# Patient Record
Sex: FEMALE | Race: WHITE | Employment: OTHER | URBAN - METROPOLITAN AREA
[De-identification: names, ages, dates, MRNs, and addresses within clinical notes are randomized per-mention and may not be internally consistent; named-entity substitution may affect disease eponyms.]

---

## 2018-01-23 ENCOUNTER — OFFICE VISIT (OUTPATIENT)
Dept: ORTHOPEDIC SURGERY | Age: 68
End: 2018-01-23
Payer: MEDICARE

## 2018-01-23 VITALS — HEIGHT: 67 IN | WEIGHT: 193 LBS | BODY MASS INDEX: 30.29 KG/M2

## 2018-01-23 DIAGNOSIS — M96.1 POSTLAMINECTOMY SYNDROME, LUMBAR REGION: ICD-10-CM

## 2018-01-23 DIAGNOSIS — M48.061 SPINAL STENOSIS, LUMBAR REGION, WITHOUT NEUROGENIC CLAUDICATION: ICD-10-CM

## 2018-01-23 DIAGNOSIS — G89.29 CHRONIC MIDLINE LOW BACK PAIN WITHOUT SCIATICA: Primary | ICD-10-CM

## 2018-01-23 DIAGNOSIS — M54.50 CHRONIC MIDLINE LOW BACK PAIN WITHOUT SCIATICA: Primary | ICD-10-CM

## 2018-01-23 DIAGNOSIS — M43.10 ACQUIRED SPONDYLOLISTHESIS: ICD-10-CM

## 2018-01-23 PROCEDURE — G8598 ASA/ANTIPLAT THER USED: HCPCS | Performed by: ORTHOPAEDIC SURGERY

## 2018-01-23 PROCEDURE — 4040F PNEUMOC VAC/ADMIN/RCVD: CPT | Performed by: ORTHOPAEDIC SURGERY

## 2018-01-23 PROCEDURE — 1090F PRES/ABSN URINE INCON ASSESS: CPT | Performed by: ORTHOPAEDIC SURGERY

## 2018-01-23 PROCEDURE — G8419 CALC BMI OUT NRM PARAM NOF/U: HCPCS | Performed by: ORTHOPAEDIC SURGERY

## 2018-01-23 PROCEDURE — G8427 DOCREV CUR MEDS BY ELIG CLIN: HCPCS | Performed by: ORTHOPAEDIC SURGERY

## 2018-01-23 PROCEDURE — 3014F SCREEN MAMMO DOC REV: CPT | Performed by: ORTHOPAEDIC SURGERY

## 2018-01-23 PROCEDURE — 1036F TOBACCO NON-USER: CPT | Performed by: ORTHOPAEDIC SURGERY

## 2018-01-23 PROCEDURE — 99203 OFFICE O/P NEW LOW 30 MIN: CPT | Performed by: ORTHOPAEDIC SURGERY

## 2018-01-23 PROCEDURE — G8484 FLU IMMUNIZE NO ADMIN: HCPCS | Performed by: ORTHOPAEDIC SURGERY

## 2018-01-23 PROCEDURE — G8400 PT W/DXA NO RESULTS DOC: HCPCS | Performed by: ORTHOPAEDIC SURGERY

## 2018-01-23 PROCEDURE — 3017F COLORECTAL CA SCREEN DOC REV: CPT | Performed by: ORTHOPAEDIC SURGERY

## 2018-01-23 PROCEDURE — 1123F ACP DISCUSS/DSCN MKR DOCD: CPT | Performed by: ORTHOPAEDIC SURGERY

## 2018-01-23 ASSESSMENT — ENCOUNTER SYMPTOMS: BACK PAIN: 1

## 2018-01-23 NOTE — PROGRESS NOTES
Subjective:      Patient ID: Caden Menchaca is a 75 y.o. female. Back Pain   This is a chronic problem. The current episode started more than 1 year ago. The problem occurs constantly. The problem is unchanged. The pain is present in the gluteal and lumbar spine. The quality of the pain is described as aching. The pain radiates to the right thigh. The pain is severe. The pain is the same all the time. The symptoms are aggravated by standing, position and bending. Stiffness is present all day. Risk factors include obesity. Patient presents with chronic low back pain with neurogenic claudication radiation into the right greater than left buttock and intermittently down the leg. Patient with positive claudication symptoms. Patient with history of L3 to 5 PLIF in 2012. Patient's symptoms have been fairly constant since that time. Patient has been trialed with a spinal cord stimulator with 85-90% relief of pain. Review of Systems   Musculoskeletal: Positive for back pain. All other systems reviewed and are negative. Objective:   Physical Exam   Constitutional: She is oriented to person, place, and time. She appears well-developed and well-nourished. HENT:   Head: Normocephalic and atraumatic. Eyes: Conjunctivae and EOM are normal.   Neck: Normal range of motion. Pulmonary/Chest: Effort normal. No respiratory distress. Neurological: She is alert and oriented to person, place, and time. She has normal strength. No sensory deficit. Normal gait   Skin: Skin is warm and dry. Psychiatric: Her behavior is normal. Thought content normal.   Nursing note and vitals reviewed.     Diagnostic x-rays AP lateral with lateral flexion and extension obtained reviewed by myself in clinic today - hips normal.    Very satisfactory radiographic appearance of 4 to the sacrum fusion mild retrolisthesis L1 to correction L2-3 with significant degenerative disc disease at L3 4    MRI lumbar spine is reviewed patient with mild to moderate lumbar spinal stenosis L2-3 moderate to moderately severe lumbar spinal stenosis L3 4 history of L3 to 5 PLIF  Assessment:      Encounter Diagnoses   Name Primary?  Chronic midline low back pain without sciatica Yes    Postlaminectomy syndrome, lumbar region     Spinal stenosis, lumbar region, without neurogenic claudication     Acquired spondylolisthesis      Chronic low back pain and neurogenic claudication symptoms    Postlaminectomy syndrome.     Symptoms have been remarkably stable since index surgery in 2012th    85-90% relief reported following spinal cord stimulator trial      Plan:      MRI thoracic spine to ensure room for laminotomy lead    Follow-up    Follow-up will likely schedule for spinal cord stimulator

## 2018-02-12 ENCOUNTER — HOSPITAL ENCOUNTER (OUTPATIENT)
Dept: MRI IMAGING | Age: 68
Discharge: HOME | End: 2018-02-14
Payer: MEDICARE

## 2018-02-12 DIAGNOSIS — M48.061 SPINAL STENOSIS, LUMBAR REGION, WITHOUT NEUROGENIC CLAUDICATION: ICD-10-CM

## 2018-02-12 DIAGNOSIS — M96.1 POSTLAMINECTOMY SYNDROME, LUMBAR REGION: ICD-10-CM

## 2018-02-12 DIAGNOSIS — M43.10 ACQUIRED SPONDYLOLISTHESIS: ICD-10-CM

## 2018-02-12 DIAGNOSIS — G89.29 CHRONIC MIDLINE LOW BACK PAIN WITHOUT SCIATICA: ICD-10-CM

## 2018-02-12 DIAGNOSIS — M54.50 CHRONIC MIDLINE LOW BACK PAIN WITHOUT SCIATICA: ICD-10-CM

## 2018-02-12 PROCEDURE — 72146 MRI CHEST SPINE W/O DYE: CPT

## 2018-07-03 ENCOUNTER — OFFICE VISIT (OUTPATIENT)
Dept: ORTHOPEDIC SURGERY | Age: 68
End: 2018-07-03
Payer: MEDICARE

## 2018-07-03 DIAGNOSIS — M43.10 ACQUIRED SPONDYLOLISTHESIS: Primary | ICD-10-CM

## 2018-07-03 DIAGNOSIS — G89.29 CHRONIC MIDLINE LOW BACK PAIN WITHOUT SCIATICA: ICD-10-CM

## 2018-07-03 DIAGNOSIS — M48.061 SPINAL STENOSIS, LUMBAR REGION, WITHOUT NEUROGENIC CLAUDICATION: ICD-10-CM

## 2018-07-03 DIAGNOSIS — M96.1 POSTLAMINECTOMY SYNDROME, LUMBAR REGION: ICD-10-CM

## 2018-07-03 DIAGNOSIS — M54.50 CHRONIC MIDLINE LOW BACK PAIN WITHOUT SCIATICA: ICD-10-CM

## 2018-07-03 PROCEDURE — 1123F ACP DISCUSS/DSCN MKR DOCD: CPT | Performed by: ORTHOPAEDIC SURGERY

## 2018-07-03 PROCEDURE — 4004F PT TOBACCO SCREEN RCVD TLK: CPT | Performed by: ORTHOPAEDIC SURGERY

## 2018-07-03 PROCEDURE — G8400 PT W/DXA NO RESULTS DOC: HCPCS | Performed by: ORTHOPAEDIC SURGERY

## 2018-07-03 PROCEDURE — 3017F COLORECTAL CA SCREEN DOC REV: CPT | Performed by: ORTHOPAEDIC SURGERY

## 2018-07-03 PROCEDURE — G8598 ASA/ANTIPLAT THER USED: HCPCS | Performed by: ORTHOPAEDIC SURGERY

## 2018-07-03 PROCEDURE — 1090F PRES/ABSN URINE INCON ASSESS: CPT | Performed by: ORTHOPAEDIC SURGERY

## 2018-07-03 PROCEDURE — G8419 CALC BMI OUT NRM PARAM NOF/U: HCPCS | Performed by: ORTHOPAEDIC SURGERY

## 2018-07-03 PROCEDURE — 99213 OFFICE O/P EST LOW 20 MIN: CPT | Performed by: ORTHOPAEDIC SURGERY

## 2018-07-03 PROCEDURE — G8427 DOCREV CUR MEDS BY ELIG CLIN: HCPCS | Performed by: ORTHOPAEDIC SURGERY

## 2018-07-03 PROCEDURE — 4040F PNEUMOC VAC/ADMIN/RCVD: CPT | Performed by: ORTHOPAEDIC SURGERY

## 2018-07-13 ENCOUNTER — TELEPHONE (OUTPATIENT)
Dept: ORTHOPEDIC SURGERY | Age: 68
End: 2018-07-13

## 2018-07-13 NOTE — TELEPHONE ENCOUNTER
Patient calling in for you to send over surgery information to her cardiologist for clearance for her 8/8 spinal stimulator surgery with  [facility]. Her PAT is not until 7/25/18, but the cardiologist will not make her an appointment for clearance until we send all in the information with the request to them. I didn't see anything in the computer, wasn't sure if you already did or not.     Cardiologist is Dr. Marcelle Slater in [facility], fax

## 2018-07-25 ENCOUNTER — HOSPITAL ENCOUNTER (OUTPATIENT)
Dept: PREADMISSION TESTING | Age: 68
Discharge: HOME | End: 2018-07-29
Payer: MEDICARE

## 2018-07-25 VITALS
HEIGHT: 67 IN | SYSTOLIC BLOOD PRESSURE: 126 MMHG | WEIGHT: 198 LBS | OXYGEN SATURATION: 96 % | HEART RATE: 62 BPM | BODY MASS INDEX: 31.08 KG/M2 | TEMPERATURE: 97.4 F | RESPIRATION RATE: 16 BRPM | DIASTOLIC BLOOD PRESSURE: 63 MMHG

## 2018-07-25 LAB
ABSOLUTE EOS #: 0.1 K/UL (ref 0–0.4)
ABSOLUTE IMMATURE GRANULOCYTE: ABNORMAL K/UL (ref 0–0.3)
ABSOLUTE LYMPH #: 1.2 K/UL (ref 1–4.8)
ABSOLUTE MONO #: 0.4 K/UL (ref 0.1–1.3)
ANION GAP SERPL CALCULATED.3IONS-SCNC: 12 MMOL/L (ref 9–17)
BASOPHILS # BLD: 1 % (ref 0–2)
BASOPHILS ABSOLUTE: 0 K/UL (ref 0–0.2)
BUN BLDV-MCNC: 26 MG/DL (ref 8–23)
BUN/CREAT BLD: ABNORMAL (ref 9–20)
CALCIUM SERPL-MCNC: 10 MG/DL (ref 8.6–10.4)
CHLORIDE BLD-SCNC: 105 MMOL/L (ref 98–107)
CO2: 26 MMOL/L (ref 20–31)
CREAT SERPL-MCNC: 1.07 MG/DL (ref 0.5–0.9)
DIFFERENTIAL TYPE: ABNORMAL
EKG ATRIAL RATE: 62 BPM
EKG P AXIS: 32 DEGREES
EKG P-R INTERVAL: 158 MS
EKG Q-T INTERVAL: 434 MS
EKG QRS DURATION: 84 MS
EKG QTC CALCULATION (BAZETT): 440 MS
EKG R AXIS: 1 DEGREES
EKG T AXIS: 54 DEGREES
EKG VENTRICULAR RATE: 62 BPM
EOSINOPHILS RELATIVE PERCENT: 4 % (ref 0–4)
GFR AFRICAN AMERICAN: >60 ML/MIN
GFR NON-AFRICAN AMERICAN: 51 ML/MIN
GFR SERPL CREATININE-BSD FRML MDRD: ABNORMAL ML/MIN/{1.73_M2}
GFR SERPL CREATININE-BSD FRML MDRD: ABNORMAL ML/MIN/{1.73_M2}
GLUCOSE BLD-MCNC: 99 MG/DL (ref 70–99)
HCT VFR BLD CALC: 39.4 % (ref 36–46)
HEMOGLOBIN: 13.6 G/DL (ref 12–16)
IMMATURE GRANULOCYTES: ABNORMAL %
LYMPHOCYTES # BLD: 31 % (ref 24–44)
MCH RBC QN AUTO: 32.3 PG (ref 26–34)
MCHC RBC AUTO-ENTMCNC: 34.5 G/DL (ref 31–37)
MCV RBC AUTO: 93.6 FL (ref 80–100)
MONOCYTES # BLD: 9 % (ref 1–7)
NRBC AUTOMATED: ABNORMAL PER 100 WBC
PDW BLD-RTO: 13.8 % (ref 11.5–14.9)
PLATELET # BLD: 193 K/UL (ref 150–450)
PLATELET ESTIMATE: ABNORMAL
PMV BLD AUTO: 8 FL (ref 6–12)
POTASSIUM SERPL-SCNC: 4 MMOL/L (ref 3.7–5.3)
RBC # BLD: 4.2 M/UL (ref 4–5.2)
RBC # BLD: ABNORMAL 10*6/UL
SEG NEUTROPHILS: 55 % (ref 36–66)
SEGMENTED NEUTROPHILS ABSOLUTE COUNT: 2.2 K/UL (ref 1.3–9.1)
SODIUM BLD-SCNC: 143 MMOL/L (ref 135–144)
WBC # BLD: 3.9 K/UL (ref 3.5–11)
WBC # BLD: ABNORMAL 10*3/UL

## 2018-07-25 PROCEDURE — 93005 ELECTROCARDIOGRAM TRACING: CPT

## 2018-07-25 PROCEDURE — 85025 COMPLETE CBC W/AUTO DIFF WBC: CPT

## 2018-07-25 PROCEDURE — 36415 COLL VENOUS BLD VENIPUNCTURE: CPT

## 2018-07-25 PROCEDURE — 80048 BASIC METABOLIC PNL TOTAL CA: CPT

## 2018-07-25 RX ORDER — ASPIRIN 81 MG/1
81 TABLET ORAL DAILY
Status: ON HOLD | COMMUNITY
End: 2021-08-18 | Stop reason: HOSPADM

## 2018-07-25 RX ORDER — MULTIVIT-MIN/IRON/FOLIC ACID/K 18-600-40
2000 CAPSULE ORAL DAILY
COMMUNITY
End: 2019-07-16 | Stop reason: ALTCHOICE

## 2018-07-25 ASSESSMENT — PAIN DESCRIPTION - LOCATION: LOCATION: BACK

## 2018-07-25 ASSESSMENT — PAIN DESCRIPTION - FREQUENCY: FREQUENCY: CONTINUOUS

## 2018-07-25 ASSESSMENT — PAIN DESCRIPTION - ORIENTATION: ORIENTATION: LOWER

## 2018-07-25 ASSESSMENT — PAIN SCALES - GENERAL: PAINLEVEL_OUTOF10: 8

## 2018-07-25 ASSESSMENT — PAIN DESCRIPTION - ONSET: ONSET: ON-GOING

## 2018-07-25 ASSESSMENT — PAIN DESCRIPTION - DIRECTION: RADIATING_TOWARDS: RIGHT LEG

## 2018-07-25 ASSESSMENT — PAIN DESCRIPTION - PAIN TYPE: TYPE: CHRONIC PAIN

## 2018-07-25 NOTE — H&P
 KIDNEY STONE SURGERY      x 3    LITHOTRIPSY      x 3    PARATHYROIDECTOMY      VARICOSE VEIN SURGERY       FAMILY HISTORY       Family History   Problem Relation Age of Onset    Heart Disease Father      SOCIAL HISTORY       Social History     Social History    Marital status:      Spouse name: N/A    Number of children: N/A    Years of education: N/A     Social History Main Topics    Smoking status: Former Smoker     Packs/day: 1.00     Years: 44.00     Types: Cigarettes     Quit date: 5/25/2010    Smokeless tobacco: Never Used    Alcohol use No    Drug use: No    Sexual activity: Not Asked     Other Topics Concern    None     Social History Narrative    None     REVIEW OF SYSTEMS      Allergies   Allergen Reactions    Toradol [Ketorolac Tromethamine] Swelling     Swelling at site of injection     Current Outpatient Prescriptions on File Prior to Encounter   Medication Sig Dispense Refill    carvedilol (COREG) 12.5 MG tablet Take 1 tablet by mouth 2 times daily (with meals) (Patient taking differently: Take 25 mg by mouth 2 times daily (with meals) ) 60 tablet 3    pantoprazole (PROTONIX) 40 MG tablet Take 1 tablet by mouth daily 30 tablet 0    DULoxetine (CYMBALTA) 60 MG capsule Take 60 mg by mouth daily      gabapentin (NEURONTIN) 300 MG capsule Take 300 mg by mouth 3 times daily. Ely Hoke pravastatin (PRAVACHOL) 80 MG tablet Take 80 mg by mouth nightly      rOPINIRole (REQUIP) 1 MG tablet Take 1 mg by mouth nightly      tiZANidine (ZANAFLEX) 4 MG tablet Take 4 mg by mouth 2 times daily       No current facility-administered medications on file prior to encounter. General health:  Feel well today. No fever or chills. Skin:  No itching, redness or rash. HEENT:  No headache, rhinorrhea, congestion or sore throat. Neck:  No pain, stiffness or masses.                  Cardiovascular/Respiratory system:  No chest pain, palpitations, cough or shortness of breath. Gastrointestinal tract: No abdominal pain, nausea, vomiting, diarrhea or constipation. Genitourinary:  No dysuria, hesitancy, urgency, frequency or discoloration of urine. Locomotor:  See HPI. Neuropsychiatric:  No referable complaints. GENERAL PHYSICAL EXAM:     Vitals: /63   Pulse 62   Temp 97.4 °F (36.3 °C) (Oral)   Resp 16   Ht 5' 7\" (1.702 m)   Wt 198 lb (89.8 kg)   SpO2 96%   BMI 31.01 kg/m²  Body mass index is 31.01 kg/m². GENERAL APPEARANCE: Gabriella Brand is a 75 y.o.  female, mildly obese, nourished, conscious, alert. Does not appear to be in any distress or pain at this time. SKIN:  Normal temperature, turgor and texture. No cyanosis or jaundice. HEAD:  Normocephalic, atraumatic. EYES:  Pupils equal, reactive to light and accomodation. Conjunctiva clear. THROAT:  Mucous membranes moist. No tonsillar erythema or exudates. NECK:  No stiffness, trachea central.  No palpable masses. CHEST:  Symmetrical and equal on expansion. HEART:  Normotensive. Regular rate, rhythm. No murmur. LUNGS:  Equal on expansion. Clear to auscultation with no adventitious sounds. ABDOMEN:  Obese. Soft on palpation. No localized tenderness, guarding or rigidity. No palpable organomegaly. LYMPHATICS:  No palpable cervical lymphadenopathy. LOCOMOTOR, BACK AND SPINE:  Mild tenderness to palpation of lumbar spine. No obvious deformities. No flank tenderness. EXTREMITIES:  Symmetrical with no pretibial/pedal edema. No discoloration or ulcerations. No warmth, tenderness, erythema noted in lower legs bilaterally. Strength 5/5 in lower extremities bilaterally, no sensory deficit.      NEUROLOGIC:  The patient is conscious,

## 2018-07-26 ENCOUNTER — ANESTHESIA EVENT (OUTPATIENT)
Dept: OPERATING ROOM | Age: 68
End: 2018-07-26
Payer: MEDICARE

## 2018-08-08 ENCOUNTER — APPOINTMENT (OUTPATIENT)
Dept: GENERAL RADIOLOGY | Age: 68
End: 2018-08-08
Attending: ORTHOPAEDIC SURGERY
Payer: MEDICARE

## 2018-08-08 ENCOUNTER — ANESTHESIA (OUTPATIENT)
Dept: OPERATING ROOM | Age: 68
End: 2018-08-08
Payer: MEDICARE

## 2018-08-08 ENCOUNTER — HOSPITAL ENCOUNTER (OUTPATIENT)
Age: 68
Setting detail: OUTPATIENT SURGERY
Discharge: HOME | End: 2018-08-08
Attending: ORTHOPAEDIC SURGERY | Admitting: ORTHOPAEDIC SURGERY
Payer: MEDICARE

## 2018-08-08 VITALS
DIASTOLIC BLOOD PRESSURE: 78 MMHG | HEART RATE: 65 BPM | OXYGEN SATURATION: 93 % | TEMPERATURE: 97.8 F | SYSTOLIC BLOOD PRESSURE: 161 MMHG | BODY MASS INDEX: 31.08 KG/M2 | HEIGHT: 67 IN | RESPIRATION RATE: 16 BRPM | WEIGHT: 198 LBS

## 2018-08-08 VITALS
OXYGEN SATURATION: 97 % | RESPIRATION RATE: 10 BRPM | SYSTOLIC BLOOD PRESSURE: 107 MMHG | DIASTOLIC BLOOD PRESSURE: 63 MMHG

## 2018-08-08 DIAGNOSIS — M96.1 POSTLAMINECTOMY SYNDROME, LUMBAR REGION: Primary | ICD-10-CM

## 2018-08-08 PROCEDURE — 2500000003 HC RX 250 WO HCPCS: Performed by: ORTHOPAEDIC SURGERY

## 2018-08-08 PROCEDURE — 2580000003 HC RX 258: Performed by: ORTHOPAEDIC SURGERY

## 2018-08-08 PROCEDURE — 3600000002 HC SURGERY LEVEL 2 BASE: Performed by: ORTHOPAEDIC SURGERY

## 2018-08-08 PROCEDURE — 7100000000 HC PACU RECOVERY - FIRST 15 MIN: Performed by: ORTHOPAEDIC SURGERY

## 2018-08-08 PROCEDURE — 2500000003 HC RX 250 WO HCPCS: Performed by: NURSE ANESTHETIST, CERTIFIED REGISTERED

## 2018-08-08 PROCEDURE — 3600000012 HC SURGERY LEVEL 2 ADDTL 15MIN: Performed by: ORTHOPAEDIC SURGERY

## 2018-08-08 PROCEDURE — 3700000000 HC ANESTHESIA ATTENDED CARE: Performed by: ORTHOPAEDIC SURGERY

## 2018-08-08 PROCEDURE — 63655 IMPLANT NEUROELECTRODES: CPT | Performed by: ORTHOPAEDIC SURGERY

## 2018-08-08 PROCEDURE — 6370000000 HC RX 637 (ALT 250 FOR IP): Performed by: ANESTHESIOLOGY

## 2018-08-08 PROCEDURE — 6360000002 HC RX W HCPCS: Performed by: ORTHOPAEDIC SURGERY

## 2018-08-08 PROCEDURE — 63685 INS/RPLC SPI NPG/RCVR POCKET: CPT | Performed by: ORTHOPAEDIC SURGERY

## 2018-08-08 PROCEDURE — C1787 PATIENT PROGR, NEUROSTIM: HCPCS | Performed by: ORTHOPAEDIC SURGERY

## 2018-08-08 PROCEDURE — 6360000002 HC RX W HCPCS: Performed by: NURSE ANESTHETIST, CERTIFIED REGISTERED

## 2018-08-08 PROCEDURE — 7100000001 HC PACU RECOVERY - ADDTL 15 MIN: Performed by: ORTHOPAEDIC SURGERY

## 2018-08-08 PROCEDURE — 7100000011 HC PHASE II RECOVERY - ADDTL 15 MIN: Performed by: ORTHOPAEDIC SURGERY

## 2018-08-08 PROCEDURE — 7100000031 HC ASPR PHASE II RECOVERY - ADDTL 15 MIN: Performed by: ORTHOPAEDIC SURGERY

## 2018-08-08 PROCEDURE — 3209999900 FLUORO FOR SURGICAL PROCEDURES

## 2018-08-08 PROCEDURE — 7100000030 HC ASPR PHASE II RECOVERY - FIRST 15 MIN: Performed by: ORTHOPAEDIC SURGERY

## 2018-08-08 PROCEDURE — 2720000010 HC SURG SUPPLY STERILE: Performed by: ORTHOPAEDIC SURGERY

## 2018-08-08 PROCEDURE — 3700000001 HC ADD 15 MINUTES (ANESTHESIA): Performed by: ORTHOPAEDIC SURGERY

## 2018-08-08 PROCEDURE — C1822 GEN, NEURO, HF, RECHG BAT: HCPCS | Performed by: ORTHOPAEDIC SURGERY

## 2018-08-08 PROCEDURE — 72070 X-RAY EXAM THORAC SPINE 2VWS: CPT

## 2018-08-08 PROCEDURE — C1778 LEAD, NEUROSTIMULATOR: HCPCS | Performed by: ORTHOPAEDIC SURGERY

## 2018-08-08 PROCEDURE — 2709999900 HC NON-CHARGEABLE SUPPLY: Performed by: ORTHOPAEDIC SURGERY

## 2018-08-08 PROCEDURE — 6360000002 HC RX W HCPCS: Performed by: ANESTHESIOLOGY

## 2018-08-08 PROCEDURE — 7100000010 HC PHASE II RECOVERY - FIRST 15 MIN: Performed by: ORTHOPAEDIC SURGERY

## 2018-08-08 DEVICE — SURGICAL LEAD KIT, 50CM
Type: IMPLANTABLE DEVICE | Site: BACK | Status: FUNCTIONAL
Brand: NEVRO®

## 2018-08-08 DEVICE — SENZA®  IPG KIT
Type: IMPLANTABLE DEVICE | Site: BACK | Status: FUNCTIONAL
Brand: SENZA®

## 2018-08-08 RX ORDER — NEOSTIGMINE METHYLSULFATE 1 MG/ML
INJECTION, SOLUTION INTRAVENOUS PRN
Status: DISCONTINUED | OUTPATIENT
Start: 2018-08-08 | End: 2018-08-08 | Stop reason: SDUPTHER

## 2018-08-08 RX ORDER — FENTANYL CITRATE 50 UG/ML
25 INJECTION, SOLUTION INTRAMUSCULAR; INTRAVENOUS EVERY 5 MIN PRN
Status: DISCONTINUED | OUTPATIENT
Start: 2018-08-08 | End: 2018-08-08 | Stop reason: HOSPADM

## 2018-08-08 RX ORDER — MORPHINE SULFATE 10 MG/ML
INJECTION, SOLUTION INTRAMUSCULAR; INTRAVENOUS PRN
Status: DISCONTINUED | OUTPATIENT
Start: 2018-08-08 | End: 2018-08-08 | Stop reason: SDUPTHER

## 2018-08-08 RX ORDER — MORPHINE SULFATE 2 MG/ML
1 INJECTION, SOLUTION INTRAMUSCULAR; INTRAVENOUS EVERY 5 MIN PRN
Status: DISCONTINUED | OUTPATIENT
Start: 2018-08-08 | End: 2018-08-08 | Stop reason: HOSPADM

## 2018-08-08 RX ORDER — OXYCODONE HYDROCHLORIDE AND ACETAMINOPHEN 5; 325 MG/1; MG/1
2 TABLET ORAL PRN
Status: COMPLETED | OUTPATIENT
Start: 2018-08-08 | End: 2018-08-08

## 2018-08-08 RX ORDER — OXYCODONE HYDROCHLORIDE AND ACETAMINOPHEN 5; 325 MG/1; MG/1
1 TABLET ORAL PRN
Status: COMPLETED | OUTPATIENT
Start: 2018-08-08 | End: 2018-08-08

## 2018-08-08 RX ORDER — CEFAZOLIN SODIUM 1 G/3ML
INJECTION, POWDER, FOR SOLUTION INTRAMUSCULAR; INTRAVENOUS
Status: DISCONTINUED
Start: 2018-08-08 | End: 2018-08-08 | Stop reason: HOSPADM

## 2018-08-08 RX ORDER — BUPIVACAINE HYDROCHLORIDE AND EPINEPHRINE 5; 5 MG/ML; UG/ML
INJECTION, SOLUTION EPIDURAL; INTRACAUDAL; PERINEURAL PRN
Status: DISCONTINUED | OUTPATIENT
Start: 2018-08-08 | End: 2018-08-08 | Stop reason: HOSPADM

## 2018-08-08 RX ORDER — MIDAZOLAM HYDROCHLORIDE 1 MG/ML
INJECTION INTRAMUSCULAR; INTRAVENOUS PRN
Status: DISCONTINUED | OUTPATIENT
Start: 2018-08-08 | End: 2018-08-08 | Stop reason: SDUPTHER

## 2018-08-08 RX ORDER — DIPHENHYDRAMINE HYDROCHLORIDE 50 MG/ML
12.5 INJECTION INTRAMUSCULAR; INTRAVENOUS
Status: DISCONTINUED | OUTPATIENT
Start: 2018-08-08 | End: 2018-08-08 | Stop reason: HOSPADM

## 2018-08-08 RX ORDER — MEPERIDINE HYDROCHLORIDE 50 MG/ML
12.5 INJECTION INTRAMUSCULAR; INTRAVENOUS; SUBCUTANEOUS EVERY 5 MIN PRN
Status: DISCONTINUED | OUTPATIENT
Start: 2018-08-08 | End: 2018-08-08 | Stop reason: HOSPADM

## 2018-08-08 RX ORDER — PROPOFOL 10 MG/ML
INJECTION, EMULSION INTRAVENOUS PRN
Status: DISCONTINUED | OUTPATIENT
Start: 2018-08-08 | End: 2018-08-08 | Stop reason: SDUPTHER

## 2018-08-08 RX ORDER — TRANEXAMIC ACID 100 MG/ML
INJECTION, SOLUTION INTRAVENOUS PRN
Status: DISCONTINUED | OUTPATIENT
Start: 2018-08-08 | End: 2018-08-08 | Stop reason: SDUPTHER

## 2018-08-08 RX ORDER — DEXAMETHASONE SODIUM PHOSPHATE 4 MG/ML
INJECTION, SOLUTION INTRA-ARTICULAR; INTRALESIONAL; INTRAMUSCULAR; INTRAVENOUS; SOFT TISSUE PRN
Status: DISCONTINUED | OUTPATIENT
Start: 2018-08-08 | End: 2018-08-08 | Stop reason: SDUPTHER

## 2018-08-08 RX ORDER — FENTANYL CITRATE 50 UG/ML
INJECTION, SOLUTION INTRAMUSCULAR; INTRAVENOUS PRN
Status: DISCONTINUED | OUTPATIENT
Start: 2018-08-08 | End: 2018-08-08 | Stop reason: SDUPTHER

## 2018-08-08 RX ORDER — GLYCOPYRROLATE 1 MG/5 ML
SYRINGE (ML) INTRAVENOUS PRN
Status: DISCONTINUED | OUTPATIENT
Start: 2018-08-08 | End: 2018-08-08 | Stop reason: SDUPTHER

## 2018-08-08 RX ORDER — OXYCODONE HYDROCHLORIDE AND ACETAMINOPHEN 5; 325 MG/1; MG/1
1-2 TABLET ORAL EVERY 4 HOURS PRN
Qty: 40 TABLET | Refills: 0 | Status: SHIPPED | OUTPATIENT
Start: 2018-08-08 | End: 2019-08-08

## 2018-08-08 RX ORDER — SODIUM CHLORIDE, SODIUM LACTATE, POTASSIUM CHLORIDE, CALCIUM CHLORIDE 600; 310; 30; 20 MG/100ML; MG/100ML; MG/100ML; MG/100ML
INJECTION, SOLUTION INTRAVENOUS CONTINUOUS
Status: DISCONTINUED | OUTPATIENT
Start: 2018-08-08 | End: 2018-08-08 | Stop reason: HOSPADM

## 2018-08-08 RX ORDER — PROMETHAZINE HYDROCHLORIDE 25 MG/ML
6.25 INJECTION, SOLUTION INTRAMUSCULAR; INTRAVENOUS
Status: COMPLETED | OUTPATIENT
Start: 2018-08-08 | End: 2018-08-08

## 2018-08-08 RX ORDER — ROCURONIUM BROMIDE 10 MG/ML
INJECTION, SOLUTION INTRAVENOUS PRN
Status: DISCONTINUED | OUTPATIENT
Start: 2018-08-08 | End: 2018-08-08 | Stop reason: SDUPTHER

## 2018-08-08 RX ADMIN — Medication 0.5 MG: at 11:40

## 2018-08-08 RX ADMIN — NEOSTIGMINE METHYLSULFATE 3 MG: 1 INJECTION, SOLUTION INTRAVENOUS at 11:40

## 2018-08-08 RX ADMIN — OXYCODONE HYDROCHLORIDE AND ACETAMINOPHEN 2 TABLET: 5; 325 TABLET ORAL at 13:37

## 2018-08-08 RX ADMIN — HYDROMORPHONE HYDROCHLORIDE 0.5 MG: 1 INJECTION, SOLUTION INTRAMUSCULAR; INTRAVENOUS; SUBCUTANEOUS at 12:00

## 2018-08-08 RX ADMIN — SODIUM CHLORIDE, POTASSIUM CHLORIDE, SODIUM LACTATE AND CALCIUM CHLORIDE: 600; 310; 30; 20 INJECTION, SOLUTION INTRAVENOUS at 10:09

## 2018-08-08 RX ADMIN — FENTANYL CITRATE 100 MCG: 50 INJECTION, SOLUTION INTRAMUSCULAR; INTRAVENOUS at 10:12

## 2018-08-08 RX ADMIN — Medication 2 G: at 10:09

## 2018-08-08 RX ADMIN — PROPOFOL 200 MG: 10 INJECTION, EMULSION INTRAVENOUS at 10:12

## 2018-08-08 RX ADMIN — SODIUM CHLORIDE, POTASSIUM CHLORIDE, SODIUM LACTATE AND CALCIUM CHLORIDE: 600; 310; 30; 20 INJECTION, SOLUTION INTRAVENOUS at 07:39

## 2018-08-08 RX ADMIN — ROCURONIUM BROMIDE 40 MG: 10 INJECTION INTRAVENOUS at 10:12

## 2018-08-08 RX ADMIN — PHENYLEPHRINE HYDROCHLORIDE 100 MCG: 10 INJECTION INTRAVENOUS at 10:57

## 2018-08-08 RX ADMIN — PROMETHAZINE HYDROCHLORIDE 6.25 MG: 25 INJECTION INTRAMUSCULAR; INTRAVENOUS at 12:15

## 2018-08-08 RX ADMIN — MORPHINE SULFATE 8 MG: 10 INJECTION INTRAVENOUS at 11:19

## 2018-08-08 RX ADMIN — TRANEXAMIC ACID 1000 MG: 100 INJECTION, SOLUTION INTRAVENOUS at 10:23

## 2018-08-08 RX ADMIN — MIDAZOLAM 2 MG: 1 INJECTION INTRAMUSCULAR; INTRAVENOUS at 10:09

## 2018-08-08 RX ADMIN — PHENYLEPHRINE HYDROCHLORIDE 100 MCG: 10 INJECTION INTRAVENOUS at 10:47

## 2018-08-08 RX ADMIN — DEXAMETHASONE SODIUM PHOSPHATE 8 MG: 4 INJECTION, SOLUTION INTRAMUSCULAR; INTRAVENOUS at 10:23

## 2018-08-08 RX ADMIN — Medication 0.3 MG: at 10:22

## 2018-08-08 ASSESSMENT — PULMONARY FUNCTION TESTS
PIF_VALUE: 16
PIF_VALUE: 17
PIF_VALUE: 16
PIF_VALUE: 3
PIF_VALUE: 16
PIF_VALUE: 16
PIF_VALUE: 5
PIF_VALUE: 10
PIF_VALUE: 15
PIF_VALUE: 14
PIF_VALUE: 16
PIF_VALUE: 3
PIF_VALUE: 16
PIF_VALUE: 16
PIF_VALUE: 17
PIF_VALUE: 10
PIF_VALUE: 16
PIF_VALUE: 16
PIF_VALUE: 17
PIF_VALUE: 17
PIF_VALUE: 10
PIF_VALUE: 16
PIF_VALUE: 1
PIF_VALUE: 16
PIF_VALUE: 17
PIF_VALUE: 15
PIF_VALUE: 16
PIF_VALUE: 10
PIF_VALUE: 15
PIF_VALUE: 5
PIF_VALUE: 17
PIF_VALUE: 16
PIF_VALUE: 17
PIF_VALUE: 17
PIF_VALUE: 15
PIF_VALUE: 17
PIF_VALUE: 16
PIF_VALUE: 17
PIF_VALUE: 10
PIF_VALUE: 4
PIF_VALUE: 17
PIF_VALUE: 23
PIF_VALUE: 5
PIF_VALUE: 16
PIF_VALUE: 14
PIF_VALUE: 16
PIF_VALUE: 17
PIF_VALUE: 13
PIF_VALUE: 20
PIF_VALUE: 17
PIF_VALUE: 15
PIF_VALUE: 17
PIF_VALUE: 16
PIF_VALUE: 3
PIF_VALUE: 10
PIF_VALUE: 15
PIF_VALUE: 17
PIF_VALUE: 10
PIF_VALUE: 17
PIF_VALUE: 2
PIF_VALUE: 14
PIF_VALUE: 17
PIF_VALUE: 17
PIF_VALUE: 0
PIF_VALUE: 17
PIF_VALUE: 14
PIF_VALUE: 17
PIF_VALUE: 16
PIF_VALUE: 17
PIF_VALUE: 2
PIF_VALUE: 16
PIF_VALUE: 16
PIF_VALUE: 3
PIF_VALUE: 16
PIF_VALUE: 17
PIF_VALUE: 16
PIF_VALUE: 17
PIF_VALUE: 17
PIF_VALUE: 4

## 2018-08-08 ASSESSMENT — PAIN SCALES - GENERAL
PAINLEVEL_OUTOF10: 10
PAINLEVEL_OUTOF10: 7
PAINLEVEL_OUTOF10: 10
PAINLEVEL_OUTOF10: 7
PAINLEVEL_OUTOF10: 5

## 2018-08-08 ASSESSMENT — PAIN DESCRIPTION - DESCRIPTORS
DESCRIPTORS: DISCOMFORT
DESCRIPTORS: DISCOMFORT

## 2018-08-08 ASSESSMENT — PAIN DESCRIPTION - LOCATION
LOCATION: BACK
LOCATION: BACK

## 2018-08-08 ASSESSMENT — PAIN DESCRIPTION - FREQUENCY: FREQUENCY: CONTINUOUS

## 2018-08-08 ASSESSMENT — PAIN DESCRIPTION - PAIN TYPE: TYPE: SURGICAL PAIN;CHRONIC PAIN

## 2018-08-08 ASSESSMENT — PAIN - FUNCTIONAL ASSESSMENT: PAIN_FUNCTIONAL_ASSESSMENT: 0-10

## 2018-08-08 ASSESSMENT — PAIN DESCRIPTION - ORIENTATION: ORIENTATION: MID

## 2018-08-08 NOTE — BRIEF OP NOTE
Brief Postoperative Note    Amol Lester  YOB: 1950  [id number]    Pre-operative Diagnosis: post laminectomy syndrome    Post-operative Diagnosis: Same    Procedure: spinal cord stimulator; thoracic laminotomy; fluro    Anesthesia: General    Surgeons/Assistants: Jesus    Estimated Blood Loss: less than 50     Complications: None    Specimens: Was Not Obtained    Findings: Nevro    Electronically signed by Brittani Alfonso MD on 8/8/2018 at 11:41 AM

## 2018-08-08 NOTE — ANESTHESIA PRE PROCEDURE
Department of Anesthesiology  Preprocedure Note       Name:  Erik Baez   Age:  75 y.o.  :  1950                                          MRN:  [id number]         Date:  2018      Surgeon: Elidia Villavicencio):  Stephany Muñiz MD    Procedure: Procedure(s):  SPINAL CORD STIMULATOR IMPLANT    Medications prior to admission:   Prior to Admission medications    Medication Sig Start Date End Date Taking? Authorizing Provider   carvedilol (COREG) 12.5 MG tablet Take 1 tablet by mouth 2 times daily (with meals)  Patient taking differently: Take 25 mg by mouth 2 times daily (with meals)  16  Yes Daisy Eduardo MD   pantoprazole (PROTONIX) 40 MG tablet Take 1 tablet by mouth daily 16  Yes Daisy Eduardo MD   DULoxetine (CYMBALTA) 60 MG capsule Take 60 mg by mouth daily   Yes Historical Provider, MD   gabapentin (NEURONTIN) 300 MG capsule Take 300 mg by mouth 3 times daily. .   Yes Historical Provider, MD   pravastatin (PRAVACHOL) 80 MG tablet Take 80 mg by mouth nightly   Yes Historical Provider, MD   rOPINIRole (REQUIP) 1 MG tablet Take 1 mg by mouth nightly   Yes Historical Provider, MD   tiZANidine (ZANAFLEX) 4 MG tablet Take 4 mg by mouth 2 times daily   Yes Historical Provider, MD   aspirin 81 MG EC tablet Take 81 mg by mouth daily    Historical Provider, MD   Calcium Carbonate-Vitamin D (OSCAL 500/200 D-3 PO) Take 1 tablet by mouth daily    Historical Provider, MD   Cholecalciferol (VITAMIN D) 2000 units CAPS capsule Take 2,000 Units by mouth daily    Historical Provider, MD       Current medications:    Current Facility-Administered Medications   Medication Dose Route Frequency Provider Last Rate Last Dose    ceFAZolin (ANCEF) 2 g in dextrose 5 % 50 mL IVPB  2 g Intravenous Once Stephany Muñiz MD        lactated ringers infusion   Intravenous Continuous Stephany Muñiz  mL/hr at 18 7155         Allergies:     Allergies   Allergen Reactions    Toradol [Ketorolac Tromethamine] Swelling     Swelling at site of injection       Problem List:    Patient Active Problem List   Diagnosis Code    Acute cystitis without hematuria N30.00    CAD (coronary artery disease) I25.10    Hypertension I10    Lactic acid acidosis E87.2    JAZZMINE (acute kidney injury) ([facility] [address] N17.9    Septic shock ([facility] A41.9, R65.21    DIC (disseminated intravascular coagulation) ([facility] D65    Coagulopathy ([facility] [address] D68.9    Thrombocytopenia ([facility] [address] D69.6    Acute cystitis with hematuria N30.01    Pneumonia due to organism J18.9    Acquired spondylolisthesis M43.10    Spinal stenosis, lumbar region, without neurogenic claudication M48.061    Postlaminectomy syndrome, lumbar region M96.1    Chronic midline low back pain without sciatica M54.5, G89.29       Past Medical History:        Diagnosis Date    Arthritis     Blockage of coronary artery of heart ([facility] [facility] 2010    x 2 stents    CAD (coronary artery disease)     Fibromyalgia     History of blood transfusion     History of kidney stones     last one \"about 10 years ago\"    Hyperlipidemia     Hypertension     Pancreatitis 2016    Sleep apnea     uses Bipap occasionally as needed    Wears glasses     for reading       Past Surgical History:        Procedure Laterality Date    BACK SURGERY  2012    L4,L5,S1 hardware placed    CATARACT REMOVAL Bilateral 2016    COLONOSCOPY      normal    CORONARY ANGIOPLASTY  2010    x 2 stents    HERNIA REPAIR Right     inguinal    HYSTERECTOMY      KIDNEY STONE SURGERY      x 3    LITHOTRIPSY      x 3    PARATHYROIDECTOMY      VARICOSE VEIN SURGERY         Social History:    Social History   Substance Use Topics    Smoking status: Former Smoker     Packs/day: 1.00     Years: 44.00     Types: Cigarettes     Quit date: 5/25/2010    Smokeless tobacco: Never Used    Alcohol use No                                Counseling given: Not Answered      Vital Signs (Current):   Vitals:    08/08/18 0726   BP: 126/81   Pulse: 69 Resp: 16   Temp: 97.3 °F (36.3 °C)   SpO2: 92%   Weight: 198 lb (89.8 kg)   Height: 5' 7\" (1.702 m)                                              BP Readings from Last 3 Encounters:   08/08/18 126/81   07/25/18 126/63   05/16/16 111/61       NPO Status: Time of last liquid consumption: 2130                        Time of last solid consumption: 1930                        Date of last liquid consumption: 08/07/18                        Date of last solid food consumption: 08/07/18    BMI:   Wt Readings from Last 3 Encounters:   08/08/18 198 lb (89.8 kg)   07/25/18 198 lb (89.8 kg)   01/23/18 193 lb (87.5 kg)     Body mass index is 31.01 kg/m². CBC:   Lab Results   Component Value Date    WBC 3.9 07/25/2018    RBC 4.20 07/25/2018    HGB 13.6 07/25/2018    HCT 39.4 07/25/2018    MCV 93.6 07/25/2018    RDW 13.8 07/25/2018     07/25/2018       CMP:   Lab Results   Component Value Date     07/25/2018    K 4.0 07/25/2018     07/25/2018    CO2 26 07/25/2018    BUN 26 07/25/2018    CREATININE 1.07 07/25/2018    GFRAA >60 07/25/2018    LABGLOM 51 07/25/2018    GLUCOSE 99 07/25/2018    PROT 4.9 05/08/2016    CALCIUM 10.0 07/25/2018    BILITOT 0.19 05/08/2016    ALKPHOS 48 05/08/2016    AST 35 05/08/2016    ALT 27 05/08/2016       POC Tests: No results for input(s): POCGLU, POCNA, POCK, POCCL, POCBUN, POCHEMO, POCHCT in the last 72 hours.     Coags:   Lab Results   Component Value Date    PROTIME 11.3 05/10/2016    INR 1.0 05/10/2016    APTT 31.8 05/08/2016       HCG (If Applicable): No results found for: PREGTESTUR, PREGSERUM, HCG, HCGQUANT     ABGs: No results found for: PHART, PO2ART, MTC1RPE, KAC5DQY, BEART, H8NWEKEH     Type & Screen (If Applicable):  No results found for: LABABO, [address]    Anesthesia Evaluation  Patient summary reviewed and Nursing notes reviewed no history of anesthetic complications:   Airway: Mallampati: II  TM distance: >3 FB   Neck ROM: full  Mouth opening: > = 3 FB Dental: normal exam         Pulmonary:normal exam  breath sounds clear to auscultation  (+) sleep apnea:      (-) pneumonia                           Cardiovascular:    (+) hypertension: no interval change, CAD:, hyperlipidemia      ECG reviewed  Rhythm: regular  Rate: normal                    Neuro/Psych:   Negative Neuro/Psych ROS              GI/Hepatic/Renal: Neg GI/Hepatic/Renal ROS            Endo/Other:    (+) : arthritis: OA and no interval change. , . Abdominal:           Vascular: negative vascular ROS. Anesthesia Plan      general     ASA 3       Induction: intravenous. MIPS: Postoperative opioids intended and Prophylactic antiemetics administered. Anesthetic plan and risks discussed with patient. Plan discussed with CRNA.                   Sarah Beth Domingo MD   8/8/2018

## 2018-08-08 NOTE — PROGRESS NOTES
Pt has high bp. Pt reports patient has high BP in the past and higher than this reading of 161/78 especially when she has pain. Pt reports she has bp monitor at home. Writer instructs patient to check bp at home, take medications and report continued high bp to bp med prescriber. Pt verbalizes understanding.

## 2018-08-08 NOTE — H&P
Marital status:        Spouse name: N/A    Number of children: N/A    Years of education: N/A            Social History Main Topics    Smoking status: Former Smoker       Packs/day: 1.00       Years: 44.00       Types: Cigarettes       Quit date: 5/25/2010    Smokeless tobacco: Never Used    Alcohol use No    Drug use: No    Sexual activity: Not Asked           Other Topics Concern    None          Social History Narrative    None         REVIEW OF SYSTEMS             Allergies   Allergen Reactions    Toradol [Ketorolac Tromethamine] Swelling       Swelling at site of injection             Current Outpatient Prescriptions on File Prior to Encounter   Medication Sig Dispense Refill    carvedilol (COREG) 12.5 MG tablet Take 1 tablet by mouth 2 times daily (with meals) (Patient taking differently: Take 25 mg by mouth 2 times daily (with meals) ) 60 tablet 3    pantoprazole (PROTONIX) 40 MG tablet Take 1 tablet by mouth daily 30 tablet 0    DULoxetine (CYMBALTA) 60 MG capsule Take 60 mg by mouth daily        gabapentin (NEURONTIN) 300 MG capsule Take 300 mg by mouth 3 times daily. .        pravastatin (PRAVACHOL) 80 MG tablet Take 80 mg by mouth nightly        rOPINIRole (REQUIP) 1 MG tablet Take 1 mg by mouth nightly        tiZANidine (ZANAFLEX) 4 MG tablet Take 4 mg by mouth 2 times daily          No current facility-administered medications on file prior to encounter.       General health:  Feel well today. No fever or chills. Skin:  No itching, redness or rash. HEENT:  No headache, rhinorrhea, congestion or sore throat. Neck:  No pain, stiffness or masses. Cardiovascular/Respiratory system:  No chest pain, palpitations, cough or shortness of breath. Gastrointestinal tract: No abdominal pain, nausea, vomiting, diarrhea or constipation.               Genitourinary:  No dysuria, hesitancy, urgency, frequency or    Post Laminectomy Syndrome  Spinal Cord Stimulator Implant          Patient Active Problem List     Diagnosis Date Noted    Acquired spondylolisthesis 01/23/2018    Spinal stenosis, lumbar region, without neurogenic claudication 01/23/2018    Postlaminectomy syndrome, lumbar region 01/23/2018    Chronic midline low back pain without sciatica 01/23/2018    DIC (disseminated intravascular coagulation) ([address] 05/08/2016    Coagulopathy ([address] 05/08/2016    Thrombocytopenia ([address] 05/08/2016    Acute cystitis with hematuria      Pneumonia due to organism      Acute cystitis without hematuria 05/07/2016    CAD (coronary artery disease) 05/07/2016    Hypertension 05/07/2016    Lactic acid acidosis 05/07/2016    JAZZMINE (acute kidney injury) ([address] 05/07/2016    Septic shock ([address] 05/07/2016            Estela Muñoz PA-C on 7/25/2018 at 12:07 PM         Cosigned by: Melissa Parsons MD at 7/26/2018  7:50 AM

## 2018-08-09 NOTE — OP NOTE
freed up these adhesions. The paddle lead then slipped easily  into the canal.  Followup x-rays revealed that it was mostly centered over  T10. The paddle lead was advanced until it was just about dead center over  T9-10, may be electrode to caudal.  Nevertheless very acceptable, this was  verified AP, lateral.    Suture anchors were placed. Pocket for the IPG was created as well as pockets for the stress relieving  loop for the wires. The deep fascia of the thoracic spine was  reapproximated with 2-0 Vicryl suture after placing roughly 200 mg of  vancomycin in the depths of the wound. Wires were then tunneled to the pocket which was made just above the belt  line on the left hand side. IPG was connected and verified for impedance. Roughly 500 mg of vancomycin placed in the pocket for the IPG. IPG was  replaced with a wire loop behind. Deep fascia was reapproximated with a  couple of interrupted 2-0 Vicryl sutures. At the thoracic level, couple  2-0 deep sutures were utilized to try and hold the fat overlying the wire  loop and in correct position. All wounds were then reapproximated with 2-0  Vicryl suture and skin staples. Just prior to skin staples, final AP, lateral fluoroscopic images were  obtained verifying again that the paddle leads remained just slightly below  the T9-10 interspace midportion by one electrode, still remained midline,  everything looked very satisfactory. After closing the skin with staples, Aquacel dressings were applied. The  patient was awakened from anesthesia and taken to recovery room in stable  condition. ESTIMATED BLOOD LOSS:  Less than 10 mL. FLUID REPLACEMENT:  Less than 2 liters. COMPLICATIONS ARISING DURING OPERATION:  None noted.         KAYLEIGH Falk    D: 08/08/2018 11:59:17       T: 08/08/2018 22:09:31     [email or web address]  Job#: [id number]     Doc#: [id number]    CC:

## 2018-08-21 ENCOUNTER — OFFICE VISIT (OUTPATIENT)
Dept: ORTHOPEDIC SURGERY | Age: 68
End: 2018-08-21

## 2018-08-21 DIAGNOSIS — M48.061 SPINAL STENOSIS, LUMBAR REGION, WITHOUT NEUROGENIC CLAUDICATION: ICD-10-CM

## 2018-08-21 DIAGNOSIS — G89.29 CHRONIC MIDLINE LOW BACK PAIN WITHOUT SCIATICA: ICD-10-CM

## 2018-08-21 DIAGNOSIS — M96.1 POSTLAMINECTOMY SYNDROME, LUMBAR REGION: ICD-10-CM

## 2018-08-21 DIAGNOSIS — M54.50 CHRONIC MIDLINE LOW BACK PAIN WITHOUT SCIATICA: ICD-10-CM

## 2018-08-21 DIAGNOSIS — M43.10 ACQUIRED SPONDYLOLISTHESIS: Primary | ICD-10-CM

## 2018-08-21 PROCEDURE — 99024 POSTOP FOLLOW-UP VISIT: CPT | Performed by: ORTHOPAEDIC SURGERY

## 2018-09-11 ENCOUNTER — OFFICE VISIT (OUTPATIENT)
Dept: ORTHOPEDIC SURGERY | Age: 68
End: 2018-09-11

## 2018-09-11 DIAGNOSIS — G89.29 CHRONIC MIDLINE LOW BACK PAIN WITHOUT SCIATICA: ICD-10-CM

## 2018-09-11 DIAGNOSIS — M54.50 CHRONIC MIDLINE LOW BACK PAIN WITHOUT SCIATICA: ICD-10-CM

## 2018-09-11 DIAGNOSIS — M96.1 POSTLAMINECTOMY SYNDROME, LUMBAR REGION: ICD-10-CM

## 2018-09-11 DIAGNOSIS — M48.061 SPINAL STENOSIS, LUMBAR REGION, WITHOUT NEUROGENIC CLAUDICATION: ICD-10-CM

## 2018-09-11 DIAGNOSIS — M43.10 ACQUIRED SPONDYLOLISTHESIS: Primary | ICD-10-CM

## 2018-09-11 PROCEDURE — 99024 POSTOP FOLLOW-UP VISIT: CPT | Performed by: ORTHOPAEDIC SURGERY

## 2018-10-02 ENCOUNTER — HOSPITAL ENCOUNTER (EMERGENCY)
Age: 68
Discharge: HOME | End: 2018-10-02
Payer: MEDICARE

## 2018-10-02 ENCOUNTER — APPOINTMENT (OUTPATIENT)
Dept: GENERAL RADIOLOGY | Age: 68
End: 2018-10-02
Payer: MEDICARE

## 2018-10-02 VITALS
OXYGEN SATURATION: 99 % | HEART RATE: 72 BPM | SYSTOLIC BLOOD PRESSURE: 117 MMHG | RESPIRATION RATE: 14 BRPM | TEMPERATURE: 97.9 F | DIASTOLIC BLOOD PRESSURE: 76 MMHG

## 2018-10-02 DIAGNOSIS — S61.011A LACERATION OF RIGHT THUMB WITHOUT FOREIGN BODY WITHOUT DAMAGE TO NAIL, INITIAL ENCOUNTER: Primary | ICD-10-CM

## 2018-10-02 PROCEDURE — 73140 X-RAY EXAM OF FINGER(S): CPT

## 2018-10-02 PROCEDURE — 99282 EMERGENCY DEPT VISIT SF MDM: CPT

## 2018-10-02 RX ORDER — CEPHALEXIN 500 MG/1
1000 CAPSULE ORAL 2 TIMES DAILY
Qty: 28 CAPSULE | Refills: 0 | Status: SHIPPED | OUTPATIENT
Start: 2018-10-02 | End: 2018-10-09

## 2018-10-02 ASSESSMENT — PAIN DESCRIPTION - LOCATION: LOCATION: FINGER (COMMENT WHICH ONE)

## 2018-10-02 ASSESSMENT — PAIN DESCRIPTION - DESCRIPTORS: DESCRIPTORS: DISCOMFORT

## 2018-10-02 ASSESSMENT — PAIN DESCRIPTION - PAIN TYPE: TYPE: ACUTE PAIN

## 2018-10-02 ASSESSMENT — PAIN DESCRIPTION - ORIENTATION: ORIENTATION: RIGHT

## 2018-10-02 ASSESSMENT — PAIN DESCRIPTION - FREQUENCY: FREQUENCY: CONTINUOUS

## 2018-10-03 ASSESSMENT — ENCOUNTER SYMPTOMS
WHEEZING: 0
SORE THROAT: 0
BLOOD IN STOOL: 0
VOMITING: 0
EYE DISCHARGE: 0
CONSTIPATION: 0
SHORTNESS OF BREATH: 0
BACK PAIN: 0
EYE REDNESS: 0
COUGH: 0
DIARRHEA: 0
ABDOMINAL PAIN: 0
NAUSEA: 0
RHINORRHEA: 0
CHEST TIGHTNESS: 0

## 2018-10-04 NOTE — ED PROVIDER NOTES
arthralgias. Negative for back pain and myalgias. Skin: Negative for pallor and rash. Allergic/Immunologic: Negative for food allergies and immunocompromised state. Neurological: Negative for dizziness, syncope, weakness and light-headedness. Hematological: Negative for adenopathy. Does not bruise/bleed easily. Psychiatric/Behavioral: Negative for behavioral problems and suicidal ideas. The patient is not nervous/anxious. Except as noted above the remainder of the review of systems was reviewed and negative. PAST MEDICAL HISTORY     Past Medical History:   Diagnosis Date    Arthritis     Blockage of coronary artery of heart ([address] 2010    x 2 stents    CAD (coronary artery disease)     Fibromyalgia     History of blood transfusion     History of kidney stones     last one \"about 10 years ago\"    Hyperlipidemia     Hypertension     Pancreatitis 2016    Sleep apnea     uses Bipap occasionally as needed    Wears glasses     for reading         SURGICAL HISTORY       Past Surgical History:   Procedure Laterality Date    BACK SURGERY  2012    L4,L5,S1 hardware placed    CATARACT REMOVAL Bilateral 2016    COLONOSCOPY      normal    CORONARY ANGIOPLASTY  2010    x 2 stents    HERNIA REPAIR Right     inguinal    HYSTERECTOMY      KIDNEY STONE SURGERY      x 3    LITHOTRIPSY      x 3    PARATHYROIDECTOMY      OK PERCUT IMPLNT NEUROELECT,EPIDURAL N/A 8/8/2018    SPINAL CORD STIMULATOR IMPLANT performed by Nirav Forrest MD at [facility]       Discharge Medication List as of 10/2/2018  5:18 PM      CONTINUE these medications which have NOT CHANGED    Details   oxyCODONE-acetaminophen (PERCOCET) 5-325 MG per tablet Take 1-2 tablets by mouth every 4 hours as needed for Pain. ., Disp-40 tablet, R-0Print      aspirin 81 MG EC tablet Take 81 mg by mouth dailyHistorical Med      Calcium Carbonate-Vitamin D (OSCAL 500/200 D-3 PO) Take 1

## 2018-10-30 ENCOUNTER — HOSPITAL ENCOUNTER (OUTPATIENT)
Age: 68
Discharge: HOME | End: 2018-10-30
Payer: MEDICARE

## 2018-10-30 LAB
CREAT SERPL-MCNC: 0.94 MG/DL (ref 0.5–0.9)
EKG ATRIAL RATE: 73 BPM
EKG P AXIS: 29 DEGREES
EKG P-R INTERVAL: 154 MS
EKG Q-T INTERVAL: 410 MS
EKG QRS DURATION: 96 MS
EKG QTC CALCULATION (BAZETT): 451 MS
EKG R AXIS: 8 DEGREES
EKG T AXIS: 62 DEGREES
EKG VENTRICULAR RATE: 73 BPM
GFR AFRICAN AMERICAN: >60 ML/MIN
GFR NON-AFRICAN AMERICAN: 59 ML/MIN
GFR SERPL CREATININE-BSD FRML MDRD: ABNORMAL ML/MIN/{1.73_M2}
GFR SERPL CREATININE-BSD FRML MDRD: ABNORMAL ML/MIN/{1.73_M2}
POTASSIUM SERPL-SCNC: 4.7 MMOL/L (ref 3.7–5.3)

## 2018-10-30 PROCEDURE — 84132 ASSAY OF SERUM POTASSIUM: CPT

## 2018-10-30 PROCEDURE — 82565 ASSAY OF CREATININE: CPT

## 2018-10-30 PROCEDURE — 93005 ELECTROCARDIOGRAM TRACING: CPT

## 2018-10-30 PROCEDURE — 36415 COLL VENOUS BLD VENIPUNCTURE: CPT

## 2018-12-18 ENCOUNTER — OFFICE VISIT (OUTPATIENT)
Dept: ORTHOPEDIC SURGERY | Age: 68
End: 2018-12-18
Payer: MEDICARE

## 2018-12-18 DIAGNOSIS — M48.061 SPINAL STENOSIS, LUMBAR REGION, WITHOUT NEUROGENIC CLAUDICATION: ICD-10-CM

## 2018-12-18 DIAGNOSIS — G89.29 CHRONIC MIDLINE LOW BACK PAIN WITHOUT SCIATICA: ICD-10-CM

## 2018-12-18 DIAGNOSIS — M54.50 CHRONIC MIDLINE LOW BACK PAIN WITHOUT SCIATICA: ICD-10-CM

## 2018-12-18 DIAGNOSIS — M96.1 POSTLAMINECTOMY SYNDROME, LUMBAR REGION: Primary | ICD-10-CM

## 2018-12-18 PROCEDURE — 3017F COLORECTAL CA SCREEN DOC REV: CPT | Performed by: ORTHOPAEDIC SURGERY

## 2018-12-18 PROCEDURE — G8400 PT W/DXA NO RESULTS DOC: HCPCS | Performed by: ORTHOPAEDIC SURGERY

## 2018-12-18 PROCEDURE — G8484 FLU IMMUNIZE NO ADMIN: HCPCS | Performed by: ORTHOPAEDIC SURGERY

## 2018-12-18 PROCEDURE — 1123F ACP DISCUSS/DSCN MKR DOCD: CPT | Performed by: ORTHOPAEDIC SURGERY

## 2018-12-18 PROCEDURE — 1036F TOBACCO NON-USER: CPT | Performed by: ORTHOPAEDIC SURGERY

## 2018-12-18 PROCEDURE — 1101F PT FALLS ASSESS-DOCD LE1/YR: CPT | Performed by: ORTHOPAEDIC SURGERY

## 2018-12-18 PROCEDURE — 4040F PNEUMOC VAC/ADMIN/RCVD: CPT | Performed by: ORTHOPAEDIC SURGERY

## 2018-12-18 PROCEDURE — G8417 CALC BMI ABV UP PARAM F/U: HCPCS | Performed by: ORTHOPAEDIC SURGERY

## 2018-12-18 PROCEDURE — G8598 ASA/ANTIPLAT THER USED: HCPCS | Performed by: ORTHOPAEDIC SURGERY

## 2018-12-18 PROCEDURE — 99213 OFFICE O/P EST LOW 20 MIN: CPT | Performed by: ORTHOPAEDIC SURGERY

## 2018-12-18 PROCEDURE — 1090F PRES/ABSN URINE INCON ASSESS: CPT | Performed by: ORTHOPAEDIC SURGERY

## 2018-12-18 PROCEDURE — G8427 DOCREV CUR MEDS BY ELIG CLIN: HCPCS | Performed by: ORTHOPAEDIC SURGERY

## 2018-12-22 NOTE — PROGRESS NOTES
Subjective:      Patient ID: Allison Brennan is a 70 y.o. female. HPI  Refer to all previous clinic notes    Patient's post status post spinal cord stimulator placement    Patient continues to have better than expected results  Review of Systems    Objective:   Physical Exam   Constitutional: She is oriented to person, place, and time. She appears well-developed and well-nourished. HENT:   Head: Normocephalic and atraumatic. Eyes: Conjunctivae and EOM are normal.   Neck: Normal range of motion. Pulmonary/Chest: Effort normal. No respiratory distress. Neurological: She is alert and oriented to person, place, and time. She has normal strength. No sensory deficit. Normal gait   Skin: Skin is warm and dry. Psychiatric: Her behavior is normal. Thought content normal.   Nursing note and vitals reviewed. Assessment:      Encounter Diagnoses   Name Primary?     Postlaminectomy syndrome, lumbar region Yes    Spinal stenosis, lumbar region, without neurogenic claudication     Chronic midline low back pain without sciatica      Status post spinal cord stimulator placement with excellent outcome better than expected      Plan:      Follow-up August on anniversary        Seng Grace MD

## 2019-07-16 ENCOUNTER — HOSPITAL ENCOUNTER (EMERGENCY)
Age: 69
Discharge: HOME | End: 2019-07-16
Payer: MEDICARE

## 2019-07-16 ENCOUNTER — APPOINTMENT (OUTPATIENT)
Dept: GENERAL RADIOLOGY | Age: 69
End: 2019-07-16
Payer: MEDICARE

## 2019-07-16 VITALS
OXYGEN SATURATION: 95 % | SYSTOLIC BLOOD PRESSURE: 133 MMHG | HEART RATE: 77 BPM | TEMPERATURE: 98.6 F | DIASTOLIC BLOOD PRESSURE: 75 MMHG | RESPIRATION RATE: 18 BRPM

## 2019-07-16 DIAGNOSIS — S20.211A CONTUSION OF RIB ON RIGHT SIDE, INITIAL ENCOUNTER: Primary | ICD-10-CM

## 2019-07-16 PROCEDURE — 99283 EMERGENCY DEPT VISIT LOW MDM: CPT

## 2019-07-16 PROCEDURE — 71101 X-RAY EXAM UNILAT RIBS/CHEST: CPT

## 2019-07-16 PROCEDURE — 6370000000 HC RX 637 (ALT 250 FOR IP): Performed by: PHYSICIAN ASSISTANT

## 2019-07-16 RX ORDER — HYDROCODONE BITARTRATE AND ACETAMINOPHEN 5; 325 MG/1; MG/1
1 TABLET ORAL ONCE
Status: COMPLETED | OUTPATIENT
Start: 2019-07-16 | End: 2019-07-16

## 2019-07-16 RX ADMIN — HYDROCODONE BITARTRATE AND ACETAMINOPHEN 1 TABLET: 5; 325 TABLET ORAL at 16:26

## 2019-07-16 ASSESSMENT — ENCOUNTER SYMPTOMS
RHINORRHEA: 0
DIARRHEA: 0
COUGH: 0
EYE DISCHARGE: 0
SORE THROAT: 0
CHEST TIGHTNESS: 0
VOMITING: 0
BLOOD IN STOOL: 0
EYE REDNESS: 0
NAUSEA: 0
WHEEZING: 0
CONSTIPATION: 0
ABDOMINAL PAIN: 0
BACK PAIN: 0
SHORTNESS OF BREATH: 0

## 2019-07-16 ASSESSMENT — PAIN DESCRIPTION - LOCATION: LOCATION: RIB CAGE

## 2019-07-16 ASSESSMENT — PAIN SCALES - GENERAL: PAINLEVEL_OUTOF10: 10

## 2019-07-16 ASSESSMENT — PAIN DESCRIPTION - ORIENTATION: ORIENTATION: RIGHT

## 2019-07-16 ASSESSMENT — PAIN DESCRIPTION - PAIN TYPE: TYPE: ACUTE PAIN

## 2019-08-08 ENCOUNTER — OFFICE VISIT (OUTPATIENT)
Dept: ORTHOPEDIC SURGERY | Age: 69
End: 2019-08-08
Payer: MEDICARE

## 2019-08-08 DIAGNOSIS — M43.10 ACQUIRED SPONDYLOLISTHESIS: ICD-10-CM

## 2019-08-08 DIAGNOSIS — M54.50 CHRONIC MIDLINE LOW BACK PAIN WITHOUT SCIATICA: ICD-10-CM

## 2019-08-08 DIAGNOSIS — M96.1 POSTLAMINECTOMY SYNDROME, LUMBAR REGION: Primary | ICD-10-CM

## 2019-08-08 DIAGNOSIS — M48.061 SPINAL STENOSIS, LUMBAR REGION, WITHOUT NEUROGENIC CLAUDICATION: ICD-10-CM

## 2019-08-08 DIAGNOSIS — G89.29 CHRONIC MIDLINE LOW BACK PAIN WITHOUT SCIATICA: ICD-10-CM

## 2019-08-08 PROCEDURE — 1123F ACP DISCUSS/DSCN MKR DOCD: CPT | Performed by: ORTHOPAEDIC SURGERY

## 2019-08-08 PROCEDURE — 4040F PNEUMOC VAC/ADMIN/RCVD: CPT | Performed by: ORTHOPAEDIC SURGERY

## 2019-08-08 PROCEDURE — G8427 DOCREV CUR MEDS BY ELIG CLIN: HCPCS | Performed by: ORTHOPAEDIC SURGERY

## 2019-08-08 PROCEDURE — G8598 ASA/ANTIPLAT THER USED: HCPCS | Performed by: ORTHOPAEDIC SURGERY

## 2019-08-08 PROCEDURE — 99213 OFFICE O/P EST LOW 20 MIN: CPT | Performed by: ORTHOPAEDIC SURGERY

## 2019-08-08 PROCEDURE — 3017F COLORECTAL CA SCREEN DOC REV: CPT | Performed by: ORTHOPAEDIC SURGERY

## 2019-08-08 PROCEDURE — 1090F PRES/ABSN URINE INCON ASSESS: CPT | Performed by: ORTHOPAEDIC SURGERY

## 2019-08-08 PROCEDURE — G8421 BMI NOT CALCULATED: HCPCS | Performed by: ORTHOPAEDIC SURGERY

## 2019-08-08 PROCEDURE — G8400 PT W/DXA NO RESULTS DOC: HCPCS | Performed by: ORTHOPAEDIC SURGERY

## 2019-08-08 PROCEDURE — 1036F TOBACCO NON-USER: CPT | Performed by: ORTHOPAEDIC SURGERY

## 2019-08-08 ASSESSMENT — ENCOUNTER SYMPTOMS: BACK PAIN: 1

## 2019-08-08 NOTE — PROGRESS NOTES
Patient ID: Raymundo Lobo is a 70 y.o. female. Chief Complaint   Patient presents with    Follow-up     scs        Back Pain   This is a chronic problem. The current episode started more than 1 year ago. The problem occurs constantly. The problem is unchanged. The pain is present in the lumbar spine. The quality of the pain is described as aching. The pain does not radiate. The pain is moderate. The pain is worse during the day. The symptoms are aggravated by position. Stiffness is present all day. Risk factors include obesity. She has tried nothing for the symptoms. The treatment provided no relief.       1 year post spinal cord stimulator placement    Spinal cord stimulator is doing well with one exception    Patient is having pain at the IPG site generator is tending to flip horizontal as the bottom portion of the IPG is right at the beltline      Past Medical History:   Diagnosis Date    Arthritis     Blockage of coronary artery of heart ([address] 2010    x 2 stents    CAD (coronary artery disease)     Fibromyalgia     History of blood transfusion     History of kidney stones     last one \"about 10 years ago\"    Hyperlipidemia     Hypertension     Pancreatitis 2016    Sleep apnea     uses Bipap occasionally as needed    Wears glasses     for reading     Past Surgical History:   Procedure Laterality Date    BACK SURGERY  2012    L4,L5,S1 hardware placed    CATARACT REMOVAL Bilateral 2016    COLONOSCOPY      normal    CORONARY ANGIOPLASTY  2010    x 2 stents    HERNIA REPAIR Right     inguinal    HYSTERECTOMY      KIDNEY STONE SURGERY      x 3    LITHOTRIPSY      x 3    PARATHYROIDECTOMY      DE PERCUT IMPLNT NEUROELECT,EPIDURAL N/A 8/8/2018    SPINAL CORD STIMULATOR IMPLANT performed by Andrey Pollock MD at [address]        Family History   Problem Relation Age of Onset    Heart Disease Father        Review of Systems   Musculoskeletal: Positive for back pain.   All other systems reviewed and are negative. Physical Exam   Constitutional: She is oriented to person, place, and time. She appears well-developed and well-nourished. HENT:   Head: Normocephalic and atraumatic. Eyes: Conjunctivae and EOM are normal.   Neck: Normal range of motion. Pulmonary/Chest: Effort normal. No respiratory distress. Neurological: She is alert and oriented to person, place, and time. She has normal strength. No sensory deficit. Normal gait   Skin: Skin is warm and dry. Psychiatric: Her behavior is normal. Thought content normal.   Nursing note and vitals reviewed. Patient with unstable IPG tends to rotate horizontal in the pocket to the bottom of the IPG is right at the waistline of her pants  Assessment:     Painful implantable pain pulse generator    1. Postlaminectomy syndrome, lumbar region    2. Spinal stenosis, lumbar region, without neurogenic claudication    3. Chronic midline low back pain without sciatica    4. Acquired spondylolisthesis      Doing very well with spinal cord stimulator with the exception as noted above  Plan:     Digital patient for revision IPG pocket likely shift vertical more over ribs as pt well padded    No orders of the defined types were placed in this encounter. Amy Shah MD    Please note that this chart was generated using voicerecognition Dragon dictation software. Although every effort was made to ensurethe accuracy of this automated transcription, some errors in transcription may haveoccurred.

## 2019-08-21 ENCOUNTER — HOSPITAL ENCOUNTER (OUTPATIENT)
Dept: PREADMISSION TESTING | Age: 69
Discharge: HOME | End: 2019-08-25
Payer: MEDICARE

## 2019-08-21 VITALS
RESPIRATION RATE: 16 BRPM | DIASTOLIC BLOOD PRESSURE: 74 MMHG | SYSTOLIC BLOOD PRESSURE: 141 MMHG | HEART RATE: 64 BPM | TEMPERATURE: 97.9 F | BODY MASS INDEX: 32.02 KG/M2 | HEIGHT: 67 IN | WEIGHT: 204 LBS | OXYGEN SATURATION: 95 %

## 2019-08-21 LAB
ABSOLUTE EOS #: 0.1 K/UL (ref 0–0.4)
ABSOLUTE IMMATURE GRANULOCYTE: ABNORMAL K/UL (ref 0–0.3)
ABSOLUTE LYMPH #: 1.1 K/UL (ref 1–4.8)
ABSOLUTE MONO #: 0.5 K/UL (ref 0.1–1.3)
ANION GAP SERPL CALCULATED.3IONS-SCNC: 15 MMOL/L (ref 9–17)
BASOPHILS # BLD: 1 % (ref 0–2)
BASOPHILS ABSOLUTE: 0 K/UL (ref 0–0.2)
BUN BLDV-MCNC: 24 MG/DL (ref 8–23)
BUN/CREAT BLD: ABNORMAL (ref 9–20)
CALCIUM SERPL-MCNC: 10.9 MG/DL (ref 8.6–10.4)
CHLORIDE BLD-SCNC: 101 MMOL/L (ref 98–107)
CO2: 25 MMOL/L (ref 20–31)
CREAT SERPL-MCNC: 1.04 MG/DL (ref 0.5–0.9)
DIFFERENTIAL TYPE: ABNORMAL
EOSINOPHILS RELATIVE PERCENT: 3 % (ref 0–4)
GFR AFRICAN AMERICAN: >60 ML/MIN
GFR NON-AFRICAN AMERICAN: 52 ML/MIN
GFR SERPL CREATININE-BSD FRML MDRD: ABNORMAL ML/MIN/{1.73_M2}
GFR SERPL CREATININE-BSD FRML MDRD: ABNORMAL ML/MIN/{1.73_M2}
GLUCOSE BLD-MCNC: 111 MG/DL (ref 70–99)
HCT VFR BLD CALC: 45.5 % (ref 36–46)
HEMOGLOBIN: 15.5 G/DL (ref 12–16)
IMMATURE GRANULOCYTES: ABNORMAL %
LYMPHOCYTES # BLD: 24 % (ref 24–44)
MCH RBC QN AUTO: 31.4 PG (ref 26–34)
MCHC RBC AUTO-ENTMCNC: 34 G/DL (ref 31–37)
MCV RBC AUTO: 92.1 FL (ref 80–100)
MONOCYTES # BLD: 10 % (ref 1–7)
NRBC AUTOMATED: ABNORMAL PER 100 WBC
PDW BLD-RTO: 14.4 % (ref 11.5–14.9)
PLATELET # BLD: 210 K/UL (ref 150–450)
PLATELET ESTIMATE: ABNORMAL
PMV BLD AUTO: 7.9 FL (ref 6–12)
POTASSIUM SERPL-SCNC: 4.5 MMOL/L (ref 3.7–5.3)
RBC # BLD: 4.94 M/UL (ref 4–5.2)
RBC # BLD: ABNORMAL 10*6/UL
SEG NEUTROPHILS: 62 % (ref 36–66)
SEGMENTED NEUTROPHILS ABSOLUTE COUNT: 3 K/UL (ref 1.3–9.1)
SODIUM BLD-SCNC: 141 MMOL/L (ref 135–144)
WBC # BLD: 4.7 K/UL (ref 3.5–11)
WBC # BLD: ABNORMAL 10*3/UL

## 2019-08-21 PROCEDURE — 80048 BASIC METABOLIC PNL TOTAL CA: CPT

## 2019-08-21 PROCEDURE — 93005 ELECTROCARDIOGRAM TRACING: CPT | Performed by: ANESTHESIOLOGY

## 2019-08-21 PROCEDURE — 36415 COLL VENOUS BLD VENIPUNCTURE: CPT

## 2019-08-21 PROCEDURE — 85025 COMPLETE CBC W/AUTO DIFF WBC: CPT

## 2019-08-21 RX ORDER — HYDROCHLOROTHIAZIDE 25 MG/1
25 TABLET ORAL
COMMUNITY

## 2019-08-21 NOTE — H&P (VIEW-ONLY)
no marked hearing loss. NOSE:  No rhinorrhea, epistaxis or septal deformity. THROAT:  Not congested. No ulceration bleeding or discharge. NECK:  No stiffness, trachea central.                  CHEST:  Symmetrical and equal on expansion. HEART:  RRR S1 > S2. No audible murmurs or gallops. LUNGS:  Equal on expansion, normal breath sounds. ABDOMEN:  Obese. Soft on palpation. No localized tenderness. LYMPHATICS:  No palpable cervical lymphadenopathy. LOCOMOTOR, BACK AND SPINE:  No tenderness or deformities. EXTREMITIES:  Lumbar spine and paraspinals with tenderness. Stimulator palpable beneath skin surface, mobile, tender, left Symmetrical, no pedal edema. No calf tenderness. No discoloration or ulcerations. NEUROLOGIC:  The patient is conscious, alert, oriented, No apparent focal sensory or motor deficits.                                                                                    PROVISIONAL DIAGNOSES / SURGERY:      Painful spinal cord stimulator   Nerve Stimulator Revision/Removal     BEAU Viera - CNP on 8/21/2019 at 11:48 AM

## 2019-08-21 NOTE — H&P
HISTORY and [facility] [address] [address]       NAME:  Manual Duverney  MRN: [id number]   YOB: 1950   Date: 8/21/2019   Age: 78 y.o. Gender: female       COMPLAINT AND PRESENT HISTORY:     Manual Duverney is 78 y.o.,  female, undergoing preadmission testing for Nerve Stimulator Revision/Removal. Pt states she had the stimulator placed approximately 1 year ago. She states it has been working, but she feels it \"flopping around. \" Reports it is tender to the touch in the area where the stimulator is located, just \"hurts. \" She has some mild lumbar pain intermittently. S/P back surgery 2012. Denies any numbness or tingling. No recent falls or trauma. No redness or rashes. Pt C/O of pain surrounding stimulator, ROM has been fair per patient. She has not been taking anything for pain. Pt denies any other symptoms.     PAST MEDICAL HISTORY     Past Medical History:   Diagnosis Date    Arthritis     Blockage of coronary artery of heart ([address] 2010    x 2 stents    CAD (coronary artery disease)     Fibromyalgia     History of blood transfusion     History of kidney stones     last one \"about 10 years ago\"    Hyperlipidemia     Hypertension     Pancreatitis 2016    Sleep apnea     uses Bipap occasionally as needed    Wears glasses     for reading     SURGICAL HISTORY       Past Surgical History:   Procedure Laterality Date    BACK SURGERY  2012    L4,L5,S1 hardware placed    CATARACT REMOVAL Bilateral 2016    COLONOSCOPY      normal    CORONARY ANGIOPLASTY  2010    x 2 stents    HERNIA REPAIR Right     inguinal    HYSTERECTOMY      JOINT REPLACEMENT      KIDNEY STONE SURGERY      x 3    LITHOTRIPSY      x 3    PARATHYROIDECTOMY      PARATHYROIDECTOMY      OR PERCUT IMPLNT Milena Mock N/A 8/8/2018    SPINAL CORD STIMULATOR IMPLANT performed by Lemuel Kaur MD at [address] [place] [address] Right 03/2019       FAMILY HISTORY       Family History Problem Relation Age of Onset    Heart Disease Father        SOCIAL HISTORY       Social History     Socioeconomic History    Marital status:       Spouse name: None    Number of children: None    Years of education: None    Highest education level: None   Occupational History    None   Social Needs    Financial resource strain: None    Food insecurity:     Worry: None     Inability: None    Transportation needs:     Medical: None     Non-medical: None   Tobacco Use    Smoking status: Former Smoker     Packs/day: 1.00     Years: 44.00     Pack years: 44.00     Types: Cigarettes     Last attempt to quit: 2010     Years since quittin.2    Smokeless tobacco: Never Used   Substance and Sexual Activity    Alcohol use: No    Drug use: No    Sexual activity: None   Lifestyle    Physical activity:     Days per week: None     Minutes per session: None    Stress: None   Relationships    Social connections:     Talks on phone: None     Gets together: None     Attends Spiritism service: None     Active member of club or organization: None     Attends meetings of clubs or organizations: None     Relationship status: None    Intimate partner violence:     Fear of current or ex partner: None     Emotionally abused: None     Physically abused: None     Forced sexual activity: None   Other Topics Concern    None   Social History Narrative    None           REVIEW OF SYSTEMS      Allergies   Allergen Reactions    Toradol [Ketorolac Tromethamine] Swelling     Swelling at site of injection       Current Outpatient Medications on File Prior to Encounter   Medication Sig Dispense Refill    hydrochlorothiazide (HYDRODIURIL) 25 MG tablet Take 25 mg by mouth daily      aspirin 81 MG EC tablet Take 81 mg by mouth daily      Calcium Carbonate-Vitamin D (OSCAL 500/200 D-3 PO) Take 1 tablet by mouth daily      carvedilol (COREG) 12.5 MG tablet Take 1 tablet by mouth 2 times daily (with meals) (Patient

## 2019-08-23 ENCOUNTER — ANESTHESIA EVENT (OUTPATIENT)
Dept: OPERATING ROOM | Age: 69
End: 2019-08-23
Payer: MEDICARE

## 2019-08-23 LAB
EKG ATRIAL RATE: 62 BPM
EKG P AXIS: 37 DEGREES
EKG P-R INTERVAL: 144 MS
EKG Q-T INTERVAL: 422 MS
EKG QRS DURATION: 80 MS
EKG QTC CALCULATION (BAZETT): 428 MS
EKG R AXIS: 2 DEGREES
EKG T AXIS: 60 DEGREES
EKG VENTRICULAR RATE: 62 BPM

## 2019-08-23 RX ORDER — SODIUM CHLORIDE 0.9 % (FLUSH) 0.9 %
10 SYRINGE (ML) INJECTION EVERY 12 HOURS SCHEDULED
Status: CANCELLED | OUTPATIENT
Start: 2019-08-23

## 2019-08-23 RX ORDER — LIDOCAINE HYDROCHLORIDE 10 MG/ML
1 INJECTION, SOLUTION EPIDURAL; INFILTRATION; INTRACAUDAL; PERINEURAL
Status: CANCELLED | OUTPATIENT
Start: 2019-08-23 | End: 2019-08-23

## 2019-08-23 RX ORDER — SODIUM CHLORIDE 0.9 % (FLUSH) 0.9 %
10 SYRINGE (ML) INJECTION PRN
Status: CANCELLED | OUTPATIENT
Start: 2019-08-23

## 2019-08-23 RX ORDER — SODIUM CHLORIDE 9 MG/ML
INJECTION, SOLUTION INTRAVENOUS CONTINUOUS
Status: CANCELLED | OUTPATIENT
Start: 2019-08-23

## 2019-09-04 ENCOUNTER — APPOINTMENT (OUTPATIENT)
Dept: GENERAL RADIOLOGY | Age: 69
End: 2019-09-04
Attending: ORTHOPAEDIC SURGERY
Payer: MEDICARE

## 2019-09-04 ENCOUNTER — ANESTHESIA (OUTPATIENT)
Dept: OPERATING ROOM | Age: 69
End: 2019-09-04
Payer: MEDICARE

## 2019-09-04 ENCOUNTER — HOSPITAL ENCOUNTER (OUTPATIENT)
Age: 69
Setting detail: OUTPATIENT SURGERY
Discharge: HOME | End: 2019-09-04
Attending: ORTHOPAEDIC SURGERY | Admitting: ORTHOPAEDIC SURGERY
Payer: MEDICARE

## 2019-09-04 VITALS
HEIGHT: 67 IN | SYSTOLIC BLOOD PRESSURE: 125 MMHG | OXYGEN SATURATION: 95 % | WEIGHT: 204 LBS | DIASTOLIC BLOOD PRESSURE: 68 MMHG | RESPIRATION RATE: 14 BRPM | TEMPERATURE: 97.5 F | HEART RATE: 68 BPM | BODY MASS INDEX: 32.02 KG/M2

## 2019-09-04 VITALS — SYSTOLIC BLOOD PRESSURE: 130 MMHG | TEMPERATURE: 95.9 F | OXYGEN SATURATION: 100 % | DIASTOLIC BLOOD PRESSURE: 75 MMHG

## 2019-09-04 DIAGNOSIS — M96.1 POSTLAMINECTOMY SYNDROME, LUMBAR REGION: Primary | ICD-10-CM

## 2019-09-04 PROCEDURE — 7100000000 HC PACU RECOVERY - FIRST 15 MIN: Performed by: ORTHOPAEDIC SURGERY

## 2019-09-04 PROCEDURE — 7100000030 HC ASPR PHASE II RECOVERY - FIRST 15 MIN: Performed by: ORTHOPAEDIC SURGERY

## 2019-09-04 PROCEDURE — 2580000003 HC RX 258: Performed by: ANESTHESIOLOGY

## 2019-09-04 PROCEDURE — 7100000001 HC PACU RECOVERY - ADDTL 15 MIN: Performed by: ORTHOPAEDIC SURGERY

## 2019-09-04 PROCEDURE — 2709999900 HC NON-CHARGEABLE SUPPLY: Performed by: ORTHOPAEDIC SURGERY

## 2019-09-04 PROCEDURE — 7100000031 HC ASPR PHASE II RECOVERY - ADDTL 15 MIN: Performed by: ORTHOPAEDIC SURGERY

## 2019-09-04 PROCEDURE — 3600000003 HC SURGERY LEVEL 3 BASE: Performed by: ORTHOPAEDIC SURGERY

## 2019-09-04 PROCEDURE — 3600000013 HC SURGERY LEVEL 3 ADDTL 15MIN: Performed by: ORTHOPAEDIC SURGERY

## 2019-09-04 PROCEDURE — 3209999900 FLUORO FOR SURGICAL PROCEDURES

## 2019-09-04 PROCEDURE — 2500000003 HC RX 250 WO HCPCS: Performed by: ORTHOPAEDIC SURGERY

## 2019-09-04 PROCEDURE — 63688 REV/RMV IMP SP NPG/R DTCH CN: CPT | Performed by: ORTHOPAEDIC SURGERY

## 2019-09-04 PROCEDURE — 6360000002 HC RX W HCPCS: Performed by: NURSE ANESTHETIST, CERTIFIED REGISTERED

## 2019-09-04 PROCEDURE — 6370000000 HC RX 637 (ALT 250 FOR IP): Performed by: ANESTHESIOLOGY

## 2019-09-04 PROCEDURE — 3700000001 HC ADD 15 MINUTES (ANESTHESIA): Performed by: ORTHOPAEDIC SURGERY

## 2019-09-04 PROCEDURE — 2500000003 HC RX 250 WO HCPCS: Performed by: NURSE ANESTHETIST, CERTIFIED REGISTERED

## 2019-09-04 PROCEDURE — 6360000002 HC RX W HCPCS: Performed by: ORTHOPAEDIC SURGERY

## 2019-09-04 PROCEDURE — 3700000000 HC ANESTHESIA ATTENDED CARE: Performed by: ORTHOPAEDIC SURGERY

## 2019-09-04 PROCEDURE — C1883 ADAPT/EXT, PACING/NEURO LEAD: HCPCS | Performed by: ORTHOPAEDIC SURGERY

## 2019-09-04 PROCEDURE — 2720000010 HC SURG SUPPLY STERILE: Performed by: ORTHOPAEDIC SURGERY

## 2019-09-04 RX ORDER — FENTANYL CITRATE 50 UG/ML
25 INJECTION, SOLUTION INTRAMUSCULAR; INTRAVENOUS EVERY 5 MIN PRN
Status: DISCONTINUED | OUTPATIENT
Start: 2019-09-04 | End: 2019-09-04 | Stop reason: HOSPADM

## 2019-09-04 RX ORDER — SODIUM CHLORIDE 0.9 % (FLUSH) 0.9 %
10 SYRINGE (ML) INJECTION PRN
Status: DISCONTINUED | OUTPATIENT
Start: 2019-09-04 | End: 2019-09-04 | Stop reason: HOSPADM

## 2019-09-04 RX ORDER — HYDRALAZINE HYDROCHLORIDE 20 MG/ML
5 INJECTION INTRAMUSCULAR; INTRAVENOUS EVERY 10 MIN PRN
Status: DISCONTINUED | OUTPATIENT
Start: 2019-09-04 | End: 2019-09-04 | Stop reason: HOSPADM

## 2019-09-04 RX ORDER — 0.9 % SODIUM CHLORIDE 0.9 %
500 INTRAVENOUS SOLUTION INTRAVENOUS
Status: DISCONTINUED | OUTPATIENT
Start: 2019-09-04 | End: 2019-09-04 | Stop reason: HOSPADM

## 2019-09-04 RX ORDER — LABETALOL 20 MG/4 ML (5 MG/ML) INTRAVENOUS SYRINGE
5 EVERY 10 MIN PRN
Status: DISCONTINUED | OUTPATIENT
Start: 2019-09-04 | End: 2019-09-04 | Stop reason: HOSPADM

## 2019-09-04 RX ORDER — MEPERIDINE HYDROCHLORIDE 25 MG/ML
12.5 INJECTION INTRAMUSCULAR; INTRAVENOUS; SUBCUTANEOUS EVERY 5 MIN PRN
Status: DISCONTINUED | OUTPATIENT
Start: 2019-09-04 | End: 2019-09-04 | Stop reason: HOSPADM

## 2019-09-04 RX ORDER — ROCURONIUM BROMIDE 10 MG/ML
INJECTION, SOLUTION INTRAVENOUS PRN
Status: DISCONTINUED | OUTPATIENT
Start: 2019-09-04 | End: 2019-09-04 | Stop reason: SDUPTHER

## 2019-09-04 RX ORDER — DEXAMETHASONE SODIUM PHOSPHATE 4 MG/ML
INJECTION, SOLUTION INTRA-ARTICULAR; INTRALESIONAL; INTRAMUSCULAR; INTRAVENOUS; SOFT TISSUE PRN
Status: DISCONTINUED | OUTPATIENT
Start: 2019-09-04 | End: 2019-09-04 | Stop reason: SDUPTHER

## 2019-09-04 RX ORDER — TRANEXAMIC ACID 100 MG/ML
INJECTION, SOLUTION INTRAVENOUS PRN
Status: DISCONTINUED | OUTPATIENT
Start: 2019-09-04 | End: 2019-09-04 | Stop reason: SDUPTHER

## 2019-09-04 RX ORDER — SODIUM CHLORIDE 9 MG/ML
INJECTION, SOLUTION INTRAVENOUS CONTINUOUS
Status: DISCONTINUED | OUTPATIENT
Start: 2019-09-04 | End: 2019-09-04 | Stop reason: HOSPADM

## 2019-09-04 RX ORDER — MIDAZOLAM HYDROCHLORIDE 1 MG/ML
INJECTION INTRAMUSCULAR; INTRAVENOUS PRN
Status: DISCONTINUED | OUTPATIENT
Start: 2019-09-04 | End: 2019-09-04 | Stop reason: SDUPTHER

## 2019-09-04 RX ORDER — FENTANYL CITRATE 50 UG/ML
INJECTION, SOLUTION INTRAMUSCULAR; INTRAVENOUS PRN
Status: DISCONTINUED | OUTPATIENT
Start: 2019-09-04 | End: 2019-09-04 | Stop reason: SDUPTHER

## 2019-09-04 RX ORDER — SODIUM CHLORIDE 0.9 % (FLUSH) 0.9 %
10 SYRINGE (ML) INJECTION EVERY 12 HOURS SCHEDULED
Status: DISCONTINUED | OUTPATIENT
Start: 2019-09-04 | End: 2019-09-04 | Stop reason: HOSPADM

## 2019-09-04 RX ORDER — LIDOCAINE HYDROCHLORIDE 20 MG/ML
INJECTION, SOLUTION EPIDURAL; INFILTRATION; INTRACAUDAL; PERINEURAL PRN
Status: DISCONTINUED | OUTPATIENT
Start: 2019-09-04 | End: 2019-09-04 | Stop reason: SDUPTHER

## 2019-09-04 RX ORDER — GLYCOPYRROLATE 1 MG/5 ML
SYRINGE (ML) INTRAVENOUS PRN
Status: DISCONTINUED | OUTPATIENT
Start: 2019-09-04 | End: 2019-09-04 | Stop reason: SDUPTHER

## 2019-09-04 RX ORDER — DIPHENHYDRAMINE HYDROCHLORIDE 50 MG/ML
12.5 INJECTION INTRAMUSCULAR; INTRAVENOUS
Status: DISCONTINUED | OUTPATIENT
Start: 2019-09-04 | End: 2019-09-04 | Stop reason: HOSPADM

## 2019-09-04 RX ORDER — LIDOCAINE HYDROCHLORIDE 10 MG/ML
1 INJECTION, SOLUTION EPIDURAL; INFILTRATION; INTRACAUDAL; PERINEURAL
Status: DISCONTINUED | OUTPATIENT
Start: 2019-09-04 | End: 2019-09-04 | Stop reason: HOSPADM

## 2019-09-04 RX ORDER — HYDROCODONE BITARTRATE AND ACETAMINOPHEN 5; 325 MG/1; MG/1
1 TABLET ORAL EVERY 4 HOURS PRN
Qty: 18 TABLET | Refills: 0 | Status: SHIPPED | OUTPATIENT
Start: 2019-09-04 | End: 2019-09-07

## 2019-09-04 RX ORDER — PROPOFOL 10 MG/ML
INJECTION, EMULSION INTRAVENOUS PRN
Status: DISCONTINUED | OUTPATIENT
Start: 2019-09-04 | End: 2019-09-04 | Stop reason: SDUPTHER

## 2019-09-04 RX ORDER — LIDOCAINE HYDROCHLORIDE AND EPINEPHRINE 10; 10 MG/ML; UG/ML
INJECTION, SOLUTION INFILTRATION; PERINEURAL PRN
Status: DISCONTINUED | OUTPATIENT
Start: 2019-09-04 | End: 2019-09-04 | Stop reason: ALTCHOICE

## 2019-09-04 RX ORDER — BUPIVACAINE HYDROCHLORIDE AND EPINEPHRINE 2.5; 5 MG/ML; UG/ML
INJECTION, SOLUTION EPIDURAL; INFILTRATION; INTRACAUDAL; PERINEURAL PRN
Status: DISCONTINUED | OUTPATIENT
Start: 2019-09-04 | End: 2019-09-04 | Stop reason: ALTCHOICE

## 2019-09-04 RX ORDER — ONDANSETRON 2 MG/ML
INJECTION INTRAMUSCULAR; INTRAVENOUS PRN
Status: DISCONTINUED | OUTPATIENT
Start: 2019-09-04 | End: 2019-09-04 | Stop reason: SDUPTHER

## 2019-09-04 RX ORDER — PROMETHAZINE HYDROCHLORIDE 25 MG/ML
6.25 INJECTION, SOLUTION INTRAMUSCULAR; INTRAVENOUS
Status: DISCONTINUED | OUTPATIENT
Start: 2019-09-04 | End: 2019-09-04

## 2019-09-04 RX ORDER — NEOSTIGMINE METHYLSULFATE 5 MG/5 ML
SYRINGE (ML) INTRAVENOUS PRN
Status: DISCONTINUED | OUTPATIENT
Start: 2019-09-04 | End: 2019-09-04 | Stop reason: SDUPTHER

## 2019-09-04 RX ORDER — OXYCODONE HYDROCHLORIDE AND ACETAMINOPHEN 5; 325 MG/1; MG/1
1 TABLET ORAL
Status: COMPLETED | OUTPATIENT
Start: 2019-09-04 | End: 2019-09-04

## 2019-09-04 RX ORDER — METOCLOPRAMIDE HYDROCHLORIDE 5 MG/ML
10 INJECTION INTRAMUSCULAR; INTRAVENOUS
Status: DISCONTINUED | OUTPATIENT
Start: 2019-09-04 | End: 2019-09-04 | Stop reason: HOSPADM

## 2019-09-04 RX ADMIN — FENTANYL CITRATE 50 MCG: 50 INJECTION, SOLUTION INTRAMUSCULAR; INTRAVENOUS at 10:36

## 2019-09-04 RX ADMIN — OXYCODONE HYDROCHLORIDE AND ACETAMINOPHEN 1 TABLET: 5; 325 TABLET ORAL at 12:24

## 2019-09-04 RX ADMIN — SODIUM CHLORIDE: 9 INJECTION, SOLUTION INTRAVENOUS at 08:09

## 2019-09-04 RX ADMIN — ROCURONIUM BROMIDE 40 MG: 10 INJECTION, SOLUTION INTRAVENOUS at 10:37

## 2019-09-04 RX ADMIN — Medication 3 MG: at 11:14

## 2019-09-04 RX ADMIN — ONDANSETRON 4 MG: 2 INJECTION INTRAMUSCULAR; INTRAVENOUS at 10:55

## 2019-09-04 RX ADMIN — MIDAZOLAM 2 MG: 1 INJECTION INTRAMUSCULAR; INTRAVENOUS at 10:29

## 2019-09-04 RX ADMIN — FENTANYL CITRATE 50 MCG: 50 INJECTION, SOLUTION INTRAMUSCULAR; INTRAVENOUS at 11:29

## 2019-09-04 RX ADMIN — PROPOFOL 150 MG: 10 INJECTION, EMULSION INTRAVENOUS at 10:36

## 2019-09-04 RX ADMIN — DEXAMETHASONE SODIUM PHOSPHATE 4 MG: 4 INJECTION, SOLUTION INTRA-ARTICULAR; INTRALESIONAL; INTRAMUSCULAR; INTRAVENOUS; SOFT TISSUE at 10:55

## 2019-09-04 RX ADMIN — Medication 2 G: at 10:29

## 2019-09-04 RX ADMIN — TRANEXAMIC ACID 1000 MG: 100 INJECTION, SOLUTION INTRAVENOUS at 10:43

## 2019-09-04 RX ADMIN — LIDOCAINE HYDROCHLORIDE 60 MG: 20 INJECTION, SOLUTION EPIDURAL; INFILTRATION; INTRACAUDAL; PERINEURAL at 10:36

## 2019-09-04 RX ADMIN — Medication 0.6 MG: at 11:14

## 2019-09-04 ASSESSMENT — PULMONARY FUNCTION TESTS
PIF_VALUE: 13
PIF_VALUE: 2
PIF_VALUE: 3
PIF_VALUE: 14
PIF_VALUE: 13
PIF_VALUE: 2
PIF_VALUE: 13
PIF_VALUE: 12
PIF_VALUE: 4
PIF_VALUE: 31
PIF_VALUE: 13
PIF_VALUE: 19
PIF_VALUE: 12
PIF_VALUE: 19
PIF_VALUE: 14
PIF_VALUE: 31
PIF_VALUE: 13
PIF_VALUE: 14
PIF_VALUE: 13
PIF_VALUE: 14
PIF_VALUE: 18
PIF_VALUE: 14
PIF_VALUE: 14
PIF_VALUE: 27
PIF_VALUE: 12
PIF_VALUE: 2
PIF_VALUE: 14
PIF_VALUE: 12
PIF_VALUE: 14
PIF_VALUE: 2
PIF_VALUE: 12
PIF_VALUE: 12
PIF_VALUE: 14
PIF_VALUE: 14
PIF_VALUE: 27
PIF_VALUE: 0
PIF_VALUE: 12
PIF_VALUE: 14
PIF_VALUE: 25
PIF_VALUE: 17
PIF_VALUE: 24
PIF_VALUE: 14
PIF_VALUE: 12
PIF_VALUE: 12

## 2019-09-04 ASSESSMENT — ENCOUNTER SYMPTOMS: SHORTNESS OF BREATH: 0

## 2019-09-04 ASSESSMENT — PAIN SCALES - GENERAL
PAINLEVEL_OUTOF10: 7
PAINLEVEL_OUTOF10: 0
PAINLEVEL_OUTOF10: 8

## 2019-09-04 ASSESSMENT — PAIN - FUNCTIONAL ASSESSMENT: PAIN_FUNCTIONAL_ASSESSMENT: 0-10

## 2019-09-04 NOTE — ANESTHESIA PRE PROCEDURE
Department of Anesthesiology  Preprocedure Note       Name:  Wellington Diaz   Age:  78 y.o.  :  1950                                          MRN:  [id number]         Date:  2019      Surgeon: Liam Jones):  Elana Perez MD    Procedure: NERVE STIMULATOR REVISION / REMOVAL (N/A Back)    Medications prior to admission:   Prior to Admission medications    Medication Sig Start Date End Date Taking? Authorizing Provider   hydrochlorothiazide (HYDRODIURIL) 25 MG tablet Take 25 mg by mouth daily   Yes Historical Provider, MD   carvedilol (COREG) 12.5 MG tablet Take 1 tablet by mouth 2 times daily (with meals)  Patient taking differently: Take 25 mg by mouth 2 times daily (with meals)  16  Yes Santi Sun MD   pantoprazole (PROTONIX) 40 MG tablet Take 1 tablet by mouth daily  Patient taking differently: Take 40 mg by mouth nightly  16  Yes Santi Sun MD   DULoxetine (CYMBALTA) 60 MG capsule Take 60 mg by mouth daily   Yes Historical Provider, MD   gabapentin (NEURONTIN) 600 MG tablet Take 600 mg by mouth 3 times daily.     Yes Historical Provider, MD   pravastatin (PRAVACHOL) 80 MG tablet Take 80 mg by mouth nightly   Yes Historical Provider, MD   rOPINIRole (REQUIP) 1 MG tablet Take 1 mg by mouth nightly   Yes Historical Provider, MD   tiZANidine (ZANAFLEX) 4 MG tablet Take 4 mg by mouth 2 times daily   Yes Historical Provider, MD   aspirin 81 MG EC tablet Take 81 mg by mouth daily    Historical Provider, MD   Calcium Carbonate-Vitamin D (OSCAL 500/200 D-3 PO) Take 1 tablet by mouth daily    Historical Provider, MD       Current medications:    Current Facility-Administered Medications   Medication Dose Route Frequency Provider Last Rate Last Dose    0.9 % sodium chloride infusion   Intravenous Continuous [facility] MD Roger 125 mL/hr at 19 0809      lidocaine PF 1 % injection 1 mL  1 mL Intradermal Once PRN [facility] MD Roger        sodium chloride flush 0.9 % injection 10 mL PERCUT IMPLNT NEUROELECT,EPIDURAL N/A 2018    SPINAL CORD STIMULATOR IMPLANT performed by Priya Hathaway MD at [facility] Right 2019       Social History:    Social History     Tobacco Use    Smoking status: Former Smoker     Packs/day: 1.00     Years: 44.00     Pack years: 44.00     Types: Cigarettes     Last attempt to quit: 2010     Years since quittin.2    Smokeless tobacco: Never Used   Substance Use Topics    Alcohol use: No                                Counseling given: Not Answered      Vital Signs (Current):   Vitals:    19 0755   BP: 131/71   Pulse: 69   Resp: 18   Temp: 97.7 °F (36.5 °C)   TempSrc: Oral   SpO2: 94%   Weight: 204 lb (92.5 kg)   Height: 5' 7\" (1.702 m)                                              BP Readings from Last 3 Encounters:   19 131/71   19 (!) 141/74   19 133/75       NPO Status: Time of last liquid consumption:                         Time of last solid consumption:                         Date of last liquid consumption: 19                        Date of last solid food consumption: 19    BMI:   Wt Readings from Last 3 Encounters:   19 204 lb (92.5 kg)   19 204 lb (92.5 kg)   18 198 lb (89.8 kg)     Body mass index is 31.95 kg/m².     CBC:   Lab Results   Component Value Date    WBC 4.7 2019    RBC 4.94 2019    HGB 15.5 2019    HCT 45.5 2019    MCV 92.1 2019    RDW 14.4 2019     2019       CMP:   Lab Results   Component Value Date     2019    K 4.5 2019     2019    CO2 25 2019    BUN 24 2019    CREATININE 1.04 2019    GFRAA >60 2019    LABGLOM 52 2019    GLUCOSE 111 2019    PROT 4.9 2016    CALCIUM 10.9 2019    BILITOT 0.19 2016    ALKPHOS 48 2016    AST 35 2016    ALT 27 2016       POC Tests: No results for input(s): POCGLU, [place], POCCL, POCBUN, POCHEMO, POCHCT in the last 72 hours. Coags:   Lab Results   Component Value Date    PROTIME 11.3 05/10/2016    INR 1.0 05/10/2016    APTT 31.8 05/08/2016       HCG (If Applicable): No results found for: PREGTESTUR, PREGSERUM, HCG, HCGQUANT     ABGs: No results found for: PHART, PO2ART, TBI1LFV, DAD1YDS, BEART, G0EVBIPW     Type & Screen (If Applicable):  No results found for: LABABO, [address]    Anesthesia Evaluation  Patient summary reviewed and Nursing notes reviewed no history of anesthetic complications:   Airway: Mallampati: II  TM distance: >3 FB   Neck ROM: full  Mouth opening: > = 3 FB Dental: normal exam         Pulmonary:normal exam  breath sounds clear to auscultation  (+) sleep apnea: on noncompliant,      (-) COPD, asthma and shortness of breath                           Cardiovascular:    (+) hypertension:, past MI: > 6 months and no interval change, CAD: obstructive and no interval change, CABG/stent (stents x 2 2010):, dysrhythmias (paroxysmal a fib): atrial fibrillation, hyperlipidemia    (-)  angina and  LOW    ECG reviewed  Rhythm: regular  Rate: normal  Echocardiogram reviewed         Beta Blocker:  Dose within 24 Hrs      ROS comment: TTE 2016 EF 25% due to septic shock from pancreatitis  TTE 2017 EF 65%     Neuro/Psych:      (-) TIA, CVA, headaches, psychiatric history and depression/anxiety             ROS comment: Fibromyalgia  Spinal stenosis  Postlaminectomy syndrome  Chronic lower back pain GI/Hepatic/Renal:   (+) renal disease: kidney stones,      (-) GERD and hepatitis       Endo/Other:    (+) : arthritis:., .    (-) diabetes mellitus, hypothyroidism, hyperthyroidism               Abdominal:           Vascular: negative vascular ROS. Anesthesia Plan      general     ASA 3       Induction: intravenous. MIPS: Postoperative opioids intended and Prophylactic antiemetics administered.   Anesthetic plan and risks discussed with patient. Plan discussed with CRNA.                   Dave Vizcaino MD   9/4/2019

## 2019-09-19 ENCOUNTER — OFFICE VISIT (OUTPATIENT)
Dept: ORTHOPEDIC SURGERY | Age: 69
End: 2019-09-19

## 2019-09-19 DIAGNOSIS — M54.50 CHRONIC MIDLINE LOW BACK PAIN WITHOUT SCIATICA: ICD-10-CM

## 2019-09-19 DIAGNOSIS — M43.10 ACQUIRED SPONDYLOLISTHESIS: ICD-10-CM

## 2019-09-19 DIAGNOSIS — G89.29 CHRONIC MIDLINE LOW BACK PAIN WITHOUT SCIATICA: ICD-10-CM

## 2019-09-19 DIAGNOSIS — M96.1 POSTLAMINECTOMY SYNDROME, LUMBAR REGION: Primary | ICD-10-CM

## 2019-09-19 DIAGNOSIS — M48.061 SPINAL STENOSIS, LUMBAR REGION, WITHOUT NEUROGENIC CLAUDICATION: ICD-10-CM

## 2019-09-19 PROCEDURE — 99024 POSTOP FOLLOW-UP VISIT: CPT | Performed by: ORTHOPAEDIC SURGERY

## 2019-09-19 NOTE — PROGRESS NOTES
Subjective:      Patient ID: Rubie Fabry is a 78 y.o. female. HPI  2 weeks post spinal cord stimulator revision  Review of Systems    Objective:   Physical Exam  Incisions healing well  Assessment:      Encounter Diagnoses   Name Primary?     Postlaminectomy syndrome, lumbar region Yes    Spinal stenosis, lumbar region, without neurogenic claudication     Chronic midline low back pain without sciatica     Acquired spondylolisthesis            Plan:      Follow-up 8 weeks        Iram Christina MD

## 2019-11-14 ENCOUNTER — OFFICE VISIT (OUTPATIENT)
Dept: ORTHOPEDIC SURGERY | Age: 69
End: 2019-11-14
Payer: MEDICARE

## 2019-11-14 VITALS — WEIGHT: 198 LBS | HEIGHT: 67 IN | BODY MASS INDEX: 31.08 KG/M2

## 2019-11-14 DIAGNOSIS — M54.50 CHRONIC MIDLINE LOW BACK PAIN WITHOUT SCIATICA: ICD-10-CM

## 2019-11-14 DIAGNOSIS — M96.1 POSTLAMINECTOMY SYNDROME, LUMBAR REGION: Primary | ICD-10-CM

## 2019-11-14 DIAGNOSIS — M43.10 ACQUIRED SPONDYLOLISTHESIS: ICD-10-CM

## 2019-11-14 DIAGNOSIS — M48.061 SPINAL STENOSIS, LUMBAR REGION, WITHOUT NEUROGENIC CLAUDICATION: ICD-10-CM

## 2019-11-14 DIAGNOSIS — G89.29 CHRONIC MIDLINE LOW BACK PAIN WITHOUT SCIATICA: ICD-10-CM

## 2019-11-14 PROCEDURE — 3017F COLORECTAL CA SCREEN DOC REV: CPT | Performed by: ORTHOPAEDIC SURGERY

## 2019-11-14 PROCEDURE — G8400 PT W/DXA NO RESULTS DOC: HCPCS | Performed by: ORTHOPAEDIC SURGERY

## 2019-11-14 PROCEDURE — 99213 OFFICE O/P EST LOW 20 MIN: CPT | Performed by: ORTHOPAEDIC SURGERY

## 2019-11-14 PROCEDURE — 4040F PNEUMOC VAC/ADMIN/RCVD: CPT | Performed by: ORTHOPAEDIC SURGERY

## 2019-11-14 PROCEDURE — G8417 CALC BMI ABV UP PARAM F/U: HCPCS | Performed by: ORTHOPAEDIC SURGERY

## 2019-11-14 PROCEDURE — G8427 DOCREV CUR MEDS BY ELIG CLIN: HCPCS | Performed by: ORTHOPAEDIC SURGERY

## 2019-11-14 PROCEDURE — 1123F ACP DISCUSS/DSCN MKR DOCD: CPT | Performed by: ORTHOPAEDIC SURGERY

## 2019-11-14 PROCEDURE — G8484 FLU IMMUNIZE NO ADMIN: HCPCS | Performed by: ORTHOPAEDIC SURGERY

## 2019-11-14 PROCEDURE — 1090F PRES/ABSN URINE INCON ASSESS: CPT | Performed by: ORTHOPAEDIC SURGERY

## 2019-11-14 PROCEDURE — G8598 ASA/ANTIPLAT THER USED: HCPCS | Performed by: ORTHOPAEDIC SURGERY

## 2019-11-14 PROCEDURE — 1036F TOBACCO NON-USER: CPT | Performed by: ORTHOPAEDIC SURGERY

## 2020-01-16 ENCOUNTER — OFFICE VISIT (OUTPATIENT)
Dept: ORTHOPEDIC SURGERY | Age: 70
End: 2020-01-16
Payer: MEDICARE

## 2020-01-16 PROCEDURE — 99213 OFFICE O/P EST LOW 20 MIN: CPT | Performed by: ORTHOPAEDIC SURGERY

## 2020-01-16 NOTE — PROGRESS NOTES
Patient ID: Ashley Stern is a 78 y.o. female    Chief Compliant:  Chief Complaint   Patient presents with    Follow-up     low back pain         History of Present Illness: This is a 78 y.o. female who presents to the clinic today for evaluation of low back pain. She is status post spinal cord stimulator revision. She had not had any programing yet at this point as she had been trying to contact her rep. She notes that she has not had any back pain as she has not been using her spinal cord stimulator due to charging issues. Review of Systems   Constitutional: Negative for fever, sweats, weight loss, recent injury, or recent illness  Neurological: Negative for  headaches, numbness, or focal weakness. Integumentary: Negative for abrasion, laceration, rash, ecchymosis. Musculoskeletal: Positive for Follow-up (low back pain )         Physical Exam:  Constitutional: Patient is oriented to person, place, and time. Patient appears well-developed and well nourished. HENT: Negative otherwise noted  Head: Normocephalic and Atraumatic  Nose: Normal  Eyes: Conjunctivae and EOM are normal  Neck: Normal range of motion Neck supple. Respiratory/Cardio: Effort normal. No respiratory distress. Musculoskeletal: Normal gait. Neurological: Patient is alert and oriented to person, place, and time. Normal strenght. No sensory deficit. Skin: Skin is warm and dry  Psychiatric: Behavior is normal. Thought content normal.  Nursing note and vitals reviewed.        Past History:    Current Outpatient Medications:     hydrochlorothiazide (HYDRODIURIL) 25 MG tablet, Take 25 mg by mouth daily, Disp: , Rfl:     aspirin 81 MG EC tablet, Take 81 mg by mouth daily, Disp: , Rfl:     Calcium Carbonate-Vitamin D (OSCAL 500/200 D-3 PO), Take 1 tablet by mouth daily, Disp: , Rfl:     carvedilol (COREG) 12.5 MG tablet, Take 1 tablet by mouth 2 times daily (with meals) (Patient taking differently: Take 25 mg by mouth 2 times Not on file    Intimate partner violence:     Fear of current or ex partner: Not on file     Emotionally abused: Not on file     Physically abused: Not on file     Forced sexual activity: Not on file   Other Topics Concern    Not on file   Social History Narrative    Not on file     Past Medical History:   Diagnosis Date    Arthritis     Blockage of coronary artery of heart ([address] 2010    x 2 stents    CAD (coronary artery disease)     Fibromyalgia     History of blood transfusion     History of kidney stones     last one \"about 10 years ago\"    Hyperlipidemia     Hypertension     Pancreatitis 2016    Sleep apnea     uses Bipap occasionally as needed    Wears glasses     for reading     Past Surgical History:   Procedure Laterality Date    BACK SURGERY  2012    L4,L5,S1 hardware placed    CATARACT REMOVAL Bilateral 2016    COLONOSCOPY      normal    CORONARY ANGIOPLASTY  2010    x 2 stents    HERNIA REPAIR Right     inguinal    HYSTERECTOMY      JOINT REPLACEMENT Right     knee    KIDNEY STONE SURGERY      x 3    LITHOTRIPSY      x 3    PARATHYROIDECTOMY      PARATHYROIDECTOMY      ID PERCUT IMPLNT NEUROELECT,EPIDURAL N/A 8/8/2018    SPINAL CORD STIMULATOR IMPLANT performed by Thressa Gowers, MD at [address] [place] [address] Right 03/2019     Family History   Problem Relation Age of Onset    Heart Disease Father           Labs and Imaging:     None taken today    Assessment and Plan:  1. Acquired spondylolisthesis        This is a 78 y.o. female who presents to the clinic today for spinal cord stimulator. She has not had any pain although her spinal cord stimulator has not been charged. We will be working on getting the Rep to help fix the stimulator, as there has been a miscommunication somewhere along the line.      Follow up 4 months    Scribe Attestation:  By signing my name below, Lexi Mendoza, attest that this documentation has been prepared under the direction and in the

## 2020-01-30 NOTE — PROGRESS NOTES
NPO after midnight  [employer] and drivers license  Wear comfortable clean clothing  Do not bring jewelry  Shower night before and morning of surgery with a liquid antibacterial soap  Bring  medications   Follow all instructions given by your physician   needed at discharge  Call [phone] for any questions

## 2020-02-05 ENCOUNTER — ANESTHESIA EVENT (OUTPATIENT)
Dept: OPERATING ROOM | Age: 70
End: 2020-02-05
Payer: MEDICARE

## 2020-02-06 ENCOUNTER — HOSPITAL ENCOUNTER (OUTPATIENT)
Age: 70
Setting detail: OUTPATIENT SURGERY
Discharge: HOME | End: 2020-02-06
Attending: ORTHOPAEDIC SURGERY | Admitting: ORTHOPAEDIC SURGERY
Payer: MEDICARE

## 2020-02-06 ENCOUNTER — ANESTHESIA (OUTPATIENT)
Dept: OPERATING ROOM | Age: 70
End: 2020-02-06
Payer: MEDICARE

## 2020-02-06 VITALS
SYSTOLIC BLOOD PRESSURE: 133 MMHG | DIASTOLIC BLOOD PRESSURE: 78 MMHG | RESPIRATION RATE: 3 BRPM | OXYGEN SATURATION: 91 %

## 2020-02-06 VITALS
TEMPERATURE: 97.8 F | OXYGEN SATURATION: 91 % | HEIGHT: 67 IN | BODY MASS INDEX: 30.86 KG/M2 | SYSTOLIC BLOOD PRESSURE: 100 MMHG | RESPIRATION RATE: 20 BRPM | DIASTOLIC BLOOD PRESSURE: 65 MMHG | WEIGHT: 196.6 LBS | HEART RATE: 82 BPM

## 2020-02-06 LAB — POTASSIUM SERPL-SCNC: 4 MEQ/L (ref 3.5–5.2)

## 2020-02-06 PROCEDURE — 3700000000 HC ANESTHESIA ATTENDED CARE: Performed by: ORTHOPAEDIC SURGERY

## 2020-02-06 PROCEDURE — 6360000002 HC RX W HCPCS: Performed by: NURSE ANESTHETIST, CERTIFIED REGISTERED

## 2020-02-06 PROCEDURE — 3600000004 HC SURGERY LEVEL 4 BASE: Performed by: ORTHOPAEDIC SURGERY

## 2020-02-06 PROCEDURE — 2709999900 HC NON-CHARGEABLE SUPPLY: Performed by: ORTHOPAEDIC SURGERY

## 2020-02-06 PROCEDURE — 7100000011 HC PHASE II RECOVERY - ADDTL 15 MIN: Performed by: ORTHOPAEDIC SURGERY

## 2020-02-06 PROCEDURE — 3700000001 HC ADD 15 MINUTES (ANESTHESIA): Performed by: ORTHOPAEDIC SURGERY

## 2020-02-06 PROCEDURE — 7100000000 HC PACU RECOVERY - FIRST 15 MIN: Performed by: ORTHOPAEDIC SURGERY

## 2020-02-06 PROCEDURE — 36415 COLL VENOUS BLD VENIPUNCTURE: CPT

## 2020-02-06 PROCEDURE — 2500000003 HC RX 250 WO HCPCS: Performed by: NURSE ANESTHETIST, CERTIFIED REGISTERED

## 2020-02-06 PROCEDURE — 7100000001 HC PACU RECOVERY - ADDTL 15 MIN: Performed by: ORTHOPAEDIC SURGERY

## 2020-02-06 PROCEDURE — 6360000002 HC RX W HCPCS: Performed by: ORTHOPAEDIC SURGERY

## 2020-02-06 PROCEDURE — 84132 ASSAY OF SERUM POTASSIUM: CPT

## 2020-02-06 PROCEDURE — 7100000010 HC PHASE II RECOVERY - FIRST 15 MIN: Performed by: ORTHOPAEDIC SURGERY

## 2020-02-06 PROCEDURE — 3600000014 HC SURGERY LEVEL 4 ADDTL 15MIN: Performed by: ORTHOPAEDIC SURGERY

## 2020-02-06 PROCEDURE — 6360000002 HC RX W HCPCS: Performed by: ANESTHESIOLOGY

## 2020-02-06 PROCEDURE — 2580000003 HC RX 258: Performed by: NURSE ANESTHETIST, CERTIFIED REGISTERED

## 2020-02-06 RX ORDER — ONDANSETRON 2 MG/ML
4 INJECTION INTRAMUSCULAR; INTRAVENOUS
Status: DISCONTINUED | OUTPATIENT
Start: 2020-02-06 | End: 2020-02-06 | Stop reason: HOSPADM

## 2020-02-06 RX ORDER — ROPIVACAINE HYDROCHLORIDE 5 MG/ML
INJECTION, SOLUTION EPIDURAL; INFILTRATION; PERINEURAL
Status: COMPLETED | OUTPATIENT
Start: 2020-02-06 | End: 2020-02-06

## 2020-02-06 RX ORDER — DEXAMETHASONE SODIUM PHOSPHATE 4 MG/ML
INJECTION, SOLUTION INTRA-ARTICULAR; INTRALESIONAL; INTRAMUSCULAR; INTRAVENOUS; SOFT TISSUE PRN
Status: DISCONTINUED | OUTPATIENT
Start: 2020-02-06 | End: 2020-02-06 | Stop reason: SDUPTHER

## 2020-02-06 RX ORDER — PROPOFOL 10 MG/ML
INJECTION, EMULSION INTRAVENOUS PRN
Status: DISCONTINUED | OUTPATIENT
Start: 2020-02-06 | End: 2020-02-06 | Stop reason: SDUPTHER

## 2020-02-06 RX ORDER — SODIUM CHLORIDE 9 MG/ML
INJECTION, SOLUTION INTRAVENOUS CONTINUOUS
Status: DISCONTINUED | OUTPATIENT
Start: 2020-02-06 | End: 2020-02-06 | Stop reason: HOSPADM

## 2020-02-06 RX ORDER — SODIUM CHLORIDE 9 MG/ML
INJECTION, SOLUTION INTRAVENOUS CONTINUOUS PRN
Status: DISCONTINUED | OUTPATIENT
Start: 2020-02-06 | End: 2020-02-06 | Stop reason: SDUPTHER

## 2020-02-06 RX ORDER — GLYCOPYRROLATE 1 MG/5 ML
SYRINGE (ML) INTRAVENOUS PRN
Status: DISCONTINUED | OUTPATIENT
Start: 2020-02-06 | End: 2020-02-06 | Stop reason: SDUPTHER

## 2020-02-06 RX ORDER — MEPERIDINE HYDROCHLORIDE 25 MG/ML
12.5 INJECTION INTRAMUSCULAR; INTRAVENOUS; SUBCUTANEOUS EVERY 5 MIN PRN
Status: DISCONTINUED | OUTPATIENT
Start: 2020-02-06 | End: 2020-02-06 | Stop reason: HOSPADM

## 2020-02-06 RX ORDER — FENTANYL CITRATE 50 UG/ML
INJECTION, SOLUTION INTRAMUSCULAR; INTRAVENOUS PRN
Status: DISCONTINUED | OUTPATIENT
Start: 2020-02-06 | End: 2020-02-06 | Stop reason: SDUPTHER

## 2020-02-06 RX ORDER — HYDRALAZINE HYDROCHLORIDE 20 MG/ML
5 INJECTION INTRAMUSCULAR; INTRAVENOUS EVERY 10 MIN PRN
Status: DISCONTINUED | OUTPATIENT
Start: 2020-02-06 | End: 2020-02-06 | Stop reason: HOSPADM

## 2020-02-06 RX ORDER — FENTANYL CITRATE 50 UG/ML
50 INJECTION, SOLUTION INTRAMUSCULAR; INTRAVENOUS EVERY 5 MIN PRN
Status: DISCONTINUED | OUTPATIENT
Start: 2020-02-06 | End: 2020-02-06 | Stop reason: HOSPADM

## 2020-02-06 RX ORDER — LIDOCAINE HCL/PF 100 MG/5ML
SYRINGE (ML) INJECTION PRN
Status: DISCONTINUED | OUTPATIENT
Start: 2020-02-06 | End: 2020-02-06 | Stop reason: SDUPTHER

## 2020-02-06 RX ORDER — ONDANSETRON 2 MG/ML
INJECTION INTRAMUSCULAR; INTRAVENOUS PRN
Status: DISCONTINUED | OUTPATIENT
Start: 2020-02-06 | End: 2020-02-06 | Stop reason: SDUPTHER

## 2020-02-06 RX ORDER — OXYCODONE HYDROCHLORIDE AND ACETAMINOPHEN 5; 325 MG/1; MG/1
1-2 TABLET ORAL EVERY 4 HOURS PRN
Qty: 40 TABLET | Refills: 0 | Status: SHIPPED | OUTPATIENT
Start: 2020-02-06 | End: 2020-02-11

## 2020-02-06 RX ORDER — EPHEDRINE SULFATE/0.9% NACL/PF 50 MG/5 ML
SYRINGE (ML) INTRAVENOUS PRN
Status: DISCONTINUED | OUTPATIENT
Start: 2020-02-06 | End: 2020-02-06 | Stop reason: SDUPTHER

## 2020-02-06 RX ADMIN — Medication 20 MG: at 12:20

## 2020-02-06 RX ADMIN — Medication 80 MG: at 11:53

## 2020-02-06 RX ADMIN — FENTANYL CITRATE 25 MCG: 50 INJECTION, SOLUTION INTRAMUSCULAR; INTRAVENOUS at 12:02

## 2020-02-06 RX ADMIN — SODIUM CHLORIDE: 9 INJECTION, SOLUTION INTRAVENOUS at 11:49

## 2020-02-06 RX ADMIN — PROPOFOL 150 MG: 10 INJECTION, EMULSION INTRAVENOUS at 11:53

## 2020-02-06 RX ADMIN — Medication 20 MG: at 12:18

## 2020-02-06 RX ADMIN — PHENYLEPHRINE HYDROCHLORIDE 200 MCG: 10 INJECTION INTRAVENOUS at 12:26

## 2020-02-06 RX ADMIN — CEFAZOLIN 2000 MG: 10 INJECTION, POWDER, FOR SOLUTION INTRAVENOUS at 11:58

## 2020-02-06 RX ADMIN — Medication 0.2 MG: at 12:23

## 2020-02-06 RX ADMIN — Medication 10 MG: at 12:16

## 2020-02-06 RX ADMIN — ONDANSETRON HYDROCHLORIDE 4 MG: 4 INJECTION, SOLUTION INTRAMUSCULAR; INTRAVENOUS at 13:03

## 2020-02-06 RX ADMIN — PHENYLEPHRINE HYDROCHLORIDE 100 MCG: 10 INJECTION INTRAVENOUS at 12:23

## 2020-02-06 RX ADMIN — ROPIVACAINE HYDROCHLORIDE 25 ML: 5 INJECTION, SOLUTION EPIDURAL; INFILTRATION; PERINEURAL at 11:46

## 2020-02-06 RX ADMIN — FENTANYL CITRATE 25 MCG: 50 INJECTION, SOLUTION INTRAMUSCULAR; INTRAVENOUS at 12:05

## 2020-02-06 RX ADMIN — DEXAMETHASONE SODIUM PHOSPHATE 10 MG: 4 INJECTION, SOLUTION INTRAMUSCULAR; INTRAVENOUS at 12:02

## 2020-02-06 RX ADMIN — FENTANYL CITRATE 50 MCG: 50 INJECTION, SOLUTION INTRAMUSCULAR; INTRAVENOUS at 11:53

## 2020-02-06 ASSESSMENT — PULMONARY FUNCTION TESTS
PIF_VALUE: 10
PIF_VALUE: 11
PIF_VALUE: 10
PIF_VALUE: 3
PIF_VALUE: 10
PIF_VALUE: 3
PIF_VALUE: 10
PIF_VALUE: 3
PIF_VALUE: 23
PIF_VALUE: 2
PIF_VALUE: 0
PIF_VALUE: 10
PIF_VALUE: 10
PIF_VALUE: 3
PIF_VALUE: 10
PIF_VALUE: 21
PIF_VALUE: 10
PIF_VALUE: 3
PIF_VALUE: 10
PIF_VALUE: 0
PIF_VALUE: 3
PIF_VALUE: 4
PIF_VALUE: 10
PIF_VALUE: 1
PIF_VALUE: 11
PIF_VALUE: 5
PIF_VALUE: 2
PIF_VALUE: 5
PIF_VALUE: 10
PIF_VALUE: 2
PIF_VALUE: 10
PIF_VALUE: 2
PIF_VALUE: 11
PIF_VALUE: 11
PIF_VALUE: 10
PIF_VALUE: 10
PIF_VALUE: 0
PIF_VALUE: 11
PIF_VALUE: 11
PIF_VALUE: 10
PIF_VALUE: 2
PIF_VALUE: 4
PIF_VALUE: 2
PIF_VALUE: 4
PIF_VALUE: 3
PIF_VALUE: 1
PIF_VALUE: 2
PIF_VALUE: 6
PIF_VALUE: 1
PIF_VALUE: 11
PIF_VALUE: 2
PIF_VALUE: 10
PIF_VALUE: 3
PIF_VALUE: 3
PIF_VALUE: 10
PIF_VALUE: 10
PIF_VALUE: 1
PIF_VALUE: 1
PIF_VALUE: 11
PIF_VALUE: 3
PIF_VALUE: 10
PIF_VALUE: 5
PIF_VALUE: 11
PIF_VALUE: 1
PIF_VALUE: 10
PIF_VALUE: 2
PIF_VALUE: 1
PIF_VALUE: 10
PIF_VALUE: 16

## 2020-02-06 ASSESSMENT — LIFESTYLE VARIABLES: SMOKING_STATUS: 1

## 2020-02-06 ASSESSMENT — PAIN DESCRIPTION - DESCRIPTORS: DESCRIPTORS: ACHING

## 2020-02-06 ASSESSMENT — PAIN - FUNCTIONAL ASSESSMENT: PAIN_FUNCTIONAL_ASSESSMENT: 0-10

## 2020-02-06 ASSESSMENT — PAIN SCALES - GENERAL: PAINLEVEL_OUTOF10: 0

## 2020-02-06 NOTE — PROGRESS NOTES
1318-Patient to PACU. Report received from [place] [id number] and [address] [place] [address]. Patient awake. Denies pain or discomfort. On 4L NC. IV infusing, no redness or swelling at site noted. VSS  1340-Patient resting quietly in bed. Denies pain or discomfort. VSS  1354-Patient meets DC criteria. Resting quietly in bed. Denies pain or discomfort. VSS. To SDS in stable condition. [facility].

## 2020-02-06 NOTE — PROGRESS NOTES
Pt returned to [facility] room 5. Vitals and assessment as charted. 0.9 infusing, 800 ml to count from PACU. Pt has popsicle and water. Family at the bedside. Pt and family verbalized understanding of discharge criteria and call light use. Call light in reach.

## 2020-02-06 NOTE — BRIEF OP NOTE
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

## 2020-02-06 NOTE — ANESTHESIA PROCEDURE NOTES
Peripheral Block    Patient location during procedure: pre-op  Start time: 2/6/2020 11:41 AM  End time: 2/6/2020 11:46 AM  Staffing  Anesthesiologist: Cedrick Dangelo DO  Resident/CRNA: BEAU Lopez CRNA  Performed: anesthesiologist   Preanesthetic Checklist  Completed: patient identified, site marked, surgical consent, pre-op evaluation, timeout performed, IV checked, risks and benefits discussed, monitors and equipment checked, anesthesia consent given, oxygen available and patient being monitored  Peripheral Block  Patient position: supine  Prep: ChloraPrep  Patient monitoring: continuous pulse ox, frequent blood pressure checks and IV access  Block type: Brachial plexus  Laterality: left  Injection technique: single-shot  Procedures: ultrasound guided  Local infiltration: ropivacaine  Infiltration strength: 0.5 %  Dose: 3 mL  Supraclavicular  Provider prep: mask and sterile gloves  Local infiltration: ropivacaine  Needle  Needle type: combined needle/nerve stimulator   Needle gauge: 21 G  Needle length: 5 cm  Needle localization: anatomical landmarks and ultrasound guidance  Needle insertion depth: 2 cm  Assessment  Injection assessment: negative aspiration for heme, no paresthesia on injection and local visualized surrounding nerve on ultrasound  Paresthesia pain: none  Slow fractionated injection: yes  Hemodynamics: stable  Reason for block: primary anesthetic and at surgeon's request

## 2020-02-06 NOTE — PROGRESS NOTES

## 2020-02-06 NOTE — H&P
H&P Update    Patient's History and Physical from January 15, 2020 was reviewed. Patient examined. There has been no change.     Latesha Dialol

## 2020-02-06 NOTE — ANESTHESIA PRE PROCEDURE
Department of Anesthesiology  Preprocedure Note       Name:  Madelyn Tian   Age:  78 y.o.  :  1950                                          MRN:  [id number]         Date:  2020      Surgeon: Trenton Vann):  Milton Coburn DO    Procedure: LEFT FIRST CARPOMETACARPAL ARTHROPLASTY (Left )    Medications prior to admission:   Prior to Admission medications    Medication Sig Start Date End Date Taking? Authorizing Provider   hydrochlorothiazide (HYDRODIURIL) 25 MG tablet Take 25 mg by mouth nightly    Yes Historical Provider, MD   carvedilol (COREG) 12.5 MG tablet Take 1 tablet by mouth 2 times daily (with meals)  Patient taking differently: Take 25 mg by mouth 2 times daily (with meals)  16  Yes Federico Caro MD   pantoprazole (PROTONIX) 40 MG tablet Take 1 tablet by mouth daily  Patient taking differently: Take 40 mg by mouth nightly  16  Yes Federico Caro MD   DULoxetine (CYMBALTA) 60 MG capsule Take 60 mg by mouth daily   Yes Historical Provider, MD   pravastatin (PRAVACHOL) 80 MG tablet Take 80 mg by mouth nightly   Yes Historical Provider, MD   rOPINIRole (REQUIP) 1 MG tablet Take 1 mg by mouth nightly   Yes Historical Provider, MD   tiZANidine (ZANAFLEX) 4 MG tablet Take 4 mg by mouth 2 times daily   Yes Historical Provider, MD   aspirin 81 MG EC tablet Take 81 mg by mouth daily    Historical Provider, MD   Calcium Carbonate-Vitamin D (OSCAL 500/200 D-3 PO) Take 1 tablet by mouth daily    Historical Provider, MD   gabapentin (NEURONTIN) 600 MG tablet Take 600 mg by mouth 3 times daily as needed. Historical Provider, MD       Current medications:    Current Facility-Administered Medications   Medication Dose Route Frequency Provider Last Rate Last Dose    0.9 % sodium chloride infusion   Intravenous Continuous Milton Coburn DO        ceFAZolin (ANCEF) 2 g in dextrose 5 % 50 mL IVPB  2 g Intravenous Once Milton Coburn DO           Allergies:     Allergies Allergen Reactions    Toradol [Ketorolac Tromethamine] Swelling     Swelling at site of injection       Problem List:    Patient Active Problem List   Diagnosis Code    Acute cystitis without hematuria N30.00    CAD (coronary artery disease) I25.10    Hypertension I10    Lactic acid acidosis E87.2    JAZZMINE (acute kidney injury) ([address] N17.9    Septic shock ([address] A41.9, R65.21    DIC (disseminated intravascular coagulation) ([address] D65    Coagulopathy ([address] D68.9    Thrombocytopenia ([address] D69.6    Acute cystitis with hematuria N30.01    Pneumonia due to organism J18.9    Acquired spondylolisthesis M43.10    Spinal stenosis, lumbar region, without neurogenic claudication M48.061    Postlaminectomy syndrome, lumbar region M96.1    Chronic midline low back pain without sciatica M54.5, G89.29       Past Medical History:        Diagnosis Date    Arthritis     Blockage of coronary artery of heart ([facility] 2010    x 2 stents    CAD (coronary artery disease)     Fibromyalgia     History of blood transfusion     History of kidney stones     last one \"about 10 years ago\"    Hyperlipidemia     Hypertension     Pancreatitis 2016    Sleep apnea     uses Bipap occasionally as needed    Thyroid disease     Wears glasses     for reading       Past Surgical History:        Procedure Laterality Date    BACK SURGERY  2012    L4,L5,S1 hardware placed    BACK SURGERY  2018    SPINAL CORD STIMULATOR    CATARACT REMOVAL Bilateral 2016    COLONOSCOPY      normal    CORONARY ANGIOPLASTY  2010    x 2 stents    HERNIA REPAIR Right     inguinal    HYSTERECTOMY      JOINT REPLACEMENT Right     knee    KIDNEY STONE SURGERY      x 3    LITHOTRIPSY      x 3    PARATHYROIDECTOMY      PARATHYROIDECTOMY      [place] PERCUT IMPLNT NEUROELECT,EPIDURAL N/A 8/8/2018    SPINAL CORD STIMULATOR IMPLANT performed by Kait Malin MD at [facility] Right 03/2019       Social History:    Social History

## 2020-02-07 NOTE — OP NOTE
[address] [id number]                                OPERATIVE REPORT    PATIENT NAME: Augustin Castro               :        1950  MED REC NO:   [id number]                           ROOM:  ACCOUNT NO:   [de-identified]                           ADMIT DATE: 2020  PROVIDER:     Clarita Mckeon D.O.    DATE OF PROCEDURE:  2020    PREOPERATIVE DIAGNOSIS:  Left thumb CMC arthritis. POSTOPERATIVE DIAGNOSIS:  Left thumb CMC arthritis. OPERATION PERFORMED:  Left thumb CMC arthroplasty with APL suspension. SURGEON:  Clarita Mckeon DO    ASSISTANT:  Jennifer Lutz. TYPE OF ANESTHESIA:  Regional and general.    BLOOD LOSS:  Minimal.    TOURNIQUET TIME:  48 minutes. INDICATION FOR OPERATION:  The patient is a 69-year-old female that  presented to my office with longstanding thumb pain. She was diagnosed  with thumb CMC arthritis. She failed conservative management including  injection and bracing. I recommended thumb CMC arthroplasty with  trapeziectomy and APL suspension. Risks, benefits, and alternatives  were reviewed. The risks including but not limited to infection;  bleeding; blood clots; damage to nerves, vessels, tendons; joint  stiffness; residual pain; need for further surgery; risks of anesthesia;  loss of limb; loss of life were reviewed. All questions were answered  and the patient elected to proceed. OPERATIVE SUMMARY:  The patient was met in the preoperative holding area  and the correct extremity was identified and marked. Informed consent  was obtained. The patient was escorted to the operative suite. She was  transferred to the operating table, left in the supine position. The  Anesthesia team had already administered a regional nerve block in the  preoperative holding area. General anesthesia was administered. A  well-padded tourniquet was placed on the upper arm.   The upper extremity  was prepped and draped in standard surgical fashion. A timeout was  taken and the correct patient, procedure, and operative site were agreed  upon by all who were present. The arm was exsanguinated with an Esmarch  bandage and the tourniquet was set at 250 mmHg. I began with a  longitudinal incision over the proximal third of the metacarpal shaft  extending to the radial styloid. Blunt dissection was carried down to  create full-thickness flaps in order to protect the branches of the  superficial radial nerve. Nerve branches were identified and protected  throughout the case. I then developed the interval between the APL and  the EPB tendon. I then identified the radial artery and used bipolar  cautery to dissect it from the joint capsule. Once the artery was  mobilized and adequate exposure, I created a full-thickness capsulotomy  and did a subperiosteal dissection off the base of the first metacarpal  with care taken not to release the APL insertion. I completely  dissected around the trapezium. Fluoroscopy was used during this  portion of the procedure just to ensure correct  localization of dissection. Once I felt I had the trapezium freed up  enough, I then removed it with a rongeur in piecemeal fashion and it  came out very easily. I removed a couple of cartilaginous loose bodies  from the recess between the first and second metacarpal bases. There  were no other visible or palpable pieces of bone remaining. The FCR  tendon was left intact. The first dorsal compartment was released on  its dorsal aspect gaining access to the APL tendons. An ulnar base slip  of the APL was then harvested. It was then directed through the capsule  beneath it, and then I used a right-angle clamp to lead the tendon  through the volar capsule and around the FCR tendon. A shallow groove  was created in the base of the metacarpal using a rongeur as I placed  the APL tendon.   The thumb and tendon were held with appropriate  traction, and the capsule was closed to the tendon in the area of the  base of the metacarpal using a 3-0 FiberWire suture. Multiple stitches  were placed. Fluoroscopy was used to confirm appropriate suspension. The remainder of the tendon was cut, and the capsule in the area of the  trapezial resection was closed in a pursestring fashion. The tourniquet  was let down at 48 minutes. There was no bleeding from the radial  artery. Other small bleeding vessels were cauterized with bipolar  cautery with care taken to ensure these branches of superficial radial  nerve were not aggravated. The skin was closed with 4-0 nylon suture in  an interrupted fashion. Sterile dressing followed by a thumb spica  splint was applied. The patient was awakened from anesthesia. She was  transferred back to PACU in stable condition. There were no  complications.         Dick Kearns D.O.    D: 02/07/2020 7:01:27       T: 02/07/2020 8:12:36     [email or web address]  Job#: [id number]     Doc#: [id number]    CC:

## 2020-05-21 ENCOUNTER — OFFICE VISIT (OUTPATIENT)
Dept: ORTHOPEDIC SURGERY | Age: 70
End: 2020-05-21
Payer: MEDICARE

## 2020-05-21 VITALS — WEIGHT: 201 LBS | HEIGHT: 67 IN | BODY MASS INDEX: 31.55 KG/M2

## 2020-05-21 PROCEDURE — 99213 OFFICE O/P EST LOW 20 MIN: CPT | Performed by: ORTHOPAEDIC SURGERY

## 2020-10-04 ENCOUNTER — HOSPITAL ENCOUNTER (EMERGENCY)
Age: 70
Discharge: HOME | End: 2020-10-04
Attending: FAMILY MEDICINE
Payer: MEDICARE

## 2020-10-04 ENCOUNTER — APPOINTMENT (OUTPATIENT)
Dept: CT IMAGING | Age: 70
End: 2020-10-04
Payer: MEDICARE

## 2020-10-04 VITALS
OXYGEN SATURATION: 97 % | BODY MASS INDEX: 30.85 KG/M2 | DIASTOLIC BLOOD PRESSURE: 90 MMHG | WEIGHT: 197 LBS | RESPIRATION RATE: 16 BRPM | SYSTOLIC BLOOD PRESSURE: 118 MMHG | HEART RATE: 67 BPM

## 2020-10-04 LAB
-: ABNORMAL
ABSOLUTE EOS #: 0.14 K/UL (ref 0–0.44)
ABSOLUTE IMMATURE GRANULOCYTE: <0.03 K/UL (ref 0–0.3)
ABSOLUTE LYMPH #: 1.78 K/UL (ref 1.1–3.7)
ABSOLUTE MONO #: 1.09 K/UL (ref 0.1–1.2)
ALBUMIN SERPL-MCNC: 4.2 G/DL (ref 3.5–5.2)
ALBUMIN/GLOBULIN RATIO: 1.8 (ref 1–2.5)
ALP BLD-CCNC: 79 U/L (ref 35–104)
ALT SERPL-CCNC: 13 U/L (ref 5–33)
AMORPHOUS: ABNORMAL
ANION GAP SERPL CALCULATED.3IONS-SCNC: 11 MMOL/L (ref 9–17)
AST SERPL-CCNC: 16 U/L
BACTERIA: ABNORMAL
BASOPHILS # BLD: 1 % (ref 0–2)
BASOPHILS ABSOLUTE: 0.04 K/UL (ref 0–0.2)
BILIRUB SERPL-MCNC: 0.4 MG/DL (ref 0.3–1.2)
BILIRUBIN URINE: NEGATIVE
BUN BLDV-MCNC: 21 MG/DL (ref 8–23)
BUN/CREAT BLD: 22 (ref 9–20)
CALCIUM SERPL-MCNC: 9.5 MG/DL (ref 8.6–10.4)
CASTS UA: ABNORMAL /LPF
CHLORIDE BLD-SCNC: 108 MMOL/L (ref 98–107)
CO2: 23 MMOL/L (ref 20–31)
COLOR: YELLOW
COMMENT UA: ABNORMAL
CREAT SERPL-MCNC: 0.97 MG/DL (ref 0.5–0.9)
CRYSTALS, UA: ABNORMAL /HPF
DIFFERENTIAL TYPE: ABNORMAL
EOSINOPHILS RELATIVE PERCENT: 2 % (ref 1–4)
EPITHELIAL CELLS UA: ABNORMAL /HPF (ref 0–25)
GFR AFRICAN AMERICAN: >60 ML/MIN
GFR NON-AFRICAN AMERICAN: 57 ML/MIN
GFR SERPL CREATININE-BSD FRML MDRD: ABNORMAL ML/MIN/{1.73_M2}
GFR SERPL CREATININE-BSD FRML MDRD: ABNORMAL ML/MIN/{1.73_M2}
GLUCOSE BLD-MCNC: 55 MG/DL (ref 70–99)
GLUCOSE URINE: NEGATIVE
HCT VFR BLD CALC: 39.9 % (ref 36.3–47.1)
HEMOGLOBIN: 13.2 G/DL (ref 11.9–15.1)
IMMATURE GRANULOCYTES: 0 %
KETONES, URINE: NEGATIVE
LEUKOCYTE ESTERASE, URINE: ABNORMAL
LYMPHOCYTES # BLD: 23 % (ref 24–43)
MCH RBC QN AUTO: 31.3 PG (ref 25.2–33.5)
MCHC RBC AUTO-ENTMCNC: 33.1 G/DL (ref 28.4–34.8)
MCV RBC AUTO: 94.5 FL (ref 82.6–102.9)
MONOCYTES # BLD: 14 % (ref 3–12)
MUCUS: ABNORMAL
NITRITE, URINE: POSITIVE
NRBC AUTOMATED: 0 PER 100 WBC
OTHER OBSERVATIONS UA: ABNORMAL
PDW BLD-RTO: 13.1 % (ref 11.8–14.4)
PH UA: 6 (ref 5–9)
PLATELET # BLD: 227 K/UL (ref 138–453)
PLATELET ESTIMATE: ABNORMAL
PMV BLD AUTO: 9.7 FL (ref 8.1–13.5)
POTASSIUM SERPL-SCNC: 3.7 MMOL/L (ref 3.7–5.3)
PROTEIN UA: NEGATIVE
RBC # BLD: 4.22 M/UL (ref 3.95–5.11)
RBC # BLD: ABNORMAL 10*6/UL
RBC UA: ABNORMAL /HPF (ref 0–2)
RENAL EPITHELIAL, UA: ABNORMAL /HPF
SEG NEUTROPHILS: 60 % (ref 36–65)
SEGMENTED NEUTROPHILS ABSOLUTE COUNT: 4.56 K/UL (ref 1.5–8.1)
SODIUM BLD-SCNC: 142 MMOL/L (ref 135–144)
SPECIFIC GRAVITY UA: 1.02 (ref 1.01–1.02)
TOTAL PROTEIN: 6.6 G/DL (ref 6.4–8.3)
TRICHOMONAS: ABNORMAL
TURBIDITY: ABNORMAL
URINE HGB: ABNORMAL
UROBILINOGEN, URINE: NORMAL
WBC # BLD: 7.6 K/UL (ref 3.5–11.3)
WBC # BLD: ABNORMAL 10*3/UL
WBC UA: ABNORMAL /HPF (ref 0–5)
YEAST: ABNORMAL

## 2020-10-04 PROCEDURE — 85025 COMPLETE CBC W/AUTO DIFF WBC: CPT

## 2020-10-04 PROCEDURE — 99284 EMERGENCY DEPT VISIT MOD MDM: CPT

## 2020-10-04 PROCEDURE — 87086 URINE CULTURE/COLONY COUNT: CPT

## 2020-10-04 PROCEDURE — 80053 COMPREHEN METABOLIC PANEL: CPT

## 2020-10-04 PROCEDURE — 87186 SC STD MICRODIL/AGAR DIL: CPT

## 2020-10-04 PROCEDURE — 96374 THER/PROPH/DIAG INJ IV PUSH: CPT

## 2020-10-04 PROCEDURE — 74176 CT ABD & PELVIS W/O CONTRAST: CPT

## 2020-10-04 PROCEDURE — 2580000003 HC RX 258: Performed by: FAMILY MEDICINE

## 2020-10-04 PROCEDURE — 81001 URINALYSIS AUTO W/SCOPE: CPT

## 2020-10-04 PROCEDURE — 87077 CULTURE AEROBIC IDENTIFY: CPT

## 2020-10-04 PROCEDURE — 6360000002 HC RX W HCPCS: Performed by: FAMILY MEDICINE

## 2020-10-04 RX ORDER — TAMSULOSIN HYDROCHLORIDE 0.4 MG/1
0.4 CAPSULE ORAL DAILY
Qty: 10 CAPSULE | Refills: 0 | Status: SHIPPED | OUTPATIENT
Start: 2020-10-04 | End: 2021-07-23

## 2020-10-04 RX ORDER — CEPHALEXIN 500 MG/1
500 CAPSULE ORAL 3 TIMES DAILY
Qty: 28 CAPSULE | Refills: 0 | Status: SHIPPED | OUTPATIENT
Start: 2020-10-04 | End: 2020-10-11

## 2020-10-04 RX ORDER — ONDANSETRON 4 MG/1
4 TABLET, FILM COATED ORAL 3 TIMES DAILY PRN
Qty: 15 TABLET | Refills: 0 | Status: SHIPPED | OUTPATIENT
Start: 2020-10-04 | End: 2021-07-23

## 2020-10-04 RX ADMIN — WATER 1 G: 1 INJECTION INTRAMUSCULAR; INTRAVENOUS; SUBCUTANEOUS at 16:38

## 2020-10-04 ASSESSMENT — PAIN DESCRIPTION - PAIN TYPE: TYPE: ACUTE PAIN

## 2020-10-04 ASSESSMENT — PAIN DESCRIPTION - ORIENTATION: ORIENTATION: RIGHT

## 2020-10-04 ASSESSMENT — PAIN DESCRIPTION - FREQUENCY: FREQUENCY: CONTINUOUS

## 2020-10-04 ASSESSMENT — PAIN DESCRIPTION - LOCATION: LOCATION: FLANK

## 2020-10-04 ASSESSMENT — PAIN SCALES - GENERAL
PAINLEVEL_OUTOF10: 5
PAINLEVEL_OUTOF10: 5

## 2020-10-04 ASSESSMENT — PAIN DESCRIPTION - DESCRIPTORS: DESCRIPTORS: DULL

## 2020-10-04 NOTE — ED PROVIDER NOTES
[facility] ED  EMERGENCY DEPARTMENT ENCOUNTER      Pt Name: Peter Hand  MRN: [id number]  [place] 1950  Date of evaluation: 10/4/2020  Provider: Devika Issa MD    [address] [place] [address]     Chief Complaint   Patient presents with    Flank Pain     right sided, onset yesterday         HISTORY OF PRESENT ILLNESS   (Location/Symptom, Timing/Onset, Context/Setting,Quality, Duration, Modifying Factors, Severity)  Note limiting factors. Peter Hand is a77 y.o. female who presents to the emergency department      Is a 69years old female presented to our ER complaining of right flank pain radiating to her lower abdomen. States that her pain started early this morning mostly in the flank/lower back radiating to her lower abdomen. Denies dysuria, frequency and urgency. States that she feels similar she did when she had a kidney stone in the past.  Rates her pain 5 out of 10 at this time. Is sharp throbbing pain without any alleviating or aggravating factors. Nursing Notes werereviewed. REVIEW OF SYSTEMS    (2-9 systems for level 4, 10 or more for level 5)     Review of Systems   Genitourinary: Positive for dysuria, frequency and pelvic pain. Musculoskeletal: Negative. Except as noted above the remainder of the review of systems was reviewed and negative.        PAST MEDICAL HISTORY     Past Medical History:   Diagnosis Date    Arthritis     Blockage of coronary artery of heart ([address] 2010    x 2 stents    CAD (coronary artery disease)     Fibromyalgia     History of blood transfusion     History of kidney stones     last one \"about 10 years ago\"    Hyperlipidemia     Hypertension     Pancreatitis 2016    Sleep apnea     uses Bipap occasionally as needed    Thyroid disease     Wears glasses     for reading         SURGICALHISTORY       Past Surgical History:   Procedure Laterality Date    ARTHROPLASTY Left 2/6/2020    LEFT FIRST CARPOMETACARPAL ARTHROPLASTY performed by Wendy Hairston DO at [facility]  2012    L4,L5,S1 hardware placed   [address]  2018    SPINAL CORD STIMULATOR    CATARACT REMOVAL Bilateral 2016    COLONOSCOPY      normal    CORONARY ANGIOPLASTY  2010    x 2 stents    HERNIA REPAIR Right     inguinal    HYSTERECTOMY      JOINT REPLACEMENT Right     knee    KIDNEY STONE SURGERY      x 3    LITHOTRIPSY      x 3    PARATHYROIDECTOMY      PARATHYROIDECTOMY      [place] PERCUT IMPLNT NEUROELECT,EPIDURAL N/A 8/8/2018    SPINAL CORD STIMULATOR IMPLANT performed by Mark Cervantes MD at [facility] Right 03/2019         CURRENT MEDICATIONS       Discharge Medication List as of 10/4/2020  4:37 PM      CONTINUE these medications which have NOT CHANGED    Details   hydrochlorothiazide (HYDRODIURIL) 25 MG tablet Take 25 mg by mouth nightly Historical Med      aspirin 81 MG EC tablet Take 81 mg by mouth dailyHistorical Med      Calcium Carbonate-Vitamin D (OSCAL 500/200 D-3 PO) Take 1 tablet by mouth dailyHistorical Med      carvedilol (COREG) 12.5 MG tablet Take 1 tablet by mouth 2 times daily (with meals), Disp-60 tablet, R-3      pantoprazole (PROTONIX) 40 MG tablet Take 1 tablet by mouth daily, Disp-30 tablet, R-0      DULoxetine (CYMBALTA) 60 MG capsule Take 60 mg by mouth daily      gabapentin (NEURONTIN) 600 MG tablet Take 600 mg by mouth 3 times daily as needed. Historical Med      pravastatin (PRAVACHOL) 80 MG tablet Take 80 mg by mouth nightly      rOPINIRole (REQUIP) 1 MG tablet Take 1 mg by mouth nightly                  Lisinopril and Toradol [ketorolac tromethamine]    FAMILY HISTORY       Family History   Problem Relation Age of Onset    Heart Disease Father           SOCIAL HISTORY       Social History     Socioeconomic History    Marital status:       Spouse name: None    Number of children: None    Years of education: None    Highest education level: None   Occupational History    None   Social Needs    Financial resource strain: None    Food insecurity     Worry: None     Inability: None    Transportation needs     Medical: None     Non-medical: None   Tobacco Use    Smoking status: Former Smoker     Packs/day: 1.00     Years: 44.00     Pack years: 44.00     Types: Cigarettes     Last attempt to quit: 5/25/2010     Years since quitting: 10.3    Smokeless tobacco: Never Used   Substance and Sexual Activity    Alcohol use: No    Drug use: No    Sexual activity: None   Lifestyle    Physical activity     Days per week: None     Minutes per session: None    Stress: None   Relationships    Social connections     Talks on phone: None     Gets together: None     Attends Restorationism service: None     Active member of club or organization: None     Attends meetings of clubs or organizations: None     Relationship status: None    Intimate partner violence     Fear of current or ex partner: None     Emotionally abused: None     Physically abused: None     Forced sexual activity: None   Other Topics Concern    None   Social History Narrative    None       SCREENINGS      [place] Coma Scale  Eye Opening: Spontaneous  Best Verbal Response: Oriented  Best Motor Response: Obeys commands  [place] Coma Scale Score: 15             PHYSICAL EXAM    (up to 7 for level 4, 8 or more for level 5)     ED Triage Vitals [10/04/20 1400]   BP Temp Temp src Pulse Resp SpO2 Height Weight   (!) 113/56 -- -- 79 16 98 % -- 197 lb (89.4 kg)       Physical Exam  Cardiovascular:      Rate and Rhythm: Normal rate and regular rhythm. Pulses: Normal pulses. Heart sounds: Normal heart sounds. Genitourinary:     Comments: Tenderness palpation of the right CVA. Tender palpation suprapubic area. No guarding  Musculoskeletal:         General: Tenderness and signs of injury present. No swelling or deformity. Right lower leg: No edema. Left lower leg: No edema. DIAGNOSTIC RESULTS     EKG:  All EKG's are interpreted by the Emergency Department Physician who either signs orCo-signs this chart in the absence of a cardiologist.        RADIOLOGY:   plain film images such as CT, Ultrasound and MRI are read by the radiologist. Plain radiographic images are visualized and preliminarily interpreted by the emergency physician with the below findings:        Interpretation per the Radiologist below, ifavailable at the time of this note:    CT ABDOMEN PELVIS WO CONTRAST Additional Contrast? None   Final Result   1. Mild right hydronephrosis and mild dilatation of the right ureter   diffusely without evidence for calculus. Findings could be due to recently   passed calculus or non radiopaque distal obstructing calculus. 2. Punctate right and 3 mm left renal calculus unchanged. 3. Multiple hepatic hypodensities representing cysts some of which have   increased in size from 2016.                ED BEDSIDE ULTRASOUND:   Performed by ED Physician - none    LABS:  Labs Reviewed   CULTURE, URINE - Abnormal; Notable for the following components:       Result Value    Culture KLEBSIELLA PNEUMONIAE >779107 CFU/ML (*)     Culture ESCHERICHIA COLI >[id number] CFU/ML (*)     All other components within normal limits   URINE RT REFLEX TO CULTURE - Abnormal; Notable for the following components:    Turbidity UA CLOUDY (*)     Urine Hgb 1+ (*)     Nitrite, Urine POSITIVE (*)     Leukocyte Esterase, Urine MODERATE (*)     All other components within normal limits   CBC WITH AUTO DIFFERENTIAL - Abnormal; Notable for the following components:    Lymphocytes 23 (*)     Monocytes 14 (*)     All other components within normal limits   COMPREHENSIVE METABOLIC PANEL - Abnormal; Notable for the following components:    Glucose 55 (*)     CREATININE 0.97 (*)     Bun/Cre Ratio 22 (*)     Chloride 108 (*)     GFR Non- 57 (*)     All other components within normal limits   MICROSCOPIC URINALYSIS - Abnormal; Notable for the following components:    Bacteria, UA 2+ (*)     All other components within normal limits       All other labs were within normal range ornot returned as of this dictation. EMERGENCY DEPARTMENT COURSE and DIFFERENTIAL DIAGNOSIS/MDM:   Vitals:    Vitals:    10/04/20 1400 10/04/20 1645 10/04/20 1648   BP: (!) 113/56 (!) 118/90    Pulse: 79 67    Resp: 16 18 16   SpO2: 98% 97%    Weight: 197 lb (89.4 kg)             MDM  Number of Diagnoses or Management Options  Complicated UTI (urinary tract infection):   Renal colic:   Diagnosis management comments: Patient presents with right flank pain turns out is a renal colic CAT scan revealing right hydronephrosis. This is likely due to either a passed stone or radio transparent stone. Also found to have pyuria, bacteriuria positive LE. We will arrange follow-up in the morning with Dr. Fabrizio Chen the urologist on call. She be discharged home with antibiotics along with some nausea medications. She states that she has pain medications at home she does not need any pain medicines from us. CRITICAL CARE TIME   Total CriticalCare time was  minutes, excluding separately reportable procedures. There was a high probability of clinically significant/life threatening deterioration in the patient's condition which required my urgent intervention. CONSULTS:  None    PROCEDURES:  Unlessotherwise noted below, none     Procedures    FINAL IMPRESSION      1. Renal colic    2.  Complicated UTI (urinary tract infection)          DISPOSITION/PLAN   DISPOSITION        PATIENT REFERRED TO:  Yasir Kang MD  [address] [place] [address]  [phone]    In 1 day        DISCHARGE MEDICATIONS:  Discharge Medication List as of 10/4/2020  4:37 PM      START taking these medications    Details   tamsulosin (FLOMAX) 0.4 MG capsule Take 1 capsule by mouth daily, Disp-10 capsule,R-0Print      ondansetron (ZOFRAN) 4 MG tablet Take 1 tablet by mouth 3 times daily as needed for Nausea or Vomiting, Disp-15 tablet,R-0Print      cephALEXin (KEFLEX) 500 MG capsule Take 1 capsule by mouth 3 times daily for 7 days, Disp-28 capsule,R-0Print                    (Please note that portions of this note were completed with a voice recognition program.  Efforts were made to edit the dictations but occasionally words are mis-transcribed.)      Janna Mitchell MD (electronically signed)  Attending Emergency Physician          Janna Mitchell MD  10/05/20 555 GT Valenzuela MD  10/08/20 [id number]

## 2020-10-05 ENCOUNTER — TELEPHONE (OUTPATIENT)
Dept: UROLOGY | Age: 70
End: 2020-10-05

## 2020-10-05 NOTE — TELEPHONE ENCOUNTER
Patient seen in ER with stone. Called patient to see about scheduling her for follow up. Did inform patient that office not contracted with her insurance but she could still be seen. Patient stated she is going to discuss with her PCP and call back if she decides to schedule here.

## 2020-10-05 NOTE — TELEPHONE ENCOUNTER
----- Message from Milton Gil MD sent at 10/5/2020  7:34 AM EDT -----  Schedule within the week  ----- Message -----  From: Naz Sierra MD  Sent: 10/4/2020   5:07 PM EDT  To: Milton Gil MD

## 2020-10-07 LAB
CULTURE: ABNORMAL
CULTURE: ABNORMAL
Lab: ABNORMAL
SPECIMEN DESCRIPTION: ABNORMAL

## 2020-10-14 ENCOUNTER — HOSPITAL ENCOUNTER (OUTPATIENT)
Age: 70
Discharge: HOME | End: 2020-10-14
Payer: MEDICARE

## 2020-10-14 LAB
EKG ATRIAL RATE: 75 BPM
EKG P AXIS: 29 DEGREES
EKG P-R INTERVAL: 146 MS
EKG Q-T INTERVAL: 406 MS
EKG QRS DURATION: 84 MS
EKG QTC CALCULATION (BAZETT): 453 MS
EKG R AXIS: 6 DEGREES
EKG T AXIS: 48 DEGREES
EKG VENTRICULAR RATE: 75 BPM

## 2020-10-14 PROCEDURE — 93010 ELECTROCARDIOGRAM REPORT: CPT | Performed by: INTERNAL MEDICINE

## 2020-10-14 PROCEDURE — 93005 ELECTROCARDIOGRAM TRACING: CPT | Performed by: PODIATRIST

## 2021-05-28 ENCOUNTER — HOSPITAL ENCOUNTER (OUTPATIENT)
Dept: MRI IMAGING | Age: 71
Discharge: HOME | End: 2021-05-30
Payer: MEDICARE

## 2021-05-28 DIAGNOSIS — M50.323 OTHER CERVICAL DISC DEGENERATION AT C6-C7 LEVEL: ICD-10-CM

## 2021-05-28 DIAGNOSIS — M54.12 RADICULOPATHY, CERVICAL: ICD-10-CM

## 2021-05-28 DIAGNOSIS — M48.02 STENOSIS OF CERVICAL SPINE REGION: ICD-10-CM

## 2021-05-28 PROCEDURE — 72141 MRI NECK SPINE W/O DYE: CPT

## 2021-07-16 NOTE — PROGRESS NOTES
PAT appointment reminder call  Arrival time and location given 07/23 at 12:00 in [facility]  Bring drivers license and insurance  Bring list of medications with dosage and frequency  If possible bring caregiver for appointment  Appointment may last 2 hours

## 2021-07-23 ENCOUNTER — HOSPITAL ENCOUNTER (OUTPATIENT)
Dept: GENERAL RADIOLOGY | Age: 71
Discharge: HOME | End: 2021-07-23
Payer: MEDICARE

## 2021-07-23 ENCOUNTER — HOSPITAL ENCOUNTER (OUTPATIENT)
Dept: PREADMISSION TESTING | Age: 71
Discharge: HOME | End: 2021-07-27
Payer: MEDICARE

## 2021-07-23 VITALS
WEIGHT: 201.94 LBS | HEART RATE: 72 BPM | TEMPERATURE: 98 F | RESPIRATION RATE: 20 BRPM | OXYGEN SATURATION: 96 % | DIASTOLIC BLOOD PRESSURE: 74 MMHG | BODY MASS INDEX: 32.45 KG/M2 | SYSTOLIC BLOOD PRESSURE: 136 MMHG | HEIGHT: 66 IN

## 2021-07-23 DIAGNOSIS — Z01.818 PRE-OP TESTING: ICD-10-CM

## 2021-07-23 LAB
ANION GAP SERPL CALCULATED.3IONS-SCNC: 14 MEQ/L (ref 8–16)
APTT: 33.5 SECONDS (ref 22–38)
BUN BLDV-MCNC: 24 MG/DL (ref 7–22)
CALCIUM SERPL-MCNC: 10.3 MG/DL (ref 8.5–10.5)
CHLORIDE BLD-SCNC: 103 MEQ/L (ref 98–111)
CO2: 25 MEQ/L (ref 23–33)
CREAT SERPL-MCNC: 1 MG/DL (ref 0.4–1.2)
ERYTHROCYTE [DISTWIDTH] IN BLOOD BY AUTOMATED COUNT: 13.1 % (ref 11.5–14.5)
ERYTHROCYTE [DISTWIDTH] IN BLOOD BY AUTOMATED COUNT: 45.5 FL (ref 35–45)
GFR SERPL CREATININE-BSD FRML MDRD: 55 ML/MIN/1.73M2
GLUCOSE BLD-MCNC: 133 MG/DL (ref 70–108)
HCT VFR BLD CALC: 44 % (ref 37–47)
HEMOGLOBIN: 14.9 GM/DL (ref 12–16)
INR BLD: 1.03 (ref 0.85–1.13)
MCH RBC QN AUTO: 32 PG (ref 26–33)
MCHC RBC AUTO-ENTMCNC: 33.9 GM/DL (ref 32.2–35.5)
MCV RBC AUTO: 94.4 FL (ref 81–99)
MRSA NASAL SCREEN RT-PCR: NEGATIVE
PLATELET # BLD: 181 THOU/MM3 (ref 130–400)
PMV BLD AUTO: 9.9 FL (ref 9.4–12.4)
POTASSIUM SERPL-SCNC: 3.5 MEQ/L (ref 3.5–5.2)
RBC # BLD: 4.66 MILL/MM3 (ref 4.2–5.4)
SODIUM BLD-SCNC: 142 MEQ/L (ref 135–145)
STAPH AUREUS SCREEN RT-PCR: POSITIVE
WBC # BLD: 3.9 THOU/MM3 (ref 4.8–10.8)

## 2021-07-23 PROCEDURE — 87641 MR-STAPH DNA AMP PROBE: CPT

## 2021-07-23 PROCEDURE — 85730 THROMBOPLASTIN TIME PARTIAL: CPT

## 2021-07-23 PROCEDURE — 80048 BASIC METABOLIC PNL TOTAL CA: CPT

## 2021-07-23 PROCEDURE — 36415 COLL VENOUS BLD VENIPUNCTURE: CPT

## 2021-07-23 PROCEDURE — 85027 COMPLETE CBC AUTOMATED: CPT

## 2021-07-23 PROCEDURE — 87640 STAPH A DNA AMP PROBE: CPT

## 2021-07-23 PROCEDURE — 85610 PROTHROMBIN TIME: CPT

## 2021-07-23 PROCEDURE — 71046 X-RAY EXAM CHEST 2 VIEWS: CPT

## 2021-07-23 RX ORDER — M-VIT,TX,IRON,MINS/CALC/FOLIC 27MG-0.4MG
1 TABLET ORAL DAILY
COMMUNITY

## 2021-07-23 NOTE — PROGRESS NOTES
Pain -pt denies any pain today. 0-10 pain scale reviewed and handout given.   Patient verbalizes understanding of PAT instructions for neck surgery no

## 2021-07-23 NOTE — PROGRESS NOTES
Cervical Surgery Pre-op Instructions   Nothing to eat or drink after midnight the night before surgery except sips of water to take medications   Bring photo ID and any health insurance cards   Wear comfortable clean loose fitting clothing   Do not wear any jewelry    Bring your medication in the original bottles   Bring your CPAP machine if you have one   Wear clean clothes to bed and place fresh clean sheets and pillowcases on your bed the night before surgery   Showering:  o Shower 2 days before, day before and morning of surgery using the StartClean® kit provided (follow the instructions provided with the kit), OR    Do not shave the surgical area! If needed, your nurse will use clippers to remove any excess hair at the surgical site when you come in for surgery. Do not use a razor on the site of your surgery for at least 2 days prior to surgery because shaving can leave tiny nicks in the skin which may allow germs to enter and cause infection. If you have any questions about your preoperative preparations, please call the [address] [place] [address] at [phone]. STARTCLEAN® KIT SHOWER INSTRUCITONS    _____8/15___ (date)   FIRST SHOWER: Two days before surgery: Take a shower and wash your entire body, including your hair and scalp in the following manner:   Wash your hair using normal shampoo. Make sure you rinse the shampoo from your hair and body. Wash your face with your regular soap or cleanser.  Use one of the sponges in the [facility] kit and apply 1/3 of the Chlorhexidine soap to the sponge, wash from your neck down. This is very important. Do not use the soap on your face or hair and avoid private areas.  Rinse your body thoroughly. This is very important.  Using a fresh, clean towel, dry your body.  Dress in freshly washed clothes.  Do not use lotions, powders, or creams after this shower.       _8/16_______ (date)   SECOND SHOWER: The day before see if you are a carrier of Staph. The results will be available about 2 hours after we obtain the nasal specimen. A positive test does not mean you have an infection; it just means you are a carrier of Staph. Your surgery most likely will not be canceled or delayed. If my test is positive, what happens? If your test is positive, a nurse will call you and tell you to get Mupirocin Ointment (Bactroban) at your pharmacy. The medicine may come in one large tube or may come in many small packets. When the medication is administered into your nose, it works by killing some of the germs that could cause you to get a surgical site infection.  If you get a big tube of Mupirocin, place enough medicine to cover the tip of a cotton swab (Q-tip). Place the cotton swab inside one nostril and rub the medicine onto the inside of the nose. Then repeat this same procedure in your other nostril.  If you get small individual tubes, put half the tube on a cotton swab and put the medicine in one nostril. Then the other half in the other nostril. After the medication has been inserted into each nostril, gently press your nose together and release for about a minute to get the medicine all over the inside of your nose. Do this once in the morning and once at night for 5 days prior to your scheduled surgery date. If I have Staph, will I be treated differently in the hospital?  If you have a certain type of Staph called MRSA (Methicillin-Resistant Staph aureus), you will be in a single room on Contact Precautions.  This means your doctors and nurses will wear gloves and gowns when taking care of you. We do this (along with good hand hygiene) to make sure we do not spread MRSA to any another patients in our care.         SURGERY PREPARATION CHECKLIST     NAME: ________________________________________     DATE OF SURGERY: ____________________________    Enter Dates, Check (?) circles to indicate task is completed. Date MUPIROCIN NASAL OINTMENT BODY CLEANSING     DAY 1 ______________8/12________   Morning    Evening          Day 2 ____________8/13__________   Morning     Evening        Day 3 _______________8/14_______   Morning  Evening        Day 4 ___________8/15___________   Morning    Evening        Day 5 ____________8/16__________   Morning  Evening        Day 6 _____________8/17_________  (Day of Surgery)               PLEASE COMPLETE and BRING THIS CHECKLIST WITH YOU TO [facility] [address] to give to your nurse on the day of surgery. You will be notified if you need to use Mupirocin Nasal Ointment.

## 2021-07-23 NOTE — PROGRESS NOTES
Preliminary Discharge Planning Questionnaire  Date of Surgery 8/17   Surgeon stephan      · Having the proper help and care after surgery is very important to your recovery. Who will be able to help you at home when you are discharged from the hospital? ([facility] - 24/7 for a minimum of 2 weeks)    daughter    Name(s)     · How many steps to enter your home? 1    · Bathroom on first floor? Yes    · Bedroom on the first floor? Yes    · Do you have an elevated toilet seat to use at home? N/A    · Do you have a walker to use at home? · Total Joints - with wheels N/A   · Spine - with wheels  No     Have you been doing home exercises? *You will go home with some outpatient physical therapy, where do you prefer to go? Unsure but some where near her home    *If needed, what home health agency would you like to use?   Unsure

## 2021-07-26 NOTE — PROGRESS NOTES
Mupirocin ointment 22 grams BID to both nostrils for 5 days. No refills    Patient called and informed of the mupirocin script. Patient educated on how and when to use the ointment.

## 2021-08-13 NOTE — PROGRESS NOTES
Spoke with Yg French in Medical records at [facility] she will fax the clearance today

## 2021-08-15 NOTE — H&P
History and Physical    Arturo Christianson (1950)  8/15/2021      CHIEF COMPLAINT:  Cervical pain    HISTORY OF PRESENT ILLNESS:    The patient is a 71-year-old female with complaints of constant cervical pain that radiates numbness and tingling to the left bicep and anterior forearm. Symptoms have been present on a chronic basis. Patient currently has a VAS score of 2 out of 10. Percentage wise, 90% of the pain is in the neck and 10% left arm. Activities aggravate the pain include standing and bending forward. Activities help relieve the pain include lying on her side. Modifying factors include physical therapy and anti-inflammatories. Patient reports these are provide no relief of her symptoms. Patient's had 2 prior spinal surgeries. Patient had ACDF done in 2000. Patient also had a lumbar posterior spinal fusion done in 2012. Patient is a non-smoker.       PCP: Aniyah Short MD      Past Medical History:        Diagnosis Date    Arthritis     Blockage of coronary artery of heart ([address] 2010    x 2 stents    CAD (coronary artery disease)     DDD (degenerative disc disease), cervical     cervical 3-6    Fibromyalgia     History of blood transfusion     History of kidney stones     last one \"about 10 years ago\"    Hyperlipidemia     Hypertension     Pancreatitis 2016    Sleep apnea     Thyroid disease     Wears glasses     for reading     Past Surgical History:        Procedure Laterality Date    ARTHROPLASTY Left 2/6/2020    LEFT FIRST CARPOMETACARPAL ARTHROPLASTY performed by Elaine Brice DO at [facility]  2012    L4,L5,S1 hardware placed    BACK SURGERY  2018    SPINAL CORD STIMULATOR    CATARACT REMOVAL Bilateral 2016    CERVICAL SPINE SURGERY      2000    COLONOSCOPY      normal    CORONARY ANGIOPLASTY  2010    x 2 stents    FINGER SURGERY      right thumb joint    HERNIA REPAIR Right     inguinal    HYSTERECTOMY      JOINT REPLACEMENT Right     knee    KIDNEY STONE SURGERY      x 3    LITHOTRIPSY      x 3    PARATHYROIDECTOMY      PARATHYROIDECTOMY      VT PERCUT IMPLNT NEUROELECT,EPIDURAL N/A 8/8/2018    SPINAL CORD STIMULATOR IMPLANT performed by Jason Rosario MD at [facility] Right 03/2019     Current Medications:   Prior to Admission medications    Medication Sig Start Date End Date Taking? Authorizing Provider   mupirocin (BACTROBAN) 2 % nasal ointment by Nasal route 2 times daily Take by Nasal route 2 times daily. 5 days prior to surgery    Historical Provider, MD   Multiple Vitamins-Minerals (THERAPEUTIC MULTIVITAMIN-MINERALS) tablet Take 1 tablet by mouth daily    Historical Provider, MD   hydrochlorothiazide (HYDRODIURIL) 25 MG tablet Take 25 mg by mouth nightly     Historical Provider, MD   aspirin 81 MG EC tablet Take 81 mg by mouth daily    Historical Provider, MD   Calcium Carbonate-Vitamin D (OSCAL 500/200 D-3 PO) Take 1 tablet by mouth daily    Historical Provider, MD   carvedilol (COREG) 12.5 MG tablet Take 1 tablet by mouth 2 times daily (with meals)  Patient taking differently: Take 25 mg by mouth 2 times daily (with meals)  5/16/16   Sandra Castellon MD   pantoprazole (PROTONIX) 40 MG tablet Take 1 tablet by mouth daily  Patient taking differently: Take 40 mg by mouth nightly  5/16/16   Sandra Castellon MD   DULoxetine (CYMBALTA) 60 MG capsule Take 60 mg by mouth daily    Historical Provider, MD   gabapentin (NEURONTIN) 600 MG tablet Take 300 mg by mouth 2 times daily. 1 tab in morning and 2 tabs at dinner    Historical Provider, MD   pravastatin (PRAVACHOL) 80 MG tablet Take 80 mg by mouth nightly    Historical Provider, MD   rOPINIRole (REQUIP) 1 MG tablet Take 1 mg by mouth 2 times daily 1 in morning and 2 at dinner    Historical Provider, MD    D@  Allergies:  Lisinopril and Toradol [ketorolac tromethamine]    Social History:   TOBACCO:   reports that she quit smoking about 11 years ago.  Her smoking use included cigarettes. She has a 44.00 pack-year smoking history. She has never used smokeless tobacco.  ETOH:   reports no history of alcohol use. DRUGS:   reports no history of drug use. Family History:       Problem Relation Age of Onset    Heart Disease Father        REVIEW OF SYSTEMS:    CONSTITUTIONAL:  negative for fevers, chills  RESPIRATORY:  negative for cough and shortness of breath  CARDIOVASCULAR:  negative for chest pain, palpitations  GASTROINTESTINAL:  negative for nausea, vomiting, incontinence  GENITOURINARY:  negative for frequency and urinary incontinence  MUSCULOSKELETAL:  (+) for neck pain, arm pain  NEUROLOGICAL:  (+) for numbness/tingling,negative for headaches  BEHAVIOR/PSYCH:  negative for depressed mood, increased anxiety    PHYSICAL EXAM:    VITAL SIGNS: Ht 5'5\", Wt 197 lbs, BMI 32.78.  CONSTITUTIONAL:  Awake, alert, cooperative, no apparent distress, and appears stated age  HEAD: Atraumatic, normocephalic  LUNGS:  No respiratory distress. CTA bilaterally. No wheezes, rales, rhonchi  CARDIOVASCULAR:  Regular rate and rhythm, no murmur  ABDOMEN:  Normal bowel sounds, soft, non-distended, non-tender  SPINE: Limited cervical ROM. Inspection of the spine shows a well-healed anterior cervical incision. TTP cervical spine. MUSCULOSKELETAL:  There is no redness, warmth, or swelling of the joints. Full range of motion noted. Motor strength is 5 out of 5 bilateral UE. NEUROLOGIC:  Awake, alert, oriented to name, place and time.  Sensory is intact bilateral UE     DATA:    CBC:   Lab Results   Component Value Date    WBC 3.9 07/23/2021    RBC 4.66 07/23/2021    HGB 14.9 07/23/2021    HCT 44.0 07/23/2021    MCV 94.4 07/23/2021    MCH 32.0 07/23/2021    MCHC 33.9 07/23/2021    RDW 13.1 10/04/2020     07/23/2021    MPV 9.9 07/23/2021     WBC:    Lab Results   Component Value Date    WBC 3.9 07/23/2021     Hemoglobin/Hematocrit:    Lab Results   Component Value Date    HGB 14.9 07/23/2021    HCT 44.0 07/23/2021     CMP:    Lab Results   Component Value Date     07/23/2021    K 3.5 07/23/2021     07/23/2021    CO2 25 07/23/2021    BUN 24 07/23/2021    CREATININE 1.0 07/23/2021    GFRAA >60 10/04/2020    LABGLOM 55 07/23/2021    GLUCOSE 133 07/23/2021    PROT 6.6 10/04/2020    LABALBU 4.2 10/04/2020    CALCIUM 10.3 07/23/2021    BILITOT 0.40 10/04/2020    ALKPHOS 79 10/04/2020    AST 16 10/04/2020    ALT 13 10/04/2020       Radiology:  MRI Cervical Spine dated 05/28/2021     Impression   Anterior fixation at C6-7 without evidence for complication.       Multilevel degenerative change with canal stenosis at C3-4 and C4-5.       Foraminal narrowing bilaterally as described above.          IMPRESSION/RECOMMENDATIONS:    Assessment:   1) Prior C6-7 ACDF  2) C3-6 degenerative disc disease with stenosis and radiculopathy    Plan:  1)  C3-6 anterior cervical discectomy and fusion with allograft and plate, possible hardware removal at C6-7    DEBBIE Narayan

## 2021-08-17 ENCOUNTER — APPOINTMENT (OUTPATIENT)
Dept: GENERAL RADIOLOGY | Age: 71
End: 2021-08-17
Attending: ORTHOPAEDIC SURGERY
Payer: MEDICARE

## 2021-08-17 ENCOUNTER — ANESTHESIA EVENT (OUTPATIENT)
Dept: OPERATING ROOM | Age: 71
End: 2021-08-17
Payer: MEDICARE

## 2021-08-17 ENCOUNTER — ANESTHESIA (OUTPATIENT)
Dept: OPERATING ROOM | Age: 71
End: 2021-08-17
Payer: MEDICARE

## 2021-08-17 ENCOUNTER — HOSPITAL ENCOUNTER (OUTPATIENT)
Age: 71
Discharge: HOME | End: 2021-08-18
Attending: ORTHOPAEDIC SURGERY | Admitting: ORTHOPAEDIC SURGERY
Payer: MEDICARE

## 2021-08-17 VITALS — TEMPERATURE: 96.1 F | DIASTOLIC BLOOD PRESSURE: 85 MMHG | OXYGEN SATURATION: 99 % | SYSTOLIC BLOOD PRESSURE: 137 MMHG

## 2021-08-17 DIAGNOSIS — M48.02 CERVICAL SPINAL STENOSIS: Primary | ICD-10-CM

## 2021-08-17 LAB
ABO: NORMAL
ANTIBODY SCREEN: NORMAL
RH FACTOR: NORMAL

## 2021-08-17 PROCEDURE — 72020 X-RAY EXAM OF SPINE 1 VIEW: CPT

## 2021-08-17 PROCEDURE — 2500000003 HC RX 250 WO HCPCS: Performed by: NURSE ANESTHETIST, CERTIFIED REGISTERED

## 2021-08-17 PROCEDURE — L0120 CERV FLEX N/ADJ FOAM PRE OTS: HCPCS | Performed by: ORTHOPAEDIC SURGERY

## 2021-08-17 PROCEDURE — 6360000002 HC RX W HCPCS

## 2021-08-17 PROCEDURE — C1713 ANCHOR/SCREW BN/BN,TIS/BN: HCPCS | Performed by: ORTHOPAEDIC SURGERY

## 2021-08-17 PROCEDURE — 86900 BLOOD TYPING SEROLOGIC ABO: CPT

## 2021-08-17 PROCEDURE — 2709999900 HC NON-CHARGEABLE SUPPLY: Performed by: ORTHOPAEDIC SURGERY

## 2021-08-17 PROCEDURE — 6360000002 HC RX W HCPCS: Performed by: NURSE ANESTHETIST, CERTIFIED REGISTERED

## 2021-08-17 PROCEDURE — 2500000003 HC RX 250 WO HCPCS: Performed by: PHYSICIAN ASSISTANT

## 2021-08-17 PROCEDURE — 86850 RBC ANTIBODY SCREEN: CPT

## 2021-08-17 PROCEDURE — 3600000004 HC SURGERY LEVEL 4 BASE: Performed by: ORTHOPAEDIC SURGERY

## 2021-08-17 PROCEDURE — 7100000011 HC PHASE II RECOVERY - ADDTL 15 MIN: Performed by: ORTHOPAEDIC SURGERY

## 2021-08-17 PROCEDURE — 36415 COLL VENOUS BLD VENIPUNCTURE: CPT

## 2021-08-17 PROCEDURE — 6360000002 HC RX W HCPCS: Performed by: ANESTHESIOLOGY

## 2021-08-17 PROCEDURE — 86901 BLOOD TYPING SEROLOGIC RH(D): CPT

## 2021-08-17 PROCEDURE — 3700000001 HC ADD 15 MINUTES (ANESTHESIA): Performed by: ORTHOPAEDIC SURGERY

## 2021-08-17 PROCEDURE — 7100000000 HC PACU RECOVERY - FIRST 15 MIN: Performed by: ORTHOPAEDIC SURGERY

## 2021-08-17 PROCEDURE — 7100000010 HC PHASE II RECOVERY - FIRST 15 MIN: Performed by: ORTHOPAEDIC SURGERY

## 2021-08-17 PROCEDURE — 7100000001 HC PACU RECOVERY - ADDTL 15 MIN: Performed by: ORTHOPAEDIC SURGERY

## 2021-08-17 PROCEDURE — 3700000000 HC ANESTHESIA ATTENDED CARE: Performed by: ORTHOPAEDIC SURGERY

## 2021-08-17 PROCEDURE — 2720000010 HC SURG SUPPLY STERILE: Performed by: ORTHOPAEDIC SURGERY

## 2021-08-17 PROCEDURE — 6360000002 HC RX W HCPCS: Performed by: PHYSICIAN ASSISTANT

## 2021-08-17 PROCEDURE — 6370000000 HC RX 637 (ALT 250 FOR IP): Performed by: PHYSICIAN ASSISTANT

## 2021-08-17 PROCEDURE — 3600000014 HC SURGERY LEVEL 4 ADDTL 15MIN: Performed by: ORTHOPAEDIC SURGERY

## 2021-08-17 PROCEDURE — 2580000003 HC RX 258: Performed by: PHYSICIAN ASSISTANT

## 2021-08-17 DEVICE — IMPLANTABLE DEVICE: Type: IMPLANTABLE DEVICE | Site: NECK | Status: FUNCTIONAL

## 2021-08-17 RX ORDER — PROPOFOL 10 MG/ML
INJECTION, EMULSION INTRAVENOUS CONTINUOUS PRN
Status: DISCONTINUED | OUTPATIENT
Start: 2021-08-17 | End: 2021-08-17 | Stop reason: SDUPTHER

## 2021-08-17 RX ORDER — ONDANSETRON 2 MG/ML
4 INJECTION INTRAMUSCULAR; INTRAVENOUS EVERY 6 HOURS PRN
Status: DISCONTINUED | OUTPATIENT
Start: 2021-08-17 | End: 2021-08-18 | Stop reason: HOSPADM

## 2021-08-17 RX ORDER — LABETALOL 20 MG/4 ML (5 MG/ML) INTRAVENOUS SYRINGE
5 4 TIMES DAILY PRN
Status: DISCONTINUED | OUTPATIENT
Start: 2021-08-17 | End: 2021-08-17 | Stop reason: HOSPADM

## 2021-08-17 RX ORDER — MEPERIDINE HYDROCHLORIDE 25 MG/ML
12.5 INJECTION INTRAMUSCULAR; INTRAVENOUS; SUBCUTANEOUS EVERY 5 MIN PRN
Status: DISCONTINUED | OUTPATIENT
Start: 2021-08-17 | End: 2021-08-17 | Stop reason: HOSPADM

## 2021-08-17 RX ORDER — MORPHINE SULFATE 4 MG/ML
4 INJECTION, SOLUTION INTRAMUSCULAR; INTRAVENOUS
Status: DISCONTINUED | OUTPATIENT
Start: 2021-08-17 | End: 2021-08-18 | Stop reason: HOSPADM

## 2021-08-17 RX ORDER — TRAMADOL HYDROCHLORIDE 50 MG/1
100 TABLET ORAL EVERY 6 HOURS PRN
Status: DISCONTINUED | OUTPATIENT
Start: 2021-08-17 | End: 2021-08-18 | Stop reason: HOSPADM

## 2021-08-17 RX ORDER — SODIUM CHLORIDE 0.9 % (FLUSH) 0.9 %
5-40 SYRINGE (ML) INJECTION EVERY 12 HOURS SCHEDULED
Status: DISCONTINUED | OUTPATIENT
Start: 2021-08-17 | End: 2021-08-17 | Stop reason: HOSPADM

## 2021-08-17 RX ORDER — POLYETHYLENE GLYCOL 3350 17 G/17G
17 POWDER, FOR SOLUTION ORAL DAILY
Status: DISCONTINUED | OUTPATIENT
Start: 2021-08-17 | End: 2021-08-18 | Stop reason: HOSPADM

## 2021-08-17 RX ORDER — MORPHINE SULFATE 2 MG/ML
2 INJECTION, SOLUTION INTRAMUSCULAR; INTRAVENOUS
Status: DISCONTINUED | OUTPATIENT
Start: 2021-08-17 | End: 2021-08-18 | Stop reason: HOSPADM

## 2021-08-17 RX ORDER — MIDAZOLAM HYDROCHLORIDE 1 MG/ML
INJECTION INTRAMUSCULAR; INTRAVENOUS PRN
Status: DISCONTINUED | OUTPATIENT
Start: 2021-08-17 | End: 2021-08-17 | Stop reason: SDUPTHER

## 2021-08-17 RX ORDER — TRAMADOL HYDROCHLORIDE 50 MG/1
50 TABLET ORAL EVERY 6 HOURS PRN
Status: DISCONTINUED | OUTPATIENT
Start: 2021-08-17 | End: 2021-08-18 | Stop reason: HOSPADM

## 2021-08-17 RX ORDER — PANTOPRAZOLE SODIUM 40 MG/1
40 TABLET, DELAYED RELEASE ORAL DAILY
Status: DISCONTINUED | OUTPATIENT
Start: 2021-08-17 | End: 2021-08-18 | Stop reason: HOSPADM

## 2021-08-17 RX ORDER — MORPHINE SULFATE 4 MG/ML
INJECTION, SOLUTION INTRAMUSCULAR; INTRAVENOUS
Status: COMPLETED
Start: 2021-08-17 | End: 2021-08-17

## 2021-08-17 RX ORDER — LIDOCAINE HCL/PF 100 MG/5ML
SYRINGE (ML) INJECTION PRN
Status: DISCONTINUED | OUTPATIENT
Start: 2021-08-17 | End: 2021-08-17 | Stop reason: SDUPTHER

## 2021-08-17 RX ORDER — ROPINIROLE 1 MG/1
1 TABLET, FILM COATED ORAL 2 TIMES DAILY
Status: DISCONTINUED | OUTPATIENT
Start: 2021-08-17 | End: 2021-08-18 | Stop reason: HOSPADM

## 2021-08-17 RX ORDER — SODIUM CHLORIDE 0.9 % (FLUSH) 0.9 %
5-40 SYRINGE (ML) INJECTION PRN
Status: DISCONTINUED | OUTPATIENT
Start: 2021-08-17 | End: 2021-08-18 | Stop reason: HOSPADM

## 2021-08-17 RX ORDER — ONDANSETRON 2 MG/ML
INJECTION INTRAMUSCULAR; INTRAVENOUS PRN
Status: DISCONTINUED | OUTPATIENT
Start: 2021-08-17 | End: 2021-08-17 | Stop reason: SDUPTHER

## 2021-08-17 RX ORDER — PROPOFOL 10 MG/ML
INJECTION, EMULSION INTRAVENOUS PRN
Status: DISCONTINUED | OUTPATIENT
Start: 2021-08-17 | End: 2021-08-17 | Stop reason: SDUPTHER

## 2021-08-17 RX ORDER — DULOXETIN HYDROCHLORIDE 60 MG/1
60 CAPSULE, DELAYED RELEASE ORAL DAILY
Status: DISCONTINUED | OUTPATIENT
Start: 2021-08-17 | End: 2021-08-18 | Stop reason: HOSPADM

## 2021-08-17 RX ORDER — DEXAMETHASONE SODIUM PHOSPHATE 10 MG/ML
INJECTION, EMULSION INTRAMUSCULAR; INTRAVENOUS PRN
Status: DISCONTINUED | OUTPATIENT
Start: 2021-08-17 | End: 2021-08-17 | Stop reason: SDUPTHER

## 2021-08-17 RX ORDER — MORPHINE SULFATE 2 MG/ML
2 INJECTION, SOLUTION INTRAMUSCULAR; INTRAVENOUS EVERY 5 MIN PRN
Status: DISCONTINUED | OUTPATIENT
Start: 2021-08-17 | End: 2021-08-17 | Stop reason: HOSPADM

## 2021-08-17 RX ORDER — SODIUM CHLORIDE 0.9 % (FLUSH) 0.9 %
5-40 SYRINGE (ML) INJECTION EVERY 12 HOURS SCHEDULED
Status: DISCONTINUED | OUTPATIENT
Start: 2021-08-17 | End: 2021-08-18 | Stop reason: HOSPADM

## 2021-08-17 RX ORDER — SODIUM CHLORIDE 9 MG/ML
25 INJECTION, SOLUTION INTRAVENOUS PRN
Status: DISCONTINUED | OUTPATIENT
Start: 2021-08-17 | End: 2021-08-18 | Stop reason: HOSPADM

## 2021-08-17 RX ORDER — FENTANYL CITRATE 50 UG/ML
INJECTION, SOLUTION INTRAMUSCULAR; INTRAVENOUS PRN
Status: DISCONTINUED | OUTPATIENT
Start: 2021-08-17 | End: 2021-08-17 | Stop reason: SDUPTHER

## 2021-08-17 RX ORDER — EPHEDRINE SULFATE 50 MG/ML
INJECTION INTRAVENOUS PRN
Status: DISCONTINUED | OUTPATIENT
Start: 2021-08-17 | End: 2021-08-17 | Stop reason: SDUPTHER

## 2021-08-17 RX ORDER — DEXTROSE, SODIUM CHLORIDE, AND POTASSIUM CHLORIDE 5; .45; .15 G/100ML; G/100ML; G/100ML
INJECTION INTRAVENOUS CONTINUOUS
Status: DISCONTINUED | OUTPATIENT
Start: 2021-08-17 | End: 2021-08-18 | Stop reason: HOSPADM

## 2021-08-17 RX ORDER — HYDRALAZINE HYDROCHLORIDE 20 MG/ML
5 INJECTION INTRAMUSCULAR; INTRAVENOUS EVERY 10 MIN PRN
Status: DISCONTINUED | OUTPATIENT
Start: 2021-08-17 | End: 2021-08-17 | Stop reason: HOSPADM

## 2021-08-17 RX ORDER — SODIUM CHLORIDE 9 MG/ML
INJECTION, SOLUTION INTRAVENOUS CONTINUOUS
Status: DISCONTINUED | OUTPATIENT
Start: 2021-08-17 | End: 2021-08-17

## 2021-08-17 RX ORDER — SENNA AND DOCUSATE SODIUM 50; 8.6 MG/1; MG/1
1 TABLET, FILM COATED ORAL 2 TIMES DAILY
Status: DISCONTINUED | OUTPATIENT
Start: 2021-08-17 | End: 2021-08-18 | Stop reason: HOSPADM

## 2021-08-17 RX ORDER — CYCLOBENZAPRINE HCL 10 MG
10 TABLET ORAL 3 TIMES DAILY PRN
Status: DISCONTINUED | OUTPATIENT
Start: 2021-08-17 | End: 2021-08-18 | Stop reason: HOSPADM

## 2021-08-17 RX ORDER — DIPHENHYDRAMINE HYDROCHLORIDE 50 MG/ML
12.5 INJECTION INTRAMUSCULAR; INTRAVENOUS
Status: DISCONTINUED | OUTPATIENT
Start: 2021-08-17 | End: 2021-08-17 | Stop reason: HOSPADM

## 2021-08-17 RX ORDER — PRAVASTATIN SODIUM 80 MG/1
80 TABLET ORAL NIGHTLY
Status: DISCONTINUED | OUTPATIENT
Start: 2021-08-17 | End: 2021-08-18 | Stop reason: HOSPADM

## 2021-08-17 RX ORDER — ROCURONIUM BROMIDE 10 MG/ML
INJECTION, SOLUTION INTRAVENOUS PRN
Status: DISCONTINUED | OUTPATIENT
Start: 2021-08-17 | End: 2021-08-17 | Stop reason: SDUPTHER

## 2021-08-17 RX ORDER — HYDROCHLOROTHIAZIDE 25 MG/1
25 TABLET ORAL NIGHTLY
Status: DISCONTINUED | OUTPATIENT
Start: 2021-08-17 | End: 2021-08-18 | Stop reason: HOSPADM

## 2021-08-17 RX ORDER — ONDANSETRON 4 MG/1
4 TABLET, ORALLY DISINTEGRATING ORAL EVERY 8 HOURS PRN
Status: DISCONTINUED | OUTPATIENT
Start: 2021-08-17 | End: 2021-08-18 | Stop reason: HOSPADM

## 2021-08-17 RX ORDER — SODIUM CHLORIDE 9 MG/ML
25 INJECTION, SOLUTION INTRAVENOUS PRN
Status: DISCONTINUED | OUTPATIENT
Start: 2021-08-17 | End: 2021-08-17 | Stop reason: HOSPADM

## 2021-08-17 RX ORDER — FENTANYL CITRATE 50 UG/ML
50 INJECTION, SOLUTION INTRAMUSCULAR; INTRAVENOUS EVERY 5 MIN PRN
Status: DISCONTINUED | OUTPATIENT
Start: 2021-08-17 | End: 2021-08-17 | Stop reason: HOSPADM

## 2021-08-17 RX ORDER — CARVEDILOL 25 MG/1
25 TABLET ORAL 2 TIMES DAILY WITH MEALS
Status: DISCONTINUED | OUTPATIENT
Start: 2021-08-17 | End: 2021-08-18 | Stop reason: HOSPADM

## 2021-08-17 RX ORDER — SODIUM CHLORIDE 0.9 % (FLUSH) 0.9 %
5-40 SYRINGE (ML) INJECTION PRN
Status: DISCONTINUED | OUTPATIENT
Start: 2021-08-17 | End: 2021-08-17 | Stop reason: HOSPADM

## 2021-08-17 RX ORDER — ONDANSETRON 2 MG/ML
4 INJECTION INTRAMUSCULAR; INTRAVENOUS
Status: DISCONTINUED | OUTPATIENT
Start: 2021-08-17 | End: 2021-08-17 | Stop reason: HOSPADM

## 2021-08-17 RX ADMIN — DOCUSATE SODIUM 50 MG AND SENNOSIDES 8.6 MG 1 TABLET: 8.6; 5 TABLET, FILM COATED ORAL at 20:57

## 2021-08-17 RX ADMIN — MORPHINE SULFATE 4 MG: 4 INJECTION, SOLUTION INTRAMUSCULAR; INTRAVENOUS at 18:39

## 2021-08-17 RX ADMIN — SODIUM CHLORIDE: 9 INJECTION, SOLUTION INTRAVENOUS at 08:02

## 2021-08-17 RX ADMIN — Medication 70 MG: at 08:27

## 2021-08-17 RX ADMIN — EPHEDRINE SULFATE 10 MG: 50 INJECTION, SOLUTION INTRAVENOUS at 08:50

## 2021-08-17 RX ADMIN — HYDROMORPHONE HYDROCHLORIDE 0.25 MG: 1 INJECTION, SOLUTION INTRAMUSCULAR; INTRAVENOUS; SUBCUTANEOUS at 10:44

## 2021-08-17 RX ADMIN — CEFAZOLIN 2000 MG: 10 INJECTION, POWDER, FOR SOLUTION INTRAVENOUS at 08:32

## 2021-08-17 RX ADMIN — ROPINIROLE 1 MG: 1 TABLET, FILM COATED ORAL at 20:58

## 2021-08-17 RX ADMIN — CYCLOBENZAPRINE 10 MG: 10 TABLET, FILM COATED ORAL at 20:57

## 2021-08-17 RX ADMIN — FENTANYL CITRATE 50 MCG: 50 INJECTION, SOLUTION INTRAMUSCULAR; INTRAVENOUS at 08:27

## 2021-08-17 RX ADMIN — HYDROMORPHONE HYDROCHLORIDE 0.25 MG: 1 INJECTION, SOLUTION INTRAMUSCULAR; INTRAVENOUS; SUBCUTANEOUS at 10:51

## 2021-08-17 RX ADMIN — SUGAMMADEX 200 MG: 100 INJECTION, SOLUTION INTRAVENOUS at 09:58

## 2021-08-17 RX ADMIN — EPHEDRINE SULFATE 10 MG: 50 INJECTION, SOLUTION INTRAVENOUS at 08:42

## 2021-08-17 RX ADMIN — BISACODYL 5 MG: 5 TABLET, COATED ORAL at 15:13

## 2021-08-17 RX ADMIN — PRAVASTATIN SODIUM 80 MG: 80 TABLET ORAL at 20:58

## 2021-08-17 RX ADMIN — PROPOFOL 120 MG: 10 INJECTION, EMULSION INTRAVENOUS at 08:27

## 2021-08-17 RX ADMIN — ONDANSETRON HYDROCHLORIDE 4 MG: 4 INJECTION, SOLUTION INTRAMUSCULAR; INTRAVENOUS at 08:33

## 2021-08-17 RX ADMIN — MORPHINE SULFATE 4 MG: 4 INJECTION, SOLUTION INTRAMUSCULAR; INTRAVENOUS at 12:14

## 2021-08-17 RX ADMIN — PANTOPRAZOLE SODIUM 40 MG: 40 TABLET, DELAYED RELEASE ORAL at 20:58

## 2021-08-17 RX ADMIN — HYDROMORPHONE HYDROCHLORIDE 0.25 MG: 1 INJECTION, SOLUTION INTRAMUSCULAR; INTRAVENOUS; SUBCUTANEOUS at 10:22

## 2021-08-17 RX ADMIN — TRAMADOL HYDROCHLORIDE 100 MG: 50 TABLET, FILM COATED ORAL at 14:47

## 2021-08-17 RX ADMIN — FENTANYL CITRATE 50 MCG: 50 INJECTION, SOLUTION INTRAMUSCULAR; INTRAVENOUS at 08:49

## 2021-08-17 RX ADMIN — EPHEDRINE SULFATE 20 MG: 50 INJECTION, SOLUTION INTRAVENOUS at 08:47

## 2021-08-17 RX ADMIN — POTASSIUM CHLORIDE, DEXTROSE MONOHYDRATE AND SODIUM CHLORIDE: 150; 5; 450 INJECTION, SOLUTION INTRAVENOUS at 15:08

## 2021-08-17 RX ADMIN — POLYETHYLENE GLYCOL 3350 17 G: 17 POWDER, FOR SOLUTION ORAL at 15:17

## 2021-08-17 RX ADMIN — DOCUSATE SODIUM 50 MG AND SENNOSIDES 8.6 MG 1 TABLET: 8.6; 5 TABLET, FILM COATED ORAL at 14:47

## 2021-08-17 RX ADMIN — HYDROMORPHONE HYDROCHLORIDE 0.25 MG: 1 INJECTION, SOLUTION INTRAMUSCULAR; INTRAVENOUS; SUBCUTANEOUS at 10:33

## 2021-08-17 RX ADMIN — FENTANYL CITRATE 50 MCG: 50 INJECTION, SOLUTION INTRAMUSCULAR; INTRAVENOUS at 09:38

## 2021-08-17 RX ADMIN — CARVEDILOL 25 MG: 25 TABLET, FILM COATED ORAL at 18:38

## 2021-08-17 RX ADMIN — CEFAZOLIN 2000 MG: 10 INJECTION, POWDER, FOR SOLUTION INTRAVENOUS at 23:37

## 2021-08-17 RX ADMIN — FENTANYL CITRATE 50 MCG: 50 INJECTION, SOLUTION INTRAMUSCULAR; INTRAVENOUS at 09:03

## 2021-08-17 RX ADMIN — EPHEDRINE SULFATE 10 MG: 50 INJECTION, SOLUTION INTRAVENOUS at 08:44

## 2021-08-17 RX ADMIN — MIDAZOLAM 1 MG: 1 INJECTION INTRAMUSCULAR; INTRAVENOUS at 08:25

## 2021-08-17 RX ADMIN — CEFAZOLIN 2000 MG: 10 INJECTION, POWDER, FOR SOLUTION INTRAVENOUS at 15:13

## 2021-08-17 RX ADMIN — ROCURONIUM BROMIDE 50 MG: 10 INJECTION INTRAVENOUS at 08:28

## 2021-08-17 RX ADMIN — TRAMADOL HYDROCHLORIDE 100 MG: 50 TABLET, FILM COATED ORAL at 20:57

## 2021-08-17 RX ADMIN — DEXAMETHASONE SODIUM PHOSPHATE 10 MG: 10 INJECTION, EMULSION INTRAMUSCULAR; INTRAVENOUS at 08:33

## 2021-08-17 RX ADMIN — CYCLOBENZAPRINE 10 MG: 10 TABLET, FILM COATED ORAL at 15:23

## 2021-08-17 RX ADMIN — PROPOFOL 100 MCG/KG/MIN: 10 INJECTION, EMULSION INTRAVENOUS at 08:33

## 2021-08-17 ASSESSMENT — PULMONARY FUNCTION TESTS
PIF_VALUE: 2
PIF_VALUE: 9
PIF_VALUE: 14
PIF_VALUE: 20
PIF_VALUE: 14
PIF_VALUE: 18
PIF_VALUE: 21
PIF_VALUE: 19
PIF_VALUE: 20
PIF_VALUE: 21
PIF_VALUE: 23
PIF_VALUE: 20
PIF_VALUE: 19
PIF_VALUE: 19
PIF_VALUE: 12
PIF_VALUE: 14
PIF_VALUE: 20
PIF_VALUE: 2
PIF_VALUE: 20
PIF_VALUE: 13
PIF_VALUE: 20
PIF_VALUE: 21
PIF_VALUE: 21
PIF_VALUE: 19
PIF_VALUE: 18
PIF_VALUE: 21
PIF_VALUE: 20
PIF_VALUE: 0
PIF_VALUE: 20
PIF_VALUE: 21
PIF_VALUE: 21
PIF_VALUE: 20
PIF_VALUE: 19
PIF_VALUE: 21
PIF_VALUE: 20
PIF_VALUE: 19
PIF_VALUE: 1
PIF_VALUE: 21
PIF_VALUE: 21
PIF_VALUE: 20
PIF_VALUE: 20
PIF_VALUE: 21
PIF_VALUE: 19
PIF_VALUE: 20
PIF_VALUE: 20
PIF_VALUE: 19
PIF_VALUE: 20
PIF_VALUE: 19
PIF_VALUE: 20
PIF_VALUE: 20
PIF_VALUE: 9
PIF_VALUE: 21
PIF_VALUE: 20
PIF_VALUE: 21
PIF_VALUE: 20
PIF_VALUE: 18
PIF_VALUE: 20
PIF_VALUE: 20
PIF_VALUE: 5
PIF_VALUE: 20
PIF_VALUE: 19
PIF_VALUE: 20
PIF_VALUE: 21
PIF_VALUE: 20
PIF_VALUE: 21
PIF_VALUE: 20
PIF_VALUE: 22
PIF_VALUE: 21
PIF_VALUE: 20
PIF_VALUE: 18
PIF_VALUE: 21
PIF_VALUE: 19
PIF_VALUE: 27
PIF_VALUE: 20
PIF_VALUE: 18
PIF_VALUE: 14
PIF_VALUE: 21
PIF_VALUE: 20
PIF_VALUE: 1
PIF_VALUE: 18
PIF_VALUE: 4
PIF_VALUE: 19
PIF_VALUE: 21
PIF_VALUE: 9
PIF_VALUE: 20
PIF_VALUE: 18
PIF_VALUE: 21
PIF_VALUE: 20
PIF_VALUE: 19
PIF_VALUE: 20
PIF_VALUE: 23

## 2021-08-17 ASSESSMENT — PAIN SCALES - GENERAL
PAINLEVEL_OUTOF10: 5
PAINLEVEL_OUTOF10: 6
PAINLEVEL_OUTOF10: 6
PAINLEVEL_OUTOF10: 3
PAINLEVEL_OUTOF10: 8
PAINLEVEL_OUTOF10: 7
PAINLEVEL_OUTOF10: 6
PAINLEVEL_OUTOF10: 6
PAINLEVEL_OUTOF10: 3
PAINLEVEL_OUTOF10: 6
PAINLEVEL_OUTOF10: 7
PAINLEVEL_OUTOF10: 7
PAINLEVEL_OUTOF10: 8

## 2021-08-17 ASSESSMENT — PAIN DESCRIPTION - ONSET: ONSET: ON-GOING

## 2021-08-17 ASSESSMENT — PAIN - FUNCTIONAL ASSESSMENT
PAIN_FUNCTIONAL_ASSESSMENT: PREVENTS OR INTERFERES SOME ACTIVE ACTIVITIES AND ADLS
PAIN_FUNCTIONAL_ASSESSMENT: 0-10

## 2021-08-17 ASSESSMENT — PAIN DESCRIPTION - DESCRIPTORS: DESCRIPTORS: ACHING

## 2021-08-17 ASSESSMENT — PAIN DESCRIPTION - PAIN TYPE
TYPE: SURGICAL PAIN
TYPE: SURGICAL PAIN

## 2021-08-17 ASSESSMENT — PAIN DESCRIPTION - FREQUENCY: FREQUENCY: CONTINUOUS

## 2021-08-17 ASSESSMENT — PAIN DESCRIPTION - PROGRESSION: CLINICAL_PROGRESSION: NOT CHANGED

## 2021-08-17 ASSESSMENT — PAIN DESCRIPTION - ORIENTATION: ORIENTATION: ANTERIOR

## 2021-08-17 ASSESSMENT — PAIN DESCRIPTION - LOCATION: LOCATION: NECK

## 2021-08-17 NOTE — ANESTHESIA POSTPROCEDURE EVALUATION
Department of Anesthesiology  Postprocedure Note    Patient: Hailey Nguyen  MRN: [id number]  YOB: 1950  Date of evaluation: 8/17/2021  Time:  12:16 PM     Procedure Summary     Date: 08/17/21 Room / Location: [address]    Anesthesia Start: 6818 Anesthesia Stop: 1014    Procedure: C3-6 ACDF, REMOVAL OF HARDWARE C6-7 (N/A Neck) Diagnosis: (CERVICAL SPINAL STENOSIS)    Surgeons: Toni Jameson MD Responsible Provider: Mannie Maldonado MD    Anesthesia Type: general ASA Status: 3          Anesthesia Type: general    Steh Phase I: Seth Score: 9    Seth Phase II: Seth Score: 9    Last vitals: Reviewed and per EMR flowsheets.        Anesthesia Post Evaluation    Complications: no  Cardiovascular status: hemodynamically stable  Respiratory status: acceptable

## 2021-08-17 NOTE — OP NOTE
Operative Note      PATIENT NAME: Benita Mcdaniel  YOB: 1950  MRN: [id number]                      ACCOUNT NO: [de-identified]                               ADMISSION DATE: 8/17/2021    PROVIDER:  Any Serrano. Julian Lomas M.D.     DATE OF PROCEDURE: 8/17/2021     PREOPERATIVE DIAGNOSES:  1. Prior C6-C7 anterior cervical discectomy and fusion  2. C3-C4, C4-C5 and C5-C6 cervical stenosis with radiculopathy. 3.  C3-C4, C4-C5 and C5-C6 degenerative disc disease. 4.  Obesity, BMI of 32.44     POSTOPERATIVE DIAGNOSES:  1. Prior C6-C7 anterior cervical discectomy and fusion  2. C3-C4, C4-C5 and C5-C6 cervical stenosis with radiculopathy. 3.  C3-C4, C4-C5 and C5-C6 degenerative disc disease. 4.  Obesity, BMI of 32.44     OPERATION PERFORMED:  1. C6-C7 removal of instrumentation, [place], Medtronic instrumentation  2. C6-C7 exploration of fusion  3. C3-C4, C4-C5 and C5-C6 anterior cervical diskectomy and fusion with decompression and stabilization of spinal cord and nerve roots. 4.  C3-C4, C4-C5 and C5-C6 Elemax allograft with demineralized bone matrix, 6-mm, 7-mm and 6-mm height respectively  5. C3 through C6 anterior cervical plate, Aspect with 14 mm fixed angle and variable screws, SurgAlign instrumentation    SURGEON:  Claire Alcazar M.D.     ASSISTANT:  Tai Euceda PA-C. Tai Euceda PA-C, assisted throughout the procedure with positioning, draping, retraction, wound closure, and dressing application     ANESTHESIA:  General.     INDICATIONS:  This is a 66 y.o. female with refractory neck and left arm pain from cervical stenosis primarily at C3 through C6. She is status post C6-7 anterior cervical discectomy and fusion done in 2000 elsewhere. Patient had tried and failed conservative therapy including medication management, physical therapy. Due to the persistence of symptoms and reduction in ADLs, patient elected surgical treatment.  Patient therefore understood the indications for the Excellent torque insertion was achieved. The locking mechanism was engaged. Final x-rays were taken demonstrating good position of the spine and all of the implants. Satisfied with this, we then achieved hemostasis. We then copiously irrigated the wound. We then inserted a MANSOOR drain through a separate stab incision. The wound was then closed in layers with a single running 2-0 Vicryl suture. A 4-0 Monocryl was used for the skin. The skin edges were sealed with Dermabond. Steri-strips were placed over the incision. A dry sterile dressing was applied. Cervical collar secured into place. The patient was then returned to the hospital bed, extubated, and taken to the recovery room in stable condition. Spinal cord monitoring remained stable throughout the operation. Specimens:   * No specimens in log *    Implants:  Implant Name Type Inv. Item Serial No.  Lot No. LRB No. Used Action   GRAFT 2CC DBM PTTY BIOREADY ALLGRFT IMPL BIOSET PSTE SYR OPN  GRAFT 2CC DBM PTTY BIOREADY ALLGRFT IMPL BIOSET PSTE SYR OPN  [employer] Expert [id number] N/A 1 Implanted   RTI SURGICAL ELEMAX CORTICAL SPACER LORDOTIC SMALL 6 MM   [id number] [employer] Expert [id number] N/A 1 Implanted   RTI SURGICAL ELEMAX CORTICAL SPACER LORDOTIC SMALL 7 MM   [id number] [employer] Expert [id number] N/A 1 Implanted   RTI SURGICAL ELEMAX CORTICAL SPACER LORDOTIC SMALL 6 MM   [id number] [employer] Expert [id number] N/A 1 Implanted   ASPECT 3 LEVEL 45 MM PLATE    [employer] Expert  N/A 1 Implanted   SDV ASPECT SCREW 4.0 X 14      N/A 2 Implanted   SDF ASPECT SCREW 4.0 X 14 MM      N/A 6 Implanted         Drains:   Closed/Suction Drain Left; Anterior;Superior Neck Bulb (Active)   Site Description Unable to view 08/17/21 1011   Dressing Status Clean;Dry; Intact 08/17/21 1011   Drainage Appearance Bloody 08/17/21 1011   Status To bulb suction 08/17/21 1803         COMPLICATIONS:  None. SPECIMENS:  None.      ESTIMATED BLOOD LOSS:  25 mL.     POSTOPERATIVE CARE:  The patient will be recovered in PACU and then a regular nursing floor. Once the drainage is low and pain is under control, patient will be discharged home per clinical indication. Patient will follow up in the office in six weeks. At that time, AP and lateral x-rays of the cervical spine will be obtained to assess  instrumentation and fusion. MODIFIER 22:  Due to the patient's BMI over 30, modifier 22 applied due to the patient's case taking 50% longer due to poor visualization and orientation.       Electronically signed by Severa Moan, MD on 8/17/2021 at 10:39 AM

## 2021-08-17 NOTE — H&P
[facility]  History and Physical Update    Pt Name: Yue Mcdonald  MRN: [id number]  YOB: 1950  Date of evaluation: 8/17/2021    I have examined the patient and reviewed the H&P/Consult and there are no changes to the patient or plans.       Renato Tay MD  Electronically signed 8/17/2021 at 10:39 AM

## 2021-08-17 NOTE — PROGRESS NOTES
1011-  Patient arrived to pacu via bed to bay 10. Spontaneous respirations even and unlabored. Placed on monitor--VSS. Report received from [facility] and Inga Vazquez CRNA.    2585-  Assessment completed-- see flowsheet. Patient is drowsy, but responds to name and follows commands. IV infusing 0.9 in left hand-- no complications. Patient states pain 6/10. Denies N/V-- will monitor. MANSOOR in place--see charting. See incision charting. SCDS and TEDs in place. Neck brace on. 1015-  Respirations even and unlabored. VSS.   1022-  0.25mg of Dilaudid given. See MAR.    1027-  Respirations even and unlabored. VSS. 1033-  Patient states pain remains 6/10.  0.25mg of Dilaudid given. See MAR. 1040-  Respirations even and unlabored. VSS. 1044-  0.25mg of Dilaudid given. See MAR.   1050-  Respirations even and unlabored. VSS. 1051-  Patient states pain 5/10.  0.25mg of Dilaudid given. See MAR.   1055-  Respirations even and unlabored. VSS. Patient states pain is \"better\" 4-5/10 and tolerable. 1100-  Patient sleeping. 1105-  Respirations even and unlabored. VSS. 1110-  Dressing remain clean and dry. 1115-  Patient continue to rest.  Pain remains tolerable. 1120-  Reassessment completed. Patient meets criteria to be moved [address] [place] [address]. Patient transferred to [facility] in stable condition on 4L NC. Will call report to [address] [place] [address]. 1130-  Report given to [facility]. All questions answered.

## 2021-08-17 NOTE — PROGRESS NOTES
ADMITTED TO [facility] AND ORIENTED TO UNIT. SCDS ON. FALL AND ALLERGY BANDS ON. PT VERBALIZED APPROVAL FOR FIRST NAME, LAST INITIAL AND PHYSICIAN NAME ON UNIT WHITEBOARD. Daughter, Angelika Juárez with the patient.

## 2021-08-17 NOTE — ANESTHESIA PRE PROCEDURE
Department of Anesthesiology  Preprocedure Note       Name:  Benita Mcdaniel   Age:  66 y.o.  :  1950                                          MRN:  [id number]         Date:  2021      Surgeon: Peter Shin):  Bry Alexander MD    Procedure: Procedure(s):  C3-6 ACDF    Medications prior to admission:   Prior to Admission medications    Medication Sig Start Date End Date Taking? Authorizing Provider   mupirocin (BACTROBAN) 2 % nasal ointment by Nasal route 2 times daily Take by Nasal route 2 times daily. 5 days prior to surgery   Yes Historical Provider, MD   Multiple Vitamins-Minerals (THERAPEUTIC MULTIVITAMIN-MINERALS) tablet Take 1 tablet by mouth daily   Yes Historical Provider, MD   hydrochlorothiazide (HYDRODIURIL) 25 MG tablet Take 25 mg by mouth nightly    Yes Historical Provider, MD   aspirin 81 MG EC tablet Take 81 mg by mouth daily   Yes Historical Provider, MD   Calcium Carbonate-Vitamin D (OSCAL 500/200 D-3 PO) Take 1 tablet by mouth daily   Yes Historical Provider, MD   carvedilol (COREG) 12.5 MG tablet Take 1 tablet by mouth 2 times daily (with meals)  Patient taking differently: Take 25 mg by mouth 2 times daily (with meals)  16  Yes Lior Adams MD   pantoprazole (PROTONIX) 40 MG tablet Take 1 tablet by mouth daily  Patient taking differently: Take 40 mg by mouth nightly  16  Yes Lior Adams MD   DULoxetine (CYMBALTA) 60 MG capsule Take 60 mg by mouth daily   Yes Historical Provider, MD   gabapentin (NEURONTIN) 600 MG tablet Take 300 mg by mouth 2 times daily.  1 tab in morning and 2 tabs at dinner   Yes Historical Provider, MD   pravastatin (PRAVACHOL) 80 MG tablet Take 80 mg by mouth nightly   Yes Historical Provider, MD   rOPINIRole (REQUIP) 1 MG tablet Take 1 mg by mouth 2 times daily 1 in morning and 2 at dinner   Yes Historical Provider, MD       Current medications:    Current Facility-Administered Medications   Medication Dose Route Frequency Provider Last Rate Last Admin    0.9 % sodium chloride infusion   Intravenous Continuous DEBBIE [place]        sodium chloride flush 0.9 % injection 5-40 mL  5-40 mL Intravenous 2 times per day DBEBIE Short        sodium chloride flush 0.9 % injection 5-40 mL  5-40 mL Intravenous PRN DEBBIE [place]        0.9 % sodium chloride infusion  25 mL Intravenous PRN DEBBIE Short        ceFAZolin (ANCEF) 2000 mg in dextrose 5 % 50 mL IVPB  2,000 mg Intravenous On Call to [address] DE[address]BI[address]           Allergies:     Allergies   Allergen Reactions    Lisinopril      Pt uncertain of the reaction    Toradol [Ketorolac Tromethamine] Swelling     Swelling at site of injection       Problem List:    Patient Active Problem List   Diagnosis Code    Acute cystitis without hematuria N30.00    CAD (coronary artery disease) I25.10    Hypertension I10    Lactic acid acidosis E87.2    JAZZMINE (acute kidney injury) ([facility] N17.9    Septic shock (HCC) A41.9, R65.21    DIC (disseminated intravascular coagulation) ([facility] D65    Coagulopathy ([facility] D68.9    Thrombocytopenia ([facility] D69.6    Acute cystitis with hematuria N30.01    Pneumonia due to organism J18.9    Acquired spondylolisthesis M43.10    Spinal stenosis, lumbar region, without neurogenic claudication M48.061    Postlaminectomy syndrome, lumbar region M96.1    Chronic midline low back pain without sciatica M54.5, G89.29    Cervical spinal stenosis M48.02       Past Medical History:        Diagnosis Date    Arthritis     Blockage of coronary artery of heart ([facility] 2010    x 2 stents    CAD (coronary artery disease)     DDD (degenerative disc disease), cervical     cervical 3-6    Fibromyalgia     History of blood transfusion     History of kidney stones     last one \"about 10 years ago\"    Hyperlipidemia     Hypertension     Pancreatitis 2016    Sleep apnea     Thyroid disease     Wears glasses     for reading       Past Surgical History: Procedure Laterality Date    ARTHROPLASTY Left 2020    LEFT FIRST CARPOMETACARPAL ARTHROPLASTY performed by Pamela Maldonado DO at [address]      L4,L5,S1 hardware placed    BACK SURGERY  2018    SPINAL CORD STIMULATOR    CATARACT REMOVAL Bilateral 2016    CERVICAL SPINE SURGERY      2000    COLONOSCOPY      normal    CORONARY ANGIOPLASTY  2010    x 2 stents    FINGER SURGERY      right thumb joint    HERNIA REPAIR Right     inguinal    HYSTERECTOMY      JOINT REPLACEMENT Right     knee    KIDNEY STONE SURGERY      x 3    LITHOTRIPSY      x 3    PARATHYROIDECTOMY      PARATHYROIDECTOMY      MO PERCUT IMPLNT NEUROELECT,EPIDURAL N/A 2018    SPINAL CORD STIMULATOR IMPLANT performed by Jose L Oswald MD at [facility] Right 2019       Social History:    Social History     Tobacco Use    Smoking status: Former Smoker     Packs/day: 1.00     Years: 44.00     Pack years: 44.00     Types: Cigarettes     Quit date: 2010     Years since quittin.2    Smokeless tobacco: Never Used   Substance Use Topics    Alcohol use: No                                Counseling given: Not Answered      Vital Signs (Current):   Vitals:    21 0715 21 0743   BP: 129/74    Pulse: 75    Resp: 18    Temp: 98 °F (36.7 °C)    TempSrc: Temporal    SpO2: 96%    Weight:  201 lb (91.2 kg)   Height: 5' 6\" (1.676 m)                                               BP Readings from Last 3 Encounters:   21 129/74   21 136/74   10/04/20 (!) 118/90       NPO Status:                                                                                 BMI:   Wt Readings from Last 3 Encounters:   21 201 lb (91.2 kg)   21 201 lb 15.1 oz (91.6 kg)   10/04/20 197 lb (89.4 kg)     Body mass index is 32.44 kg/m².     CBC:   Lab Results   Component Value Date    WBC 3.9 2021    RBC 4.66 2021    HGB 14.9 2021    HCT 44.0 2021    MCV 94.4 07/23/2021    RDW 13.1 10/04/2020     07/23/2021       CMP:   Lab Results   Component Value Date     07/23/2021    K 3.5 07/23/2021     07/23/2021    CO2 25 07/23/2021    BUN 24 07/23/2021    CREATININE 1.0 07/23/2021    GFRAA >60 10/04/2020    LABGLOM 55 07/23/2021    GLUCOSE 133 07/23/2021    PROT 6.6 10/04/2020    CALCIUM 10.3 07/23/2021    BILITOT 0.40 10/04/2020    ALKPHOS 79 10/04/2020    AST 16 10/04/2020    ALT 13 10/04/2020       POC Tests: No results for input(s): POCGLU, POCNA, POCK, POCCL, POCBUN, POCHEMO, POCHCT in the last 72 hours. Coags:   Lab Results   Component Value Date    PROTIME 11.3 05/10/2016    INR 1.03 07/23/2021    APTT 33.5 07/23/2021       HCG (If Applicable): No results found for: PREGTESTUR, PREGSERUM, HCG, HCGQUANT     ABGs: No results found for: PHART, PO2ART, BBB5OXA, KTH1FKQ, BEART, R4NGVYYI     Type & Screen (If Applicable):  No results found for: LABABO, LABRH    Drug/Infectious Status (If Applicable):  Lab Results   Component Value Date    HEPCAB NONREACTIVE 05/08/2016       COVID-19 Screening (If Applicable): No results found for: COVID19        Anesthesia Evaluation    Airway: Mallampati: II        Dental:          Pulmonary:   (+) sleep apnea:                             Cardiovascular:    (+) hypertension:, CAD:,                   Neuro/Psych:   (+) neuromuscular disease:,             GI/Hepatic/Renal:             Endo/Other:                     Abdominal:   (+) obese,           Vascular: Other Findings:             Anesthesia Plan      general     ASA 3       Induction: intravenous. Anesthetic plan and risks discussed with patient. Plan discussed with CRNA.                   Caroline Merino MD   8/17/2021

## 2021-08-18 VITALS
RESPIRATION RATE: 16 BRPM | TEMPERATURE: 97.9 F | OXYGEN SATURATION: 95 % | DIASTOLIC BLOOD PRESSURE: 68 MMHG | HEIGHT: 66 IN | HEART RATE: 82 BPM | BODY MASS INDEX: 32.3 KG/M2 | WEIGHT: 201 LBS | SYSTOLIC BLOOD PRESSURE: 118 MMHG

## 2021-08-18 LAB
ANION GAP SERPL CALCULATED.3IONS-SCNC: 8 MEQ/L (ref 8–16)
BUN BLDV-MCNC: 15 MG/DL (ref 7–22)
CALCIUM SERPL-MCNC: 8.9 MG/DL (ref 8.5–10.5)
CHLORIDE BLD-SCNC: 108 MEQ/L (ref 98–111)
CO2: 21 MEQ/L (ref 23–33)
CREAT SERPL-MCNC: 0.8 MG/DL (ref 0.4–1.2)
GFR SERPL CREATININE-BSD FRML MDRD: 71 ML/MIN/1.73M2
GLUCOSE BLD-MCNC: 176 MG/DL (ref 70–108)
HCT VFR BLD CALC: 39.9 % (ref 37–47)
HEMOGLOBIN: 13.1 GM/DL (ref 12–16)
POTASSIUM SERPL-SCNC: 4 MEQ/L (ref 3.5–5.2)
SODIUM BLD-SCNC: 137 MEQ/L (ref 135–145)

## 2021-08-18 PROCEDURE — 85014 HEMATOCRIT: CPT

## 2021-08-18 PROCEDURE — 80048 BASIC METABOLIC PNL TOTAL CA: CPT

## 2021-08-18 PROCEDURE — 2500000003 HC RX 250 WO HCPCS: Performed by: PHYSICIAN ASSISTANT

## 2021-08-18 PROCEDURE — 85018 HEMOGLOBIN: CPT

## 2021-08-18 PROCEDURE — 6370000000 HC RX 637 (ALT 250 FOR IP): Performed by: PHYSICIAN ASSISTANT

## 2021-08-18 PROCEDURE — 36415 COLL VENOUS BLD VENIPUNCTURE: CPT

## 2021-08-18 RX ORDER — CYCLOBENZAPRINE HCL 10 MG
10 TABLET ORAL 3 TIMES DAILY PRN
Qty: 30 TABLET | Refills: 0 | Status: SHIPPED | OUTPATIENT
Start: 2021-08-18 | End: 2021-08-28

## 2021-08-18 RX ORDER — TRAMADOL HYDROCHLORIDE 50 MG/1
50 TABLET ORAL EVERY 6 HOURS PRN
Qty: 28 TABLET | Refills: 0 | Status: SHIPPED | OUTPATIENT
Start: 2021-08-18 | End: 2021-08-25

## 2021-08-18 RX ADMIN — ROPINIROLE 1 MG: 1 TABLET, FILM COATED ORAL at 09:55

## 2021-08-18 RX ADMIN — DULOXETIN HYDROCHLORIDE 60 MG: 60 CAPSULE, DELAYED RELEASE ORAL at 09:55

## 2021-08-18 RX ADMIN — TRAMADOL HYDROCHLORIDE 100 MG: 50 TABLET, FILM COATED ORAL at 05:09

## 2021-08-18 RX ADMIN — CYCLOBENZAPRINE 10 MG: 10 TABLET, FILM COATED ORAL at 05:09

## 2021-08-18 RX ADMIN — CARVEDILOL 25 MG: 25 TABLET, FILM COATED ORAL at 09:55

## 2021-08-18 RX ADMIN — POTASSIUM CHLORIDE, DEXTROSE MONOHYDRATE AND SODIUM CHLORIDE: 150; 5; 450 INJECTION, SOLUTION INTRAVENOUS at 01:17

## 2021-08-18 ASSESSMENT — PAIN SCALES - GENERAL
PAINLEVEL_OUTOF10: 7
PAINLEVEL_OUTOF10: 5
PAINLEVEL_OUTOF10: 0
PAINLEVEL_OUTOF10: 0

## 2021-08-18 NOTE — PROGRESS NOTES
Patient condition stable, ambulating without difficulty, gait steady, into wheelchair, discharged from hospital via transport with daughter at side to car.

## 2021-08-18 NOTE — CARE COORDINATION
8/18/21, 7:45 AM EDT  DISCHARGE PLANNING EVALUATION:    Lianne Marie       Admitted: 8/17/2021/ [facility] day: 0   Location: 7-15/015-A Reason for admit: Cervical spinal stenosis [M48.02]   PMH:  has a past medical history of Arthritis, Blockage of coronary artery of heart (HCC), CAD (coronary artery disease), DDD (degenerative disc disease), cervical, Fibromyalgia, History of blood transfusion, History of kidney stones, Hyperlipidemia, Hypertension, Pancreatitis, Sleep apnea, Thyroid disease, and Wears glasses. Procedure: 08/17   C6-C7 removal of instrumentation, [employer] [place], [employer] instrumentation, C6-C7 exploration of fusion, C3-C4, C4-C5 and C5-C6 anterior cervical diskectomy and fusion with decompression and stabilization of spinal cord and nerve roots byDr [employer]  Barriers to Discharge:  N/V checks, monitor drain outputs, pain control  PCP: Nemesio Little MD   %    Patient Goals/Plan/Treatment Preferences: Met with [facility]; she lives home alone. She has daughter who will provide transportation, is able to afford medications, has f/u appt for Dr Fredia Closs also. She chose [facility] out of [place] since she lives there. I called and made referral with [facility]. They are unable to accept due to no staff. Spoke with patient; referral made to [facility] with information faxed off at 0940 am.      Transportation/Food Security/Housekeeping Addressed:  No issues identified. 8/18/21, 9:57 AM EDT    Patient goals/plan/ treatment preferences discussed by  and . Patient goals/plan/ treatment preferences reviewed with patient/ family. Patient/ family verbalize understanding of discharge plan and are in agreement with goal/plan/treatment preferences. Understanding was demonstrated using the teach back method.   AVS provided by RN at time of discharge, which includes all necessary medical information pertaining to the patients current course of illness, treatment, post-discharge goals of care, and treatment preferences.     Services After Discharge  Services At/After Discharge: Nursing Services

## 2021-08-18 NOTE — PLAN OF CARE
Problem: Pain:  Goal: Control of chronic pain  Description: Control of chronic pain  8/18/2021 0310 by Ld Leon RN  Outcome: Ongoing   Patient pain controlled with medication    Problem: Discharge Planning:  Goal: Discharged to appropriate level of care  Description: Discharged to appropriate level of care  8/18/2021 1140 by Ayla Hurley RN  Outcome: Ongoing   Patient planning on going home today  Problem: Infection - Surgical Site:  Goal: Will show no infection signs and symptoms  Description: Will show no infection signs and symptoms  8/18/2021 1140 by Ayla Hurley RN  Outcome: Ongoing    No signs of infection, no fever

## 2021-08-20 ENCOUNTER — HOSPITAL ENCOUNTER (EMERGENCY)
Age: 71
Discharge: ANOTHER HOSPITAL | End: 2021-08-22
Attending: FAMILY MEDICINE
Payer: MEDICARE

## 2021-08-20 DIAGNOSIS — R53.81 MALAISE AND FATIGUE: ICD-10-CM

## 2021-08-20 DIAGNOSIS — R50.82 POSTOPERATIVE FEVER: Primary | ICD-10-CM

## 2021-08-20 DIAGNOSIS — R53.83 MALAISE AND FATIGUE: ICD-10-CM

## 2021-08-20 LAB
ABSOLUTE EOS #: 0.14 K/UL (ref 0–0.44)
ABSOLUTE IMMATURE GRANULOCYTE: 0 K/UL (ref 0–0.3)
ABSOLUTE LYMPH #: 0.28 K/UL (ref 1.1–3.7)
ABSOLUTE MONO #: 0.32 K/UL (ref 0.1–1.2)
ALBUMIN SERPL-MCNC: 3.8 G/DL (ref 3.5–5.2)
ALBUMIN/GLOBULIN RATIO: 1.3 (ref 1–2.5)
ALP BLD-CCNC: 67 U/L (ref 35–104)
ALT SERPL-CCNC: 8 U/L (ref 5–33)
ANION GAP SERPL CALCULATED.3IONS-SCNC: 13 MMOL/L (ref 9–17)
AST SERPL-CCNC: 14 U/L
BASOPHILS # BLD: 1 % (ref 0–2)
BASOPHILS ABSOLUTE: 0.05 K/UL (ref 0–0.2)
BILIRUB SERPL-MCNC: 0.55 MG/DL (ref 0.3–1.2)
BUN BLDV-MCNC: 15 MG/DL (ref 8–23)
BUN/CREAT BLD: 18 (ref 9–20)
CALCIUM SERPL-MCNC: 9.2 MG/DL (ref 8.6–10.4)
CHLORIDE BLD-SCNC: 100 MMOL/L (ref 98–107)
CO2: 23 MMOL/L (ref 20–31)
CREAT SERPL-MCNC: 0.83 MG/DL (ref 0.5–0.9)
DIFFERENTIAL TYPE: ABNORMAL
EOSINOPHILS RELATIVE PERCENT: 3 % (ref 1–4)
GFR AFRICAN AMERICAN: >60 ML/MIN
GFR NON-AFRICAN AMERICAN: >60 ML/MIN
GFR SERPL CREATININE-BSD FRML MDRD: ABNORMAL ML/MIN/{1.73_M2}
GFR SERPL CREATININE-BSD FRML MDRD: ABNORMAL ML/MIN/{1.73_M2}
GLUCOSE BLD-MCNC: 112 MG/DL (ref 70–99)
HCT VFR BLD CALC: 38.3 % (ref 36.3–47.1)
HEMOGLOBIN: 12.9 G/DL (ref 11.9–15.1)
IMMATURE GRANULOCYTES: 0 %
LYMPHOCYTES # BLD: 6 % (ref 24–43)
MCH RBC QN AUTO: 31.9 PG (ref 25.2–33.5)
MCHC RBC AUTO-ENTMCNC: 33.7 G/DL (ref 28.4–34.8)
MCV RBC AUTO: 94.8 FL (ref 82.6–102.9)
MONOCYTES # BLD: 7 % (ref 3–12)
MORPHOLOGY: NORMAL
NRBC AUTOMATED: 0 PER 100 WBC
PDW BLD-RTO: 12.6 % (ref 11.8–14.4)
PLATELET # BLD: ABNORMAL K/UL (ref 138–453)
PLATELET ESTIMATE: ABNORMAL
PLATELET, FLUORESCENCE: 140 K/UL (ref 138–453)
PLATELET, IMMATURE FRACTION: 2.7 % (ref 1.1–10.3)
PMV BLD AUTO: ABNORMAL FL (ref 8.1–13.5)
POTASSIUM SERPL-SCNC: 3.7 MMOL/L (ref 3.7–5.3)
RBC # BLD: 4.04 M/UL (ref 3.95–5.11)
RBC # BLD: ABNORMAL 10*6/UL
SEG NEUTROPHILS: 83 % (ref 36–65)
SEGMENTED NEUTROPHILS ABSOLUTE COUNT: 3.81 K/UL (ref 1.5–8.1)
SODIUM BLD-SCNC: 136 MMOL/L (ref 135–144)
TOTAL PROTEIN: 6.7 G/DL (ref 6.4–8.3)
WBC # BLD: 4.6 K/UL (ref 3.5–11.3)
WBC # BLD: ABNORMAL 10*3/UL

## 2021-08-20 PROCEDURE — 99285 EMERGENCY DEPT VISIT HI MDM: CPT

## 2021-08-20 PROCEDURE — 85055 RETICULATED PLATELET ASSAY: CPT

## 2021-08-20 PROCEDURE — 96375 TX/PRO/DX INJ NEW DRUG ADDON: CPT

## 2021-08-20 PROCEDURE — 6360000002 HC RX W HCPCS: Performed by: FAMILY MEDICINE

## 2021-08-20 PROCEDURE — 96366 THER/PROPH/DIAG IV INF ADDON: CPT

## 2021-08-20 PROCEDURE — 85025 COMPLETE CBC W/AUTO DIFF WBC: CPT

## 2021-08-20 PROCEDURE — 2580000003 HC RX 258: Performed by: FAMILY MEDICINE

## 2021-08-20 PROCEDURE — 87040 BLOOD CULTURE FOR BACTERIA: CPT

## 2021-08-20 PROCEDURE — 80053 COMPREHEN METABOLIC PANEL: CPT

## 2021-08-20 PROCEDURE — 96365 THER/PROPH/DIAG IV INF INIT: CPT

## 2021-08-20 PROCEDURE — 96368 THER/DIAG CONCURRENT INF: CPT

## 2021-08-20 PROCEDURE — 36415 COLL VENOUS BLD VENIPUNCTURE: CPT

## 2021-08-20 RX ADMIN — VANCOMYCIN HYDROCHLORIDE 1000 MG: 1 INJECTION, POWDER, LYOPHILIZED, FOR SOLUTION INTRAVENOUS at 23:39

## 2021-08-20 RX ADMIN — PIPERACILLIN AND TAZOBACTAM 3375 MG: 3; .375 INJECTION, POWDER, FOR SOLUTION INTRAVENOUS at 22:40

## 2021-08-20 ASSESSMENT — PAIN DESCRIPTION - PAIN TYPE: TYPE: ACUTE PAIN

## 2021-08-20 ASSESSMENT — PAIN SCALES - GENERAL
PAINLEVEL_OUTOF10: 5
PAINLEVEL_OUTOF10: 4

## 2021-08-21 ENCOUNTER — APPOINTMENT (OUTPATIENT)
Dept: GENERAL RADIOLOGY | Age: 71
End: 2021-08-21
Payer: MEDICARE

## 2021-08-21 LAB
SARS-COV-2, RAPID: DETECTED
SPECIMEN DESCRIPTION: ABNORMAL

## 2021-08-21 PROCEDURE — 96366 THER/PROPH/DIAG IV INF ADDON: CPT

## 2021-08-21 PROCEDURE — 6370000000 HC RX 637 (ALT 250 FOR IP): Performed by: EMERGENCY MEDICINE

## 2021-08-21 PROCEDURE — 71045 X-RAY EXAM CHEST 1 VIEW: CPT

## 2021-08-21 PROCEDURE — 96376 TX/PRO/DX INJ SAME DRUG ADON: CPT

## 2021-08-21 PROCEDURE — 87635 SARS-COV-2 COVID-19 AMP PRB: CPT

## 2021-08-21 PROCEDURE — 2580000003 HC RX 258: Performed by: EMERGENCY MEDICINE

## 2021-08-21 PROCEDURE — 96375 TX/PRO/DX INJ NEW DRUG ADDON: CPT

## 2021-08-21 PROCEDURE — 6360000002 HC RX W HCPCS: Performed by: EMERGENCY MEDICINE

## 2021-08-21 RX ORDER — DULOXETIN HYDROCHLORIDE 60 MG/1
60 CAPSULE, DELAYED RELEASE ORAL ONCE
Status: COMPLETED | OUTPATIENT
Start: 2021-08-21 | End: 2021-08-21

## 2021-08-21 RX ORDER — ROPINIROLE 1 MG/1
1 TABLET, FILM COATED ORAL ONCE
Status: COMPLETED | OUTPATIENT
Start: 2021-08-21 | End: 2021-08-21

## 2021-08-21 RX ORDER — PRAVASTATIN SODIUM 20 MG
80 TABLET ORAL NIGHTLY
Status: DISCONTINUED | OUTPATIENT
Start: 2021-08-21 | End: 2021-08-22 | Stop reason: HOSPADM

## 2021-08-21 RX ORDER — ROPINIROLE 1 MG/1
2 TABLET, FILM COATED ORAL NIGHTLY
Status: DISCONTINUED | OUTPATIENT
Start: 2021-08-21 | End: 2021-08-22 | Stop reason: HOSPADM

## 2021-08-21 RX ORDER — CARVEDILOL 12.5 MG/1
12.5 TABLET ORAL ONCE
Status: COMPLETED | OUTPATIENT
Start: 2021-08-21 | End: 2021-08-21

## 2021-08-21 RX ORDER — ASPIRIN 81 MG/1
81 TABLET, CHEWABLE ORAL ONCE
Status: COMPLETED | OUTPATIENT
Start: 2021-08-21 | End: 2021-08-21

## 2021-08-21 RX ORDER — CARVEDILOL 12.5 MG/1
25 TABLET ORAL ONCE
Status: COMPLETED | OUTPATIENT
Start: 2021-08-21 | End: 2021-08-21

## 2021-08-21 RX ORDER — CYCLOBENZAPRINE HCL 10 MG
10 TABLET ORAL ONCE
Status: COMPLETED | OUTPATIENT
Start: 2021-08-21 | End: 2021-08-21

## 2021-08-21 RX ORDER — ASPIRIN 81 MG/1
81 TABLET ORAL DAILY
COMMUNITY

## 2021-08-21 RX ORDER — DEXAMETHASONE SODIUM PHOSPHATE 10 MG/ML
6 INJECTION INTRAMUSCULAR; INTRAVENOUS ONCE
Status: COMPLETED | OUTPATIENT
Start: 2021-08-21 | End: 2021-08-21

## 2021-08-21 RX ORDER — PANTOPRAZOLE SODIUM 40 MG/1
40 TABLET, DELAYED RELEASE ORAL ONCE
Status: COMPLETED | OUTPATIENT
Start: 2021-08-21 | End: 2021-08-21

## 2021-08-21 RX ADMIN — PIPERACILLIN AND TAZOBACTAM 3375 MG: 3; .375 INJECTION, POWDER, FOR SOLUTION INTRAVENOUS at 16:05

## 2021-08-21 RX ADMIN — VANCOMYCIN HYDROCHLORIDE 1000 MG: 1 INJECTION, POWDER, LYOPHILIZED, FOR SOLUTION INTRAVENOUS at 12:48

## 2021-08-21 RX ADMIN — ASPIRIN 81 MG: 81 TABLET, CHEWABLE ORAL at 14:57

## 2021-08-21 RX ADMIN — PIPERACILLIN AND TAZOBACTAM 3375 MG: 3; .375 INJECTION, POWDER, FOR SOLUTION INTRAVENOUS at 08:11

## 2021-08-21 RX ADMIN — ROPINIROLE HYDROCHLORIDE 1 MG: 1 TABLET, FILM COATED ORAL at 14:57

## 2021-08-21 RX ADMIN — VANCOMYCIN HYDROCHLORIDE 1000 MG: 1 INJECTION, POWDER, LYOPHILIZED, FOR SOLUTION INTRAVENOUS at 23:32

## 2021-08-21 RX ADMIN — CARVEDILOL 25 MG: 12.5 TABLET, FILM COATED ORAL at 23:31

## 2021-08-21 RX ADMIN — DULOXETINE HYDROCHLORIDE 60 MG: 60 CAPSULE, DELAYED RELEASE ORAL at 14:57

## 2021-08-21 RX ADMIN — DEXAMETHASONE SODIUM PHOSPHATE 6 MG: 10 INJECTION INTRAMUSCULAR; INTRAVENOUS at 07:44

## 2021-08-21 RX ADMIN — CARVEDILOL 12.5 MG: 12.5 TABLET, FILM COATED ORAL at 14:57

## 2021-08-21 RX ADMIN — ROPINIROLE HYDROCHLORIDE 2 MG: 1 TABLET, FILM COATED ORAL at 23:31

## 2021-08-21 RX ADMIN — PIPERACILLIN AND TAZOBACTAM 3375 MG: 3; .375 INJECTION, POWDER, FOR SOLUTION INTRAVENOUS at 23:32

## 2021-08-21 RX ADMIN — PANTOPRAZOLE SODIUM 40 MG: 40 TABLET, DELAYED RELEASE ORAL at 23:32

## 2021-08-21 RX ADMIN — CYCLOBENZAPRINE HYDROCHLORIDE 10 MG: 10 TABLET, FILM COATED ORAL at 23:31

## 2021-08-21 ASSESSMENT — PAIN DESCRIPTION - FREQUENCY: FREQUENCY: CONTINUOUS

## 2021-08-21 ASSESSMENT — PAIN DESCRIPTION - LOCATION: LOCATION: NECK

## 2021-08-21 ASSESSMENT — PAIN DESCRIPTION - DESCRIPTORS: DESCRIPTORS: ACHING

## 2021-08-21 ASSESSMENT — PAIN DESCRIPTION - PAIN TYPE: TYPE: ACUTE PAIN

## 2021-08-21 ASSESSMENT — PAIN SCALES - GENERAL: PAINLEVEL_OUTOF10: 4

## 2021-08-21 NOTE — ED NOTES
Called [facility] to initiate transfer to [employer], they will call us back.      Tracie Isaacs  08/20/21 6165

## 2021-08-21 NOTE — ED NOTES
Called [facility] to check on the status. [facility] declined to take the patient connected Dr Jorge Guadarrama with the ER  @ [facility].      Tracie Isaacs  08/20/21 9687

## 2021-08-21 NOTE — ED NOTES
[facility] is still declining oatient as they are full with no bed available     Tracie ALVARADO [facility]  08/20/21 9415

## 2021-08-21 NOTE — ED PROVIDER NOTES
[facility]  EMERGENCY DEPARTMENT ENCOUNTER      Pt Name: Heide Li  MRN: [id number]  [place] 1950  Date of evaluation: 8/20/2021  Provider: Andrea Chicas MD    CHIEF COMPLAINT     Chief Complaint   Patient presents with    Post-op Problem     spinal fusion (C3-6) surgery Tuesday at [facility]. Fever onset yesterday. HISTORY OF PRESENT ILLNESS   (Location/Symptom, Timing/Onset, Context/Setting,Quality, Duration, Modifying Factors, Severity)  Note limiting factors. Heide Li is a69 y.o. female who presents to the emergency department      This patient had cervical fusion surgery on last Tuesday at [facility].  She is presenting or ER today complaining of a fever, chills, sweats and weakness. She does not report any weakness in the arms or legs particularly just, overall weakness. She does report some appropriate post surgical pain in her cervical spine. Nursing Notes werereviewed. REVIEW OF SYSTEMS    (2-9 systems for level 4, 10 or more for level 5)     Review of Systems   Constitutional: Positive for appetite change, chills, diaphoresis, fatigue and fever. HENT: Negative. Eyes: Negative. Respiratory: Negative. Cardiovascular: Negative. Gastrointestinal: Negative. Endocrine: Negative. Genitourinary: Negative. Musculoskeletal: Positive for myalgias and neck pain. Negative for arthralgias, back pain, gait problem, joint swelling and neck stiffness. Neurological: Negative. Hematological: Negative. Except as noted above the remainder of the review of systems was reviewed and negative.        PAST MEDICAL HISTORY     Past Medical History:   Diagnosis Date    Arthritis     Blockage of coronary artery of heart ([facility] 2010    x 2 stents    CAD (coronary artery disease)     DDD (degenerative disc disease), cervical     cervical 3-6    Fibromyalgia     History of blood transfusion     History of kidney stones     last one \"about 10 years ago\"    Hyperlipidemia     Hypertension     Pancreatitis 2016    Sleep apnea     Thyroid disease     Wears glasses     for reading         SURGICALHISTORY       Past Surgical History:   Procedure Laterality Date    ARTHROPLASTY Left 2/6/2020    LEFT FIRST CARPOMETACARPAL ARTHROPLASTY performed by Joan Valdivia DO at [address]  2012    L4,L5,S1 hardware placed    BACK SURGERY  2018    SPINAL CORD STIMULATOR    CATARACT REMOVAL Bilateral 2016    CERVICAL FUSION N/A 8/17/2021    C3-6 ACDF, REMOVAL OF HARDWARE C6-7 performed by Chaitanya Ma MD at [address]    COLONOSCOPY      normal    CORONARY ANGIOPLASTY  2010    x 2 stents    FINGER SURGERY      right thumb joint    HERNIA REPAIR Right     inguinal    HYSTERECTOMY      JOINT REPLACEMENT Right     knee    KIDNEY STONE SURGERY      x 3    LITHOTRIPSY      x 3    PARATHYROIDECTOMY      PARATHYROIDECTOMY      MA PERCUT IMPLNT NEUROELECT,EPIDURAL N/A 8/8/2018    SPINAL CORD STIMULATOR IMPLANT performed by Padmini Brooks MD at [facility] [facility] Right 03/2019         CURRENT MEDICATIONS       Discharge Medication List as of 8/22/2021 10:13 AM      CONTINUE these medications which have NOT CHANGED    Details   aspirin 81 MG EC tablet Take 81 mg by mouth dailyHistorical Med      cyclobenzaprine (FLEXERIL) 10 MG tablet Take 1 tablet by mouth 3 times daily as needed for Muscle spasms, Disp-30 tablet, R-0Print      traMADol (ULTRAM) 50 MG tablet Take 1 tablet by mouth every 6 hours as needed for Pain for up to 7 days. , Disp-28 tablet, R-0Print      mupirocin (BACTROBAN) 2 % nasal ointment by Nasal route 2 times daily Take by Nasal route 2 times daily.  5 days prior to surgery, Nasal, 2 TIMES DAILY, Historical Med      Multiple Vitamins-Minerals (THERAPEUTIC MULTIVITAMIN-MINERALS) tablet Take 1 tablet by mouth dailyHistorical Med hydrochlorothiazide (HYDRODIURIL) 25 MG tablet Take 25 mg by mouth nightly Historical Med      Calcium Carbonate-Vitamin D (OSCAL 500/200 D-3 PO) Take 1 tablet by mouth dailyHistorical Med      carvedilol (COREG) 12.5 MG tablet Take 1 tablet by mouth 2 times daily (with meals), Disp-60 tablet, R-3      pantoprazole (PROTONIX) 40 MG tablet Take 1 tablet by mouth daily, Disp-30 tablet, R-0      DULoxetine (CYMBALTA) 60 MG capsule Take 60 mg by mouth daily      gabapentin (NEURONTIN) 600 MG tablet Take 300 mg by mouth 2 times daily. 1 tab in morning and 2 tabs at dinnerHistorical Med      pravastatin (PRAVACHOL) 80 MG tablet Take 80 mg by mouth nightly      rOPINIRole (REQUIP) 1 MG tablet Take 1 mg by mouth 2 times daily 1 in morning and 2 at dinnerHistorical Med                  Lisinopril and Toradol [ketorolac tromethamine]    FAMILY HISTORY       Family History   Problem Relation Age of Onset    Heart Disease Father           SOCIAL HISTORY       Social History     Socioeconomic History    Marital status:      Spouse name: None    Number of children: None    Years of education: None    Highest education level: None   Occupational History    None   Tobacco Use    Smoking status: Former Smoker     Packs/day: 1.00     Years: 44.00     Pack years: 44.00     Types: Cigarettes     Quit date: 2010     Years since quittin.3    Smokeless tobacco: Never Used   Vaping Use    Vaping Use: Never used   Substance and Sexual Activity    Alcohol use: No    Drug use: No    Sexual activity: None   Other Topics Concern    None   Social History Narrative    None     Social Determinants of Health     Financial Resource Strain:     Difficulty of Paying Living Expenses:    Food Insecurity:     Worried About Running Out of Food in the Last Year:     Ran Out of Food in the Last Year:    Transportation Needs:     Lack of Transportation (Medical):      Lack of Transportation (Non-Medical):    Physical Activity:     Days of Exercise per Week:     Minutes of Exercise per Session:    Stress:     Feeling of Stress :    Social Connections:     Frequency of Communication with Friends and Family:     Frequency of Social Gatherings with Friends and Family:     Attends Church Services:     Active Member of Clubs or Organizations:     Attends Club or Organization Meetings:     Marital Status:    Intimate Partner Violence:     Fear of Current or Ex-Partner:     Emotionally Abused:     Physically Abused:     Sexually Abused:        SCREENINGS      [place] Coma Scale  Eye Opening: Spontaneous  Best Verbal Response: Oriented  Best Motor Response: Obeys commands  [place] Coma Scale Score: 15             PHYSICAL EXAM    (up to 7 for level 4, 8 or more for level 5)     ED Triage Vitals   BP Temp Temp Source Pulse Resp SpO2 Height Weight   08/20/21 2151 08/20/21 2145 08/20/21 2145 08/20/21 2145 08/20/21 2145 08/20/21 2145 08/21/21 0624 08/21/21 0624   (!) 141/82 102.4 °F (39.1 °C) Tympanic 98 16 92 % 5' 6\" (1.676 m) 197 lb (89.4 kg)       Physical Exam  Vitals and nursing note reviewed. Constitutional:       Appearance: Normal appearance. HENT:      Head: Normocephalic and atraumatic. Nose: Nose normal.      Mouth/Throat:      Mouth: Mucous membranes are moist.   Neck:      Vascular: No carotid bruit. Comments: Postsurgical scar with some appropriate tenderness palpation. She has some decreased range of motion due to the post surgical condition. Cardiovascular:      Rate and Rhythm: Normal rate and regular rhythm. Pulses: Normal pulses. Heart sounds: Normal heart sounds. Pulmonary:      Effort: Pulmonary effort is normal.      Breath sounds: Normal breath sounds. Musculoskeletal:         General: Normal range of motion. Cervical back: Tenderness present. No rigidity. Lymphadenopathy:      Cervical: No cervical adenopathy. Skin:     General: Skin is warm.       Capillary Refill: Capillary refill takes less than 2 seconds. Neurological:      General: No focal deficit present. Mental Status: She is alert and oriented to person, place, and time. DIAGNOSTIC RESULTS     EKG: All EKG's are interpreted by the Emergency Department Physician who either signs orCo-signs this chart in the absence of a cardiologist.        RADIOLOGY:   plain film images such as CT, Ultrasound and MRI are read by the radiologist. Plain radiographic images are visualized and preliminarily interpreted by the emergency physician with the below findings:        Interpretation per the Radiologist below, ifavailable at the time of this note:    XR CHEST (SINGLE VIEW FRONTAL)   Final Result   Mild bibasilar atelectasis. Early infiltrate cannot be excluded on the right   but is considered unlikely. ED BEDSIDE ULTRASOUND:   Performed by ED Physician - none    LABS:  Labs Reviewed   COVID-19, RAPID - Abnormal; Notable for the following components:       Result Value    SARS-CoV-2, Rapid DETECTED (*)     All other components within normal limits   CBC WITH AUTO DIFFERENTIAL - Abnormal; Notable for the following components:    Seg Neutrophils 83 (*)     Lymphocytes 6 (*)     Absolute Lymph # 0.28 (*)     All other components within normal limits   COMPREHENSIVE METABOLIC PANEL - Abnormal; Notable for the following components:    Glucose 112 (*)     All other components within normal limits   CULTURE, BLOOD 1   CULTURE, BLOOD 1   IMMATURE PLATELET FRACTION       All other labs were within normal range ornot returned as of this dictation.     EMERGENCY DEPARTMENT COURSE and DIFFERENTIAL DIAGNOSIS/MDM:   Vitals:    Vitals:    08/22/21 0754 08/22/21 0755 08/22/21 0801 08/22/21 0825   BP:  (!) 149/69 136/83    Pulse:    67   Resp:    18   Temp:       TempSrc:       SpO2: 94%      Weight:       Height:               MDM  Number of Diagnoses or Management Options  Diagnosis management comments: Patient with post surgical fever. She had cervical spine fusion done at [facility]. We discussed with surgery as services for further evaluation and transfer the patient to their facility. She received empiric antibiotics facility/Zosyn. She is being transferred at this time awaiting transportation to arrive. At time of departure her condition was stable. Plan was discussed with patient and the family and they understand and agree. Amount and/or Complexity of Data Reviewed  Clinical lab tests: reviewed  Tests in the radiology section of CPT®: reviewed         CRITICAL CARE TIME   Total CriticalCare time was  minutes, excluding separately reportable procedures. There was a high probability of clinically significant/life threatening deterioration in the patient's condition which required my urgent intervention. CONSULTS:  None    PROCEDURES:  Unlessotherwise noted below, none     Procedures    FINAL IMPRESSION      1. Postoperative fever    2. Malaise and fatigue          DISPOSITION/PLAN   DISPOSITION Decision To Transfer 08/21/2021 12:37:20 AM      PATIENT REFERRED TO:  No follow-up provider specified.     DISCHARGE MEDICATIONS:  Discharge Medication List as of 8/22/2021 10:13 AM                 (Please note that portions of this note were completed with a voice recognition program.  Efforts were made to edit the dictations but occasionally words are mis-transcribed.)      Eliza Escobedo MD (electronically signed)  Attending Emergency Physician        Eliza Escobedo MD  08/21/21 [address] [facility] [address], [place]  09/11/21 [address], [place]  09/12/21 9122

## 2021-08-21 NOTE — ED NOTES
[facility] on [facility] bypass.  Access then called back and said [facility] will add to wait list. Connected to Marcel Olivia at "[employer] call transferred to Dr Aggarwal Files  08/21/21 0032

## 2021-08-21 NOTE — ED NOTES
[facility] called with Sheila from [address] [place] [address].  Call connected to Dr Elliot Dolan  08/21/21 0003

## 2021-08-21 NOTE — PROGRESS NOTES
Transfer  [facility]  Referring Physician Dr Meme Boswell  Accepting Physician Dr Hossein Pereyra  Patient Condition: 67 yo at [facility] ER with post op fever. Was discharged from here on the 18th. Cervical spine fusion by Dr Salomón Braxton who was consulted and he stated pt. needs MRI. Patient came in with fever of 103.1, it is now down to 102.4, she is weak but no other neuro symptoms WBC are WNL, Blood cultures were drawn and antibiotics started, Zosyn and Vanc. Vitals 102.4 98 16 148/78 91% on RA. Will place on wait list until bed available. Accepted on behalf of Dr Hossein Pereyra to stepdown with diagnosis of Post op fever.

## 2021-08-21 NOTE — ED NOTES
Waiting for [facility] to call back with Ortho on the line.      Tracie VILLA [facility]  08/20/21 8298

## 2021-08-21 NOTE — ED NOTES
Called [employer] to check on [employer] and they are on bypass. [employer] [facility] will check with [facility].      Tracie Isaacs  08/20/21 9518

## 2021-08-22 ENCOUNTER — HOSPITAL ENCOUNTER (INPATIENT)
Age: 71
LOS: 3 days | Discharge: HOME HEALTH | DRG: 177 | End: 2021-08-25
Attending: PHYSICIAN ASSISTANT | Admitting: INTERNAL MEDICINE
Payer: MEDICARE

## 2021-08-22 VITALS
BODY MASS INDEX: 31.66 KG/M2 | WEIGHT: 197 LBS | SYSTOLIC BLOOD PRESSURE: 136 MMHG | TEMPERATURE: 98.8 F | HEIGHT: 66 IN | OXYGEN SATURATION: 94 % | DIASTOLIC BLOOD PRESSURE: 83 MMHG | HEART RATE: 67 BPM | RESPIRATION RATE: 18 BRPM

## 2021-08-22 LAB
ERYTHROCYTE [DISTWIDTH] IN BLOOD BY AUTOMATED COUNT: 12.3 % (ref 11.5–14.5)
ERYTHROCYTE [DISTWIDTH] IN BLOOD BY AUTOMATED COUNT: 43.5 FL (ref 35–45)
HCT VFR BLD CALC: 41.6 % (ref 37–47)
HEMOGLOBIN: 13.8 GM/DL (ref 12–16)
LACTIC ACID: 1.8 MMOL/L (ref 0.5–2)
MCH RBC QN AUTO: 31.6 PG (ref 26–33)
MCHC RBC AUTO-ENTMCNC: 33.2 GM/DL (ref 32.2–35.5)
MCV RBC AUTO: 95.2 FL (ref 81–99)
MRSA SCREEN RT-PCR: NEGATIVE
PLATELET # BLD: 166 THOU/MM3 (ref 130–400)
PMV BLD AUTO: 9.6 FL (ref 9.4–12.4)
PROCALCITONIN: 0.08 NG/ML (ref 0.01–0.09)
RBC # BLD: 4.37 MILL/MM3 (ref 4.2–5.4)
WBC # BLD: 4.1 THOU/MM3 (ref 4.8–10.8)

## 2021-08-22 PROCEDURE — 36415 COLL VENOUS BLD VENIPUNCTURE: CPT

## 2021-08-22 PROCEDURE — 84145 PROCALCITONIN (PCT): CPT

## 2021-08-22 PROCEDURE — 87641 MR-STAPH DNA AMP PROBE: CPT

## 2021-08-22 PROCEDURE — 83605 ASSAY OF LACTIC ACID: CPT

## 2021-08-22 PROCEDURE — 2060000000 HC ICU INTERMEDIATE R&B

## 2021-08-22 PROCEDURE — 96366 THER/PROPH/DIAG IV INF ADDON: CPT

## 2021-08-22 PROCEDURE — XW03396 INTRODUCTION OF CEFTOLOZANE/TAZOBACTAM ANTI-INFECTIVE INTO PERIPHERAL VEIN, PERCUTANEOUS APPROACH, NEW TECHNOLOGY GROUP 6: ICD-10-PCS | Performed by: STUDENT IN AN ORGANIZED HEALTH CARE EDUCATION/TRAINING PROGRAM

## 2021-08-22 PROCEDURE — 6360000002 HC RX W HCPCS: Performed by: EMERGENCY MEDICINE

## 2021-08-22 PROCEDURE — 6370000000 HC RX 637 (ALT 250 FOR IP): Performed by: EMERGENCY MEDICINE

## 2021-08-22 PROCEDURE — 6370000000 HC RX 637 (ALT 250 FOR IP): Performed by: PHYSICIAN ASSISTANT

## 2021-08-22 PROCEDURE — 6360000002 HC RX W HCPCS: Performed by: PHYSICIAN ASSISTANT

## 2021-08-22 PROCEDURE — 2580000003 HC RX 258: Performed by: PHYSICIAN ASSISTANT

## 2021-08-22 PROCEDURE — 2580000003 HC RX 258: Performed by: EMERGENCY MEDICINE

## 2021-08-22 PROCEDURE — 99223 1ST HOSP IP/OBS HIGH 75: CPT | Performed by: PHYSICIAN ASSISTANT

## 2021-08-22 PROCEDURE — 85027 COMPLETE CBC AUTOMATED: CPT

## 2021-08-22 RX ORDER — ACETAMINOPHEN 650 MG/1
650 SUPPOSITORY RECTAL EVERY 6 HOURS PRN
Status: DISCONTINUED | OUTPATIENT
Start: 2021-08-22 | End: 2021-08-25 | Stop reason: HOSPADM

## 2021-08-22 RX ORDER — SODIUM CHLORIDE 0.9 % (FLUSH) 0.9 %
5-40 SYRINGE (ML) INJECTION EVERY 12 HOURS SCHEDULED
Status: DISCONTINUED | OUTPATIENT
Start: 2021-08-22 | End: 2021-08-25 | Stop reason: HOSPADM

## 2021-08-22 RX ORDER — TRAMADOL HYDROCHLORIDE 50 MG/1
50 TABLET ORAL EVERY 6 HOURS PRN
Status: DISCONTINUED | OUTPATIENT
Start: 2021-08-22 | End: 2021-08-25 | Stop reason: HOSPADM

## 2021-08-22 RX ORDER — MORPHINE SULFATE 2 MG/ML
2 INJECTION, SOLUTION INTRAMUSCULAR; INTRAVENOUS EVERY 4 HOURS PRN
Status: DISCONTINUED | OUTPATIENT
Start: 2021-08-22 | End: 2021-08-25 | Stop reason: HOSPADM

## 2021-08-22 RX ORDER — ZINC GLUCONATE 50 MG
50 TABLET ORAL DAILY
Status: DISCONTINUED | OUTPATIENT
Start: 2021-08-22 | End: 2021-08-22

## 2021-08-22 RX ORDER — DULOXETIN HYDROCHLORIDE 60 MG/1
60 CAPSULE, DELAYED RELEASE ORAL ONCE
Status: COMPLETED | OUTPATIENT
Start: 2021-08-22 | End: 2021-08-22

## 2021-08-22 RX ORDER — DULOXETIN HYDROCHLORIDE 60 MG/1
60 CAPSULE, DELAYED RELEASE ORAL DAILY
Status: DISCONTINUED | OUTPATIENT
Start: 2021-08-23 | End: 2021-08-25 | Stop reason: HOSPADM

## 2021-08-22 RX ORDER — SODIUM CHLORIDE 9 MG/ML
25 INJECTION, SOLUTION INTRAVENOUS PRN
Status: DISCONTINUED | OUTPATIENT
Start: 2021-08-22 | End: 2021-08-25 | Stop reason: HOSPADM

## 2021-08-22 RX ORDER — ROPINIROLE 1 MG/1
1 TABLET, FILM COATED ORAL ONCE
Status: COMPLETED | OUTPATIENT
Start: 2021-08-22 | End: 2021-08-22

## 2021-08-22 RX ORDER — CETIRIZINE HYDROCHLORIDE 10 MG/1
10 TABLET ORAL DAILY
Status: DISCONTINUED | OUTPATIENT
Start: 2021-08-22 | End: 2021-08-25 | Stop reason: HOSPADM

## 2021-08-22 RX ORDER — ZINC SULFATE 50(220)MG
50 CAPSULE ORAL DAILY
Status: DISCONTINUED | OUTPATIENT
Start: 2021-08-22 | End: 2021-08-25 | Stop reason: HOSPADM

## 2021-08-22 RX ORDER — GABAPENTIN 300 MG/1
600 CAPSULE ORAL ONCE
Status: COMPLETED | OUTPATIENT
Start: 2021-08-22 | End: 2021-08-22

## 2021-08-22 RX ORDER — CARVEDILOL 12.5 MG/1
12.5 TABLET ORAL ONCE
Status: COMPLETED | OUTPATIENT
Start: 2021-08-22 | End: 2021-08-22

## 2021-08-22 RX ORDER — CETIRIZINE HYDROCHLORIDE 10 MG/1
10 TABLET ORAL ONCE
Status: COMPLETED | OUTPATIENT
Start: 2021-08-22 | End: 2021-08-22

## 2021-08-22 RX ORDER — ASCORBIC ACID 500 MG
1000 TABLET ORAL DAILY
Status: DISCONTINUED | OUTPATIENT
Start: 2021-08-22 | End: 2021-08-25 | Stop reason: HOSPADM

## 2021-08-22 RX ORDER — HYDROCHLOROTHIAZIDE 25 MG/1
25 TABLET ORAL
Status: DISCONTINUED | OUTPATIENT
Start: 2021-08-22 | End: 2021-08-25 | Stop reason: HOSPADM

## 2021-08-22 RX ORDER — ONDANSETRON 2 MG/ML
4 INJECTION INTRAMUSCULAR; INTRAVENOUS EVERY 6 HOURS PRN
Status: DISCONTINUED | OUTPATIENT
Start: 2021-08-22 | End: 2021-08-25 | Stop reason: HOSPADM

## 2021-08-22 RX ORDER — CARVEDILOL 25 MG/1
25 TABLET ORAL 2 TIMES DAILY WITH MEALS
Status: DISCONTINUED | OUTPATIENT
Start: 2021-08-22 | End: 2021-08-25 | Stop reason: HOSPADM

## 2021-08-22 RX ORDER — ONDANSETRON 4 MG/1
4 TABLET, ORALLY DISINTEGRATING ORAL EVERY 8 HOURS PRN
Status: DISCONTINUED | OUTPATIENT
Start: 2021-08-22 | End: 2021-08-25 | Stop reason: HOSPADM

## 2021-08-22 RX ORDER — VITAMIN B COMPLEX
2000 TABLET ORAL DAILY
Status: DISCONTINUED | OUTPATIENT
Start: 2021-08-22 | End: 2021-08-25 | Stop reason: HOSPADM

## 2021-08-22 RX ORDER — M-VIT,TX,IRON,MINS/CALC/FOLIC 27MG-0.4MG
1 TABLET ORAL DAILY
Status: DISCONTINUED | OUTPATIENT
Start: 2021-08-23 | End: 2021-08-25 | Stop reason: HOSPADM

## 2021-08-22 RX ORDER — PANTOPRAZOLE SODIUM 40 MG/1
40 TABLET, DELAYED RELEASE ORAL NIGHTLY
Status: DISCONTINUED | OUTPATIENT
Start: 2021-08-22 | End: 2021-08-25 | Stop reason: HOSPADM

## 2021-08-22 RX ORDER — ASPIRIN 81 MG/1
81 TABLET ORAL DAILY
Status: DISCONTINUED | OUTPATIENT
Start: 2021-08-22 | End: 2021-08-25 | Stop reason: HOSPADM

## 2021-08-22 RX ORDER — PRAVASTATIN SODIUM 80 MG/1
80 TABLET ORAL NIGHTLY
Status: DISCONTINUED | OUTPATIENT
Start: 2021-08-22 | End: 2021-08-25 | Stop reason: HOSPADM

## 2021-08-22 RX ORDER — CYCLOBENZAPRINE HCL 10 MG
10 TABLET ORAL 3 TIMES DAILY PRN
Status: DISCONTINUED | OUTPATIENT
Start: 2021-08-22 | End: 2021-08-25 | Stop reason: HOSPADM

## 2021-08-22 RX ORDER — POLYETHYLENE GLYCOL 3350 17 G/17G
17 POWDER, FOR SOLUTION ORAL DAILY PRN
Status: DISCONTINUED | OUTPATIENT
Start: 2021-08-22 | End: 2021-08-25 | Stop reason: HOSPADM

## 2021-08-22 RX ORDER — ACETAMINOPHEN 325 MG/1
650 TABLET ORAL EVERY 6 HOURS PRN
Status: DISCONTINUED | OUTPATIENT
Start: 2021-08-22 | End: 2021-08-25 | Stop reason: HOSPADM

## 2021-08-22 RX ORDER — ROPINIROLE 1 MG/1
1 TABLET, FILM COATED ORAL 2 TIMES DAILY
Status: DISCONTINUED | OUTPATIENT
Start: 2021-08-22 | End: 2021-08-25

## 2021-08-22 RX ORDER — SODIUM CHLORIDE 0.9 % (FLUSH) 0.9 %
5-40 SYRINGE (ML) INJECTION PRN
Status: DISCONTINUED | OUTPATIENT
Start: 2021-08-22 | End: 2021-08-25 | Stop reason: HOSPADM

## 2021-08-22 RX ADMIN — VANCOMYCIN HYDROCHLORIDE 1000 MG: 1 INJECTION, POWDER, LYOPHILIZED, FOR SOLUTION INTRAVENOUS at 15:17

## 2021-08-22 RX ADMIN — CARVEDILOL 12.5 MG: 12.5 TABLET, FILM COATED ORAL at 08:19

## 2021-08-22 RX ADMIN — CYCLOBENZAPRINE 10 MG: 10 TABLET, FILM COATED ORAL at 22:28

## 2021-08-22 RX ADMIN — Medication 2000 UNITS: at 15:18

## 2021-08-22 RX ADMIN — TRAMADOL HYDROCHLORIDE 50 MG: 50 TABLET, FILM COATED ORAL at 13:51

## 2021-08-22 RX ADMIN — SODIUM CHLORIDE, PRESERVATIVE FREE 10 ML: 5 INJECTION INTRAVENOUS at 18:08

## 2021-08-22 RX ADMIN — CARVEDILOL 25 MG: 25 TABLET, FILM COATED ORAL at 18:03

## 2021-08-22 RX ADMIN — PIPERACILLIN AND TAZOBACTAM 3375 MG: 3; .375 INJECTION, POWDER, FOR SOLUTION INTRAVENOUS at 08:19

## 2021-08-22 RX ADMIN — PANTOPRAZOLE SODIUM 40 MG: 40 TABLET, DELAYED RELEASE ORAL at 22:28

## 2021-08-22 RX ADMIN — PRAVASTATIN SODIUM 80 MG: 80 TABLET ORAL at 22:28

## 2021-08-22 RX ADMIN — CETIRIZINE HYDROCHLORIDE 10 MG: 10 TABLET, FILM COATED ORAL at 08:19

## 2021-08-22 RX ADMIN — ROPINIROLE HYDROCHLORIDE 1 MG: 1 TABLET, FILM COATED ORAL at 08:19

## 2021-08-22 RX ADMIN — PRAVASTATIN SODIUM 80 MG: 20 TABLET ORAL at 00:09

## 2021-08-22 RX ADMIN — OXYCODONE HYDROCHLORIDE AND ACETAMINOPHEN 1000 MG: 500 TABLET ORAL at 15:18

## 2021-08-22 RX ADMIN — ENOXAPARIN SODIUM 30 MG: 30 INJECTION, SOLUTION INTRAVENOUS; SUBCUTANEOUS at 13:50

## 2021-08-22 RX ADMIN — PIPERACILLIN AND TAZOBACTAM 3375 MG: 3; .375 INJECTION, POWDER, LYOPHILIZED, FOR SOLUTION INTRAVENOUS at 15:17

## 2021-08-22 RX ADMIN — HYDROCHLOROTHIAZIDE 25 MG: 25 TABLET ORAL at 18:03

## 2021-08-22 RX ADMIN — SODIUM CHLORIDE, PRESERVATIVE FREE 10 ML: 5 INJECTION INTRAVENOUS at 22:29

## 2021-08-22 RX ADMIN — Medication 50 MG: at 15:17

## 2021-08-22 RX ADMIN — DULOXETINE HYDROCHLORIDE 60 MG: 60 CAPSULE, DELAYED RELEASE ORAL at 08:19

## 2021-08-22 RX ADMIN — GABAPENTIN 600 MG: 300 CAPSULE ORAL at 08:19

## 2021-08-22 RX ADMIN — ROPINIROLE HYDROCHLORIDE 1 MG: 1 TABLET, FILM COATED ORAL at 22:28

## 2021-08-22 ASSESSMENT — PAIN SCALES - GENERAL
PAINLEVEL_OUTOF10: 0
PAINLEVEL_OUTOF10: 0
PAINLEVEL_OUTOF10: 3
PAINLEVEL_OUTOF10: 5
PAINLEVEL_OUTOF10: 0

## 2021-08-22 ASSESSMENT — PAIN DESCRIPTION - LOCATION: LOCATION: NECK

## 2021-08-22 ASSESSMENT — PAIN DESCRIPTION - FREQUENCY: FREQUENCY: CONTINUOUS

## 2021-08-22 ASSESSMENT — PAIN DESCRIPTION - PAIN TYPE: TYPE: ACUTE PAIN

## 2021-08-22 ASSESSMENT — PAIN DESCRIPTION - DESCRIPTORS: DESCRIPTORS: ACHING

## 2021-08-22 ASSESSMENT — PAIN DESCRIPTION - ORIENTATION: ORIENTATION: ANTERIOR

## 2021-08-22 NOTE — ED NOTES
[facility] called back stating [facility] may not have bed sandernir     Richard Redmond  08/21/21 2024

## 2021-08-22 NOTE — H&P
Hospitalist - History & Physical      Patient: Alicia Burns    Unit/Bed:4B-02/002-A  YOB: 1950  MRN: [id number]   Acct: [de-identified]   PCP: Anabel Castañeda MD    Date of Service: Pt seen/examined on 08/22/21  and Admitted to Inpatient with expected LOS greater than two midnights due to medical therapy. Chief Complaint:  Cervical pain    Assessment and Plan:-  1. PNA secondary to COVID-19 infection: Without hypoxia. Presenting with fatigue, malaise, and Tmax 103 and chills at [facility]. WBC wnl at outside facility, will recheck here. LA pending. Procalcitonin pending. Low suspicion for superimposed bacterial infection giving lack of cough or sputum production. S/p Vanc / Zosyn to cover, will continue for now. NGTD on cx. Check MRSA by PCR. Vitamin C, vitamin D, and zinc. Incentive spirometry. 2. S/p C3-6 DDD with stenosis and radiculopathy s/p anterior cervical discectomy and infusion and removal of C6-C7 hardware 8/17/21: need to r/o postoperative infection. Will consult Ortho Spine (Dr. Raul Thrasher) -> discussed with Marlin Pabon PA-C. Do not recommend MRI of the spine at this time. Appreciate their assistance. MANSOOR drain remains in place to L side neck with little outpt per pt. 3. Essential hypertension: BP stable, continue home meds and monitor. 4. Anxiety/depression: Cymbalta  5. HLD: Statin  6. Obesity BMI 31.8     History Of Present Illness:    Pt is a 67 yo female with PMH of CAD, DDD, fibromyalgia, hyperlipidemia, hypertension, PABLO, hypothyroidism and obesity who presents to [facility] as a direct transfer from [facility] due to evaluation for fever. History is obtained via chart review and patient. Patient reports that she recently had a cervical ACDF done on 8/17/2021 with Dr. Flor Hernandez in [address]. Patient was discharged 8/18 with home health for drain placement. Patient reports that she went home and ~ 2 days later on Friday she began to feel fatigued and general malaise. Patient reports that later on that evening she began to feel feverish and checked her temperature and states it was \"a little over 100 \". Patient also admits to feeling short of breath at that time. Patient states that her daughter then took her to the [facility] ED for evaluation. While there, patient was noted to have a fever T-max 103 without leukocytosis on blood work. Given recent ACDF, transfer to [facility] was initiated. While at [facility], patient was tested and found to be Covid positive. CXR at [place] showed mild bibasilar atelectasis and possible early infiltrate on R side. Patient denies any cough or sputum production. She denies any chest congestion, sore throat, myalgias, headache, abdominal pain, nausea/vomiting, diarrhea. Denies chest pain or feeling short of breath currently. Patient admitted to [facility] [place] unit. Discussed with orthospine provider who did not recommend obtaining MRI at this time. They will be consulted and will follow along, appreciate assistance.     Past Medical History:        Diagnosis Date    Arthritis     Blockage of coronary artery of heart ([address] 2010    x 2 stents    CAD (coronary artery disease)     DDD (degenerative disc disease), cervical     cervical 3-6    Fibromyalgia     History of blood transfusion     History of kidney stones     last one \"about 10 years ago\"    Hyperlipidemia     Hypertension     Pancreatitis 2016    Sleep apnea     Thyroid disease     Wears glasses     for reading       Past Surgical History:        Procedure Laterality Date    ARTHROPLASTY Left 2/6/2020    LEFT FIRST CARPOMETACARPAL ARTHROPLASTY performed by Rj Mcgill DO at [address]  2012    L4,L5,S1 hardware placed    BACK SURGERY  2018    SPINAL CORD STIMULATOR    CATARACT REMOVAL Bilateral 2016    CERVICAL FUSION N/A 8/17/2021    C3-6 ACDF, REMOVAL OF HARDWARE C6-7 performed by Caitie Wellington MD at [facility] COLONOSCOPY      normal    CORONARY ANGIOPLASTY  2010    x 2 stents    FINGER SURGERY      right thumb joint    HERNIA REPAIR Right     inguinal    HYSTERECTOMY      JOINT REPLACEMENT Right     knee    KIDNEY STONE SURGERY      x 3    LITHOTRIPSY      x 3    PARATHYROIDECTOMY      PARATHYROIDECTOMY      DE PERCUT IMPLNT NEUROELECT,EPIDURAL N/A 8/8/2018    SPINAL CORD STIMULATOR IMPLANT performed by Rocky Vinson MD at [address] Right 03/2019       Home Medications:   No current facility-administered medications on file prior to encounter. Current Outpatient Medications on File Prior to Encounter   Medication Sig Dispense Refill    aspirin 81 MG EC tablet Take 81 mg by mouth daily      cyclobenzaprine (FLEXERIL) 10 MG tablet Take 1 tablet by mouth 3 times daily as needed for Muscle spasms 30 tablet 0    Multiple Vitamins-Minerals (THERAPEUTIC MULTIVITAMIN-MINERALS) tablet Take 1 tablet by mouth daily      hydrochlorothiazide (HYDRODIURIL) 25 MG tablet Take 25 mg by mouth Daily with supper       Calcium Carbonate-Vitamin D (OSCAL 500/200 D-3 PO) Take 1 tablet by mouth daily      carvedilol (COREG) 12.5 MG tablet Take 1 tablet by mouth 2 times daily (with meals) (Patient taking differently: Take 25 mg by mouth 2 times daily (with meals) ) 60 tablet 3    pantoprazole (PROTONIX) 40 MG tablet Take 1 tablet by mouth daily (Patient taking differently: Take 40 mg by mouth nightly ) 30 tablet 0    DULoxetine (CYMBALTA) 60 MG capsule Take 60 mg by mouth daily      gabapentin (NEURONTIN) 600 MG tablet Take 300 mg by mouth 2 times daily. 1 tab in morning and 2 tabs at dinner      pravastatin (PRAVACHOL) 80 MG tablet Take 80 mg by mouth nightly      rOPINIRole (REQUIP) 1 MG tablet Take 1 mg by mouth 2 times daily 1 in morning and 2 at dinner      traMADol (ULTRAM) 50 MG tablet Take 1 tablet by mouth every 6 hours as needed for Pain for up to 7 days.  28 tablet 0       Allergies: Lisinopril and Toradol [ketorolac tromethamine]    Social History:    reports that she quit smoking about 11 years ago. Her smoking use included cigarettes. She has a 44.00 pack-year smoking history. She has never used smokeless tobacco. She reports that she does not drink alcohol and does not use drugs. Family History:       Problem Relation Age of Onset    Heart Disease Father        Diet:  No diet orders on file    Review of systems:   Pertinent positives as noted in the HPI. All other systems reviewed and negative. PHYSICAL EXAM:  /71   Pulse 76   Temp 98.1 °F (36.7 °C) (Oral)   Resp 20   SpO2 95%   General appearance: pleasant, obese, no apparent distress, appears stated age and cooperative. HEENT: Normal cephalic, atraumatic without obvious deformity. Pupils equal, round, and reactive to light. Extra ocular muscles intact. Conjunctivae/corneas clear. Presence of MANSOOR drain in L side anterior neck with minimal blood noted. Neck: Supple, with full range of motion. No jugular venous distention. Trachea midline. Respiratory:  Normal respiratory effort. Diminished to auscultation b/l with bibasilar rales, no wheezing / rhonchi noted. Cardiovascular: Regular rate and rhythm with normal S1/S2 without murmurs, rubs or gallops. Abdomen: Soft, non-tender, non-distended with normal bowel sounds. Musculoskeletal:  No clubbing, cyanosis or edema bilaterally. Skin: Skin color, texture, turgor normal.  No rashes or lesions. Neurologic:  Neurovascularly intact without any focal sensory/motor deficits.  Cranial nerves: II-XII intact, grossly non-focal.  Psychiatric: Alert and oriented x4, thought content appropriate, normal insight  Capillary Refill: Brisk,< 3 seconds   Peripheral Pulses: +2 palpable, equal bilaterally     Labs:   Recent Labs     08/20/21  2203   WBC 4.6   HGB 12.9   HCT 38.3   PLT See Reflexed IPF Result     Recent Labs     08/20/21  2203      K 3.7      CO2 23   BUN 15 CREATININE 0.83   CALCIUM 9.2     Recent Labs     08/20/21  2203   AST 14   ALT 8   BILITOT 0.55   ALKPHOS 67     No results for input(s): INR in the last 72 hours. No results for input(s): Sulema Rodriges in the last 72 hours. Urinalysis:    Lab Results   Component Value Date    NITRU POSITIVE 10/04/2020    WBCUA 50  10/04/2020    BACTERIA 2+ 10/04/2020    RBCUA 0 TO 2 10/04/2020    SPECGRAV 1.020 10/04/2020    GLUCOSEU NEGATIVE 10/04/2020       Radiology:   No orders to display     No results found.     Electronically signed by Juju Bailon PA-C on 8/22/2021 at 12:15 PM

## 2021-08-22 NOTE — ED NOTES
[facility] called, patient's intake reading that a bed is ready at [facility]. Number for report given.  [facility] called for transport     Lenin Farmer  08/22/21 1739

## 2021-08-23 PROBLEM — J12.82 PNEUMONIA DUE TO COVID-19 VIRUS: Status: ACTIVE | Noted: 2021-08-23

## 2021-08-23 PROBLEM — U07.1 PNEUMONIA DUE TO COVID-19 VIRUS: Status: ACTIVE | Noted: 2021-08-23

## 2021-08-23 LAB
BASOPHILS # BLD: 0.3 %
BASOPHILS ABSOLUTE: 0 THOU/MM3 (ref 0–0.1)
EOSINOPHIL # BLD: 0.3 %
EOSINOPHILS ABSOLUTE: 0 THOU/MM3 (ref 0–0.4)
ERYTHROCYTE [DISTWIDTH] IN BLOOD BY AUTOMATED COUNT: 12.6 % (ref 11.5–14.5)
ERYTHROCYTE [DISTWIDTH] IN BLOOD BY AUTOMATED COUNT: 45.3 FL (ref 35–45)
HCT VFR BLD CALC: 40.3 % (ref 37–47)
HEMOGLOBIN: 13 GM/DL (ref 12–16)
IMMATURE GRANS (ABS): 0.01 THOU/MM3 (ref 0–0.07)
IMMATURE GRANULOCYTES: 0.3 %
LYMPHOCYTES # BLD: 16.1 %
LYMPHOCYTES ABSOLUTE: 0.6 THOU/MM3 (ref 1–4.8)
MCH RBC QN AUTO: 31.3 PG (ref 26–33)
MCHC RBC AUTO-ENTMCNC: 32.3 GM/DL (ref 32.2–35.5)
MCV RBC AUTO: 96.9 FL (ref 81–99)
MONOCYTES # BLD: 11.4 %
MONOCYTES ABSOLUTE: 0.4 THOU/MM3 (ref 0.4–1.3)
NUCLEATED RED BLOOD CELLS: 0 /100 WBC
PLATELET # BLD: 144 THOU/MM3 (ref 130–400)
PMV BLD AUTO: 10 FL (ref 9.4–12.4)
RBC # BLD: 4.16 MILL/MM3 (ref 4.2–5.4)
SARS-COV-2, PCR: DETECTED
SEG NEUTROPHILS: 71.6 %
SEGMENTED NEUTROPHILS ABSOLUTE COUNT: 2.8 THOU/MM3 (ref 1.8–7.7)
WBC # BLD: 3.9 THOU/MM3 (ref 4.8–10.8)

## 2021-08-23 PROCEDURE — 1200000003 HC TELEMETRY R&B

## 2021-08-23 PROCEDURE — 6370000000 HC RX 637 (ALT 250 FOR IP): Performed by: PHYSICIAN ASSISTANT

## 2021-08-23 PROCEDURE — 85025 COMPLETE CBC W/AUTO DIFF WBC: CPT

## 2021-08-23 PROCEDURE — 99233 SBSQ HOSP IP/OBS HIGH 50: CPT | Performed by: FAMILY MEDICINE

## 2021-08-23 PROCEDURE — 2060000000 HC ICU INTERMEDIATE R&B

## 2021-08-23 PROCEDURE — 36415 COLL VENOUS BLD VENIPUNCTURE: CPT

## 2021-08-23 PROCEDURE — XW033E5 INTRODUCTION OF REMDESIVIR ANTI-INFECTIVE INTO PERIPHERAL VEIN, PERCUTANEOUS APPROACH, NEW TECHNOLOGY GROUP 5: ICD-10-PCS | Performed by: STUDENT IN AN ORGANIZED HEALTH CARE EDUCATION/TRAINING PROGRAM

## 2021-08-23 PROCEDURE — 87635 SARS-COV-2 COVID-19 AMP PRB: CPT

## 2021-08-23 PROCEDURE — 2580000003 HC RX 258: Performed by: PHYSICIAN ASSISTANT

## 2021-08-23 PROCEDURE — 2500000003 HC RX 250 WO HCPCS: Performed by: STUDENT IN AN ORGANIZED HEALTH CARE EDUCATION/TRAINING PROGRAM

## 2021-08-23 PROCEDURE — 2580000003 HC RX 258: Performed by: STUDENT IN AN ORGANIZED HEALTH CARE EDUCATION/TRAINING PROGRAM

## 2021-08-23 PROCEDURE — 6360000002 HC RX W HCPCS: Performed by: PHYSICIAN ASSISTANT

## 2021-08-23 PROCEDURE — 6360000002 HC RX W HCPCS: Performed by: STUDENT IN AN ORGANIZED HEALTH CARE EDUCATION/TRAINING PROGRAM

## 2021-08-23 RX ORDER — DEXAMETHASONE SODIUM PHOSPHATE 4 MG/ML
6 INJECTION, SOLUTION INTRA-ARTICULAR; INTRALESIONAL; INTRAMUSCULAR; INTRAVENOUS; SOFT TISSUE EVERY 24 HOURS
Status: DISCONTINUED | OUTPATIENT
Start: 2021-08-23 | End: 2021-08-25 | Stop reason: HOSPADM

## 2021-08-23 RX ADMIN — ENOXAPARIN SODIUM 30 MG: 30 INJECTION, SOLUTION INTRAVENOUS; SUBCUTANEOUS at 15:19

## 2021-08-23 RX ADMIN — PIPERACILLIN AND TAZOBACTAM 3375 MG: 3; .375 INJECTION, POWDER, LYOPHILIZED, FOR SOLUTION INTRAVENOUS at 00:36

## 2021-08-23 RX ADMIN — VANCOMYCIN HYDROCHLORIDE 1000 MG: 1 INJECTION, POWDER, LYOPHILIZED, FOR SOLUTION INTRAVENOUS at 02:59

## 2021-08-23 RX ADMIN — CARVEDILOL 25 MG: 25 TABLET, FILM COATED ORAL at 17:05

## 2021-08-23 RX ADMIN — CYCLOBENZAPRINE 10 MG: 10 TABLET, FILM COATED ORAL at 20:38

## 2021-08-23 RX ADMIN — REMDESIVIR 200 MG: 100 INJECTION, POWDER, LYOPHILIZED, FOR SOLUTION INTRAVENOUS at 15:18

## 2021-08-23 RX ADMIN — Medication 2000 UNITS: at 09:43

## 2021-08-23 RX ADMIN — ROPINIROLE HYDROCHLORIDE 1 MG: 1 TABLET, FILM COATED ORAL at 20:37

## 2021-08-23 RX ADMIN — Medication 1 TABLET: at 09:43

## 2021-08-23 RX ADMIN — OXYCODONE HYDROCHLORIDE AND ACETAMINOPHEN 1000 MG: 500 TABLET ORAL at 20:37

## 2021-08-23 RX ADMIN — Medication 50 MG: at 09:43

## 2021-08-23 RX ADMIN — CARVEDILOL 25 MG: 25 TABLET, FILM COATED ORAL at 09:43

## 2021-08-23 RX ADMIN — CETIRIZINE HYDROCHLORIDE 10 MG: 10 TABLET, FILM COATED ORAL at 09:44

## 2021-08-23 RX ADMIN — PANTOPRAZOLE SODIUM 40 MG: 40 TABLET, DELAYED RELEASE ORAL at 20:38

## 2021-08-23 RX ADMIN — ACETAMINOPHEN 650 MG: 325 TABLET ORAL at 02:55

## 2021-08-23 RX ADMIN — ACETAMINOPHEN 650 MG: 325 TABLET ORAL at 13:27

## 2021-08-23 RX ADMIN — PRAVASTATIN SODIUM 80 MG: 80 TABLET ORAL at 20:37

## 2021-08-23 RX ADMIN — SODIUM CHLORIDE, PRESERVATIVE FREE 10 ML: 5 INJECTION INTRAVENOUS at 15:19

## 2021-08-23 RX ADMIN — ASPIRIN 81 MG: 81 TABLET, COATED ORAL at 20:38

## 2021-08-23 RX ADMIN — SODIUM CHLORIDE, PRESERVATIVE FREE 10 ML: 5 INJECTION INTRAVENOUS at 13:27

## 2021-08-23 RX ADMIN — SODIUM CHLORIDE, PRESERVATIVE FREE 10 ML: 5 INJECTION INTRAVENOUS at 09:44

## 2021-08-23 RX ADMIN — ENOXAPARIN SODIUM 30 MG: 30 INJECTION, SOLUTION INTRAVENOUS; SUBCUTANEOUS at 00:37

## 2021-08-23 RX ADMIN — ROPINIROLE HYDROCHLORIDE 1 MG: 1 TABLET, FILM COATED ORAL at 09:43

## 2021-08-23 RX ADMIN — DEXAMETHASONE SODIUM PHOSPHATE 6 MG: 4 INJECTION, SOLUTION INTRA-ARTICULAR; INTRALESIONAL; INTRAMUSCULAR; INTRAVENOUS; SOFT TISSUE at 13:28

## 2021-08-23 RX ADMIN — PIPERACILLIN AND TAZOBACTAM 3375 MG: 3; .375 INJECTION, POWDER, LYOPHILIZED, FOR SOLUTION INTRAVENOUS at 09:43

## 2021-08-23 RX ADMIN — HYDROCHLOROTHIAZIDE 25 MG: 25 TABLET ORAL at 17:05

## 2021-08-23 RX ADMIN — DULOXETINE HYDROCHLORIDE 60 MG: 60 CAPSULE, DELAYED RELEASE ORAL at 09:43

## 2021-08-23 ASSESSMENT — PAIN DESCRIPTION - PAIN TYPE
TYPE: ACUTE PAIN;SURGICAL PAIN
TYPE: ACUTE PAIN
TYPE: ACUTE PAIN;SURGICAL PAIN

## 2021-08-23 ASSESSMENT — PAIN SCALES - GENERAL
PAINLEVEL_OUTOF10: 5
PAINLEVEL_OUTOF10: 3
PAINLEVEL_OUTOF10: 5
PAINLEVEL_OUTOF10: 7
PAINLEVEL_OUTOF10: 0

## 2021-08-23 ASSESSMENT — PAIN - FUNCTIONAL ASSESSMENT: PAIN_FUNCTIONAL_ASSESSMENT: PREVENTS OR INTERFERES SOME ACTIVE ACTIVITIES AND ADLS

## 2021-08-23 ASSESSMENT — PAIN DESCRIPTION - LOCATION
LOCATION: NECK

## 2021-08-23 ASSESSMENT — PAIN DESCRIPTION - ORIENTATION
ORIENTATION: ANTERIOR

## 2021-08-23 ASSESSMENT — PAIN DESCRIPTION - ONSET: ONSET: ON-GOING

## 2021-08-23 ASSESSMENT — PAIN DESCRIPTION - DESCRIPTORS: DESCRIPTORS: ACHING

## 2021-08-23 ASSESSMENT — PAIN DESCRIPTION - PROGRESSION: CLINICAL_PROGRESSION: NOT CHANGED

## 2021-08-23 ASSESSMENT — PAIN DESCRIPTION - FREQUENCY: FREQUENCY: CONTINUOUS

## 2021-08-23 NOTE — PROGRESS NOTES
Pt daughter [facility] here to see mother and receives report and update about her transfer to med/surg unit and covid treatment from floor RN.

## 2021-08-23 NOTE — CARE COORDINATION
08/23/21 1228   Readmission Assessment   Number of Days since last admission? 1-7 days   Previous Disposition Home with Home Health   Who is being Interviewed Patient   What was the patient's/caregiver's perception as to why they think they needed to return back to the hospital? Other (Comment)  (fever, sob)   Did you visit your Primary Care Physician after you left the hospital, before you returned this time? No   Why weren't you able to visit your PCP? (Returned prior to visit)   Did you see a specialist, such as Cardiac, Pulmonary, Orthopedic Physician, etc. after you left the hospital? No   Who advised the patient to return to the hospital? Self-referral   Does the patient report anything that got in the way of taking their medications?  No   In our efforts to provide the best possible care to you and others like you, can you think of anything that we could have done to help you after you left the hospital the first time, so that you might not have needed to return so soon?   (aziza, returned with covid 19)

## 2021-08-23 NOTE — CARE COORDINATION
8/23/21, 9:05 AM EDT  DISCHARGE PLANNING EVALUATION:    Eve Caicedo       Admitted: 8/22/2021/ [address] day: 1   Location: [facility]/002-A Reason for admit: post op fever   PMH:  has a past medical history of Arthritis, Blockage of coronary artery of heart (HCC), CAD (coronary artery disease), DDD (degenerative disc disease), cervical, Fibromyalgia, History of blood transfusion, History of kidney stones, Hyperlipidemia, Hypertension, Pancreatitis, Sleep apnea, Thyroid disease, and Wears glasses. Procedure: none  Barriers to Discharge:  Transfer from [address] [place] [address]. Recent spinal surgery-[place] concerned for infection r/t fever. Ortho seen, no signs of infection. Drain removed. Febrile r/t covid. IV steroids. IV zosyn. Remdesivir. IV vanc. 2L, attempting to wean. PCP: Юлия Cardenas MD  Readmission Risk Score: 10%    Patient Goals/Plan/Treatment Preferences: SW following. Plans to return home with current [facility]. Transportation/Food Security/Housekeeping Addressed:  No issues identified.

## 2021-08-23 NOTE — CONSULTS
Inpatient Consultation    Herberth Collado ([id number])  8/23/2021      CHIEF COMPLAINT:  fever    History Obtained From:  patient    HISTORY OF PRESENT ILLNESS:                The patient is a 66 y.o. female who presents with above chief complaint. Patient is 6 days s/p C3-6 ACDF. Patient developed a fever a couple days ago and was admitted. Patient is positive COVID-19. She has no drainage from her incision. Cervical drain removed yesterday. No radicular symptoms.     Past Medical History:        Diagnosis Date    Arthritis     Blockage of coronary artery of heart ([address] 2010    x 2 stents    CAD (coronary artery disease)     DDD (degenerative disc disease), cervical     cervical 3-6    Fibromyalgia     History of blood transfusion     History of kidney stones     last one \"about 10 years ago\"    Hyperlipidemia     Hypertension     Pancreatitis 2016    Sleep apnea     Thyroid disease     Wears glasses     for reading     Past Surgical History:        Procedure Laterality Date    ARTHROPLASTY Left 2/6/2020    LEFT FIRST CARPOMETACARPAL ARTHROPLASTY performed by Betty Agudelo DO at [address]  2012    L4,L5,S1 hardware placed    BACK SURGERY  2018    SPINAL CORD STIMULATOR    CATARACT REMOVAL Bilateral 2016    CERVICAL FUSION N/A 8/17/2021    C3-6 ACDF, REMOVAL OF HARDWARE C6-7 performed by Giacomo Callaway MD at [address]    COLONOSCOPY      normal    CORONARY ANGIOPLASTY  2010    x 2 stents    FINGER SURGERY      right thumb joint    HERNIA REPAIR Right     inguinal    HYSTERECTOMY      JOINT REPLACEMENT Right     knee    KIDNEY STONE SURGERY      x 3    LITHOTRIPSY      x 3    PARATHYROIDECTOMY      PARATHYROIDECTOMY      TN PERCUT IMPLNT [place] Sensor N/A 8/8/2018    SPINAL CORD STIMULATOR IMPLANT performed by Alysia Warner MD at [address] [facility] Right 03/2019     Current Medications:   Current reports that she quit smoking about 11 years ago. Her smoking use included cigarettes. She has a 44.00 pack-year smoking history. She has never used smokeless tobacco.  ETOH:   reports no history of alcohol use. DRUGS:   reports no history of drug use. Family History:       Problem Relation Age of Onset    Heart Disease Father        REVIEW OF SYSTEMS:    CONSTITUTIONAL:  positive for  Fevers, fatigue   RESPIRATORY:  negative for  cough with sputum and dyspnea  CARDIOVASCULAR:  negative for  chest pain, dyspnea, exertional chest pressure/discomfort  GASTROINTESTINAL:  negative for nausea, vomiting, diarrhea and abdominal pain  GENITOURINARY:  negative for frequency and urinary incontinence  MUSCULOSKELETAL: negative for neck Pain  NEUROLOGICAL:  negative for headaches, dizziness and syncope  BEHAVIOR/PSYCH:  negative for depressed mood, increased agitation and anxiety    PHYSICAL EXAM:      CONSTITUTIONAL:  awake, alert, cooperative, and appears stated age  NECK:  Well healing incision, no drainage on dressing  LUNGS:  + rales bilaterally  CARDIOVASCULAR:  Normal apical impulse, regular rate and rhythm, normal S1 and S2, no S3 or S4, and no murmur noted  ABDOMEN:  No scars, normal bowel sounds, soft, non-distended, non-tender, no masses palpated, no hepatosplenomegally  MUSCULOSKELETAL:  There is no redness, warmth, or swelling of the joints. Full range of motion noted. Motor strength is 5 out of 5 all extremities bilaterally. Tone is normal.  NEUROLOGIC:  Awake, alert, oriented to name, place and time. Sensory is intact.     DATA:    CBC:   Lab Results   Component Value Date    WBC 3.9 08/23/2021    RBC 4.16 08/23/2021    HGB 13.0 08/23/2021    HCT 40.3 08/23/2021    MCV 96.9 08/23/2021    MCH 31.3 08/23/2021    MCHC 32.3 08/23/2021    RDW 12.6 08/20/2021     08/23/2021    MPV 10.0 08/23/2021     WBC:    Lab Results   Component Value Date    WBC 3.9 08/23/2021     Hemoglobin/Hematocrit:    Lab Results

## 2021-08-23 NOTE — PROGRESS NOTES
PROGRESS NOTE      Patient:  Lianne Marie      Unit/Bed:4B-02/002-A    YOB: 1950    MRN: [id number]       Acct: [de-identified]     PCP: Nemesio Little MD    Date of Admission: 8/22/2021      Assessment/Plan:    Anticipated Discharge in : 3 days     There are no active hospital problems to display for this patient. [facility] Complaint:  Cervical pain    Assessment and Plan:-  1. Acute Hypoxic Respiratory Failure 2/2 PNA secondary to COVID-19 infection:  Presented with fatigue, malaise, and Tmax 103 and chills at [facility]. WBC wnl at outside facility, not elevated on recheck here. LA normal.  Procalcitonin normal.  Low suspicion for superimposed bacterial infection giving lack of cough or sputum production. S/p Vanc / Zosyn to cover, can discontinue for now. NGTD on cx. Negative MRSA by PCR. Vitamin C, vitamin D, and zinc. Incentive spirometry. a. Now requiring oxygen as of 8/23. Consulted pharmacy for remdesivir dosing. Starting steroids. 2. S/p C3-6 DDD with stenosis and radiculopathy s/p anterior cervical discectomy and infusion and removal of C6-C7 hardware 8/17/21:  r/o postoperative infection. Consulted Ortho Spine (Dr. Salomón Braxton) -> discussed with Timi Hernández PA-C. Do not recommend MRI of the spine at this time. Appreciate their assistance. MANSOOR drain to L side neck removed on 8/22  3. Essential hypertension: BP stable, continue home meds and monitor. 4. Anxiety/depression: Cymbalta  5. HLD/CAD: Statin, aspirin, coreg  6. Hypothyroidism -  Appears historical and no TSH on file and not taking home meds. Will not check due to acute sickness. Can followup outpatient   7. Seasonal Allergies - Continue zyrtec  8. GERD Prophylaxis - Continue protonix   9. RLS - Continue requip  10.  Obesity BMI 31.8     History Of Present Illness:    Pt is a 65 yo female with PMH of CAD, DDD, fibromyalgia, hyperlipidemia, hypertension, PABLO, hypothyroidism and obesity who presents to [facility] as a direct transfer from [facility] due to evaluation for fever. History is obtained via chart review and patient. Patient reports that she recently had a cervical ACDF done on 8/17/2021 with Dr. Chastity Guerrero in [address] [facility]. Patient was discharged 8/18 with home health for drain placement. Patient reports that she went home and ~ 2 days later on Friday she began to feel fatigued and general malaise. Patient reports that later on that evening she began to feel feverish and checked her temperature and states it was \"a little over 100 \". Patient also admits to feeling short of breath at that time. Patient states that her daughter then took her to the [facility] ED for evaluation. While there, patient was noted to have a fever T-max 103 without leukocytosis on blood work. Given recent ACDF, transfer to [facility] was initiated. While at [facility], patient was tested and found to be Covid positive. CXR at [place] showed mild bibasilar atelectasis and possible early infiltrate on R side. Patient denies any cough or sputum production. She denies any chest congestion, sore throat, myalgias, headache, abdominal pain, nausea/vomiting, diarrhea. Denies chest pain or feeling short of breath currently. Patient admitted to [facility] [place] unit. Discussed with orthospine provider who did not recommend obtaining MRI at this time. They will be consulted and will follow along, appreciate assistance. Since admission, ortho signed off, no further recommendations. Patient did require oxygen on 8/23 and so remdesivir started on 8/23. Lab results showed low concern for superimposed bacterial infection and so antibiotics discontinued on 8/23. Subjective    Did become briefly hypoxic to 88 overnight while sleeping and so oxygen started. Since being placed on 2L, is ~ 94%,  No further fevers. Patient is asking when she can go home and feels about at baseline. Otherwise no chest pain, abdominal pain, or changes to bowel or bladder habits. Medications:  Reviewed    Infusion Medications    sodium chloride       Scheduled Medications    remdesivir IVPB  200 mg Intravenous Once    Followed by   Bernadette Andrés ON 8/24/2021] remdesivir IVPB  100 mg Intravenous Q24H    dexamethasone  6 mg Intravenous Q24H    sodium chloride flush  5-40 mL Intravenous 2 times per day    enoxaparin  30 mg Subcutaneous Q12H    Vitamin D  2,000 Units Oral Daily    ascorbic acid  1,000 mg Oral Daily    carvedilol  25 mg Oral BID WC    DULoxetine  60 mg Oral Daily    hydroCHLOROthiazide  25 mg Oral Dinner    therapeutic multivitamin-minerals  1 tablet Oral Daily    pantoprazole  40 mg Oral Nightly    pravastatin  80 mg Oral Nightly    rOPINIRole  1 mg Oral BID    aspirin  81 mg Oral Daily    zinc sulfate  50 mg Oral Daily    vancomycin (VANCOCIN) intermittent dosing (placeholder)   Other RX Placeholder    cetirizine  10 mg Oral Daily     PRN Meds: sodium chloride flush, sodium chloride, ondansetron **OR** ondansetron, polyethylene glycol, acetaminophen **OR** acetaminophen, cyclobenzaprine, traMADol, morphine      Intake/Output Summary (Last 24 hours) at 8/23/2021 1003  Last data filed at 8/22/2021 1456  Gross per 24 hour   Intake 600 ml   Output --   Net 600 ml       Diet:  ADULT DIET; Regular    Exam:  /86   Pulse 88   Temp 99.5 °F (37.5 °C) (Oral)   Resp 16   Ht 5' 6\" (1.676 m)   Wt 204 lb 12.8 oz (92.9 kg)   SpO2 91%   BMI 33.06 kg/m²     Physical Exam  Vitals and nursing note reviewed. Constitutional:       General: She is not in acute distress. HENT:      Head: Normocephalic and atraumatic. Nose: Nose normal.      Mouth/Throat:      Mouth: Mucous membranes are moist.      Pharynx: Oropharynx is clear. Eyes:      Extraocular Movements: Extraocular movements intact. Pupils: Pupils are equal, round, and reactive to light. Cardiovascular:      Rate and Rhythm: Normal rate and regular rhythm. Pulses: Normal pulses.       Heart sounds: Normal heart sounds. Pulmonary:      Effort: Pulmonary effort is normal.      Breath sounds: Normal breath sounds. Abdominal:      Palpations: Abdomen is soft. Tenderness: There is no abdominal tenderness. Musculoskeletal:         General: No signs of injury. Normal range of motion. Cervical back: Normal range of motion and neck supple. Skin:     General: Skin is warm and dry. Capillary Refill: Capillary refill takes less than 2 seconds. Neurological:      General: No focal deficit present. Mental Status: She is alert and oriented to person, place, and time. Mental status is at baseline. Psychiatric:         Mood and Affect: Mood normal.         Behavior: Behavior normal.         Labs:   Recent Labs     08/20/21  2203 08/22/21  1415 08/23/21  0553   WBC 4.6 4.1* 3.9*   HGB 12.9 13.8 13.0   HCT 38.3 41.6 40.3   PLT See Reflexed IPF Result 166 144     Recent Labs     08/20/21 2203      K 3.7      CO2 23   BUN 15   CREATININE 0.83   CALCIUM 9.2     Recent Labs     08/20/21 2203   AST 14   ALT 8   BILITOT 0.55   ALKPHOS 67     No results for input(s): INR in the last 72 hours. No results for input(s): Michael Altes in the last 72 hours. Urinalysis:      Lab Results   Component Value Date    NITRU POSITIVE 10/04/2020    WBCUA 50  10/04/2020    BACTERIA 2+ 10/04/2020    RBCUA 0 TO 2 10/04/2020    SPECGRAV 1.020 10/04/2020    GLUCOSEU NEGATIVE 10/04/2020       Radiology:  No orders to display       Diet: ADULT DIET;  Regular    DVT prophylaxis: [x] Lovenox                                 [] SCDs                                 [] SQ Heparin                                 [] Encourage ambulation           [] Already on Anticoagulation     Disposition:    [x] Home       [] TCU       [] Rehab       [] Psych       [] SNF       [] [place]       [] Other-    Code Status: Full Code    PT/OT Eval Status: Ongoing       Electronically signed by Alannah Galvan MD on 8/23/2021 at 10:03 AM

## 2021-08-23 NOTE — CARE COORDINATION
DISCHARGE/PLANNING EVALUATION  8/23/21, 11:36 AM EDT    Reason for Referral: Current with [facility]. Mental Status: Alert and oriented. Decision Making: makes own decisions. Family/Social/Home Environment: Assessment completed with pt, states she lives alone in a one story home and plans to return there post discharge. Pt states prior to having neck surgery on 8-17-21 she was very independent and driving. Post surgery her 2 children have been helping with chores and some personal care. Children provide transportation until pt is able to drive in 2 weeks. Current Services including food security, transportation and housekeeping: see note above. Current Equipment:  Pt states she has walker, cane, walk in tub, high toilets and grab bar in bathroom  Payment Source:[employer] Medicare  Concerns or Barriers to Discharge: pt denies barriers. Post acute provider list with quality measures, geographic area and applicable managed care information provided. Questions regarding selection process answered: Current with [facility]. Teach Back Method used with ptregarding care plan and discharge planning. Patient verbalized understanding of the plan of care and contribute to goal setting. Patient goals, treatment preferences and discharge plan: Pt planning home with [facility]. Pt denies having additional needs at this time. Electronically signed by CHARLEY Levi on 8/23/2021 at 11:36 AM     [facility], they will need new orders for Skilled nursing and PT/OT when pt is discharged.

## 2021-08-24 LAB
ALBUMIN SERPL-MCNC: 4 G/DL (ref 3.5–5.1)
ALP BLD-CCNC: 73 U/L (ref 38–126)
ALT SERPL-CCNC: 13 U/L (ref 11–66)
ANION GAP SERPL CALCULATED.3IONS-SCNC: 18 MEQ/L (ref 8–16)
AST SERPL-CCNC: 20 U/L (ref 5–40)
BASOPHILS # BLD: 0 %
BASOPHILS ABSOLUTE: 0 THOU/MM3 (ref 0–0.1)
BILIRUB SERPL-MCNC: 0.3 MG/DL (ref 0.3–1.2)
BILIRUBIN DIRECT: < 0.2 MG/DL (ref 0–0.3)
BUN BLDV-MCNC: 22 MG/DL (ref 7–22)
CALCIUM SERPL-MCNC: 9.6 MG/DL (ref 8.5–10.5)
CHLORIDE BLD-SCNC: 103 MEQ/L (ref 98–111)
CO2: 21 MEQ/L (ref 23–33)
CREAT SERPL-MCNC: 0.9 MG/DL (ref 0.4–1.2)
EOSINOPHIL # BLD: 0 %
EOSINOPHILS ABSOLUTE: 0 THOU/MM3 (ref 0–0.4)
ERYTHROCYTE [DISTWIDTH] IN BLOOD BY AUTOMATED COUNT: 12.5 % (ref 11.5–14.5)
ERYTHROCYTE [DISTWIDTH] IN BLOOD BY AUTOMATED COUNT: 43.8 FL (ref 35–45)
GFR SERPL CREATININE-BSD FRML MDRD: 62 ML/MIN/1.73M2
GLUCOSE BLD-MCNC: 107 MG/DL (ref 70–108)
HCT VFR BLD CALC: 40.4 % (ref 37–47)
HEMOGLOBIN: 13.6 GM/DL (ref 12–16)
IMMATURE GRANS (ABS): 0.01 THOU/MM3 (ref 0–0.07)
IMMATURE GRANULOCYTES: 0.4 %
LYMPHOCYTES # BLD: 24.8 %
LYMPHOCYTES ABSOLUTE: 0.6 THOU/MM3 (ref 1–4.8)
MAGNESIUM: 1.8 MG/DL (ref 1.6–2.4)
MCH RBC QN AUTO: 32 PG (ref 26–33)
MCHC RBC AUTO-ENTMCNC: 33.7 GM/DL (ref 32.2–35.5)
MCV RBC AUTO: 95.1 FL (ref 81–99)
MONOCYTES # BLD: 15.5 %
MONOCYTES ABSOLUTE: 0.4 THOU/MM3 (ref 0.4–1.3)
NUCLEATED RED BLOOD CELLS: 0 /100 WBC
PLATELET # BLD: 151 THOU/MM3 (ref 130–400)
PMV BLD AUTO: 9.7 FL (ref 9.4–12.4)
POTASSIUM REFLEX MAGNESIUM: 3.4 MEQ/L (ref 3.5–5.2)
RBC # BLD: 4.25 MILL/MM3 (ref 4.2–5.4)
SEG NEUTROPHILS: 59.3 %
SEGMENTED NEUTROPHILS ABSOLUTE COUNT: 1.4 THOU/MM3 (ref 1.8–7.7)
SODIUM BLD-SCNC: 142 MEQ/L (ref 135–145)
TOTAL PROTEIN: 6.1 G/DL (ref 6.1–8)
WBC # BLD: 2.4 THOU/MM3 (ref 4.8–10.8)

## 2021-08-24 PROCEDURE — 6370000000 HC RX 637 (ALT 250 FOR IP): Performed by: PHYSICIAN ASSISTANT

## 2021-08-24 PROCEDURE — 1200000003 HC TELEMETRY R&B

## 2021-08-24 PROCEDURE — 80048 BASIC METABOLIC PNL TOTAL CA: CPT

## 2021-08-24 PROCEDURE — 85025 COMPLETE CBC W/AUTO DIFF WBC: CPT

## 2021-08-24 PROCEDURE — 83735 ASSAY OF MAGNESIUM: CPT

## 2021-08-24 PROCEDURE — 2500000003 HC RX 250 WO HCPCS: Performed by: STUDENT IN AN ORGANIZED HEALTH CARE EDUCATION/TRAINING PROGRAM

## 2021-08-24 PROCEDURE — 36415 COLL VENOUS BLD VENIPUNCTURE: CPT

## 2021-08-24 PROCEDURE — 99233 SBSQ HOSP IP/OBS HIGH 50: CPT | Performed by: INTERNAL MEDICINE

## 2021-08-24 PROCEDURE — 2580000003 HC RX 258: Performed by: STUDENT IN AN ORGANIZED HEALTH CARE EDUCATION/TRAINING PROGRAM

## 2021-08-24 PROCEDURE — 80076 HEPATIC FUNCTION PANEL: CPT

## 2021-08-24 PROCEDURE — 6360000002 HC RX W HCPCS: Performed by: STUDENT IN AN ORGANIZED HEALTH CARE EDUCATION/TRAINING PROGRAM

## 2021-08-24 PROCEDURE — 6360000002 HC RX W HCPCS: Performed by: PHYSICIAN ASSISTANT

## 2021-08-24 PROCEDURE — 2580000003 HC RX 258: Performed by: PHYSICIAN ASSISTANT

## 2021-08-24 RX ADMIN — CARVEDILOL 25 MG: 25 TABLET, FILM COATED ORAL at 18:13

## 2021-08-24 RX ADMIN — ENOXAPARIN SODIUM 30 MG: 30 INJECTION, SOLUTION INTRAVENOUS; SUBCUTANEOUS at 02:55

## 2021-08-24 RX ADMIN — OXYCODONE HYDROCHLORIDE AND ACETAMINOPHEN 1000 MG: 500 TABLET ORAL at 20:15

## 2021-08-24 RX ADMIN — SODIUM CHLORIDE, PRESERVATIVE FREE 10 ML: 5 INJECTION INTRAVENOUS at 10:38

## 2021-08-24 RX ADMIN — REMDESIVIR 100 MG: 100 INJECTION, POWDER, LYOPHILIZED, FOR SOLUTION INTRAVENOUS at 13:32

## 2021-08-24 RX ADMIN — Medication 2000 UNITS: at 10:38

## 2021-08-24 RX ADMIN — Medication 50 MG: at 10:38

## 2021-08-24 RX ADMIN — Medication 1 TABLET: at 10:38

## 2021-08-24 RX ADMIN — HYDROCHLOROTHIAZIDE 25 MG: 25 TABLET ORAL at 18:13

## 2021-08-24 RX ADMIN — PRAVASTATIN SODIUM 80 MG: 80 TABLET ORAL at 20:16

## 2021-08-24 RX ADMIN — SODIUM CHLORIDE, PRESERVATIVE FREE 10 ML: 5 INJECTION INTRAVENOUS at 20:15

## 2021-08-24 RX ADMIN — CARVEDILOL 25 MG: 25 TABLET, FILM COATED ORAL at 10:38

## 2021-08-24 RX ADMIN — ROPINIROLE HYDROCHLORIDE 1 MG: 1 TABLET, FILM COATED ORAL at 20:15

## 2021-08-24 RX ADMIN — ASPIRIN 81 MG: 81 TABLET, COATED ORAL at 20:15

## 2021-08-24 RX ADMIN — ACETAMINOPHEN 650 MG: 325 TABLET ORAL at 14:26

## 2021-08-24 RX ADMIN — ROPINIROLE HYDROCHLORIDE 1 MG: 1 TABLET, FILM COATED ORAL at 10:38

## 2021-08-24 RX ADMIN — DEXAMETHASONE SODIUM PHOSPHATE 6 MG: 4 INJECTION, SOLUTION INTRA-ARTICULAR; INTRALESIONAL; INTRAMUSCULAR; INTRAVENOUS; SOFT TISSUE at 10:39

## 2021-08-24 RX ADMIN — DULOXETINE HYDROCHLORIDE 60 MG: 60 CAPSULE, DELAYED RELEASE ORAL at 10:38

## 2021-08-24 RX ADMIN — PANTOPRAZOLE SODIUM 40 MG: 40 TABLET, DELAYED RELEASE ORAL at 20:15

## 2021-08-24 RX ADMIN — CETIRIZINE HYDROCHLORIDE 10 MG: 10 TABLET, FILM COATED ORAL at 10:38

## 2021-08-24 ASSESSMENT — PAIN SCALES - GENERAL
PAINLEVEL_OUTOF10: 0
PAINLEVEL_OUTOF10: 6
PAINLEVEL_OUTOF10: 0

## 2021-08-24 NOTE — CARE COORDINATION
8/24/21, 1:20 PM EDT    DISCHARGE ON GOING Hien 52 day: 2  Location: 8A-16/016-A Reason for admit: Pneumonia due to COVID-19 virus [U07.1, J12.82]   Procedure:   8/25 CXR ordered for am.   Barriers to Discharge: Afebrile. Weaned off of O2, now on room air and sats are 91-92%. Met with Hospitalist, feels pt needs overnight pulse ox study prior to discharge. Anticipate discharge tomorrow. PCP: Cecilio Cruz MD  Readmission Risk Score: 11%  Patient Goals/Plan/Treatment Preferences: Plans home at discharge, current with [facility]. SW following.

## 2021-08-24 NOTE — PROGRESS NOTES
Hospitalist Progress Note  [facility] MED SURG 8A       Patient: Malik Lis  Unit/Bed: 8A-16/016-A  YOB: 1950  MRN: [id number]  Acct: [de-identified]   AdmittingDiagnosis: Pneumonia due to COVID-19 virus [U07.1, J12.82]  Admit Date: 8/22/2021  Hospital Day: 2    Subjective:    Patient is stable this morning however he still requires oxygen by nasal cannula especially with activity    Objective:   /78   Pulse 82   Temp 98.1 °F (36.7 °C) (Oral)   Resp 16   Ht 5' 6\" (1.676 m)   Wt 204 lb 12.8 oz (92.9 kg)   SpO2 92%   BMI 33.06 kg/m²   No intake or output data in the 24 hours ending 08/24/21 1158     Physical Exam  Constitutional:       Appearance: She is obese. HENT:      Head: Normocephalic. Right Ear: External ear normal.      Left Ear: External ear normal.      Nose: Nose normal.      Mouth/Throat:      Mouth: Mucous membranes are moist.   Eyes:      Conjunctiva/sclera: Conjunctivae normal.      Pupils: Pupils are equal, round, and reactive to light. Neck:      Comments:   Surgical incision noted in the neck on the left side  Cardiovascular:      Rate and Rhythm: Normal rate. Pulses: Normal pulses. Pulmonary:      Effort: Pulmonary effort is normal.      Comments:   Breath sounds decreased at the bases with bibasilar crackles present no wheeze  Abdominal:      General: Bowel sounds are normal.      Palpations: Abdomen is soft. Musculoskeletal:         General: Normal range of motion. Skin:     General: Skin is warm. Capillary Refill: Capillary refill takes less than 2 seconds. Neurological:      Mental Status: She is alert.    Psychiatric:         Mood and Affect: Mood normal.       DVT Prophylaxis: Lovenox    Data:  CBC:   Lab Results   Component Value Date    WBC 2.4 08/24/2021    HGB 13.6 08/24/2021    HCT 40.4 08/24/2021    MCV 95.1 08/24/2021     08/24/2021     BMP:   Lab Results   Component Value Date     08/24/2021    K 3.4 08/24/2021  08/24/2021    CO2 21 08/24/2021    PHOS 3.1 05/13/2016    BUN 22 08/24/2021    CREATININE 0.9 08/24/2021    CALCIUM 9.6 08/24/2021     ABGs: No results found for: PH, PCO2, PO2, HCO3, O2SAT  Troponin:   Lab Results   Component Value Date    TROPONINI 0.01 05/07/2016      LFTs   Lab Results   Component Value Date    AST 20 08/24/2021    ALT 13 08/24/2021    BILITOT 0.3 08/24/2021    ALKPHOS 73 08/24/2021          Imaging   XR CHEST (SINGLE VIEW FRONTAL)    Result Date: 8/21/2021  EXAMINATION: ONE XRAY VIEW OF THE CHEST 8/21/2021 6:38 am COMPARISON: Chest and right ribs dated 07/16/2019 HISTORY: ORDERING SYSTEM PROVIDED HISTORY: fever and hypoxia TECHNOLOGIST PROVIDED HISTORY: fever and hypoxia FINDINGS: Heart size and pulmonary vasculature are normal.  There is mild bibasilar atelectasis. Lung volumes are low. No pneumothorax or pleural effusion. Surrounding structures are noted for spinal stimulator wires. Mild bibasilar atelectasis. Early infiltrate cannot be excluded on the right but is considered unlikely. XR CERVICAL SPINE 1 VW    Result Date: 8/17/2021  PROCEDURE: XR CERVICAL SPINE 1 VW CLINICAL INFORMATION: post C3 C7 acdf, hardware removal C6 C7 . COMPARISON: No prior study. TECHNIQUE: Crosstable lateral projections done in the OR FINDINGS: C3-C7 ACDF. The inferior most hardware is difficult to clearly delineate. Intervertebral bone grafts the same levels. No evidence for acute complication. Limited single intraoperative image showing C3-C7 ACDF **This report has been created using voice recognition software. It may contain minor errors which are inherent in voice recognition technology. ** Final report electronically signed by Dr. Fawn Jones on 8/17/2021 11:11 AM      Assessment/Plan:    1. Acute Hypoxic Respiratory Failure 2/2 PNA secondary to COVID-19 infection:  Presented with fatigue, malaise, and Tmax 103 and chills at [facility].  WBC wnl at outside facility, not elevated on

## 2021-08-25 VITALS
BODY MASS INDEX: 32.92 KG/M2 | HEART RATE: 62 BPM | TEMPERATURE: 97.2 F | DIASTOLIC BLOOD PRESSURE: 79 MMHG | SYSTOLIC BLOOD PRESSURE: 126 MMHG | OXYGEN SATURATION: 94 % | RESPIRATION RATE: 16 BRPM | WEIGHT: 204.8 LBS | HEIGHT: 66 IN

## 2021-08-25 PROBLEM — U07.1 PNEUMONIA DUE TO COVID-19 VIRUS: Status: RESOLVED | Noted: 2021-08-23 | Resolved: 2021-08-25

## 2021-08-25 PROBLEM — J12.82 PNEUMONIA DUE TO COVID-19 VIRUS: Status: RESOLVED | Noted: 2021-08-23 | Resolved: 2021-08-25

## 2021-08-25 LAB
ALBUMIN SERPL-MCNC: 3.5 G/DL (ref 3.5–5.1)
ALP BLD-CCNC: 67 U/L (ref 38–126)
ALT SERPL-CCNC: 14 U/L (ref 11–66)
ANION GAP SERPL CALCULATED.3IONS-SCNC: 13 MEQ/L (ref 8–16)
AST SERPL-CCNC: 19 U/L (ref 5–40)
BASOPHILS # BLD: 0 %
BASOPHILS ABSOLUTE: 0 THOU/MM3 (ref 0–0.1)
BILIRUB SERPL-MCNC: 0.2 MG/DL (ref 0.3–1.2)
BILIRUBIN DIRECT: < 0.2 MG/DL (ref 0–0.3)
BUN BLDV-MCNC: 26 MG/DL (ref 7–22)
CALCIUM SERPL-MCNC: 9.6 MG/DL (ref 8.5–10.5)
CHLORIDE BLD-SCNC: 103 MEQ/L (ref 98–111)
CO2: 23 MEQ/L (ref 23–33)
CREAT SERPL-MCNC: 1 MG/DL (ref 0.4–1.2)
CULTURE: NORMAL
CULTURE: NORMAL
EOSINOPHIL # BLD: 0 %
EOSINOPHILS ABSOLUTE: 0 THOU/MM3 (ref 0–0.4)
ERYTHROCYTE [DISTWIDTH] IN BLOOD BY AUTOMATED COUNT: 12.4 % (ref 11.5–14.5)
ERYTHROCYTE [DISTWIDTH] IN BLOOD BY AUTOMATED COUNT: 43.1 FL (ref 35–45)
GFR SERPL CREATININE-BSD FRML MDRD: 54 ML/MIN/1.73M2
GLUCOSE BLD-MCNC: 98 MG/DL (ref 70–108)
HCT VFR BLD CALC: 41.4 % (ref 37–47)
HEMOGLOBIN: 14 GM/DL (ref 12–16)
IMMATURE GRANS (ABS): 0.01 THOU/MM3 (ref 0–0.07)
IMMATURE GRANULOCYTES: 0.4 %
LYMPHOCYTES # BLD: 35 %
LYMPHOCYTES ABSOLUTE: 0.8 THOU/MM3 (ref 1–4.8)
Lab: NORMAL
Lab: NORMAL
MCH RBC QN AUTO: 31.7 PG (ref 26–33)
MCHC RBC AUTO-ENTMCNC: 33.8 GM/DL (ref 32.2–35.5)
MCV RBC AUTO: 93.9 FL (ref 81–99)
MONOCYTES # BLD: 17.3 %
MONOCYTES ABSOLUTE: 0.4 THOU/MM3 (ref 0.4–1.3)
NUCLEATED RED BLOOD CELLS: 0 /100 WBC
PLATELET # BLD: 146 THOU/MM3 (ref 130–400)
PMV BLD AUTO: 9.7 FL (ref 9.4–12.4)
POTASSIUM REFLEX MAGNESIUM: 3.6 MEQ/L (ref 3.5–5.2)
RBC # BLD: 4.41 MILL/MM3 (ref 4.2–5.4)
SEG NEUTROPHILS: 47.3 %
SEGMENTED NEUTROPHILS ABSOLUTE COUNT: 1.1 THOU/MM3 (ref 1.8–7.7)
SODIUM BLD-SCNC: 139 MEQ/L (ref 135–145)
SPECIMEN DESCRIPTION: NORMAL
SPECIMEN DESCRIPTION: NORMAL
TOTAL PROTEIN: 6.3 G/DL (ref 6.1–8)
WBC # BLD: 2.3 THOU/MM3 (ref 4.8–10.8)

## 2021-08-25 PROCEDURE — 80048 BASIC METABOLIC PNL TOTAL CA: CPT

## 2021-08-25 PROCEDURE — 99239 HOSP IP/OBS DSCHRG MGMT >30: CPT | Performed by: INTERNAL MEDICINE

## 2021-08-25 PROCEDURE — 6360000002 HC RX W HCPCS: Performed by: PHYSICIAN ASSISTANT

## 2021-08-25 PROCEDURE — 36415 COLL VENOUS BLD VENIPUNCTURE: CPT

## 2021-08-25 PROCEDURE — 6360000002 HC RX W HCPCS: Performed by: STUDENT IN AN ORGANIZED HEALTH CARE EDUCATION/TRAINING PROGRAM

## 2021-08-25 PROCEDURE — 6370000000 HC RX 637 (ALT 250 FOR IP): Performed by: PHYSICIAN ASSISTANT

## 2021-08-25 PROCEDURE — 94762 N-INVAS EAR/PLS OXIMTRY CONT: CPT

## 2021-08-25 PROCEDURE — 80076 HEPATIC FUNCTION PANEL: CPT

## 2021-08-25 PROCEDURE — 2580000003 HC RX 258: Performed by: PHYSICIAN ASSISTANT

## 2021-08-25 PROCEDURE — 85025 COMPLETE CBC W/AUTO DIFF WBC: CPT

## 2021-08-25 RX ORDER — ZINC SULFATE 50(220)MG
50 CAPSULE ORAL DAILY
Qty: 30 CAPSULE | Refills: 3 | COMMUNITY
Start: 2021-08-26

## 2021-08-25 RX ORDER — ROPINIROLE 1 MG/1
2 TABLET, FILM COATED ORAL NIGHTLY
Status: DISCONTINUED | OUTPATIENT
Start: 2021-08-25 | End: 2021-08-25 | Stop reason: HOSPADM

## 2021-08-25 RX ORDER — ROPINIROLE 1 MG/1
1 TABLET, FILM COATED ORAL DAILY
Status: DISCONTINUED | OUTPATIENT
Start: 2021-08-25 | End: 2021-08-25 | Stop reason: HOSPADM

## 2021-08-25 RX ORDER — DEXAMETHASONE 6 MG/1
6 TABLET ORAL DAILY
Qty: 3 TABLET | Refills: 0 | Status: SHIPPED | OUTPATIENT
Start: 2021-08-25 | End: 2021-08-28

## 2021-08-25 RX ORDER — CHOLECALCIFEROL (VITAMIN D3) 50 MCG
2000 TABLET ORAL DAILY
Qty: 30 TABLET | Refills: 0 | Status: SHIPPED | OUTPATIENT
Start: 2021-08-26

## 2021-08-25 RX ADMIN — Medication 50 MG: at 08:06

## 2021-08-25 RX ADMIN — CARVEDILOL 25 MG: 25 TABLET, FILM COATED ORAL at 08:07

## 2021-08-25 RX ADMIN — Medication 2000 UNITS: at 08:06

## 2021-08-25 RX ADMIN — ENOXAPARIN SODIUM 30 MG: 30 INJECTION, SOLUTION INTRAVENOUS; SUBCUTANEOUS at 03:30

## 2021-08-25 RX ADMIN — ACETAMINOPHEN 650 MG: 325 TABLET ORAL at 03:30

## 2021-08-25 RX ADMIN — DULOXETINE HYDROCHLORIDE 60 MG: 60 CAPSULE, DELAYED RELEASE ORAL at 08:07

## 2021-08-25 RX ADMIN — CYCLOBENZAPRINE 10 MG: 10 TABLET, FILM COATED ORAL at 11:30

## 2021-08-25 RX ADMIN — CETIRIZINE HYDROCHLORIDE 10 MG: 10 TABLET, FILM COATED ORAL at 08:07

## 2021-08-25 RX ADMIN — DEXAMETHASONE SODIUM PHOSPHATE 6 MG: 4 INJECTION, SOLUTION INTRA-ARTICULAR; INTRALESIONAL; INTRAMUSCULAR; INTRAVENOUS; SOFT TISSUE at 11:30

## 2021-08-25 RX ADMIN — Medication 1 TABLET: at 08:06

## 2021-08-25 RX ADMIN — SODIUM CHLORIDE, PRESERVATIVE FREE 10 ML: 5 INJECTION INTRAVENOUS at 08:07

## 2021-08-25 RX ADMIN — ROPINIROLE HYDROCHLORIDE 1 MG: 1 TABLET, FILM COATED ORAL at 08:06

## 2021-08-25 ASSESSMENT — PAIN SCALES - GENERAL
PAINLEVEL_OUTOF10: 6
PAINLEVEL_OUTOF10: 6
PAINLEVEL_OUTOF10: 0
PAINLEVEL_OUTOF10: 0

## 2021-08-25 NOTE — CARE COORDINATION
8/25/21, 10:39 AM EDT    Patient goals/plan/ treatment preferences discussed by  and . Patient goals/plan/ treatment preferences reviewed with patient/ family. Patient/ family verbalize understanding of discharge plan and are in agreement with goal/plan/treatment preferences. Understanding was demonstrated using the teach back method. AVS provided by RN at time of discharge, which includes all necessary medical information pertaining to the patients current course of illness, treatment, post-discharge goals of care, and treatment preferences. Services After Discharge  Services At/After Discharge: Nursing Services, OT, PT ([facility])   IMM Letter  IMM Letter given to Patient/Family/Significant other/Guardian/POA/by[de-identified]   IMM Letter date given[de-identified] 08/25/21  IMM Letter time given[de-identified] 21        Patient discharge home alone and resume [facility] ,nursing, PT & OT. Spoke with Izabela Sosa from [address] [place] [address] of discharge and new [facility] orders. She is able to get orders and AVS from [facility].

## 2021-08-25 NOTE — CARE COORDINATION
Discharge Planning Update:     [facility] Department of Health notified of planned discharge today. Spoke with pt, if home O2 is needed she prefers to go with any DME within her insurance network. Will await physician decision regarding home O2 for at night. Update: 1:46 PM EDT    Spoke with [employer], they are in network with pt's insurance. Pt is agreeable. Referral made and information faxed.      Electronically signed by Jud Crump RN on 8/25/2021 at 1:48 PM

## 2021-08-25 NOTE — DISCHARGE SUMMARY
Hospital Medicine Discharge Summary      Patient Identification:   Marycarmen Newsome   : [id number]  MRN: [id number]   Account: [de-identified]      Patient's PCP: Jeff Self MD    Admit Date: 2021     Discharge Date:   2021    Admitting Physician: No admitting provider for patient encounter. Discharge Physician: Doug Esposito MD     Discharge Diagnoses:   1. Covid - 19 infection   2. HTN,    3. Cervical DDD, s/p ACDF   4. ? PABLO being d/c'd on Home O2 at night     The patient was seen and examined on day of discharge and this discharge summary is in conjunction with any daily progress note from day of discharge. Hospital Course:   Marycarmen Newsome is a 66 y.o. female admitted to [facility] on 2021 is a direct transfer from [facility]. Patient reports that she recently had a cervical ACDF done on 2021 with Dr. Bud Baeza in [address] [place] [address]. Patient was discharged  with home health for drain placement. Patient reports that she went home and ~ 2 days later on Friday she began to feel fatigued and general malaise. Patient reports that later on that evening she began to feel feverish and checked her temperature and states it was \"a little over 100 \". Patient also admits to feeling short of breath at that time. Patient states that her daughter then took her to the [facility] ED for evaluation. While there, patient was noted to have a fever T-max 103 without leukocytosis on blood work. Given recent ACDF, transfer to [facility] was initiated. While at [place] [address], patient was tested and found to be Covid positive. CXR at [facility] showed mild bibasilar atelectasis and possible early infiltrate on R side. Patient denies any cough or sputum production. She denies any chest congestion, sore throat, myalgias, headache, abdominal pain, nausea/vomiting, diarrhea. Denies chest pain or feeling short of breath currently.    Patient was admitted to the hospitalist service and treated conservatively with which she improved and is being discharged home on 3 more days of p.o. Decadron along with vitamin D and zinc sulfate  Patient underwent overnight pulse oximetry which revealed significant desaturation and is being discharged home on oxygen to be used at night with follow-up to pulmonologist as outpatient for sleep study  Patient to follow-up with primary care provider next week and with the surgeon in 3 to 5 days      Exam:     Vitals:  Vitals:    08/25/21 0000 08/25/21 0315 08/25/21 0336 08/25/21 0800   BP:  123/76  126/79   Pulse:  64  62   Resp:  16 16 16   Temp:  98.3 °F (36.8 °C)  97.2 °F (36.2 °C)   TempSrc:  Oral  Oral   SpO2: 92% 91% 92% 94%   Weight:       Height:         Weight: Weight: 204 lb 12.8 oz (92.9 kg)     24 hour intake/output:    Intake/Output Summary (Last 24 hours) at 8/25/2021 1156  Last data filed at 8/25/2021 0315  Gross per 24 hour   Intake 1290 ml   Output --   Net 1290 ml         General appearance:  No apparent distress, appears stated age and cooperative. HEENT:  Normal cephalic, atraumatic without obvious deformity. Pupils equal, round, and reactive to light. Extra ocular muscles intact. Conjunctivae/corneas clear. Neck: surgical incison noted Supple, with full range of motion. No jugular venous distention. Trachea midline. Respiratory:  Normal respiratory effort. Clear to auscultation, bilaterally without Rales/Wheezes/Rhonchi. Cardiovascular:  Regular rate and rhythm with normal S1/S2 without murmurs, rubs or gallops. Abdomen: Soft, non-tender, non-distended with normal bowel sounds. Musculoskeletal:  No clubbing, cyanosis or edema bilaterally. Full range of motion without deformity. Skin: Skin color, texture, turgor normal.  No rashes or lesions. Neurologic:  Neurovascularly intact without any focal sensory/motor deficits.  Cranial nerves: II-XII intact, grossly non-focal.  Psychiatric:  Alert and oriented, thought content appropriate, normal insight  Capillary Refill: Brisk,< 3 seconds   Peripheral Pulses: +2 palpable, equal bilaterally       Labs: For convenience and continuity at follow-up the following most recent labs are provided:      CBC:    Lab Results   Component Value Date    WBC 2.3 08/25/2021    HGB 14.0 08/25/2021    HCT 41.4 08/25/2021     08/25/2021       Renal:    Lab Results   Component Value Date     08/25/2021    K 3.6 08/25/2021     08/25/2021    CO2 23 08/25/2021    BUN 26 08/25/2021    CREATININE 1.0 08/25/2021    CALCIUM 9.6 08/25/2021    PHOS 3.1 05/13/2016         Significant Diagnostic Studies    Radiology:   XR CHEST (2 VW)    (Results Pending)          Consults:     IP CONSULT TO ORTHOPEDIC SURGERY  IP CONSULT TO PHARMACY  IP CONSULT TO SOCIAL WORK    Disposition: Home  Condition at Discharge: Stable    Code Status:  Full Code     Patient Instructions:    Discharge lab work: Activity: activity as tolerated  Diet: ADULT DIET; Regular      Follow-up visits:   Eleonora Riley MD  [place] [address]  [phone]             Discharge Medications:      Fabio Hurst \"Haydee\"   Home Medication Instructions [id number]    Printed on:08/25/21 1158   Medication Information                      aspirin 81 MG EC tablet  Take 81 mg by mouth daily             Calcium Carbonate-Vitamin D (OSCAL 500/200 D-3 PO)  Take 1 tablet by mouth daily             carvedilol (COREG) 12.5 MG tablet  Take 1 tablet by mouth 2 times daily (with meals)             cyclobenzaprine (FLEXERIL) 10 MG tablet  Take 1 tablet by mouth 3 times daily as needed for Muscle spasms             dexamethasone (DECADRON) 6 MG tablet  Take 1 tablet by mouth daily for 3 days             DULoxetine (CYMBALTA) 60 MG capsule  Take 60 mg by mouth daily             gabapentin (NEURONTIN) 600 MG tablet  Take 300 mg by mouth 2 times daily.  1 tab in morning and 2 tabs at dinner             hydrochlorothiazide (HYDRODIURIL) 25 MG tablet  Take 25 mg by mouth Daily with supper              Multiple Vitamins-Minerals (THERAPEUTIC MULTIVITAMIN-MINERALS) tablet  Take 1 tablet by mouth daily             pantoprazole (PROTONIX) 40 MG tablet  Take 1 tablet by mouth daily             pravastatin (PRAVACHOL) 80 MG tablet  Take 80 mg by mouth nightly             rOPINIRole (REQUIP) 1 MG tablet  Take 1 mg by mouth 2 times daily 1 in morning and 2 at dinner             traMADol (ULTRAM) 50 MG tablet  Take 1 tablet by mouth every 6 hours as needed for Pain for up to 7 days. Vitamin D (CHOLECALCIFEROL) 50 MCG (2000 UT) TABS tablet  Take 1 tablet by mouth daily             zinc sulfate (ZINCATE) 220 (50 Zn) MG capsule  Take 1 capsule by mouth daily                 Time Spent on discharge is more than 30 minutes in the examination, evaluation, counseling and review of medications and discharge plan. Signed: Thank you Hakan Freed MD for the opportunity to be involved in this patient's care.     Electronically signed by Mason Gill MD on 8/25/21 at 11:56 AM EDT

## 2021-08-25 NOTE — PROGRESS NOTES
Discharge instructions given. Voiced no concerns. Patient was given medications that they purchased from pharmacy.

## 2021-08-25 NOTE — PLAN OF CARE
Problem: Airway Clearance - Ineffective  Goal: Achieve or maintain patent airway  Outcome: Completed     Problem: Gas Exchange - Impaired  Goal: Absence of hypoxia  Outcome: Completed  Goal: Promote optimal lung function  Outcome: Completed     Problem: Breathing Pattern - Ineffective  Goal: Ability to achieve and maintain a regular respiratory rate  Outcome: Completed     Problem:  Body Temperature -  Risk of, Imbalanced  Goal: Ability to maintain a body temperature within defined limits  Outcome: Completed  Goal: Will regain or maintain usual level of consciousness  Outcome: Completed  Goal: Complications related to the disease process, condition or treatment will be avoided or minimized  Outcome: Completed     Problem: Isolation Precautions - Risk of Spread of Infection  Goal: Prevent transmission of infection  Outcome: Completed     Problem: Nutrition Deficits  Goal: Optimize nutritional status  Outcome: Completed     Problem: Risk for Fluid Volume Deficit  Goal: Maintain normal heart rhythm  Outcome: Completed  Goal: Maintain absence of muscle cramping  Outcome: Completed  Goal: Maintain normal serum potassium, sodium, calcium, phosphorus, and pH  Outcome: Completed     Problem: Loneliness or Risk for Loneliness  Goal: Demonstrate positive use of time alone when socialization is not possible  Outcome: Completed     Problem: Fatigue  Goal: Verbalize increase energy and improved vitality  Outcome: Completed     Problem: Patient Education: Go to Patient Education Activity  Goal: Patient/Family Education  Outcome: Completed     Problem: Falls - Risk of:  Goal: Will remain free from falls  Description: Will remain free from falls  Outcome: Completed  Goal: Absence of physical injury  Description: Absence of physical injury  Outcome: Completed     Problem: Pain:  Goal: Pain level will decrease  Description: Pain level will decrease  Outcome: Completed  Goal: Control of acute pain  Description: Control of acute pain  Outcome: Completed  Goal: Control of chronic pain  Description: Control of chronic pain  Outcome: Completed

## 2021-08-26 ENCOUNTER — CARE COORDINATION (OUTPATIENT)
Dept: CASE MANAGEMENT | Age: 71
End: 2021-08-26

## 2021-08-26 NOTE — CARE COORDINATION
[facility] Transitions Initial Follow Up Call    Call within 2 business days of discharge: Yes    Patient: Marycarmen Newsome Patient : 1950   MRN: [id number]  Reason for Admission:  COVID -19  Discharge Date: 21 RARS: Readmission Risk Score: 13      Last Discharge [facility]       Complaint Diagnosis Description Type Department Provider    21   Admission (Discharged) Jd Marrero MD           Spoke with: Alieen Eli, states she is doing great at home, she slept well last night, she is wearing her oxygen. She can not find her Pulse ox right now it's in her bathroom somewhere but denies shortness of breath currently. She does have a cough and dry mouth. Still has her taste, although different and still has smell. We reviewed, diet, increasing protein and lots of fluids. She has PCP appt 2021 @ 10:50; she will get Pulmonary referral from doctor then. She feels she has everthing she needs at home right now. Transitions of Care Initial Call    Was this an external facility discharge? No Discharge Facility: [facility]      Challenges to be reviewed by the provider   Additional needs identified to be addressed with provider: No  none             Method of communication with provider : none      Advance Care Planning:   Does patient have an Advance Directive: not on file; education provided. Was this a readmission? Yes  Patient stated reason for admission:  COVID -19    Patients top risk factors for readmission: medical condition-covid -19 and polypharmacy    Care Transition Nurse (CTN) contacted the patient by telephone to perform post hospital discharge assessment. Verified name and  with patient as identifiers. Provided introduction to self, and explanation of the CTN role. CTN reviewed discharge instructions, medical action plan and red flags with patient who verbalized understanding.  Patient given an opportunity to ask questions and does not have any further questions or concerns at this time. Were discharge instructions available to patient? Yes. Reviewed appropriate site of care based on symptoms and resources available to patient including: PCP, Specialist and Home health. The patient agrees to contact the PCP office for questions related to their healthcare. Medication reconciliation was performed with patient, who verbalizes understanding of administration of home medications. Advised obtaining a 90-day supply of all daily and as-needed medications. Covid Risk Education     Educated patient about risk for severe COVID-19 due to risk factors according to CDC guidelines. CTN reviewed discharge instructions, medical action plan and red flag symptoms with the patient who verbalized understanding. Discussed COVID vaccination status: No. Education provided on COVID-19 vaccination as appropriate. Discussed exposure protocols and quarantine with CDC Guidelines. Patient was given an opportunity to verbalize any questions and concerns and agrees to contact CTN or health care provider for questions related to their healthcare. Reviewed and educated patient on any new and changed medications related to discharge diagnosis. Was patient discharged with a pulse oximeter? Pt has at home needs to find. Discussed and confirmed pulse oximeter discharge instructions and when to notify provider or seek emergency care. CTN provided contact information. Plan for follow-up call in 3-5 days based on severity of symptoms and risk factors.   Plan for next call: symptom management-shortness of breath, cough, fever, covid sympotms changed?, follow up appointment-/1/2021 PCP and community resources-home health RN- are they still coming out    Non-face-to-face services provided:  Obtained and reviewed discharge summary and/or continuity of care documents  Education of patient/family/caregiver/guardian to support self-management- COVID care     Care Transitions 24 Hour Call    Schedule Follow Up Appointment with PCP: Completed  Do you have any ongoing symptoms?: Yes  Patient-reported symptoms: Other, Fatigue, Cough  Interventions for patient-reported symptoms: Other (Comment: discussed)  Do you have a copy of your discharge instructions?: Yes  Do you have all of your prescriptions and are they filled?: Yes  Have you been contacted by a [address]?: No  Have you scheduled your follow up appointment?: Yes  How are you going to get to your appointment?: Car - family or friend to transport  Were you discharged with any Home Care or Post Acute Services: Yes  Post Acute Services: Home Health (Comment: [facility] - from previous surgery they are continuing)  Do you feel like you have everything you need to keep you well at home?: Yes  Care Transitions Interventions  No Identified Needs         Follow Up  No future appointments.     Pam Mckeon RN

## 2021-08-31 ENCOUNTER — APPOINTMENT (OUTPATIENT)
Dept: CT IMAGING | Age: 71
DRG: 871 | End: 2021-08-31
Payer: MEDICARE

## 2021-08-31 ENCOUNTER — APPOINTMENT (OUTPATIENT)
Dept: GENERAL RADIOLOGY | Age: 71
DRG: 871 | End: 2021-08-31
Payer: MEDICARE

## 2021-08-31 ENCOUNTER — HOSPITAL ENCOUNTER (INPATIENT)
Age: 71
LOS: 14 days | Discharge: HOME HEALTH | DRG: 871 | End: 2021-09-14
Attending: EMERGENCY MEDICINE | Admitting: FAMILY MEDICINE
Payer: MEDICARE

## 2021-08-31 DIAGNOSIS — J12.82 PNEUMONIA DUE TO COVID-19 VIRUS: ICD-10-CM

## 2021-08-31 DIAGNOSIS — J18.9 PNEUMONIA OF LEFT LUNG DUE TO INFECTIOUS ORGANISM, UNSPECIFIED PART OF LUNG: Primary | ICD-10-CM

## 2021-08-31 DIAGNOSIS — U07.1 PNEUMONIA DUE TO COVID-19 VIRUS: ICD-10-CM

## 2021-08-31 DIAGNOSIS — R09.02 HYPOXIA: ICD-10-CM

## 2021-08-31 DIAGNOSIS — J96.01 ACUTE RESPIRATORY FAILURE WITH HYPOXIA (HCC): ICD-10-CM

## 2021-08-31 DIAGNOSIS — R53.1 GENERAL WEAKNESS: ICD-10-CM

## 2021-08-31 LAB
ABSOLUTE EOS #: 0 K/UL (ref 0–0.44)
ABSOLUTE IMMATURE GRANULOCYTE: 0 K/UL (ref 0–0.3)
ABSOLUTE LYMPH #: 0.31 K/UL (ref 1.1–3.7)
ABSOLUTE MONO #: 0.19 K/UL (ref 0.1–1.2)
ALBUMIN SERPL-MCNC: 3.4 G/DL (ref 3.5–5.2)
ALBUMIN/GLOBULIN RATIO: 0.8 (ref 1–2.5)
ALLEN TEST: ABNORMAL
ALP BLD-CCNC: 90 U/L (ref 35–104)
ALT SERPL-CCNC: 17 U/L (ref 5–33)
ANION GAP SERPL CALCULATED.3IONS-SCNC: 17 MMOL/L (ref 9–17)
AST SERPL-CCNC: 30 U/L
BASOPHILS # BLD: 0 % (ref 0–2)
BASOPHILS ABSOLUTE: 0 K/UL (ref 0–0.2)
BILIRUB SERPL-MCNC: 0.49 MG/DL (ref 0.3–1.2)
BILIRUBIN URINE: NEGATIVE
BNP INTERPRETATION: NORMAL
BUN BLDV-MCNC: 37 MG/DL (ref 8–23)
BUN/CREAT BLD: 42 (ref 9–20)
CALCIUM SERPL-MCNC: 9.7 MG/DL (ref 8.6–10.4)
CARBOXYHEMOGLOBIN: ABNORMAL % (ref 0–5)
CHLORIDE BLD-SCNC: 100 MMOL/L (ref 98–107)
CO2: 20 MMOL/L (ref 20–31)
COLOR: YELLOW
COMMENT UA: NORMAL
CREAT SERPL-MCNC: 0.89 MG/DL (ref 0.5–0.9)
D-DIMER QUANTITATIVE: 1.1 MG/L FEU (ref 0–0.59)
DIFFERENTIAL TYPE: ABNORMAL
EOSINOPHILS RELATIVE PERCENT: 0 % (ref 1–4)
FIO2: ABNORMAL
GFR AFRICAN AMERICAN: >60 ML/MIN
GFR NON-AFRICAN AMERICAN: >60 ML/MIN
GFR SERPL CREATININE-BSD FRML MDRD: ABNORMAL ML/MIN/{1.73_M2}
GFR SERPL CREATININE-BSD FRML MDRD: ABNORMAL ML/MIN/{1.73_M2}
GLUCOSE BLD-MCNC: 112 MG/DL (ref 70–99)
GLUCOSE URINE: NEGATIVE
HCO3 VENOUS: 23.1 MMOL/L (ref 24–30)
HCT VFR BLD CALC: 46.2 % (ref 36.3–47.1)
HEMOGLOBIN: 15.8 G/DL (ref 11.9–15.1)
IMMATURE GRANULOCYTES: 0 %
INR BLD: 1
KETONES, URINE: NEGATIVE
LACTIC ACID, SEPSIS WHOLE BLOOD: NORMAL MMOL/L (ref 0.5–1.9)
LACTIC ACID, SEPSIS WHOLE BLOOD: NORMAL MMOL/L (ref 0.5–1.9)
LACTIC ACID, SEPSIS: 1.5 MMOL/L (ref 0.5–1.9)
LACTIC ACID, SEPSIS: 1.9 MMOL/L (ref 0.5–1.9)
LEUKOCYTE ESTERASE, URINE: NEGATIVE
LYMPHOCYTES # BLD: 5 % (ref 24–43)
MCH RBC QN AUTO: 30.9 PG (ref 25.2–33.5)
MCHC RBC AUTO-ENTMCNC: 34.2 G/DL (ref 28.4–34.8)
MCV RBC AUTO: 90.2 FL (ref 82.6–102.9)
METHEMOGLOBIN: ABNORMAL % (ref 0–1.9)
MODE: ABNORMAL
MONOCYTES # BLD: 3 % (ref 3–12)
MORPHOLOGY: NORMAL
NEGATIVE BASE EXCESS, VEN: 0.3 MMOL/L (ref 0–2)
NITRITE, URINE: NEGATIVE
NOTIFICATION TIME: ABNORMAL
NOTIFICATION: ABNORMAL
NRBC AUTOMATED: 0 PER 100 WBC
O2 DEVICE/FLOW/%: ABNORMAL
O2 SAT, VEN: 20.6 % (ref 60–85)
OXYHEMOGLOBIN: ABNORMAL % (ref 95–98)
PARTIAL THROMBOPLASTIN TIME: 28.8 SEC (ref 23.9–33.8)
PATIENT TEMP: 37
PCO2, VEN, TEMP ADJ: ABNORMAL MMHG (ref 39–55)
PCO2, VEN: 34.5 (ref 39–55)
PDW BLD-RTO: 12.6 % (ref 11.8–14.4)
PEEP/CPAP: ABNORMAL
PH UA: 6 (ref 5–9)
PH VENOUS: 7.44 (ref 7.32–7.42)
PH, VEN, TEMP ADJ: ABNORMAL (ref 7.32–7.42)
PLATELET # BLD: 161 K/UL (ref 138–453)
PLATELET ESTIMATE: ABNORMAL
PMV BLD AUTO: 10.1 FL (ref 8.1–13.5)
PO2, VEN, TEMP ADJ: ABNORMAL MMHG (ref 30–50)
PO2, VEN: 14.6 (ref 30–50)
POSITIVE BASE EXCESS, VEN: ABNORMAL MMOL/L (ref 0–2)
POTASSIUM SERPL-SCNC: 3.7 MMOL/L (ref 3.7–5.3)
PRO-BNP: 152 PG/ML
PROTEIN UA: NEGATIVE
PROTHROMBIN TIME: 13.3 SEC (ref 11.5–14.2)
PSV: ABNORMAL
PT. POSITION: ABNORMAL
RBC # BLD: 5.12 M/UL (ref 3.95–5.11)
RBC # BLD: ABNORMAL 10*6/UL
RESPIRATORY RATE: ABNORMAL
SAMPLE SITE: ABNORMAL
SARS-COV-2, RAPID: DETECTED
SEG NEUTROPHILS: 92 % (ref 36–65)
SEGMENTED NEUTROPHILS ABSOLUTE COUNT: 5.7 K/UL (ref 1.5–8.1)
SET RATE: ABNORMAL
SODIUM BLD-SCNC: 137 MMOL/L (ref 135–144)
SPECIFIC GRAVITY UA: 1.02 (ref 1.01–1.02)
SPECIMEN DESCRIPTION: ABNORMAL
TEXT FOR RESPIRATORY: ABNORMAL
TOTAL HB: ABNORMAL G/DL (ref 12–16)
TOTAL PROTEIN: 7.6 G/DL (ref 6.4–8.3)
TOTAL RATE: ABNORMAL
TROPONIN INTERP: NORMAL
TROPONIN T: NORMAL NG/ML
TROPONIN, HIGH SENSITIVITY: 12 NG/L (ref 0–14)
TURBIDITY: CLEAR
URINE HGB: NEGATIVE
UROBILINOGEN, URINE: NORMAL
VT: ABNORMAL
WBC # BLD: 6.2 K/UL (ref 3.5–11.3)
WBC # BLD: ABNORMAL 10*3/UL

## 2021-08-31 PROCEDURE — 99285 EMERGENCY DEPT VISIT HI MDM: CPT

## 2021-08-31 PROCEDURE — 96360 HYDRATION IV INFUSION INIT: CPT

## 2021-08-31 PROCEDURE — 83605 ASSAY OF LACTIC ACID: CPT

## 2021-08-31 PROCEDURE — 94761 N-INVAS EAR/PLS OXIMETRY MLT: CPT

## 2021-08-31 PROCEDURE — 82805 BLOOD GASES W/O2 SATURATION: CPT

## 2021-08-31 PROCEDURE — 71045 X-RAY EXAM CHEST 1 VIEW: CPT

## 2021-08-31 PROCEDURE — 93005 ELECTROCARDIOGRAM TRACING: CPT | Performed by: EMERGENCY MEDICINE

## 2021-08-31 PROCEDURE — 2700000000 HC OXYGEN THERAPY PER DAY

## 2021-08-31 PROCEDURE — 2580000003 HC RX 258: Performed by: EMERGENCY MEDICINE

## 2021-08-31 PROCEDURE — 71260 CT THORAX DX C+: CPT

## 2021-08-31 PROCEDURE — 80053 COMPREHEN METABOLIC PANEL: CPT

## 2021-08-31 PROCEDURE — 83880 ASSAY OF NATRIURETIC PEPTIDE: CPT

## 2021-08-31 PROCEDURE — 85379 FIBRIN DEGRADATION QUANT: CPT

## 2021-08-31 PROCEDURE — 81003 URINALYSIS AUTO W/O SCOPE: CPT

## 2021-08-31 PROCEDURE — 2000000000 HC ICU R&B

## 2021-08-31 PROCEDURE — C9803 HOPD COVID-19 SPEC COLLECT: HCPCS

## 2021-08-31 PROCEDURE — 6360000004 HC RX CONTRAST MEDICATION: Performed by: EMERGENCY MEDICINE

## 2021-08-31 PROCEDURE — 36415 COLL VENOUS BLD VENIPUNCTURE: CPT

## 2021-08-31 PROCEDURE — 85730 THROMBOPLASTIN TIME PARTIAL: CPT

## 2021-08-31 PROCEDURE — 87635 SARS-COV-2 COVID-19 AMP PRB: CPT

## 2021-08-31 PROCEDURE — 85025 COMPLETE CBC W/AUTO DIFF WBC: CPT

## 2021-08-31 PROCEDURE — 6370000000 HC RX 637 (ALT 250 FOR IP): Performed by: EMERGENCY MEDICINE

## 2021-08-31 PROCEDURE — 6360000002 HC RX W HCPCS: Performed by: EMERGENCY MEDICINE

## 2021-08-31 PROCEDURE — 85610 PROTHROMBIN TIME: CPT

## 2021-08-31 PROCEDURE — 87040 BLOOD CULTURE FOR BACTERIA: CPT

## 2021-08-31 PROCEDURE — 96365 THER/PROPH/DIAG IV INF INIT: CPT

## 2021-08-31 PROCEDURE — 84484 ASSAY OF TROPONIN QUANT: CPT

## 2021-08-31 PROCEDURE — 96361 HYDRATE IV INFUSION ADD-ON: CPT

## 2021-08-31 PROCEDURE — 86140 C-REACTIVE PROTEIN: CPT

## 2021-08-31 RX ORDER — ACETAMINOPHEN 325 MG/1
650 TABLET ORAL EVERY 6 HOURS PRN
Status: DISCONTINUED | OUTPATIENT
Start: 2021-08-31 | End: 2021-09-14 | Stop reason: HOSPADM

## 2021-08-31 RX ORDER — ACETAMINOPHEN 650 MG/1
650 SUPPOSITORY RECTAL EVERY 6 HOURS PRN
Status: DISCONTINUED | OUTPATIENT
Start: 2021-08-31 | End: 2021-09-14 | Stop reason: HOSPADM

## 2021-08-31 RX ORDER — SODIUM CHLORIDE 0.9 % (FLUSH) 0.9 %
10 SYRINGE (ML) INJECTION EVERY 12 HOURS SCHEDULED
Status: DISCONTINUED | OUTPATIENT
Start: 2021-08-31 | End: 2021-09-14 | Stop reason: HOSPADM

## 2021-08-31 RX ORDER — SODIUM CHLORIDE 9 MG/ML
25 INJECTION, SOLUTION INTRAVENOUS PRN
Status: DISCONTINUED | OUTPATIENT
Start: 2021-08-31 | End: 2021-09-14 | Stop reason: HOSPADM

## 2021-08-31 RX ORDER — SODIUM CHLORIDE 9 MG/ML
1000 INJECTION, SOLUTION INTRAVENOUS CONTINUOUS
Status: DISCONTINUED | OUTPATIENT
Start: 2021-08-31 | End: 2021-09-01 | Stop reason: SDUPTHER

## 2021-08-31 RX ORDER — SODIUM CHLORIDE 9 MG/ML
INJECTION, SOLUTION INTRAVENOUS CONTINUOUS
Status: DISCONTINUED | OUTPATIENT
Start: 2021-09-01 | End: 2021-09-02

## 2021-08-31 RX ORDER — VITAMIN B COMPLEX
2000 TABLET ORAL DAILY
Status: DISCONTINUED | OUTPATIENT
Start: 2021-09-01 | End: 2021-09-01 | Stop reason: SDUPTHER

## 2021-08-31 RX ORDER — GABAPENTIN 300 MG/1
300 CAPSULE ORAL EVERY MORNING
COMMUNITY

## 2021-08-31 RX ORDER — ROPINIROLE 2 MG/1
2 TABLET, FILM COATED ORAL NIGHTLY
COMMUNITY

## 2021-08-31 RX ORDER — SODIUM CHLORIDE 0.9 % (FLUSH) 0.9 %
10 SYRINGE (ML) INJECTION PRN
Status: DISCONTINUED | OUTPATIENT
Start: 2021-08-31 | End: 2021-09-14 | Stop reason: HOSPADM

## 2021-08-31 RX ORDER — ACETAMINOPHEN 325 MG/1
650 TABLET ORAL EVERY 4 HOURS PRN
Status: DISCONTINUED | OUTPATIENT
Start: 2021-08-31 | End: 2021-08-31

## 2021-08-31 RX ORDER — ACETAMINOPHEN 325 MG/1
650 TABLET ORAL ONCE
Status: COMPLETED | OUTPATIENT
Start: 2021-08-31 | End: 2021-08-31

## 2021-08-31 RX ORDER — GABAPENTIN 300 MG/1
600 CAPSULE ORAL ONCE
Status: DISCONTINUED | OUTPATIENT
Start: 2021-09-01 | End: 2021-09-14 | Stop reason: HOSPADM

## 2021-08-31 RX ORDER — ROPINIROLE 1 MG/1
2 TABLET, FILM COATED ORAL ONCE
Status: COMPLETED | OUTPATIENT
Start: 2021-09-01 | End: 2021-09-01

## 2021-08-31 RX ORDER — DEXAMETHASONE 4 MG/1
6 TABLET ORAL DAILY
Status: COMPLETED | OUTPATIENT
Start: 2021-09-01 | End: 2021-09-10

## 2021-08-31 RX ADMIN — IOPAMIDOL 75 ML: 755 INJECTION, SOLUTION INTRAVENOUS at 20:20

## 2021-08-31 RX ADMIN — SODIUM CHLORIDE 1000 ML: 9 INJECTION, SOLUTION INTRAVENOUS at 19:04

## 2021-08-31 RX ADMIN — ACETAMINOPHEN 650 MG: 325 TABLET ORAL at 22:10

## 2021-08-31 RX ADMIN — PIPERACILLIN SODIUM AND TAZOBACTAM SODIUM 4500 MG: 4; .5 INJECTION, POWDER, LYOPHILIZED, FOR SOLUTION INTRAVENOUS at 22:10

## 2021-08-31 RX ADMIN — SODIUM CHLORIDE, PRESERVATIVE FREE 10 ML: 5 INJECTION INTRAVENOUS at 22:15

## 2021-08-31 RX ADMIN — SODIUM CHLORIDE 1000 ML: 9 INJECTION, SOLUTION INTRAVENOUS at 22:09

## 2021-08-31 RX ADMIN — VANCOMYCIN HYDROCHLORIDE 2250 MG: 1 INJECTION, POWDER, LYOPHILIZED, FOR SOLUTION INTRAVENOUS at 23:04

## 2021-08-31 ASSESSMENT — PAIN SCALES - GENERAL
PAINLEVEL_OUTOF10: 0
PAINLEVEL_OUTOF10: 0

## 2021-09-01 ENCOUNTER — APPOINTMENT (OUTPATIENT)
Dept: NON INVASIVE DIAGNOSTICS | Age: 71
DRG: 871 | End: 2021-09-01
Payer: MEDICARE

## 2021-09-01 LAB
ABSOLUTE EOS #: 0 K/UL (ref 0–0.44)
ABSOLUTE IMMATURE GRANULOCYTE: 0 K/UL (ref 0–0.3)
ABSOLUTE LYMPH #: 0.48 K/UL (ref 1.1–3.7)
ABSOLUTE MONO #: 0.36 K/UL (ref 0.1–1.2)
ALBUMIN SERPL-MCNC: 2.8 G/DL (ref 3.5–5.2)
ALBUMIN/GLOBULIN RATIO: 1 (ref 1–2.5)
ALP BLD-CCNC: 64 U/L (ref 35–104)
ALT SERPL-CCNC: 14 U/L (ref 5–33)
ANION GAP SERPL CALCULATED.3IONS-SCNC: 11 MMOL/L (ref 9–17)
AST SERPL-CCNC: 21 U/L
BASOPHILS # BLD: 0 % (ref 0–2)
BASOPHILS ABSOLUTE: 0 K/UL (ref 0–0.2)
BILIRUB SERPL-MCNC: 0.42 MG/DL (ref 0.3–1.2)
BUN BLDV-MCNC: 31 MG/DL (ref 8–23)
BUN/CREAT BLD: 34 (ref 9–20)
C-REACTIVE PROTEIN: 181.5 MG/L (ref 0–5)
CALCIUM SERPL-MCNC: 7.9 MG/DL (ref 8.6–10.4)
CHLORIDE BLD-SCNC: 105 MMOL/L (ref 98–107)
CO2: 21 MMOL/L (ref 20–31)
CREAT SERPL-MCNC: 0.92 MG/DL (ref 0.5–0.9)
D-DIMER QUANTITATIVE: 0.77 MG/L FEU (ref 0–0.59)
DIFFERENTIAL TYPE: ABNORMAL
EKG ATRIAL RATE: 100 BPM
EKG P AXIS: 28 DEGREES
EKG P-R INTERVAL: 132 MS
EKG Q-T INTERVAL: 346 MS
EKG QRS DURATION: 86 MS
EKG QTC CALCULATION (BAZETT): 446 MS
EKG R AXIS: -23 DEGREES
EKG T AXIS: 56 DEGREES
EKG VENTRICULAR RATE: 100 BPM
EOSINOPHILS RELATIVE PERCENT: 0 % (ref 1–4)
FERRITIN: 789 UG/L (ref 13–150)
FIBRINOGEN: 546 MG/DL (ref 179–518)
GFR AFRICAN AMERICAN: >60 ML/MIN
GFR NON-AFRICAN AMERICAN: >60 ML/MIN
GFR SERPL CREATININE-BSD FRML MDRD: ABNORMAL ML/MIN/{1.73_M2}
GFR SERPL CREATININE-BSD FRML MDRD: ABNORMAL ML/MIN/{1.73_M2}
GLUCOSE BLD-MCNC: 139 MG/DL (ref 70–99)
HCT VFR BLD CALC: 36.7 % (ref 36.3–47.1)
HEMOGLOBIN: 12.1 G/DL (ref 11.9–15.1)
IMMATURE GRANULOCYTES: 0 %
INR BLD: 1.1
LACTATE DEHYDROGENASE: 250 U/L (ref 135–214)
LV EF: 48 %
LVEF MODALITY: NORMAL
LYMPHOCYTES # BLD: 8 % (ref 24–43)
MAGNESIUM: 2.1 MG/DL (ref 1.6–2.6)
MCH RBC QN AUTO: 30.9 PG (ref 25.2–33.5)
MCHC RBC AUTO-ENTMCNC: 33 G/DL (ref 28.4–34.8)
MCV RBC AUTO: 93.6 FL (ref 82.6–102.9)
MONOCYTES # BLD: 6 % (ref 3–12)
MORPHOLOGY: NORMAL
NRBC AUTOMATED: 0 PER 100 WBC
PARTIAL THROMBOPLASTIN TIME: 35.4 SEC (ref 23.9–33.8)
PDW BLD-RTO: 13 % (ref 11.8–14.4)
PLATELET # BLD: 173 K/UL (ref 138–453)
PLATELET ESTIMATE: ABNORMAL
PMV BLD AUTO: 9.4 FL (ref 8.1–13.5)
POTASSIUM SERPL-SCNC: 3.5 MMOL/L (ref 3.7–5.3)
PROTHROMBIN TIME: 14.1 SEC (ref 11.5–14.2)
RBC # BLD: 3.92 M/UL (ref 3.95–5.11)
RBC # BLD: ABNORMAL 10*6/UL
SEG NEUTROPHILS: 86 % (ref 36–65)
SEGMENTED NEUTROPHILS ABSOLUTE COUNT: 5.16 K/UL (ref 1.5–8.1)
SODIUM BLD-SCNC: 137 MMOL/L (ref 135–144)
TOTAL PROTEIN: 5.5 G/DL (ref 6.4–8.3)
WBC # BLD: 6 K/UL (ref 3.5–11.3)
WBC # BLD: ABNORMAL 10*3/UL

## 2021-09-01 PROCEDURE — 2580000003 HC RX 258: Performed by: EMERGENCY MEDICINE

## 2021-09-01 PROCEDURE — 80053 COMPREHEN METABOLIC PANEL: CPT

## 2021-09-01 PROCEDURE — 99222 1ST HOSP IP/OBS MODERATE 55: CPT | Performed by: INTERNAL MEDICINE

## 2021-09-01 PROCEDURE — 85610 PROTHROMBIN TIME: CPT

## 2021-09-01 PROCEDURE — 6370000000 HC RX 637 (ALT 250 FOR IP): Performed by: FAMILY MEDICINE

## 2021-09-01 PROCEDURE — 2580000003 HC RX 258: Performed by: FAMILY MEDICINE

## 2021-09-01 PROCEDURE — 82728 ASSAY OF FERRITIN: CPT

## 2021-09-01 PROCEDURE — 94761 N-INVAS EAR/PLS OXIMETRY MLT: CPT

## 2021-09-01 PROCEDURE — 83735 ASSAY OF MAGNESIUM: CPT

## 2021-09-01 PROCEDURE — 36415 COLL VENOUS BLD VENIPUNCTURE: CPT

## 2021-09-01 PROCEDURE — 85379 FIBRIN DEGRADATION QUANT: CPT

## 2021-09-01 PROCEDURE — 93010 ELECTROCARDIOGRAM REPORT: CPT | Performed by: INTERNAL MEDICINE

## 2021-09-01 PROCEDURE — 2700000000 HC OXYGEN THERAPY PER DAY

## 2021-09-01 PROCEDURE — 83615 LACTATE (LD) (LDH) ENZYME: CPT

## 2021-09-01 PROCEDURE — 2000000000 HC ICU R&B

## 2021-09-01 PROCEDURE — 85730 THROMBOPLASTIN TIME PARTIAL: CPT

## 2021-09-01 PROCEDURE — 93306 TTE W/DOPPLER COMPLETE: CPT

## 2021-09-01 PROCEDURE — 6360000002 HC RX W HCPCS: Performed by: FAMILY MEDICINE

## 2021-09-01 PROCEDURE — 85025 COMPLETE CBC W/AUTO DIFF WBC: CPT

## 2021-09-01 PROCEDURE — 85384 FIBRINOGEN ACTIVITY: CPT

## 2021-09-01 RX ORDER — GABAPENTIN 300 MG/1
600 CAPSULE ORAL NIGHTLY
Status: DISCONTINUED | OUTPATIENT
Start: 2021-09-01 | End: 2021-09-14 | Stop reason: HOSPADM

## 2021-09-01 RX ORDER — CARVEDILOL 6.25 MG/1
6.25 TABLET ORAL 2 TIMES DAILY WITH MEALS
Status: DISCONTINUED | OUTPATIENT
Start: 2021-09-02 | End: 2021-09-14 | Stop reason: HOSPADM

## 2021-09-01 RX ORDER — ZINC SULFATE 50(220)MG
50 CAPSULE ORAL DAILY
Status: DISCONTINUED | OUTPATIENT
Start: 2021-09-01 | End: 2021-09-14 | Stop reason: HOSPADM

## 2021-09-01 RX ORDER — ROPINIROLE 1 MG/1
1 TABLET, FILM COATED ORAL EVERY MORNING
Status: DISCONTINUED | OUTPATIENT
Start: 2021-09-01 | End: 2021-09-14 | Stop reason: HOSPADM

## 2021-09-01 RX ORDER — PRAVASTATIN SODIUM 20 MG
80 TABLET ORAL NIGHTLY
Status: DISCONTINUED | OUTPATIENT
Start: 2021-09-01 | End: 2021-09-14 | Stop reason: HOSPADM

## 2021-09-01 RX ORDER — POTASSIUM CHLORIDE 20 MEQ/1
40 TABLET, EXTENDED RELEASE ORAL PRN
Status: DISCONTINUED | OUTPATIENT
Start: 2021-09-01 | End: 2021-09-14 | Stop reason: HOSPADM

## 2021-09-01 RX ORDER — ROPINIROLE 1 MG/1
2 TABLET, FILM COATED ORAL NIGHTLY
Status: DISCONTINUED | OUTPATIENT
Start: 2021-09-01 | End: 2021-09-14 | Stop reason: HOSPADM

## 2021-09-01 RX ORDER — GABAPENTIN 300 MG/1
300 CAPSULE ORAL EVERY MORNING
Status: DISCONTINUED | OUTPATIENT
Start: 2021-09-01 | End: 2021-09-14 | Stop reason: HOSPADM

## 2021-09-01 RX ORDER — M-VIT,TX,IRON,MINS/CALC/FOLIC 27MG-0.4MG
1 TABLET ORAL DAILY
Status: DISCONTINUED | OUTPATIENT
Start: 2021-09-01 | End: 2021-09-14 | Stop reason: HOSPADM

## 2021-09-01 RX ORDER — ASPIRIN 81 MG/1
81 TABLET ORAL DAILY
Status: DISCONTINUED | OUTPATIENT
Start: 2021-09-01 | End: 2021-09-14 | Stop reason: HOSPADM

## 2021-09-01 RX ORDER — POTASSIUM CHLORIDE 7.45 MG/ML
10 INJECTION INTRAVENOUS PRN
Status: DISCONTINUED | OUTPATIENT
Start: 2021-09-01 | End: 2021-09-14 | Stop reason: HOSPADM

## 2021-09-01 RX ORDER — DULOXETIN HYDROCHLORIDE 60 MG/1
60 CAPSULE, DELAYED RELEASE ORAL DAILY
Status: DISCONTINUED | OUTPATIENT
Start: 2021-09-01 | End: 2021-09-14 | Stop reason: HOSPADM

## 2021-09-01 RX ORDER — VITAMIN B COMPLEX
2000 TABLET ORAL DAILY
Status: DISCONTINUED | OUTPATIENT
Start: 2021-09-01 | End: 2021-09-14 | Stop reason: HOSPADM

## 2021-09-01 RX ORDER — CARVEDILOL 12.5 MG/1
12.5 TABLET ORAL 2 TIMES DAILY WITH MEALS
Status: DISCONTINUED | OUTPATIENT
Start: 2021-09-01 | End: 2021-09-01

## 2021-09-01 RX ORDER — OYSTER SHELL CALCIUM WITH VITAMIN D 500; 200 MG/1; [IU]/1
2 TABLET, FILM COATED ORAL DAILY
Status: DISCONTINUED | OUTPATIENT
Start: 2021-09-01 | End: 2021-09-14 | Stop reason: HOSPADM

## 2021-09-01 RX ORDER — 0.9 % SODIUM CHLORIDE 0.9 %
1000 INTRAVENOUS SOLUTION INTRAVENOUS ONCE
Status: COMPLETED | OUTPATIENT
Start: 2021-09-01 | End: 2021-09-01

## 2021-09-01 RX ORDER — PANTOPRAZOLE SODIUM 40 MG/1
40 TABLET, DELAYED RELEASE ORAL DAILY
Status: DISCONTINUED | OUTPATIENT
Start: 2021-09-01 | End: 2021-09-14 | Stop reason: HOSPADM

## 2021-09-01 RX ADMIN — ENOXAPARIN SODIUM 40 MG: 40 INJECTION SUBCUTANEOUS at 09:05

## 2021-09-01 RX ADMIN — SODIUM CHLORIDE, PRESERVATIVE FREE 10 ML: 5 INJECTION INTRAVENOUS at 09:09

## 2021-09-01 RX ADMIN — PIPERACILLIN AND TAZOBACTAM 3375 MG: 3; .375 INJECTION, POWDER, FOR SOLUTION INTRAVENOUS at 05:31

## 2021-09-01 RX ADMIN — PRAVASTATIN SODIUM 80 MG: 20 TABLET ORAL at 20:08

## 2021-09-01 RX ADMIN — SODIUM CHLORIDE, PRESERVATIVE FREE 10 ML: 5 INJECTION INTRAVENOUS at 20:08

## 2021-09-01 RX ADMIN — ENOXAPARIN SODIUM 40 MG: 40 INJECTION SUBCUTANEOUS at 20:08

## 2021-09-01 RX ADMIN — ACETAMINOPHEN 650 MG: 325 TABLET ORAL at 20:09

## 2021-09-01 RX ADMIN — ROPINIROLE HYDROCHLORIDE 2 MG: 1 TABLET, FILM COATED ORAL at 20:09

## 2021-09-01 RX ADMIN — ZINC SULFATE 220 MG (50 MG) CAPSULE 50 MG: CAPSULE at 09:04

## 2021-09-01 RX ADMIN — DEXAMETHASONE 6 MG: 4 TABLET ORAL at 00:09

## 2021-09-01 RX ADMIN — Medication 2000 UNITS: at 09:03

## 2021-09-01 RX ADMIN — ASPIRIN 81 MG: 81 TABLET, COATED ORAL at 09:04

## 2021-09-01 RX ADMIN — GABAPENTIN 300 MG: 300 CAPSULE ORAL at 09:04

## 2021-09-01 RX ADMIN — SODIUM CHLORIDE 1000 ML: 9 INJECTION, SOLUTION INTRAVENOUS at 02:38

## 2021-09-01 RX ADMIN — PIPERACILLIN AND TAZOBACTAM 3375 MG: 3; .375 INJECTION, POWDER, FOR SOLUTION INTRAVENOUS at 14:53

## 2021-09-01 RX ADMIN — CARVEDILOL 12.5 MG: 12.5 TABLET, FILM COATED ORAL at 17:03

## 2021-09-01 RX ADMIN — CALCIUM CARBONATE-VITAMIN D TAB 500 MG-200 UNIT 2 TABLET: 500-200 TAB at 09:05

## 2021-09-01 RX ADMIN — ENOXAPARIN SODIUM 30 MG: 30 INJECTION SUBCUTANEOUS at 00:10

## 2021-09-01 RX ADMIN — SODIUM CHLORIDE: 9 INJECTION, SOLUTION INTRAVENOUS at 11:54

## 2021-09-01 RX ADMIN — MULTIPLE VITAMINS W/ MINERALS TAB 1 TABLET: TAB at 09:04

## 2021-09-01 RX ADMIN — PANTOPRAZOLE SODIUM 40 MG: 40 TABLET, DELAYED RELEASE ORAL at 09:04

## 2021-09-01 RX ADMIN — ROPINIROLE HYDROCHLORIDE 2 MG: 1 TABLET, FILM COATED ORAL at 00:09

## 2021-09-01 RX ADMIN — SODIUM CHLORIDE: 9 INJECTION, SOLUTION INTRAVENOUS at 00:11

## 2021-09-01 RX ADMIN — ROPINIROLE HYDROCHLORIDE 1 MG: 1 TABLET, FILM COATED ORAL at 09:04

## 2021-09-01 RX ADMIN — DULOXETINE HYDROCHLORIDE 60 MG: 60 CAPSULE, DELAYED RELEASE ORAL at 09:04

## 2021-09-01 RX ADMIN — GABAPENTIN 600 MG: 300 CAPSULE ORAL at 20:09

## 2021-09-01 RX ADMIN — SODIUM CHLORIDE: 9 INJECTION, SOLUTION INTRAVENOUS at 20:07

## 2021-09-01 ASSESSMENT — PAIN SCALES - GENERAL
PAINLEVEL_OUTOF10: 0

## 2021-09-01 NOTE — PROGRESS NOTES
Spoke with Patient via telephone conversation this p.m. re: discharge planning. Patient is a 66year old , white female, admitted with a diagnosis of Pneumonia due to Covid 19 virus. Patient is alert and oriented, polite and cooperative during this assessment. States that she wishes to be discharged home with [facility] to resume services. [place]. Has the support of her daughter and son. Patient uses a walker, cane, walk in tub with built in shower seat and a raised toilet seat for assistance. Patient was discharged home with [facility] from a recent hospitalization. Would like to return home with [facility] upon discharge. Patient ordinarily relies on herself for her transportation needs and household tasks but at this point she has been relying on her daughter for assistance with this. PCP is Debbie Myrick MD.  Patient has Danette Moritz and denies having any difficulty with regards to affording her medications. Discharge plan is home with [facility] when deemed medically stable. Patient also discharged with order for home oxygen. She believes that oxygen supplier is [facility].  No further anticipated needs or concerns identified by Patient at this time. Patient has '[address]' order in place and reports that she does have medical directives in place as well. LSW to monitor for any further needs or concerns and assist with needs as they arise.     [address]  9/1/2021

## 2021-09-01 NOTE — CONSULTS
Spoke with Haydee via phone. States that she has a living will and [address] at home but the document is old and needs updated. Discussed OP ACP document completion. She wishes for her daughter and son to be her decision makers. ACP activator note completed. Haydee signed a DNR-CCA this morning and was clear that she did not want intubation or CPR. Denies further needs.      [address] Nurse Coordinator  9/1/2021 12:47 PM

## 2021-09-01 NOTE — PROGRESS NOTES
Patient transferred to [facility] per cart from ER. Patient states unable to transfer to bed from cot. Assisted x 2 staff. See noted VS and assessment. Respirations noted dyspnea on exertion, but unlabored at rest.  Patient denies any nausea, pain. Complaints of just feeling weak and tired. Patient alert and oriented x 4 but does not want to answer questions all the time. Patient had cervical fusion surgery 8/17/2021 and has steri strips to incision site to anterior left neck which clean, dry and intact without any drainage or redness.  Continue to monitor

## 2021-09-01 NOTE — PLAN OF CARE
Problem: Airway Clearance - Ineffective  Goal: Achieve or maintain patent airway  9/1/2021 0931 by Deborah Edward RN  Outcome: Ongoing  Note: Patient able to maintain a patent airway independently. 9/1/2021 0111 by Vinicio Narayanan RN  Outcome: Ongoing  Note: Lung sounds diminished throughout with high flow nasal cannula at 8LPM with SaO2 90-93%. Continue to monitor  9/1/2021 0040 by Vinicio Narayanan RN  Outcome: Ongoing  Note: Patient is diminished throughout lung fields. Pt is currently on high flow nasal cannula at 8LPM and having SaO2 of 91-93%. Continue to monitor     Problem: Gas Exchange - Impaired  Goal: Absence of hypoxia  9/1/2021 0931 by Deborah Edward RN  Outcome: Ongoing  Note: Oxygen saturation maintained >92% with patient on 5 liters via nasal cannula. 9/1/2021 0111 by Vinicio Narayanan RN  Outcome: Ongoing  Note: Lung sounds diminished throughout with high flow nasal cannula at 8LPM with SaO2 90-93%. Continue to monitor  9/1/2021 0040 by Vinicio Narayanan RN  Outcome: Ongoing  Note: Patient is currently on high flow nasal cannula 8LPM with SaO2  Goal: Promote optimal lung function  9/1/2021 0931 by Deborah Edward RN  Outcome: Ongoing  Note: Patient encouraged use of acapella every hour while awake. 9/1/2021 0111 by Vinicio Narayanan RN  Outcome: Ongoing  9/1/2021 0040 by Vinicio Narayanan RN  Outcome: Ongoing     Problem: Breathing Pattern - Ineffective  Goal: Ability to achieve and maintain a regular respiratory rate  9/1/2021 0931 by Deborah Edward RN  Outcome: Ongoing  Note: Patient with dyspnea on exertion when up to commode. After couple minutes of rest respirations unlabored. 9/1/2021 0111 by Vinicio Narayanan RN  Outcome: Ongoing  Note: Lung sounds diminished throughout with high flow nasal cannula at 8LPM with SaO2 90-93%. Respirations unlabored at rest, dyspnea on exertion noted. Continue to monitor  9/1/2021 0040 by Vinicio Narayanan RN  Outcome: Ongoing     Problem:  Body Deficit  Goal: Maintain normal heart rhythm  9/1/2021 0931 by Hubert Clancy RN  Outcome: Ongoing  Note: Cardiac monitor shows NSR. Will continue to monitor HR closely. 9/1/2021 0111 by Peyton Baltazar RN  Outcome: Ongoing  Note: Monitor shows patient is RSR in the 70's. Continue to monitor  9/1/2021 0040 by Peyton Baltazar RN  Outcome: Ongoing  Goal: Maintain absence of muscle cramping  9/1/2021 0931 by Hubert Clancy RN  Outcome: Ongoing  9/1/2021 0111 by Peyton Baltazar RN  Outcome: Ongoing  9/1/2021 0040 by Peyton Baltazar RN  Outcome: Ongoing  Goal: Maintain normal serum potassium, sodium, calcium, phosphorus, and pH  9/1/2021 0931 by Hubert Clancy RN  Outcome: Ongoing  Note: Labs drawn daily with results reported to Dr. Gabby Meyers. 9/1/2021 0111 by Peyton Baltazar RN  Outcome: Ongoing  Note: Blood work ordered daily as per Doctor  9/1/2021 0040 by Peyton Baltazar RN  Outcome: Ongoing     Problem: Loneliness or Risk for Loneliness  Goal: Demonstrate positive use of time alone when socialization is not possible  9/1/2021 0931 by Hubert Clancy RN  Outcome: Ongoing  9/1/2021 0111 by Peyton Baltazar RN  Outcome: Ongoing  Note: Encourage patient to call family and friends. Encouraged patient to talk, but patient is currently feeling too tired at this time  9/1/2021 0040 by Peyton Baltazar RN  Outcome: Ongoing     Problem: Fatigue  Goal: Verbalize increase energy and improved vitality  9/1/2021 0931 by Hubert Clancy RN  Outcome: Ongoing  Note: Patient verbalized that she felt better this morning. Will continue to offer periods of rest.  9/1/2021 0111 by Peyton Baltazar RN  Outcome: Ongoing  Note: Patient just admitted and feeling very fatigued.   Continue to monitor  9/1/2021 0040 by Peyton Baltazar RN  Outcome: Ongoing     Problem: Patient Education: Go to Patient Education Activity  Goal: Patient/Family Education  9/1/2021 0931 by Hubert Clancy RN  Outcome: Ongoing  Note: Patient kept up to date on plan of care. 9/1/2021 0111 by Felx Alcala RN  Outcome: Ongoing  9/1/2021 0040 by Flex Alcala RN  Outcome: Ongoing     Problem: Falls - Risk of:  Goal: Will remain free from falls  Description: Will remain free from falls  9/1/2021 0931 by Leah Hatfield RN  Outcome: Ongoing  Note: Patient at high risk for falls. Fall precautions in place. Call light within reach at all times. 9/1/2021 0111 by Flex Alcala RN  Outcome: Ongoing  Note: Bed alarm on. Bed in low position and wheels locked. Call light in reach. Falling star posted on door. Yellow bracelet on. Non skid socks on. Side rails up. Will continue to assess.    9/1/2021 0040 by Flex Alcala RN  Outcome: Ongoing  Goal: Absence of physical injury  Description: Absence of physical injury  9/1/2021 0931 by Leah Hatfield RN  Outcome: Met This Shift  9/1/2021 0111 by Flex Alcala RN  Outcome: Ongoing  9/1/2021 0040 by Flex Alcala RN  Outcome: Ongoing

## 2021-09-01 NOTE — PROGRESS NOTES
Comprehensive Nutrition Assessment    Type and Reason for Visit:  Initial, Positive Nutrition Screen    Nutrition Recommendations/Plan:  Encourage oral intakes    Nutrition Assessment:  Altered nutrition related lab values r/t endocrine dysfunction, AEB hyperglycemia in presence of steroid. Denies loss of taste/smell, and reports relative weight stability 190-200# of late. Recent increases in weight to 204# may be r/t recent neck surgery and surgical fluids provided then. Has continued increased energy needs for surgical healing, as well as COVID. Meal intakes suboptimal currently and is agreeable to supplement (preferring vanilla). Malnutrition Assessment:  Malnutrition Status: At risk for malnutrition (Comment)    Context:  Acute Illness     Findings of the 6 clinical characteristics of malnutrition:  Energy Intake:  Mild decrease in energy intake (Comment) (3 days per patient)  Weight Loss:  No significant weight loss     Body Fat Loss:  Unable to assess     Muscle Mass Loss:  Unable to assess    Fluid Accumulation:  No significant fluid accumulation     Strength:  Not Performed    Estimated Daily Nutrient Needs:  Energy (kcal):  6090-3259 (18-20); Weight Used for Energy Requirements:  Current     Protein (g):  80-92 (1.3-1.5); Weight Used for Protein Requirements:  Ideal        Fluid (ml/day):  1800; Method Used for Fluid Requirements:  1 ml/kcal      Nutrition Related Findings:  tired sounding      Wounds:  Surgical Incision       Current Nutrition Therapies:    ADULT DIET; Regular  Adult Oral Nutrition Supplement; Other Oral Supplement; Two Alex HN    Anthropometric Measures:  · Height: 5' 7\" (170.2 cm)  · Current Body Weight: 197 lb 12.8 oz (89.7 kg)   · Admission Body Weight: 204 lb (92.5 kg)    · Usual Body Weight:  (190-200#)     · Ideal Body Weight: 135 lbs; % Ideal Body Weight 146.5 %   · BMI: 31  · Adjusted Body Weight:  ; No Adjustment   · BMI Categories: Obese Class 1 (BMI 30.0-34. 9) Nutrition Diagnosis:   · Altered nutrition-related lab values related to endocrine dysfuntion as evidenced by lab values    Lab Results   Component Value Date     09/01/2021    K 3.5 (L) 09/01/2021     09/01/2021    CO2 21 09/01/2021    BUN 31 (H) 09/01/2021    CREATININE 0.92 (H) 09/01/2021    GLUCOSE 139 (H) 09/01/2021    CALCIUM 7.9 (L) 09/01/2021    PROT 5.5 (L) 09/01/2021    LABALBU 2.8 (L) 09/01/2021    BILITOT 0.42 09/01/2021    ALKPHOS 64 09/01/2021    AST 21 09/01/2021    ALT 14 09/01/2021    LABGLOM >60 09/01/2021    GFRAA >60 09/01/2021    GLOB NOT REPORTED 05/08/2016     Lab Results   Component Value Date    LABA1C 5.2 05/07/2016     Lab Results   Component Value Date     05/07/2016     No results found for: VITD25    Nutrition Interventions:   Food and/or Nutrient Delivery:  Continue Current Diet, Start Oral Nutrition Supplement  Nutrition Education/Counseling:  Education initiated   Coordination of Nutrition Care:  Continue to monitor while inpatient    Goals:  PO >75% meals and supplements       Nutrition Monitoring and Evaluation:   Behavioral-Environmental Outcomes:  None Identified   Food/Nutrient Intake Outcomes:  Food and Nutrient Intake, Supplement Intake  Physical Signs/Symptoms Outcomes:  Biochemical Data, Weight     Discharge Planning:     Too soon to determine     Electronically signed by Giselle Ventura RD, LD on 9/1/21 at 12:05 PM EDT    Contact: [phone]

## 2021-09-01 NOTE — PROGRESS NOTES
Pharmacy Note  COVID-19 Anticoagulation Adjustment    Galen Gilliland is a 66 y.o. female. Pharmacist assessment of anticoagulation in a SARS-CoV-2 positive patient. Recent Labs     08/31/21  1818 09/01/21  0520   BUN 37* 31*       Recent Labs     08/31/21  1818 09/01/21  0520   CREATININE 0.89 0.92*     Recent Labs     09/01/21 0520   DDIMER 0.77*        Estimated Creatinine Clearance: 64 mL/min (A) (based on SCr of 0.92 mg/dL (H)). Height:   Ht Readings from Last 1 Encounters:   08/31/21 5' 7\" (1.702 m)     Weight:  Wt Readings from Last 1 Encounters:   09/01/21 197 lb 12.8 oz (89.7 kg)     BMI:  Body mass index is 30.98 kg/m².       Per the [facility] Anticoagulation Algorithm for COVID-19 positive or clinically-suspected patients, the following adjustment has been made per P&T Guidelines:             Lovenox adjusted to 40 mg sq bid for BMI > 30 kg/m2    Thank you,  Jayne Ruiz., 9/1/2021, 6:55 AM

## 2021-09-01 NOTE — CONSULTS
[facility] - Initial Consult Note COVID 19 Patient  Today's Date and Time: 9/1/2021, 4:00 PM    Impression :   · COVID 19 Suspect  · COVID 19 Confirmed Infection- Pneumonia  · Covid tests:  · 8/21/2021 : Positive  · 8-23-21: Positive  · 8-31-21: Positive  · Hypoxia   · Hypotension- resolved  · Hx Fibromyalgia  · Essential HTN  Recommendations:   · Antibiotic Rx:  · Monitor off antibiotics  · Covid Rx  · Remdesivir. Com[place] at [facility] [place]  · Decadron. Restart on 9/1/2021  · Prior cervical ACDF for degenerative disk disease at [address] [place] [address] on 8-17-21. Medical Decision Making/Summary/Discussion:9/1/2021     · Patient admitted with suspected COVID 19 infection  · Covid test confirmed positive. · Patient with initial diagnosis of COVID-19 on 8/21/2021. Received partial treatment with remdesivir and Decadron at [facility].  Was sent home on oral Decadron. Initial chest x-ray shows some bibasilar atelectasis. · Patient has deteriorated since then and current chest x-ray on admission to [facility] shows bilateral interstitial infiltrates. CT scan shows multifocal pneumonia. · Patient has developed significant hypoxia. and elevated CRP  · At this point in time remdesivir will no longer be efficacious  · Patient will need to continue with Decadron  · Unfortunately she is already out of the window for Actemra. She also does not qualify for monoclonal antibody. · She will need to be monitored for any potential bacterial superinfection  · Patient is at high risk for a complicated course.     Infection Control Recommendations   · Universal Precautions  · Airborne isolation  · Droplet Isolation    Antimicrobial Stewardship Recommendations       · Discontinuation of therapy  Coordination of Outpatient Care:   · Estimated Length of IV antimicrobials:TBD  · Patient will need Midline Catheter Insertion: TBD  · Patient will need PICC line Insertion: No  · Patient will need: Home IV , [facility],  SNF,  LTAC:TBD  · Patient will need outpatient wound care:No    Chief complaint/reason for consultation:   · Concern for COVID infection      History of Present Illness:   Hailey Nguyen is a 66y.o.-year-old female who was initially admitted on 8/31/2021. Patient seen at the request of Vito Castillo. INITIAL HISTORY:    Patient has a history of degenerative disc disease in the cervical area. She underwent an ACDF (anterior cervical discectomy and fusion) on 8/17/2021 at [facility] was discharged home the next day with a drain. A few days later she developed malaise, fevers, and some shortness of breath. She was evaluated at that point in time at [facility] were her temperature was noted to be 103 F and she had a positive Covid test on 8/23/2021. She was transferred back to [facility] where she was treated conservatively in regards to her cervical neck incision. The neck incision was described as being clean and dry. The drain was removed on 8/22/2021. She also received treatment for the Covid with remdesivir and Decadron. Her symptoms improved and she was discharged home on 8/25/2021 with a recommendation to continue Decadron for 3 more days. While at [facility] she also showed desaturation when off oxygen, therefore she was discharged on home oxygen with a pulse oximeter and request to follow with pulmonary medicine. There are no data on CRP or other inflammatory markers. Her chest x-ray on 8/21/2021 showed minimal bibasilar atelectasis. CT scan was not done. The patient then presented to [facility] on 8/31/2021 complaining of generalized fatigue, decreased appetite, weakness, cough, mild shortness of breath. She denied any signs of inflammation around her previous cervical neck incision. She was noted to have hypoxia with an O2 saturation of 75 on room air.   CT scan of the chest was performed which shows multifocal pneumonia with findings compatible with Covid pneumonia. Patient admitted because of concerns with COVID 19.    CURRENT EVALUATION : 9/1/2021    Afebrile  VS stable    Patient exhibiting respiratory distress. Respiratory secretions:     Patient receiving supplemental oxygen. 02 sat 92  RR 24      % FIO2:   PEEP:      QTc:       NEWS Score: 0-4 Low risk group; 5-6: Medium risk group; 7 or above: High risk group  Parameters 3 2 1 0 1 2 3   Age    < 65   ? 65   RR ? 8  9-11 12-20  21-24 ? 25   O2 Sats ? 91 92-93 94-95 ? 96      Suppl O2  Yes  No      SBP ? 90  101-110 111-219   ? 220   HR ? 40  41-50 51-90  111-130 ? 131   Consciousness    Alert   Drowsiness, lethargy, or confusion   Temperature ? 35.0 C (95.0 F)  35.1-36.0 C 95.1-96.9 F 36.1-38.0 C 97.0-100.4 F 38.1-39.0 C 100.5-102.3 F ? 39.1 C ? 102.4 F      NEWS Score:   9/1/2021: 9 high risk    Overall Daily Picture:    Worsening    Presence of secondary bacterial Infection:    No   Additional antibiotics: No    Labs, X rays reviewed: 9/1/2021    BUN: 31  Cr: 0.92    WBC: 6.0  Hb: 12.1  Plat: 173    Absolute Neutrophils: 5.16  Absolute Lymphocytes: 0.48  Neutrophil/Lymphocyte Ratio: 10.7 high risk    CRP: 181.5  Ferritin:  LDH: 250    Pro Calcitonin:      Cultures:  Urine:  ·   Blood:  · 3131x2 no growth  Sputum :  ·   Wound:       CXR:     CAT:  · 8-31-21: Multifocal pneumonia compatible with Covid 19    MRSA nasal screen 8/22/2021 -    Discussed with patient, RN, CC, IM. I have personally reviewed the past medical history, past surgical history, medications, social history, and family history, and I have updated the database accordingly.   Past Medical History:     Past Medical History:   Diagnosis Date    Arthritis     Blockage of coronary artery of heart ([facility] [address] 2010    x 2 stents    CAD (coronary artery disease)     DDD (degenerative disc disease), cervical     cervical 3-6    Fibromyalgia     History of blood transfusion     History of kidney stones last one \"about 10 years ago\"    Hyperlipidemia     Hypertension     Pancreatitis 2016    Sleep apnea     Thyroid disease     Wears glasses     for reading       Past Surgical  History:     Past Surgical History:   Procedure Laterality Date    ARTHROPLASTY Left 2/6/2020    LEFT FIRST CARPOMETACARPAL ARTHROPLASTY performed by Geno Mcduffie DO at [address]  2012    L4,L5,S1 hardware placed    BACK SURGERY  2018    SPINAL CORD STIMULATOR    CATARACT REMOVAL Bilateral 2016    CERVICAL FUSION N/A 8/17/2021    C3-6 ACDF, REMOVAL OF HARDWARE C6-7 performed by Nathalie Yepez MD at [address]    COLONOSCOPY      normal    CORONARY ANGIOPLASTY  2010    x 2 stents    FINGER SURGERY      right thumb joint    HERNIA REPAIR Right     inguinal    HYSTERECTOMY      JOINT REPLACEMENT Right     knee    KIDNEY STONE SURGERY      x 3    LITHOTRIPSY      x 3    PARATHYROIDECTOMY      PARATHYROIDECTOMY      ME PERCUT IMPLNT NEUROELECT,EPIDURAL N/A 8/8/2018    SPINAL CORD STIMULATOR IMPLANT performed by Kevyn Cantu MD at [facility] Right 03/2019       Medications:      enoxaparin  40 mg SubCUTAneous BID    aspirin  81 mg Oral Daily    calcium-vitamin D  2 tablet Oral Daily    carvedilol  12.5 mg Oral BID WC    DULoxetine  60 mg Oral Daily    gabapentin  600 mg Oral Nightly    gabapentin  300 mg Oral QAM    therapeutic multivitamin-minerals  1 tablet Oral Daily    pantoprazole  40 mg Oral Daily    pravastatin  80 mg Oral Nightly    rOPINIRole  1 mg Oral QAM    rOPINIRole  2 mg Oral Nightly    Vitamin D  2,000 Units Oral Daily    zinc sulfate  50 mg Oral Daily    vancomycin  1,500 mg IntraVENous Q24H    sodium chloride flush  10 mL IntraVENous 2 times per day    dexamethasone  6 mg Oral Daily    piperacillin-tazobactam  3,375 mg IntraVENous Q8H    gabapentin  600 mg Oral Once    vancomycin (VANCOCIN) intermittent dosing (placeholder)   Other RX Placeholder       Social History:     Social History     Socioeconomic History    Marital status:      Spouse name: Not on file    Number of children: Not on file    Years of education: Not on file    Highest education level: Not on file   Occupational History    Not on file   Tobacco Use    Smoking status: Former Smoker     Packs/day: 1.00     Years: 44.00     Pack years: 44.00     Types: Cigarettes     Quit date: 2010     Years since quittin.2    Smokeless tobacco: Never Used   Vaping Use    Vaping Use: Never used   Substance and Sexual Activity    Alcohol use: No    Drug use: No    Sexual activity: Not on file   Other Topics Concern    Not on file   Social History Narrative    Not on file     Social Determinants of Health     Financial Resource Strain:     Difficulty of Paying Living Expenses:    Food Insecurity:     Worried About [address] in the Last Year:     [email or web address] in the Last Year:    Transportation Needs:     Lack of Transportation (Medical):  Lack of Transportation (Non-Medical):    Physical Activity:     Days of Exercise per Week:     Minutes of Exercise per Session:    Stress:     Feeling of Stress :    Social Connections:     Frequency of Communication with Friends and Family:     Frequency of Social Gatherings with Friends and Family:     Attends Protestant Services:     Active Member of Clubs or Organizations:     Attends Club or Organization Meetings:     Marital Status:    Intimate Partner Violence:     Fear of Current or Ex-Partner:     Emotionally Abused:     Physically Abused:     Sexually Abused:        Family History:     Family History   Problem Relation Age of Onset    Heart Disease Father         Allergies:   Lisinopril and Toradol [ketorolac tromethamine]     Review of Systems:       Constitutional: No fevers or chills.  No systemic complaints  Head: No headaches  Eyes: No double vision or blurry vision. No conjunctival inflammation. ENT: No sore throat or runny nose. . No hearing loss, tinnitus or vertigo. Cardiovascular: No chest pain or palpitations. Shortness of breath. LOW  Lung: Shortness of breath, cough. No sputum production  Abdomen: No nausea, vomiting, diarrhea, or abdominal pain. Sukhwinder Luo No cramps. Genitourinary: No increased urinary frequency, or dysuria. No hematuria. No suprapubic or CVA pain  Musculoskeletal: No muscle aches or pains. No joint effusions, swelling or deformities  Hematologic: No bleeding or bruising. Neurologic: No headache, weakness, numbness, or tingling. Integument: No rash, no ulcers. Psychiatric: No depression. Endocrine: No polyuria, no polydipsia, no polyphagia. Physical Examination :     Patient Vitals for the past 8 hrs:   BP Temp Temp src Pulse Resp SpO2   09/01/21 1500 118/72 98.7 °F (37.1 °C) Temporal 80 20 93 %   09/01/21 1400 (!) 152/75 -- -- 78 22 93 %   09/01/21 1200 (!) 109/49 -- -- 76 22 98 %   09/01/21 1100 116/60 98.4 °F (36.9 °C) Temporal 76 22 94 %   09/01/21 1000 119/68 -- -- 75 18 98 %   09/01/21 0930 (!) 118/53 -- -- 76 20 99 %   09/01/21 0900 (!) 97/55 -- -- 71 21 97 %   09/01/21 0830 (!) 97/54 -- -- 73 18 99 %     General Appearance: Awake, alert, and in mild apparent distress  Head:  Normocephalic, no trauma  Eyes: Pupils equal, round, reactive to light; sclera anicteric; conjunctivae pink. No embolic phenomena. ENT: Oropharynx clear, without erythema, exudate, or thrush. No tenderness of sinuses. Mouth/throat: mucosa pink and moist. No lesions. Dentition in good repair. Neck:Supple, without lymphadenopathy. Thyroid normal, No bruits. Pulmonary/Chest: Coarse breath sounds with bilateral rhonchi. On nasal oxygen. Cardiovascular: Regular rate and rhythm without murmurs, rubs, or gallops. Abdomen: Soft, non tender. Bowel sounds normal. No organomegaly  All four Extremities: No cyanosis, clubbing, edema, or effusions.   Neurologic: No gross sensory or motor deficits. Skin: Warm and dry with good turgor. No signs of peripheral arterial or venous insufficiency. No ulcerations. No open wounds. Medical Decision Making -Laboratory:   I have independently reviewed/ordered the following labs:    CBC with Differential:   Recent Labs     08/31/21 1818 09/01/21  0520   WBC 6.2 6.0   HGB 15.8* 12.1   HCT 46.2 36.7    173   LYMPHOPCT 5* 8*   MONOPCT 3 6     BMP:   Recent Labs     08/31/21 1818 09/01/21  0520    137   K 3.7 3.5*    105   CO2 20 21   BUN 37* 31*   CREATININE 0.89 0.92*   MG  --  2.1     Hepatic Function Panel:   Recent Labs     08/31/21 1818 09/01/21  0520   PROT 7.6 5.5*   LABALBU 3.4* 2.8*   BILITOT 0.49 0.42   ALKPHOS 90 64   ALT 17 14   AST 30 21     No results for input(s): RPR in the last 72 hours. No results for input(s): HIV in the last 72 hours. No results for input(s): BC in the last 72 hours. Lab Results   Component Value Date    MUCUS NOT REPORTED 10/04/2020    RBC 3.92 09/01/2021    TRICHOMONAS NOT REPORTED 10/04/2020    WBC 6.0 09/01/2021    YEAST NOT REPORTED 10/04/2020    TURBIDITY CLEAR 08/31/2021     Lab Results   Component Value Date    CREATININE 0.92 09/01/2021    GLUCOSE 139 09/01/2021       Medical Decision Making-Imaging:     EXAMINATION:   CTA OF THE CHEST 8/31/2021 8:26 pm       TECHNIQUE:   CTA of the chest was performed after the administration of intravenous   contrast.  Multiplanar reformatted images are provided for review.  MIP   images are provided for review.  Dose modulation, iterative reconstruction,   and/or weight based adjustment of the mA/kV was utilized to reduce the   radiation dose to as low as reasonably achievable.       COMPARISON:   08/30/2021 and 08/21/2021       HISTORY:   ORDERING SYSTEM PROVIDED HISTORY: elevated d dimer and hypoxia   TECHNOLOGIST PROVIDED HISTORY:   elevated d dimer and hypoxia   Decision Support Exception - unselect if not a suspected or confirmed emergency medical condition->Emergency Medical Condition (MA)       FINDINGS:   Pulmonary Arteries: No evidence of central, lobar or proximal segmental   pulmonary artery emboli.       Mediastinum: Cardiomegaly.  Coronary disease identified.  Trace pericardial   fluid.  No suspicious mediastinal or hilar adenopathy identified per CT   criteria.  Aortic vascular calcifications.       Lungs/pleura: Multifocal interstitial alveolar opacities throughout both   lungs with a subpleural distribution suggestive of multifocal atypical   infection/viral pneumonia.  Bibasilar consolidative changes suggestive of   areas of atelectasis.       Upper Abdomen: Multiple circumscribed lesions involving the right left   hepatic lobe suggestive of cyst..       Soft Tissues/Bones: Multilevel degenerate changes thoracic spine.  Spinal   stimulator leads identified           Impression   No evidence of central to segmental pulmonary emboli.       Multifocal airspace opacities suggestive multifocal atypical infection/viral   pneumonia.       Cardiomegaly with coronary artery disease       Medical Decision [id number]:       Medical Decision Making-Other:     Note:  · Labs, medications, radiologic studies were reviewed with personal review of films  · Large amounts of data were reviewed  · Discussed with nursing Staff, Discharge planner  · Infection Control and Prevention measures reviewed  · All prior entries were reviewed  · Administer medications as ordered  · Prognosis: Guarded  · Discharge planning reviewed  · Follow up as outpatient. Thank you for allowing us to participate in the care of this patient. Please call with questions.     Cherie Epps MD  Pager: [phone] - Office: [phone]

## 2021-09-01 NOTE — PROGRESS NOTES
Pt called out requesting to go back to bed. Pt up to [facility] and then to bed with one assist. Pt tolerated fairly well. SpO2 dropped to 84% with ambulation but was able to recover fairly quickly. SOB noted with exertion. Assessment and vital signs as charted. Pt denies pain and is A & O x 4. Nasal canula on at 5L. Cardiac monitor continues to show NSR. Pt was able to do the acapella. Writer educated on proning. Pt unable to prone d/t recent neck surgery. Pt able to move from side to side in bed. Pt currently positioned to right side per self. Pt denies any other needs at this time. Bed alarm on. Call light in reach. Will continue to monitor.

## 2021-09-01 NOTE — ED NOTES
Talked with daughter, Vj Engg to update on patient care. Daughter states will leave, but wants to be called by provider for update.      Nataliya Padilla RN  08/31/21 2036

## 2021-09-01 NOTE — H&P
[facility]  History and Physical        Patient:  Kaylynn Nguyen  MRN: [id number]    Chief Complaint:    Chief Complaint   Patient presents with    Fatigue     ongoing a couple days, states \"im going into sepsis again\", recent neck surgery and covid diagnosis     Shortness of Breath       History Obtained From:  patient, electronic medical record  PCP: Alvaro Rosales MD    History of Present Illness: The patient is a 66 y.o. female with h/o cad,fibromyalgia,hypertension,sleep apnea who presents with shortness of breath,cough and hypotension to er. She had ner neck surgery few wks ago ,came to er on 8/22/2021 with shortness of breath. She was diagnosed with covid pneumonia and treated at [facility] for covid 19 infection with Remdezevir,steroids,oxygen and discharged on home oxygen. She started feeling worse 2 days ago and came to er for shortness of breath. She was hypoxic and hypotensive and admitted for probable sepsis and covid 19 pneumonia. Her BP has improved with fluid bolus and currently on 4 lit oxygen.     Past Medical History:        Diagnosis Date    Arthritis     Blockage of coronary artery of heart ([address] 2010    x 2 stents    CAD (coronary artery disease)     DDD (degenerative disc disease), cervical     cervical 3-6    Fibromyalgia     History of blood transfusion     History of kidney stones     last one \"about 10 years ago\"    Hyperlipidemia     Hypertension     Pancreatitis 2016    Sleep apnea     Thyroid disease     Wears glasses     for reading       Past Surgical History:        Procedure Laterality Date    ARTHROPLASTY Left 2/6/2020    LEFT FIRST CARPOMETACARPAL ARTHROPLASTY performed by Kary Tariq DO at [address]  2012    L4,L5,S1 hardware placed    BACK SURGERY  2018    SPINAL CORD STIMULATOR    CATARACT REMOVAL Bilateral 2016    CERVICAL FUSION N/A 8/17/2021    C3-6 ACDF, REMOVAL OF HARDWARE C6-7 performed by Calli Delcid Wellington Koehler MD at [address]    COLONOSCOPY      normal    CORONARY ANGIOPLASTY  2010    x 2 stents    FINGER SURGERY      right thumb joint    HERNIA REPAIR Right     inguinal    HYSTERECTOMY      JOINT REPLACEMENT Right     knee    KIDNEY STONE SURGERY      x 3    LITHOTRIPSY      x 3    PARATHYROIDECTOMY      PARATHYROIDECTOMY      NC PERCUT IMPLNT NEUROELECT,EPIDURAL N/A 8/8/2018    SPINAL CORD STIMULATOR IMPLANT performed by Jason Rosario MD at [address] Right 03/2019       Medications Prior to Admission:    Prior to Admission medications    Medication Sig Start Date End Date Taking? Authorizing Provider   gabapentin (NEURONTIN) 300 MG capsule Take 600 mg by mouth nightly.    Yes Historical Provider, MD   rOPINIRole (REQUIP) 2 MG tablet Take 2 mg by mouth nightly   Yes Historical Provider, MD   Vitamin D (CHOLECALCIFEROL) 50 MCG (2000 UT) TABS tablet Take 1 tablet by mouth daily 8/26/21  Yes Gina Wilson MD   zinc sulfate (ZINCATE) 220 (50 Zn) MG capsule Take 1 capsule by mouth daily 8/26/21  Yes Gina Wilson MD   aspirin 81 MG EC tablet Take 81 mg by mouth daily   Yes Historical Provider, MD   Multiple Vitamins-Minerals (THERAPEUTIC MULTIVITAMIN-MINERALS) tablet Take 1 tablet by mouth daily   Yes Historical Provider, MD   hydrochlorothiazide (HYDRODIURIL) 25 MG tablet Take 25 mg by mouth Daily with supper    Yes Historical Provider, MD   Calcium Carbonate-Vitamin D (OSCAL 500/200 D-3 PO) Take 1 tablet by mouth daily   Yes Historical Provider, MD   carvedilol (COREG) 12.5 MG tablet Take 1 tablet by mouth 2 times daily (with meals)  Patient taking differently: Take 25 mg by mouth 2 times daily (with meals)  5/16/16  Yes Sandra Castellon MD   pantoprazole (PROTONIX) 40 MG tablet Take 1 tablet by mouth daily  Patient taking differently: Take 40 mg by mouth nightly  5/16/16  Yes Sandra Castellon MD   DULoxetine (CYMBALTA) 60 MG capsule Take 60 mg by mouth daily   Yes Historical Provider, MD   gabapentin (NEURONTIN) 600 MG tablet Take 300 mg by mouth every morning. Yes Historical Provider, MD   pravastatin (PRAVACHOL) 80 MG tablet Take 80 mg by mouth nightly   Yes Historical Provider, MD   rOPINIRole (REQUIP) 1 MG tablet Take 1 mg by mouth every morning    Yes Historical Provider, MD       Allergies:  Lisinopril and Toradol [ketorolac tromethamine]    Social History:   TOBACCO:   reports that she quit smoking about 11 years ago. Her smoking use included cigarettes. She has a 44.00 pack-year smoking history. She has never used smokeless tobacco.  ETOH:   reports no history of alcohol use. Family History:       Problem Relation Age of Onset    Heart Disease Father        Review of Systems:  Constitutional:negative  for fevers, and negative for chills. Respiratory: positive for shortness of breath, positive for cough, and negative for wheezing  Cardiovascular: negative for chest pain, negative for palpitations, and negative for syncope  Gastrointestinal: negative for abdominal pain, negative for nausea,negative for vomiting, negative for diarrhea, negative for constipation, and negative for hematochezia or melena  Genitourinary: negative for dysuria, negative for urinary urgency, negative for urinary frequency, and negative for hematuria  Neurological: negative for unilateral weakness, numbness or tingling. All other systems were reviewed with the patient and are negative except as stated    Objective:    Vitals:   Temp: 98.1 °F (36.7 °C)  BP: 96/60  Resp: 24  Pulse: 77  SpO2: 96 %  -----------------------------------------------------------------  Exam:  GEN:    Awake, alert and oriented x3. EYES:  EOMI, pupils equal   NECK: Supple. No lymphadenopathy. No carotid bruit  CVS:    regular rate and rhythm, no audible murmur  PULM:  has bilat rhonchi, no acute respiratory distress, on 4 lit oxygen  ABD:    Bowels sounds normal.  Abdomen is soft.   No distention. no tenderness to palpation. EXT:   no edema bilaterally . No calf tenderness. NEURO: Moves all extremities. Motor and sensory are grossly intact  SKIN:  No rashes. No skin lesions.    -----------------------------------------------------------------  Diagnostic Data:   · All diagnostic data was reviewed  Lab Results   Component Value Date    WBC 6.0 09/01/2021    HGB 12.1 09/01/2021    MCV 93.6 09/01/2021     09/01/2021      Lab Results   Component Value Date    GLUCOSE 139 (H) 09/01/2021    BUN 31 (H) 09/01/2021    CREATININE 0.92 (H) 09/01/2021     09/01/2021    K 3.5 (L) 09/01/2021    CALCIUM 7.9 (L) 09/01/2021     09/01/2021    CO2 21 09/01/2021     Lab Results   Component Value Date    WBCUA 50  10/04/2020    RBCUA 0 TO 2 10/04/2020    EPITHUA 5 TO 10 10/04/2020    LEUKOCYTESUR NEGATIVE 08/31/2021    SPECGRAV 1.020 08/31/2021    GLUCOSEU NEGATIVE 08/31/2021    KETUA NEGATIVE 08/31/2021    PROTEINU NEGATIVE 08/31/2021    HGBUR NEGATIVE 08/31/2021    CASTUA NOT REPORTED 10/04/2020    CRYSTUA NOT REPORTED 10/04/2020    BACTERIA 2+ (A) 10/04/2020    YEAST NOT REPORTED 10/04/2020       Assessment:     Active Problems:    Pneumonia due to COVID-19 virus  Resolved Problems:    * No resolved hospital problems.  *      Plan:     Problem List     Pneumonia due to organism - Primary    Relevant Medications    piperacillin-tazobactam (ZOSYN) 3,375 mg in dextrose 5 % 50 mL IVPB extended infusion (mini-bag)    vancomycin (VANCOCIN) 1500 mg in dextrose 5 % 250 mL IVPB (Start on 9/1/2021  8:00 PM)           · This patient requires inpatient admission because of covid 19 pneumonia in unvaccinated patient with worsening of symptoms and sepsis requiring oxygen,fluids,iv antibiotics and steroids  · Factors affecting the medical complexity of this patient include cad,htn,sleep apnea  · Estimated length of stay is 3 days  · Discussed patient's symptoms and data results including labs and imaging studies with the ER MD at time of admission  · High risk drug monitoring: none  ·     CORE MEASURES  · DVT prophylaxis: Lovenox  · Decubitus ulcer present on admission: No  · CODE STATUS: DNR-CCA  · Nutrition Status: fair   · Physical therapy: Yes   · Old Charts reviewed: Yes  · EKG Reviewed: Yes  · Advance Directive Addressed:  Yes   · Total time in evaluating this patient,discussing with er physician and formulating plan of treatment 39 min    Almeta Landau, MD , M.D.  9/1/2021  8:34 AM

## 2021-09-01 NOTE — PLAN OF CARE
Problem: Airway Clearance - Ineffective  Goal: Achieve or maintain patent airway  9/1/2021 1940 by Phil Arredondo RN  Outcome: Ongoing  Note: Pulse ox WNL. High flow O2 on @ 5L per nasal canula. Lungs clear upper and diminished bases. SOB noted with exertion. Pt denies any discomfort. Will continue to assess. Problem: Body Temperature -  Risk of, Imbalanced  Goal: Ability to maintain a body temperature within defined limits  9/1/2021 1940 by Phil Arredondo RN  Outcome: Ongoing  Note: 100.1 temp noted. Will continue to monitor. Problem: Risk for Fluid Volume Deficit  Goal: Maintain normal heart rhythm  9/1/2021 1940 by Phil Arredondo RN  Outcome: Ongoing  Note: Cardiac monitor on, NSR noted. VS stable and WNL. Will continue to assess. Problem: Falls - Risk of:  Goal: Will remain free from falls  Description: Will remain free from falls  9/1/2021 1940 by Phil Arredondo RN  Outcome: Ongoing  Note: Pt is at high risk for falls. Non skid socks on. Bed in low position and wheels locked. Fall sign posted and bracelet on. Siderails up x2. Bed alarm on. Call light within reach. Will continue to assess.

## 2021-09-01 NOTE — CARE COORDINATION
Readmission Assessment  Number of Days since last admission?: 1-7 days (Discharged from [address] [place] [address] on  8/25/21 after 3 day stay)  Previous Disposition: Home with Family (had Covid, possible sepsis following neck surgery 8/17/21- Dcd home with oxygen)  Who is being Interviewed: Patient (also did chart review to follow hospital course prior to this admission)  What was the patient's/caregiver's perception as to why they think they needed to return back to the hospital?: Other (Comment) (Pt expressed concern about getting sepsis again)  Why weren't you able to visit your PCP?: Other (Comment) (returned to ER prior to scheduled appt)  Did you see a specialist, such as Cardiac, Pulmonary, Orthopedic Physician, etc. after you left the hospital?: No  Who advised the patient to return to the hospital?: Self-referral  Does the patient report anything that got in the way of taking their medications?: No     Pt DC from [facility] on 8/25/21. Had care coordinator call on 8/26/21 [facility]. Returned to [facility] ER on 8/31/21 with concern for sepsis again.    Feeling very fatigued      DIMAS Lunsford RN

## 2021-09-01 NOTE — ED NOTES
Called Dr Leandro Levine via cell phone, connected call to Dr Heather Valverde.       Mary Johnson  08/31/21 [id number]

## 2021-09-01 NOTE — CONSULTS
Patient: Noé Norris  : 1950  Date of Admission: 2021  Primary Care Physician: Lisa Rainey  Today's Date: 2021    REASON FOR CONSULTATION: Fatigue (ongoing a couple days, states \"im going into sepsis again\", recent neck surgery and covid diagnosis ) and Shortness of Breath      HPI: Ms. Yesenia Burris is a 66 y.o. female with history of myocardial infarction in stents to RCA and LAD. History of ischemic cardiomyopathy, hypertension and dyslipidemia. Patient admitted with shortness of breath, fatigue and generalized body aches. She has a recent diagnosis of Covid 19 and currently treated for COVID-19 pneumonia. Cardiology consulted because of the patient extensive cardiac history. As listed above, she had cardiac catheterization back in  with stents to RCA in mid LAD. She has history of moderately decreased left ventricular systolic function and a repeat cardiac cath back in  showed patent stents to RCA and LAD otherwise nonobstructive CAD. Treated medically. Her primary cardiologist is at [facility].    Patient was diagnosed with COVID-19 pneumonia on 2021 and treated at [facility] with steroids, Remdesivir and oxygen and was discharged with home oxygen therapy. She started feeling worse again about 2 days ago. She was hypoxemic and hypotensive in the emergency room. Blood pressure improved after fluid bolus. Currently on oxygen via nasal cannula at 4 L/min. She is complaining of generalized body aches and shortness of breath. She has history of fibromyalgia and she said she is tender all over. She denied any chest pain, pressure or tightness. No palpitations or dizziness. I reviewed her echo today. Ejection fraction is mildly reduced, 45 to 50%. The image quality is suboptimal to assess wall motion abnormalities. No significant valvular abnormalities. Currently on normal saline infusion at 125 mL/h.      Past Medical History:   Diagnosis Date    Arthritis     Blockage of coronary artery of heart ([facility] [address] 2010    x 2 stents    CAD (coronary artery disease)     DDD (degenerative disc disease), cervical     cervical 3-6    Fibromyalgia     History of blood transfusion     History of kidney stones     last one \"about 10 years ago\"    Hyperlipidemia     Hypertension     Pancreatitis 2016    Sleep apnea     Thyroid disease     Wears glasses     for reading       CURRENT ALLERGIES: Lisinopril and Toradol [ketorolac tromethamine] REVIEW OF SYSTEMS: 10 systems were reviewed. Pertinent positives and negatives as above, all else negative.      Past Surgical History:   Procedure Laterality Date    ARTHROPLASTY Left 2020    LEFT FIRST CARPOMETACARPAL ARTHROPLASTY performed by Shannon Lees DO at [address]      L4,L5,S1 hardware placed    BACK SURGERY  2018    SPINAL CORD STIMULATOR    CATARACT REMOVAL Bilateral 2016    CERVICAL FUSION N/A 2021    C3-6 ACDF, REMOVAL OF HARDWARE C6-7 performed by Renato Tay MD at [address]    COLONOSCOPY      normal    CORONARY ANGIOPLASTY  2010    x 2 stents    FINGER SURGERY      right thumb joint    HERNIA REPAIR Right     inguinal    HYSTERECTOMY      JOINT REPLACEMENT Right     knee    KIDNEY STONE SURGERY      x 3    LITHOTRIPSY      x 3    PARATHYROIDECTOMY      PARATHYROIDECTOMY      NV PERCUT IMPLNT NEUROELECT,EPIDURAL N/A 2018    SPINAL CORD STIMULATOR IMPLANT performed by Jillian Siegel MD at [address] Right 2019    Social History:  Social History     Tobacco Use    Smoking status: Former Smoker     Packs/day: 1.00     Years: 44.00     Pack years: 44.00     Types: Cigarettes     Quit date: 2010     Years since quittin.2    Smokeless tobacco: Never Used   Vaping Use    Vaping Use: Never used   Substance Use Topics    Alcohol use: No    Drug use: No        CURRENT MEDICATIONS:  Outpatient Medications Marked as Taking for the 8/31/21 encounter [facility] Encounter)   Medication Sig Dispense Refill    gabapentin (NEURONTIN) 300 MG capsule Take 600 mg by mouth nightly.  rOPINIRole (REQUIP) 2 MG tablet Take 2 mg by mouth nightly      Vitamin D (CHOLECALCIFEROL) 50 MCG (2000 UT) TABS tablet Take 1 tablet by mouth daily 30 tablet 0    zinc sulfate (ZINCATE) 220 (50 Zn) MG capsule Take 1 capsule by mouth daily 30 capsule 3    aspirin 81 MG EC tablet Take 81 mg by mouth daily      Multiple Vitamins-Minerals (THERAPEUTIC MULTIVITAMIN-MINERALS) tablet Take 1 tablet by mouth daily      hydrochlorothiazide (HYDRODIURIL) 25 MG tablet Take 25 mg by mouth Daily with supper       Calcium Carbonate-Vitamin D (OSCAL 500/200 D-3 PO) Take 1 tablet by mouth daily      carvedilol (COREG) 12.5 MG tablet Take 1 tablet by mouth 2 times daily (with meals) (Patient taking differently: Take 25 mg by mouth 2 times daily (with meals) ) 60 tablet 3    pantoprazole (PROTONIX) 40 MG tablet Take 1 tablet by mouth daily (Patient taking differently: Take 40 mg by mouth nightly ) 30 tablet 0    DULoxetine (CYMBALTA) 60 MG capsule Take 60 mg by mouth daily      gabapentin (NEURONTIN) 600 MG tablet Take 300 mg by mouth every morning.        pravastatin (PRAVACHOL) 80 MG tablet Take 80 mg by mouth nightly      rOPINIRole (REQUIP) 1 MG tablet Take 1 mg by mouth every morning         enoxaparin  40 mg SubCUTAneous BID    aspirin  81 mg Oral Daily    calcium-vitamin D  2 tablet Oral Daily    carvedilol  12.5 mg Oral BID     DULoxetine  60 mg Oral Daily    gabapentin  600 mg Oral Nightly    gabapentin  300 mg Oral QAM    therapeutic multivitamin-minerals  1 tablet Oral Daily    pantoprazole  40 mg Oral Daily    pravastatin  80 mg Oral Nightly    rOPINIRole  1 mg Oral QAM    rOPINIRole  2 mg Oral Nightly    Vitamin D  2,000 Units Oral Daily    zinc sulfate  50 mg Oral Daily    sodium chloride flush  10 mL IntraVENous 2 times per day    dexamethasone  6 mg Oral Daily    gabapentin  600 mg Oral Once    vancomycin (VANCOCIN) intermittent dosing (placeholder)   Other RX Placeholder       FAMILY HISTORY: family history includes Heart Disease in her father. PHYSICAL EXAM:   BP (!) 109/49   Pulse 76   Temp 98.4 °F (36.9 °C) (Temporal)   Resp 22   Ht 5' 7\" (1.702 m)   Wt 197 lb 12.8 oz (89.7 kg)   SpO2 98%   BMI 30.98 kg/m²  Body mass index is 30.98 kg/m². Constitutional: She is oriented to person, place, and time. She appears well-developed and well-nourished. In no acute distress. HEENT: Normocephalic and atraumatic. No JVD present. Carotid bruit is not present. No mass and no thyromegaly present. No lymphadenopathy present. Cardiovascular: Normal rate, regular rhythm, normal heart sounds and intact distal pulses. Exam reveals no gallop and no friction rub. No murmur. Pulmonary/Chest: Poor air entry bilaterally with discolored bilateral medium size crackles. Abdominal: Soft, non-tender. Bowel sounds and aorta are normal. She exhibits no organomegaly, mass or bruit. Extremities: No edema, cyanosis, or clubbing. Pulses are 2+ radial/carotid/dorsalis pedis and posterior tibial bilaterally. Neurological: She is alert and oriented to person, place, and time. No evidence of gross cranial nerve deficit. Coordination appeared normal.   Skin: Skin is warm and dry. There is no rash or diaphoresis. Psychiatric: She has a normal mood and affect.  Her speech is normal and behavior is normal.      MOST RECENT LABS ON RECORD:   Lab Results   Component Value Date    WBC 6.0 09/01/2021    HGB 12.1 09/01/2021    HCT 36.7 09/01/2021     09/01/2021    CHOL 142 05/10/2016    TRIG 451 (H) 05/10/2016    HDL 5 (L) 05/10/2016    LDLDIRECT 66 05/10/2016    ALT 14 09/01/2021    AST 21 09/01/2021     09/01/2021    K 3.5 (L) 09/01/2021     09/01/2021    CREATININE 0.92 (H) 09/01/2021 BUN 31 (H) 09/01/2021    CO2 21 09/01/2021    INR 1.1 09/01/2021    LABA1C 5.2 05/07/2016       ASSESSMENT:  Patient Active Problem List    Diagnosis Date Noted    Pneumonia due to COVID-19 virus 08/31/2021    Cervical spinal stenosis 08/17/2021    Acquired spondylolisthesis 01/23/2018    Spinal stenosis, lumbar region, without neurogenic claudication 01/23/2018    Postlaminectomy syndrome, lumbar region 01/23/2018    Chronic midline low back pain without sciatica 01/23/2018    DIC (disseminated intravascular coagulation) ([address] 05/08/2016    Coagulopathy ([address] 05/08/2016    Thrombocytopenia ([address] 05/08/2016    Acute cystitis with hematuria     Pneumonia due to organism     Acute cystitis without hematuria 05/07/2016    CAD (coronary artery disease) 05/07/2016    Hypertension 05/07/2016    Lactic acid acidosis 05/07/2016    JAZZMINE (acute kidney injury) [address] [address] 05/07/2016    Sepsis ([facility] [address] 05/07/2016       PLAN:  Patient with extensive cardiac history, PCI to RCA and LAD in 2010. Ischemic cardiomyopathy, chronic combined CHF, hypertension, dyslipidemia and fibromyalgia. Last cardiac catheterization done 2016, patent stents to RCA and LAD otherwise nonobstructive CAD. Currently admitted for COVID-19 pneumonia. Patient presented with hypoxemia and hypotension. Echo reviewed, ejection fraction is 45 to 50%. Suboptimal image quality so no regional wall motion abnormalities can be assessed. No significant valvular abnormalities. Mild diastolic dysfunction. Blood pressure improved with intravenous fluid hydration, currently at around 125 mL normal saline per hour. O2 sat is stable at 4 L/min via nasal cannula. Continue aspirin, Pravachol and carvedilol. Decrease carvedilol to 6.25 mg twice daily giving borderline hypotension. Agree with holding hydrochlorothiazide. Continue management of COVID-19 pneumonia as per primary team and is per pulmonary.   Check troponin, BNP and CK-MB isoenzymes. Closely monitor blood pressure as per unit protocol. Follow-up repeat lipid profile. Once again, thank you for allowing me to participate in this patients care. Please do not hesitate to contact me could I be of further assistance. Sincerely,  Cleve Robles MD, MS, F.A.C.C.   [facility], [address], [facility], [place] [address], [address] [facility]  Phone: [phone], Fax: [phone]      Based on the patients current medical conditions, I believe the risk of significant morbidity and mortality is:high

## 2021-09-01 NOTE — ACP (ADVANCE CARE PLANNING)
Advance Care Planning     Advance Care Planning Activator (Inpatient)  Conversation Note      Date of ACP Conversation: 9/1/2021     Conversation Conducted with: Patient with Decision Making Capacity    ACP Activator: Gina Bradley RN    Health Care Decision Maker:     Current Designated Health Care Decision Maker:     Primary Decision Maker: Neeta Ruffin - [phone]    Secondary Decision Maker: Angela Leach - [phone]     Today we documented Decision Maker(s) consistent with Legal Next of Kin hierarchy. Care Preferences    Ventilation: \"If you were in your present state of health and suddenly became very ill and were unable to breathe on your own, what would your preference be about the use of a ventilator (breathing machine) if it were available to you? \"      Would the patient desire the use of ventilator (breathing machine)?: no    \"If your health worsens and it becomes clear that your chance of recovery is unlikely, what would your preference be about the use of a ventilator (breathing machine) if it were available to you? \"     Would the patient desire the use of ventilator (breathing machine)?: No      Resuscitation  \"CPR works best to restart the heart when there is a sudden event, like a heart attack, in someone who is otherwise healthy. Unfortunately, CPR does not typically restart the heart for people who have serious health conditions or who are very sick. \"    \"In the event your heart stopped as a result of an underlying serious health condition, would you want attempts to be made to restart your heart (answer \"yes\" for attempt to resuscitate) or would you prefer a natural death (answer \"no\" for do not attempt to resuscitate)? \" no       [x] Yes   [] No   Educated Patient / Wild [place] regarding differences between Advance Directives and portable DNR orders.     Length of ACP Conversation in minutes:      Conversation Outcomes:  [x] ACP discussion completed  [] Existing advance directive reviewed with patient; no changes to patient's previously recorded wishes  [] New Advance Directive completed  [] Portable Do Not Rescitate prepared for Provider review and signature  [] POLST/POST/MOLST/MOST prepared for Provider review and signature      Follow-up plan:    [] Schedule follow-up conversation to continue planning  [] Referred individual to Provider for additional questions/concerns   [] Advised patient/agent/surrogate to review completed ACP document and update if needed with changes in condition, patient preferences or care setting    [x] This note routed to one or more involved healthcare providers

## 2021-09-01 NOTE — CONSULTS
Vancomycin Dosing by Pharmacy - Initial Note   TODAY'S DATE:  9/1/2021  Patient name, age:  Julian Carrasco, 66 y.o. Indication: Sepsis of unknown origin, empiric  Additional antimicrobials:  Zosyn IV extended interval q 8 hr    Allergies:  Lisinopril and Toradol [ketorolac tromethamine]   Actual Weight:    Wt Readings from Last 1 Encounters:   09/01/21 197 lb 12.8 oz (89.7 kg)     Labs/Ancillary Data  Estimated Creatinine Clearance: 64 mL/min (A) (based on SCr of 0.92 mg/dL (H)). Recent Labs     08/31/21  1818 09/01/21  0520   CREATININE 0.89 0.92*   BUN 37* 31*   WBC 6.2 6.0     Procalcitonin   Date Value Ref Range Status   08/22/2021 0.08 0.01 - 0.09 ng/mL Final     Comment:     Suspected Sepsis:  <0.50 ng/mL   Low likelihood of sepsis. 0.50-2.00 ng/mL   Increased likelihood of sepsis. Antibiotics encouraged. >2.00 ng/mL   High risk of sepsis/shock. Antibiotics strongly encouraged. Suspected Lower Resp Tract Infections:  <0.24 ng/mL   Low likelihood of bacterial infection. >0.24 ng/mL   Increased likelihood of bacterial infection. Antibiotics encouraged. With successful antibiotic therapy, PCT levels should decrease rapidly. (Half-life of 24 to 36 hours.)  Procalcitonin values from samples collected within the first 6  hours of systemic infection may still be low. Retesting may be indicated. Values from day 1 and day 4 can be entered into the Change in  Procalcitonin Calculator ([email or web address] com)  to determine the patient's Mortality Risk Prognosis. In healthy neonates, plasma Procalcitonin (PCT) concentrations increase  gradually after birth, reaching peak values at about 24 hours of age then  decrease to normal values below 0.5 ng/mL by 48-72 hours of age.   Performed at [facility] [address]         Intake/Output Summary (Last 24 hours) at 9/1/2021 0802  Last data filed at 9/1/2021 0430  Gross per 24 hour   Intake 2658 ml Output 775 ml   Net 1883 ml     Temp: 99.5    Recent vancomycin administrations                     vancomycin (VANCOCIN) 2,250 mg in dextrose 5 % 500 mL IVPB (mg) 2,250 mg New Bag 08/31/21 2304                  Culture Date / Source  /  Results  8-31-21            blood         pending    MRSA Nasal Swab: was negative on 8-22-21, ordered for respiratory indication, pharmacy to contact provider about discontinuing vancomycin. PLAN   Initial loading dose of 25mg/kg (max of 3,000 mg) = 2250  mg  given 8-, then  vancomycin 1500 mg IV every 24 hours. Ensured BUN/sCr ordered at baseline and every 48 hours x at least 3 levels, then at least weekly. Vancomycin level ordered for 9-2-21 @ 0600. Will use bayesian method for dosing. This level will not be a trough. Target AUC/DIOMEDES: 400-600. Vancomycin Target Concentration Parameters  Treatment  Population Target AUC/DIOMEDES Target Trough   Invasive MRSA Infection (bacteremia, pneumonia, meningitis, endocarditis, osteomyelitis)  Sepsis (undifferentiated) 400-600 N/A   Infection due to non-MRSA pathogen  Empiric treatment of non-invasive MRSA infection  (SSTI, UTI) <500 10-15 mg/L   CrCl < 29 mL/min  Rapidly fluctuating serum creatinine   JAZZMINE N/A < 15 mg/L     Renal replacement therapy is dosed by levels, per hospital protocol. Abbreviations  * Pauc: probability that AUC is >400 (efficacy); Pconc: probability that Ctrough is above 20 ?g/mL (toxicity); Tox: Probability of nephrotoxicity, based on Ni et al. Clin Infect Dis 2009. Thank you for the consult. Pharmacy will continue to follow.     Giana Downs PharmD 9/1/2021 8:02 AM

## 2021-09-01 NOTE — PROGRESS NOTES
Physician Progress Note      PATIENTDylon Montalvo  CSN #:                  [id number]  :                       1950  ADMIT DATE:       2021 6:24 PM  [address] [place] DATE:  RESPONDING  PROVIDER #:        Marilu Solano MD          QUERY TEXT:    Pt admitted with probable sepsis and covid 19 pneumonia  Pt noted to have SpO2 75% in ED, treated with O2 NC and  high flow O2 NC. If possible, please document in the progress notes and discharge summary if   you are evaluating and/or treating any of the following: The medical record reflects the following:  Risk Factors: sleep apnea, Covid positive 21, hospitalized  -21    with Covid pneumonia,  discharged on home oxygen  Clinical Indicators: per ED provider: present here with a pulse ox on room air   of 75%, hyperventilating, VBGs in ED:  pH  7.44,  pCO2  34.5,  pO2 14.6;   RR   max 27/min  Treatment: O2 high flow NC   -> 5lpm NC    Luc Keen RN, [address] [place] [address]   [phone]  . Options provided:  -- Acute respiratory failure with hypoxia  -- Hypoxia without respiratory failure  -- Other - I will add my own diagnosis  -- Disagree - Not applicable / Not valid  -- Disagree - Clinically unable to determine / Unknown  -- Refer to Clinical Documentation Reviewer    PROVIDER RESPONSE TEXT:    This patient is in acute respiratory failure with hypoxia.     Query created by: Varghese López on 2021 11:28 AM      Electronically signed by:  Marilu Solano MD 2021 2:58 PM

## 2021-09-01 NOTE — ED PROVIDER NOTES
[facility] ED  EMERGENCY DEPARTMENT ENCOUNTER      Pt Name: Yue Mcdonald  MRN: [id number]  [place] 1950  Date of evaluation: 8/31/2021  Provider: Romeo Villavicencio MD    CHIEF COMPLAINT     Chief Complaint   Patient presents with    Fatigue     ongoing a couple days, states \"im going into sepsis again\", recent neck surgery and covid diagnosis     Shortness of Breath         HISTORY OF PRESENT ILLNESS   (Location/Symptom, Timing/Onset, Context/Setting,Quality, Duration, Modifying Factors, Severity)  Note limiting factors. Yue Mcdonald is a69 y.o. female who presents to the emergency department with a complaint of generalized fatigue, decreased appetite, and a concern that she may be developing sepsis again. This patient had a cervical surgery done at [facility] on the 17th. She returned there on the 22nd and was found to have Covid and sepsis. She reports since being discharged from [facility] she has had a decreased appetite and weakness. She has had a cough. She has felt mildly short of breath. Her main complaint though is just profound weakness. Patient has not had a documented fever does not sound like. She is denying chest pain. Is been no vomiting or diarrhea. No urinary symptoms. She has not noted swelling around her incision site or cellulitis or redness elsewhere. She is not noted focal weakness just generalized in all 4 extremities weakness. Patient does present here with a pulse ox on room air of 75%. She was hyperventilating when she arrived as well. HPI    Nursing Notes werereviewed. REVIEW OF SYSTEMS    (2-9 systems for level 4, 10 or more for level 5)     Review of Systems  Constitutional no documented fevers or chills  Eyes no loss of vision double vision  ENT no significant headache or sore throat  Cardiac no chest pain or palpitations  Respiratory she been short of breath and coughing but not coughing much of anything up.   GI no abdominal pain or vomiting   no dysuria or frequency  Musculoskeletal no joint pain or significant leg swelling  Neurologic she is not had focal weakness or trouble with her speech  Except as noted above the remainder of the review of systems was reviewed and negative.        PAST MEDICAL HISTORY     Past Medical History:   Diagnosis Date    Arthritis     Blockage of coronary artery of heart ([address] 2010    x 2 stents    CAD (coronary artery disease)     DDD (degenerative disc disease), cervical     cervical 3-6    Fibromyalgia     History of blood transfusion     History of kidney stones     last one \"about 10 years ago\"    Hyperlipidemia     Hypertension     Pancreatitis 2016    Sleep apnea     Thyroid disease     Wears glasses     for reading         SURGICALHISTORY       Past Surgical History:   Procedure Laterality Date    ARTHROPLASTY Left 2/6/2020    LEFT FIRST CARPOMETACARPAL ARTHROPLASTY performed by Jason Camargo DO at [facility]  2012    L4,L5,S1 hardware placed    BACK SURGERY  2018    SPINAL CORD STIMULATOR    CATARACT REMOVAL Bilateral 2016    CERVICAL FUSION N/A 8/17/2021    C3-6 ACDF, REMOVAL OF HARDWARE C6-7 performed by Maciej Stern MD at [address]    COLONOSCOPY      normal    CORONARY ANGIOPLASTY  2010    x 2 stents    FINGER SURGERY      right thumb joint    HERNIA REPAIR Right     inguinal    HYSTERECTOMY      JOINT REPLACEMENT Right     knee    KIDNEY STONE SURGERY      x 3    LITHOTRIPSY      x 3    PARATHYROIDECTOMY      PARATHYROIDECTOMY      WI PERCUT IMPLNT NEUROELECT,EPIDURAL N/A 8/8/2018    SPINAL CORD STIMULATOR IMPLANT performed by Mohsen Garibay MD at [facility] Right 03/2019         CURRENT MEDICATIONS       Previous Medications    ASPIRIN 81 MG EC TABLET    Take 81 mg by mouth daily    CALCIUM CARBONATE-VITAMIN D (OSCAL 500/200 D-3 PO)    Take 1 tablet by mouth daily    CARVEDILOL (COREG) 12.5 MG TABLET    Take 1 tablet by mouth 2 times daily (with meals)    DULOXETINE (CYMBALTA) 60 MG CAPSULE    Take 60 mg by mouth daily    GABAPENTIN (NEURONTIN) 600 MG TABLET    Take 300 mg by mouth 2 times daily. 1 tab in morning and 2 tabs at dinner    HYDROCHLOROTHIAZIDE (HYDRODIURIL) 25 MG TABLET    Take 25 mg by mouth Daily with supper     MULTIPLE VITAMINS-MINERALS (THERAPEUTIC MULTIVITAMIN-MINERALS) TABLET    Take 1 tablet by mouth daily    PANTOPRAZOLE (PROTONIX) 40 MG TABLET    Take 1 tablet by mouth daily    PRAVASTATIN (PRAVACHOL) 80 MG TABLET    Take 80 mg by mouth nightly    ROPINIROLE (REQUIP) 1 MG TABLET    Take 1 mg by mouth 2 times daily 1 in morning and 2 at dinner    VITAMIN D (CHOLECALCIFEROL) 50 MCG ( UT) TABS TABLET    Take 1 tablet by mouth daily    ZINC SULFATE (ZINCATE) 220 (50 ZN) MG CAPSULE    Take 1 capsule by mouth daily            Lisinopril and Toradol [ketorolac tromethamine]    FAMILY HISTORY       Family History   Problem Relation Age of Onset    Heart Disease Father           SOCIAL HISTORY       Social History     Socioeconomic History    Marital status:       Spouse name: None    Number of children: None    Years of education: None    Highest education level: None   Occupational History    None   Tobacco Use    Smoking status: Former Smoker     Packs/day: 1.00     Years: 44.00     Pack years: 44.00     Types: Cigarettes     Quit date: 2010     Years since quittin.2    Smokeless tobacco: Never Used   Vaping Use    Vaping Use: Never used   Substance and Sexual Activity    Alcohol use: No    Drug use: No    Sexual activity: None   Other Topics Concern    None   Social History Narrative    None     Social Determinants of Health     Financial Resource Strain:     Difficulty of Paying Living Expenses:    Food Insecurity:     Worried About Running Out of Food in the Last Year:     Clint of Food in the Last Year:    Transportation Needs:  Lack of Transportation (Medical):  Lack of Transportation (Non-Medical):    Physical Activity:     Days of Exercise per Week:     Minutes of Exercise per Session:    Stress:     Feeling of Stress :    Social Connections:     Frequency of Communication with Friends and Family:     Frequency of Social Gatherings with Friends and Family:     Attends Alevism Services:     Active Member of Clubs or Organizations:     Attends Club or Organization Meetings:     Marital Status:    Intimate Partner Violence:     Fear of Current or Ex-Partner:     Emotionally Abused:     Physically Abused:     Sexually Abused:        SCREENINGS                    PHYSICAL EXAM    (up to 7 for level 4, 8 or more for level 5)     ED Triage Vitals [08/31/21 1831]   BP Temp Temp Source Pulse Resp SpO2 Height Weight   132/67 99 °F (37.2 °C) Tympanic 90 22 (S) (!) 81 % 5' 7\" (1.702 m) --       Physical Exam  Shows a depressed affect female who is hyperventilating. Her pulse ox is 81% on room air initially. She is afebrile and her blood pressure and pulse are normal.  I do not see any icterus. Her pharynx is dry but otherwise normal.  Neck is supple. Lungs demonstrate a few bibasilar rales without wheezes or rhonchi. Heart has a regular rhythm without murmur. Abdomen soft and nontender. The incision to her left anterior neck looks clean and do not see redness or drainage. She does not have significant peripheral edema or calf tenderness. There is good peripheral pulses. And she does have generalized weakness has trouble sitting up even for me to listen to her lungs. She can move the 4 extremities equally but just again profound weakness throughout. DIAGNOSTIC RESULTS     EKG: All EKG's are interpreted by the Emergency Department Physician who either signs orCo-signs this chart in the absence of a cardiologist.    Her EKG showed normal sinus rhythm. Rate was 100 when this was done.   It shows a normal TX QT interval.  She does have a left anterior hemiblock. I do not see evidence of a STEMI or acute ST-T wave changes     RADIOLOGY:   plain film images such as CT, Ultrasound and MRI are read by the radiologist. Plain radiographic images are visualized and preliminarily interpreted by the emergency physician with the below findings:    Chest x-ray demonstrated some pneumonitis per the radiologist    Interpretation per the Radiologist below, ifavailable at the time of this note:    XR CHEST PORTABLE   Final Result   Diffuse interstitial prominence which may be related to pulmonary vascular   congestion or interstitial pneumonitis. A small left effusion is present. No radiation information found for this patient   Narrative   EXAMINATION:   CTA OF THE CHEST 8/31/2021 8:26 pm       TECHNIQUE:   CTA of the chest was performed after the administration of intravenous   contrast.  Multiplanar reformatted images are provided for review.  MIP   images are provided for review.  Dose modulation, iterative reconstruction,   and/or weight based adjustment of the mA/kV was utilized to reduce the   radiation dose to as low as reasonably achievable.       COMPARISON:   08/30/2021 and 08/21/2021       HISTORY:   ORDERING SYSTEM PROVIDED HISTORY: elevated d dimer and hypoxia   TECHNOLOGIST PROVIDED HISTORY:   elevated d dimer and hypoxia   Decision Support Exception - unselect if not a suspected or confirmed   emergency medical condition->Emergency Medical Condition (MA)       FINDINGS:   Pulmonary Arteries: No evidence of central, lobar or proximal segmental   pulmonary artery emboli.       Mediastinum: Cardiomegaly.  Coronary disease identified.  Trace pericardial   fluid.  No suspicious mediastinal or hilar adenopathy identified per CT   criteria.  Aortic vascular calcifications.       Lungs/pleura: Multifocal interstitial alveolar opacities throughout both   lungs with a subpleural distribution suggestive of multifocal normal range ornot returned as of this dictation. EMERGENCY DEPARTMENT COURSE and DIFFERENTIAL DIAGNOSIS/MDM:   Vitals:    Vitals:    08/31/21 1849 08/31/21 1900 08/31/21 1915 08/31/21 1945   BP:  (!) 144/82 132/78 119/63   Pulse: 92      Resp:       Temp:       TempSrc:       SpO2: (!) 84%  95% 90%   Height: This patient presented hypoxic with concern that she might be septic again. Her initial lactic acid was 1.9. Her white count is normal.  She does have perhaps a pneumonitis present. She is quite hypoxic as well. We did get her on nasal cannula 4 L and got her pulse ox into the low 90s. With her D-dimer elevated I did elect to do a CT rule out PE protocol. She is given a liter of fluid as well. MDM   CT did not demonstrate any PE but did demonstrate pneumonitis and possible pneumonia. I did elect to treat the patient with antibiotics after getting 2 blood cultures. I am still waiting for the second 2-hour lactic acid. CRITICAL CARE TIME       CONSULTS: I spoke with Dr. Isidoro Candelaria who did agree to accept the patient. He will place her in the ICU. None    PROCEDURES:  Unlessotherwise noted below, none     Procedures    FINAL IMPRESSION    Weakness and hypoxia  Status post Covid and cervical surgery   Pneumonitis    DISPOSITION/PLAN   DISPOSITION        PATIENT REFERRED TO:  No follow-up provider specified.     DISCHARGE MEDICATIONS:  New Prescriptions    No medications on file              (Please note that portions of this note were completed with a voice recognition program.  Efforts were made to edit the dictations but occasionally words are mis-transcribed.)      Rima Rosales MD (electronically signed)  Attending Emergency Physician         Rima Cisneros MD  08/31/21 5484

## 2021-09-01 NOTE — PLAN OF CARE
Problem: Airway Clearance - Ineffective  Goal: Achieve or maintain patent airway  9/1/2021 0111 by Shirley Gordon RN  Outcome: Ongoing  Note: Lung sounds diminished throughout with high flow nasal cannula at 8LPM with SaO2 90-93%. Continue to monitor     Problem: Gas Exchange - Impaired  Goal: Absence of hypoxia  9/1/2021 0111 by Shirley Gordon RN  Outcome: Ongoing  Note: Lung sounds diminished throughout with high flow nasal cannula at 8LPM with SaO2 90-93%. Continue to monitor     Problem: Gas Exchange - Impaired  Goal: Absence of hypoxia  9/1/2021 0040 by Shirley Gordon RN  Outcome: Ongoing  Note: Patient is currently on high flow nasal cannula 8LPM with SaO2     Problem: Gas Exchange - Impaired  Goal: Promote optimal lung function  9/1/2021 0111 by Shirley Gordon RN  Outcome: Ongoing     Problem: Gas Exchange - Impaired  Goal: Promote optimal lung function  9/1/2021 0040 by Shirley Gordon RN  Outcome: Ongoing     Problem: Breathing Pattern - Ineffective  Goal: Ability to achieve and maintain a regular respiratory rate  9/1/2021 0111 by Shirley Gordon RN  Outcome: Ongoing  Note: Lung sounds diminished throughout with high flow nasal cannula at 8LPM with SaO2 90-93%. Respirations unlabored at rest, dyspnea on exertion noted. Continue to monitor     Problem: Breathing Pattern - Ineffective  Goal: Ability to achieve and maintain a regular respiratory rate  9/1/2021 0040 by Shirley Gordon RN  Outcome: Ongoing     Problem: Body Temperature -  Risk of, Imbalanced  Goal: Ability to maintain a body temperature within defined limits  9/1/2021 0111 by Shirley Gordon RN  Outcome: Ongoing  Note: Patient had temperature and given tylenol for temp. Continue to monitor     Problem:  Body Temperature -  Risk of, Imbalanced  Goal: Will regain or maintain usual level of consciousness  9/1/2021 0111 by Shirley Gordon RN  Outcome: Ongoing  Note: Patient is Alert and oriented x 4 but feels very fatigued and Falls - Risk of:  Goal: Will remain free from falls  Description: Will remain free from falls  9/1/2021 0111 by Rubbie Klinefelter, RN  Outcome: Ongoing  Note: Bed alarm on. Bed in low position and wheels locked. Call light in reach. Falling star posted on door. Yellow bracelet on. Non skid socks on. Side rails up. Will continue to assess.       Problem: Falls - Risk of:  Goal: Absence of physical injury  Description: Absence of physical injury  9/1/2021 0111 by Rubbie Klinefelter, RN  Outcome: Ongoing

## 2021-09-01 NOTE — PROGRESS NOTES
Attempted x2 to complete discharge planning assessment via telephone contact. Patient does not answer. Will attempt at a later time.     [address]  9/1/2021

## 2021-09-02 ENCOUNTER — CARE COORDINATION (OUTPATIENT)
Dept: CASE MANAGEMENT | Age: 71
End: 2021-09-02

## 2021-09-02 LAB
% CKMB: 4.7 % (ref 0–3)
ABSOLUTE EOS #: 0 K/UL (ref 0–0.44)
ABSOLUTE IMMATURE GRANULOCYTE: 0 K/UL (ref 0–0.3)
ABSOLUTE LYMPH #: 0.19 K/UL (ref 1.1–3.7)
ABSOLUTE MONO #: 0 K/UL (ref 0.1–1.2)
ALBUMIN SERPL-MCNC: 2.9 G/DL (ref 3.5–5.2)
ALBUMIN/GLOBULIN RATIO: 1.1 (ref 1–2.5)
ALP BLD-CCNC: 65 U/L (ref 35–104)
ALT SERPL-CCNC: 16 U/L (ref 5–33)
ANION GAP SERPL CALCULATED.3IONS-SCNC: 10 MMOL/L (ref 9–17)
AST SERPL-CCNC: 31 U/L
BASOPHILS # BLD: 0 % (ref 0–2)
BASOPHILS ABSOLUTE: 0 K/UL (ref 0–0.2)
BILIRUB SERPL-MCNC: 0.26 MG/DL (ref 0.3–1.2)
BNP INTERPRETATION: ABNORMAL
BUN BLDV-MCNC: 20 MG/DL (ref 8–23)
BUN/CREAT BLD: 27 (ref 9–20)
CALCIUM SERPL-MCNC: 8.2 MG/DL (ref 8.6–10.4)
CHLORIDE BLD-SCNC: 111 MMOL/L (ref 98–107)
CK MB: 3.6 NG/ML
CKMB INTERPRETATION: ABNORMAL
CO2: 21 MMOL/L (ref 20–31)
CREAT SERPL-MCNC: 0.73 MG/DL (ref 0.5–0.9)
D-DIMER QUANTITATIVE: 0.55 MG/L FEU (ref 0–0.59)
DIFFERENTIAL TYPE: ABNORMAL
EOSINOPHILS RELATIVE PERCENT: 0 % (ref 1–4)
FERRITIN: 905 UG/L (ref 13–150)
FIBRINOGEN: 590 MG/DL (ref 179–518)
GFR AFRICAN AMERICAN: >60 ML/MIN
GFR NON-AFRICAN AMERICAN: >60 ML/MIN
GFR SERPL CREATININE-BSD FRML MDRD: ABNORMAL ML/MIN/{1.73_M2}
GFR SERPL CREATININE-BSD FRML MDRD: ABNORMAL ML/MIN/{1.73_M2}
GLUCOSE BLD-MCNC: 123 MG/DL (ref 70–99)
HCT VFR BLD CALC: 35 % (ref 36.3–47.1)
HEMOGLOBIN: 11.5 G/DL (ref 11.9–15.1)
IMMATURE GRANULOCYTES: 0 %
INR BLD: 1.1
LYMPHOCYTES # BLD: 2 % (ref 24–43)
MCH RBC QN AUTO: 31.3 PG (ref 25.2–33.5)
MCHC RBC AUTO-ENTMCNC: 32.9 G/DL (ref 28.4–34.8)
MCV RBC AUTO: 95.1 FL (ref 82.6–102.9)
MONOCYTES # BLD: 0 % (ref 3–12)
MORPHOLOGY: NORMAL
NRBC AUTOMATED: 0 PER 100 WBC
PARTIAL THROMBOPLASTIN TIME: 37.8 SEC (ref 23.9–33.8)
PDW BLD-RTO: 13 % (ref 11.8–14.4)
PLATELET # BLD: 192 K/UL (ref 138–453)
PLATELET ESTIMATE: ABNORMAL
PMV BLD AUTO: 9.7 FL (ref 8.1–13.5)
POTASSIUM SERPL-SCNC: 4 MMOL/L (ref 3.7–5.3)
PRO-BNP: 1533 PG/ML
PROTHROMBIN TIME: 14.2 SEC (ref 11.5–14.2)
RBC # BLD: 3.68 M/UL (ref 3.95–5.11)
RBC # BLD: ABNORMAL 10*6/UL
SEG NEUTROPHILS: 98 % (ref 36–65)
SEGMENTED NEUTROPHILS ABSOLUTE COUNT: 9.21 K/UL (ref 1.5–8.1)
SODIUM BLD-SCNC: 142 MMOL/L (ref 135–144)
TOTAL CK: 77 U/L (ref 26–192)
TOTAL PROTEIN: 5.6 G/DL (ref 6.4–8.3)
TROPONIN INTERP: NORMAL
TROPONIN T: NORMAL NG/ML
TROPONIN, HIGH SENSITIVITY: 14 NG/L (ref 0–14)
VANCOMYCIN RANDOM DATE LAST DOSE: NORMAL
VANCOMYCIN RANDOM DOSE AMOUNT: NORMAL
VANCOMYCIN RANDOM TIME LAST DOSE: NORMAL
VANCOMYCIN RANDOM: 4.3 UG/ML
WBC # BLD: 9.4 K/UL (ref 3.5–11.3)
WBC # BLD: ABNORMAL 10*3/UL

## 2021-09-02 PROCEDURE — 2700000000 HC OXYGEN THERAPY PER DAY

## 2021-09-02 PROCEDURE — 99233 SBSQ HOSP IP/OBS HIGH 50: CPT | Performed by: INTERNAL MEDICINE

## 2021-09-02 PROCEDURE — 82728 ASSAY OF FERRITIN: CPT

## 2021-09-02 PROCEDURE — 85610 PROTHROMBIN TIME: CPT

## 2021-09-02 PROCEDURE — 6370000000 HC RX 637 (ALT 250 FOR IP): Performed by: FAMILY MEDICINE

## 2021-09-02 PROCEDURE — 85730 THROMBOPLASTIN TIME PARTIAL: CPT

## 2021-09-02 PROCEDURE — 94761 N-INVAS EAR/PLS OXIMETRY MLT: CPT

## 2021-09-02 PROCEDURE — 2580000003 HC RX 258: Performed by: FAMILY MEDICINE

## 2021-09-02 PROCEDURE — 80053 COMPREHEN METABOLIC PANEL: CPT

## 2021-09-02 PROCEDURE — 84484 ASSAY OF TROPONIN QUANT: CPT

## 2021-09-02 PROCEDURE — 82553 CREATINE MB FRACTION: CPT

## 2021-09-02 PROCEDURE — 85384 FIBRINOGEN ACTIVITY: CPT

## 2021-09-02 PROCEDURE — 6370000000 HC RX 637 (ALT 250 FOR IP): Performed by: INTERNAL MEDICINE

## 2021-09-02 PROCEDURE — 83880 ASSAY OF NATRIURETIC PEPTIDE: CPT

## 2021-09-02 PROCEDURE — 2580000003 HC RX 258: Performed by: EMERGENCY MEDICINE

## 2021-09-02 PROCEDURE — 36415 COLL VENOUS BLD VENIPUNCTURE: CPT

## 2021-09-02 PROCEDURE — 6360000002 HC RX W HCPCS: Performed by: FAMILY MEDICINE

## 2021-09-02 PROCEDURE — 85379 FIBRIN DEGRADATION QUANT: CPT

## 2021-09-02 PROCEDURE — 82550 ASSAY OF CK (CPK): CPT

## 2021-09-02 PROCEDURE — 2000000000 HC ICU R&B

## 2021-09-02 PROCEDURE — 85025 COMPLETE CBC W/AUTO DIFF WBC: CPT

## 2021-09-02 PROCEDURE — 80202 ASSAY OF VANCOMYCIN: CPT

## 2021-09-02 RX ORDER — FUROSEMIDE 10 MG/ML
40 INJECTION INTRAMUSCULAR; INTRAVENOUS ONCE
Status: COMPLETED | OUTPATIENT
Start: 2021-09-02 | End: 2021-09-02

## 2021-09-02 RX ADMIN — GABAPENTIN 600 MG: 300 CAPSULE ORAL at 20:04

## 2021-09-02 RX ADMIN — FUROSEMIDE 40 MG: 10 INJECTION, SOLUTION INTRAMUSCULAR; INTRAVENOUS at 07:13

## 2021-09-02 RX ADMIN — CARVEDILOL 6.25 MG: 6.25 TABLET, FILM COATED ORAL at 08:42

## 2021-09-02 RX ADMIN — ROPINIROLE HYDROCHLORIDE 2 MG: 1 TABLET, FILM COATED ORAL at 20:03

## 2021-09-02 RX ADMIN — PANTOPRAZOLE SODIUM 40 MG: 40 TABLET, DELAYED RELEASE ORAL at 08:42

## 2021-09-02 RX ADMIN — MULTIPLE VITAMINS W/ MINERALS TAB 1 TABLET: TAB at 08:42

## 2021-09-02 RX ADMIN — SODIUM CHLORIDE, PRESERVATIVE FREE 10 ML: 5 INJECTION INTRAVENOUS at 07:13

## 2021-09-02 RX ADMIN — CARVEDILOL 6.25 MG: 6.25 TABLET, FILM COATED ORAL at 17:12

## 2021-09-02 RX ADMIN — CALCIUM CARBONATE-VITAMIN D TAB 500 MG-200 UNIT 2 TABLET: 500-200 TAB at 08:42

## 2021-09-02 RX ADMIN — ZINC SULFATE 220 MG (50 MG) CAPSULE 50 MG: CAPSULE at 08:42

## 2021-09-02 RX ADMIN — SODIUM CHLORIDE: 9 INJECTION, SOLUTION INTRAVENOUS at 03:15

## 2021-09-02 RX ADMIN — Medication 2000 UNITS: at 08:42

## 2021-09-02 RX ADMIN — PRAVASTATIN SODIUM 80 MG: 20 TABLET ORAL at 20:04

## 2021-09-02 RX ADMIN — ASPIRIN 81 MG: 81 TABLET, COATED ORAL at 08:42

## 2021-09-02 RX ADMIN — ENOXAPARIN SODIUM 40 MG: 40 INJECTION SUBCUTANEOUS at 08:42

## 2021-09-02 RX ADMIN — DULOXETINE HYDROCHLORIDE 60 MG: 60 CAPSULE, DELAYED RELEASE ORAL at 08:42

## 2021-09-02 RX ADMIN — DEXAMETHASONE 6 MG: 4 TABLET ORAL at 08:43

## 2021-09-02 RX ADMIN — SODIUM CHLORIDE, PRESERVATIVE FREE 10 ML: 5 INJECTION INTRAVENOUS at 20:03

## 2021-09-02 RX ADMIN — GABAPENTIN 300 MG: 300 CAPSULE ORAL at 08:42

## 2021-09-02 RX ADMIN — ENOXAPARIN SODIUM 40 MG: 40 INJECTION SUBCUTANEOUS at 20:03

## 2021-09-02 RX ADMIN — ROPINIROLE HYDROCHLORIDE 1 MG: 1 TABLET, FILM COATED ORAL at 08:42

## 2021-09-02 RX ADMIN — ACETAMINOPHEN 650 MG: 325 TABLET ORAL at 07:20

## 2021-09-02 ASSESSMENT — PAIN SCALES - GENERAL
PAINLEVEL_OUTOF10: 0

## 2021-09-02 NOTE — PROGRESS NOTES
Progress Note  Marcia Correia MD    OBJECTIVE:    Patient seen for f/u of Pneumonia due to COVID-19 virus. She is requiring more oxygen . Has shortness of breasth . ROS:   Constitutional: negative  for fevers, and negative for chills. Respiratory: positive for shortness of breath, positive for cough, and negative for wheezing  Cardiovascular: negative for chest pain, and negative for palpitations  Gastrointestinal: negative for abdominal pain, negative for nausea,negative for vomiting, negative for diarrhea, and negative for constipation     All other systems were reviewed with the patient and are negative unless otherwise stated in HPI    OBJECTIVE:  Vitals:   Temp: 99.6 °F (37.6 °C)  BP: 114/62  Resp: 30  Pulse: 83  SpO2: 91 %    24HR INTAKE/OUTPUT:      Intake/Output Summary (Last 24 hours) at 9/2/2021 1139  Last data filed at 9/2/2021 1023  Gross per 24 hour   Intake 4607 ml   Output 2050 ml   Net 2557 ml     -----------------------------------------------------------------  Exam:  GEN:    Awake, alert and oriented x3. EYES:  EOMI, pupils equal   NECK: Supple. No lymphadenopathy. No carotid bruit  CVS:    regular rate and rhythm, no audible murmur  PULM:  diminished with bilat rhonchi, no acute respiratory distress  ABD:    Bowels sounds normal.  Abdomen is soft. No distention. no tenderness to palpation. EXT:   no edema bilaterally . No calf tenderness. NEURO: Moves all extremities. Motor and sensory are grossly intact  SKIN:  No rashes.   No skin lesions.    -----------------------------------------------------------------  Diagnostic Data:    · All available data reviewed  Lab Results   Component Value Date    WBC 9.4 09/02/2021    HGB 11.5 (L) 09/02/2021    MCV 95.1 09/02/2021     09/02/2021      Lab Results   Component Value Date    GLUCOSE 123 (H) 09/02/2021    BUN 20 09/02/2021    CREATININE 0.73 09/02/2021     09/02/2021    K 4.0 09/02/2021    CALCIUM 8.2 (L) 09/02/2021  (H) 09/02/2021    CO2 21 09/02/2021       PROBLEM LIST:  Principal Problem:    Pneumonia due to COVID-19 virus  Active Problems:    CAD (coronary artery disease)    Hypertension    Sepsis ([address]    Acute respiratory failure with hypoxia (HCC)    Dyslipidemia  Resolved Problems:    * No resolved hospital problems. *      ASSESSMENT / PLAN:  Pneumonia due to COVID-19 virus  · Continue current therapy of steroids,aerosols and oxygen  ·   · Acute respiratory failure with hypoxia- continue oxygen,c pap at night  · Nutrition status:  Well developed, well nourished with no malnutrition  · DVT prophylaxis: Lovenox   · High risk medications: none   · Disposition:  Discharge plan is home    Lolis Pierson MD , M.D.  9/2/2021  11:39 AM

## 2021-09-02 NOTE — PROGRESS NOTES
Pt refused to get up to chair at this time for dinner, pt states she is comfortable in bed. Sat pt up in bed to eat dinner. sp02 86-88% while eating, respirations 20-30. Increased heated chris flow NC oxygen fi02 from 61% to 75 %, liter flow remains at 40. Pt denies any further needs at this time.

## 2021-09-02 NOTE — CONSULTS
[facility] - Initial Consult Note COVID 19 Patient  Today's Date and Time: 9/2/2021, 12:27 PM    Impression :   · COVID 19 Suspect  · COVID 19 Confirmed Infection- Pneumonia  · Covid tests:  · 8/21/2021 : Positive  · 8-23-21: Positive  · 8-31-21: Positive  · Hypoxia   · Hypotension- resolved  · Hx Fibromyalgia  · Essential HTN  Recommendations:   · Antibiotic Rx:  · Monitor off antibiotics  · Covid Rx  · Remdesivir. Completed at [facility]  · Decadron. Restart on 9/1/2021  · Prior cervical ACDF for degenerative disk disease at [facility] on 8-17-21. Medical Decision Making/Summary/Discussion:9/2/2021     · Patient admitted with suspected COVID 19 infection  · Covid test confirmed positive. · Patient with initial diagnosis of COVID-19 on 8/21/2021. Received partial treatment with remdesivir and Decadron at [facility].  Was sent home on oral Decadron. Initial chest x-ray shows some bibasilar atelectasis. · Patient has deteriorated since then and current chest x-ray on admission to [facility] shows bilateral interstitial infiltrates. CT scan shows multifocal pneumonia. · Patient has developed significant hypoxia. and elevated CRP  · At this point in time remdesivir will no longer be efficacious  · Patient will need to continue with Decadron  · Unfortunately she is already out of the window for Actemra. She also does not qualify for monoclonal antibody. · She will need to be monitored for any potential bacterial superinfection  · Patient is at high risk for a complicated course.     Infection Control Recommendations   · Universal Precautions  · Airborne isolation  · Droplet Isolation    Antimicrobial Stewardship Recommendations       · Discontinuation of therapy  Coordination of Outpatient Care:   · Estimated Length of IV antimicrobials:TBD  · Patient will need Midline Catheter Insertion: TBD  · Patient will need PICC line Insertion: No  · Patient will need: Home IV , [facility],  SNF,  LTAC:TBD  · Patient will need outpatient wound care:No    Chief complaint/reason for consultation:   · Concern for COVID infection      History of Present Illness:   Lianne Marie is a 66y.o.-year-old female who was initially admitted on 8/31/2021. Patient seen at the request of Mariel Fuller. INITIAL HISTORY:    Patient has a history of degenerative disc disease in the cervical area. She underwent an ACDF (anterior cervical discectomy and fusion) on 8/17/2021 at [facility] was discharged home the next day with a drain. A few days later she developed malaise, fevers, and some shortness of breath. She was evaluated at that point in time at [facility], ER were her temperature was noted to be 103 F and she had a positive Covid test on 8/23/2021. She was transferred back to [facility] where she was treated conservatively in regards to her cervical neck incision. The neck incision was described as being clean and dry. The drain was removed on 8/22/2021. She also received treatment for the Covid with remdesivir and Decadron. Her symptoms improved and she was discharged home on 8/25/2021 with a recommendation to continue Decadron for 3 more days. While at [facility] she also showed desaturation when off oxygen, therefore she was discharged on home oxygen with a pulse oximeter and request to follow with pulmonary medicine. There are no data on CRP or other inflammatory markers. Her chest x-ray on 8/21/2021 showed minimal bibasilar atelectasis. CT scan was not done. The patient then presented to [facility] on 8/31/2021 complaining of generalized fatigue, decreased appetite, weakness, cough, mild shortness of breath. She denied any signs of inflammation around her previous cervical neck incision. She was noted to have hypoxia with an O2 saturation of 75 on room air.   CT scan of the chest was performed which shows multifocal pneumonia with findings compatible with Covid pneumonia. Patient admitted because of concerns with COVID 19.    CURRENT EVALUATION : 9/2/2021    Afebrile  VS stable    Showing progressive deterioration in pulmonary function. Requiring higher levels of oxygenation. Patient exhibiting respiratory distress. Yes  Respiratory secretions: No    Patient receiving supplemental oxygen. High flow 10-->40 L/min  02 sat 92-->98  RR 24-->26      % FIO2: 61  PEEP:      QTc:       NEWS Score: 0-4 Low risk group; 5-6: Medium risk group; 7 or above: High risk group  Parameters 3 2 1 0 1 2 3   Age    < 65   ? 65   RR ? 8  9-11 12-20  21-24 ? 25   O2 Sats ? 91 92-93 94-95 ? 96      Suppl O2  Yes  No      SBP ? 90  101-110 111-219   ? 220   HR ? 40  41-50 51-90  111-130 ? 131   Consciousness    Alert   Drowsiness, lethargy, or confusion   Temperature ? 35.0 C (95.0 F)  35.1-36.0 C 95.1-96.9 F 36.1-38.0 C 97.0-100.4 F 38.1-39.0 C 100.5-102.3 F ? 39.1 C ? 102.4 F      NEWS Score:   9/1/2021: 9 high risk    Overall Daily Picture:    Worsening    Presence of secondary bacterial Infection:    No   Additional antibiotics: No    Labs, X rays reviewed: 9/2/2021    BUN: 31-->20  Cr: 0.92-->0.73    WBC: 6.0-->6.2  Hb: 12.1-->15.8  Plat: 173-->161    Absolute Neutrophils: 5.16  Absolute Lymphocytes: 0.48  Neutrophil/Lymphocyte Ratio: 10.7 high risk    CRP: 181.5  Ferritin:  LDH: 250    Pro Calcitonin:      Cultures:  Urine:  ·   Blood:  · 3131x2 no growth  Sputum :  ·   Wound:       CXR:     CAT:  · 8-31-21: Multifocal pneumonia compatible with Covid 19    MRSA nasal screen 8/22/2021 -    Discussed with patient, RN, CC, IM. I have personally reviewed the past medical history, past surgical history, medications, social history, and family history, and I have updated the database accordingly.   Past Medical History:     Past Medical History:   Diagnosis Date    Arthritis     Blockage of coronary artery of heart ([facility] [address] 2010    x 2 stents    CAD (coronary artery disease)     DDD (degenerative disc disease), cervical     cervical 3-6    Fibromyalgia     History of blood transfusion     History of kidney stones     last one \"about 10 years ago\"    Hyperlipidemia     Hypertension     Pancreatitis 2016    Sleep apnea     Thyroid disease     Wears glasses     for reading       Past Surgical  History:     Past Surgical History:   Procedure Laterality Date    ARTHROPLASTY Left 2/6/2020    LEFT FIRST CARPOMETACARPAL ARTHROPLASTY performed by Marcos Marcus DO at [address]  2012    L4,L5,S1 hardware placed    BACK SURGERY  2018    SPINAL CORD STIMULATOR    CATARACT REMOVAL Bilateral 2016    CERVICAL FUSION N/A 8/17/2021    C3-6 ACDF, REMOVAL OF HARDWARE C6-7 performed by Malia Baires MD at [address]    COLONOSCOPY      normal    CORONARY ANGIOPLASTY  2010    x 2 stents    FINGER SURGERY      right thumb joint    HERNIA REPAIR Right     inguinal    HYSTERECTOMY      JOINT REPLACEMENT Right     knee    KIDNEY STONE SURGERY      x 3    LITHOTRIPSY      x 3    PARATHYROIDECTOMY      PARATHYROIDECTOMY      NV PERCUT IMPLNT NEUROELECT,EPIDURAL N/A 8/8/2018    SPINAL CORD STIMULATOR IMPLANT performed by Shayan Paul MD at [facility] Right 03/2019       Medications:      enoxaparin  40 mg SubCUTAneous BID    aspirin  81 mg Oral Daily    calcium-vitamin D  2 tablet Oral Daily    DULoxetine  60 mg Oral Daily    gabapentin  600 mg Oral Nightly    gabapentin  300 mg Oral QAM    therapeutic multivitamin-minerals  1 tablet Oral Daily    pantoprazole  40 mg Oral Daily    pravastatin  80 mg Oral Nightly    rOPINIRole  1 mg Oral QAM    rOPINIRole  2 mg Oral Nightly    Vitamin D  2,000 Units Oral Daily    zinc sulfate  50 mg Oral Daily    carvedilol  6.25 mg Oral BID WC    sodium chloride flush  10 mL IntraVENous 2 times per day    dexamethasone  6 mg Oral Daily    gabapentin  600 mg Oral Once       Social History:     Social History     Socioeconomic History    Marital status:      Spouse name: Not on file    Number of children: Not on file    Years of education: Not on file    Highest education level: Not on file   Occupational History    Not on file   Tobacco Use    Smoking status: Former Smoker     Packs/day: 1.00     Years: 44.00     Pack years: 44.00     Types: Cigarettes     Quit date: 2010     Years since quittin.2    Smokeless tobacco: Never Used   Vaping Use    Vaping Use: Never used   Substance and Sexual Activity    Alcohol use: No    Drug use: No    Sexual activity: Not on file   Other Topics Concern    Not on file   Social History Narrative    Not on file     Social Determinants of Health     Financial Resource Strain:     Difficulty of Paying Living Expenses:    Food Insecurity:     Worried About [address] in the Last Year:     [address] [email or web address] in the Last Year:    Transportation Needs:     Lack of Transportation (Medical):  Lack of Transportation (Non-Medical):    Physical Activity:     Days of Exercise per Week:     Minutes of Exercise per Session:    Stress:     Feeling of Stress :    Social Connections:     Frequency of Communication with Friends and Family:     Frequency of Social Gatherings with Friends and Family:     Attends [employer] Services:     Active Member of Clubs or Organizations:     Attends Club or Organization Meetings:     Marital Status:    Intimate Partner Violence:     Fear of Current or Ex-Partner:     Emotionally Abused:     Physically Abused:     Sexually Abused:        Family History:     Family History   Problem Relation Age of Onset    Heart Disease Father         Allergies:   Lisinopril and Toradol [ketorolac tromethamine]     Review of Systems:       Constitutional: No fevers or chills.  No systemic complaints  Head: No headaches  Eyes: No double vision or blurry vision. No conjunctival inflammation. ENT: No sore throat or runny nose. . No hearing loss, tinnitus or vertigo. Cardiovascular: No chest pain or palpitations. Shortness of breath. LOW  Lung: Shortness of breath, cough. No sputum production  Abdomen: No nausea, vomiting, diarrhea, or abdominal pain. Shaaron Money No cramps. Genitourinary: No increased urinary frequency, or dysuria. No hematuria. No suprapubic or CVA pain  Musculoskeletal: No muscle aches or pains. No joint effusions, swelling or deformities  Hematologic: No bleeding or bruising. Neurologic: No headache, weakness, numbness, or tingling. Integument: No rash, no ulcers. Psychiatric: No depression. Endocrine: No polyuria, no polydipsia, no polyphagia. Physical Examination :     Patient Vitals for the past 8 hrs:   BP Temp Temp src Pulse Resp SpO2 Weight   09/02/21 1201 109/63 98.9 °F (37.2 °C) Temporal 88 26 98 % --   09/02/21 1100 114/62 -- -- 83 30 91 % --   09/02/21 1006 -- -- -- -- -- 92 % --   09/02/21 1002 -- -- -- -- -- (!) 86 % --   09/02/21 1000 -- -- -- -- -- (!) 87 % --   09/02/21 0958 -- -- -- -- -- (!) 86 % --   09/02/21 0910 (!) 108/52 -- -- 84 15 95 % --   09/02/21 0900 -- -- -- 94 20 95 % --   09/02/21 0846 -- -- -- -- -- 92 % --   09/02/21 0842 128/64 -- -- 90 -- -- --   09/02/21 0800 128/64 99.6 °F (37.6 °C) -- 92 17 92 % --   09/02/21 0715 127/67 100.5 °F (38.1 °C) Temporal 102 27 (!) 88 % --   09/02/21 0600 (!) 148/74 99.5 °F (37.5 °C) Temporal 95 27 (!) 83 % --   09/02/21 0500 (!) 143/71 -- -- 68 17 92 % 202 lb (91.6 kg)     General Appearance: Awake, alert, and in mild apparent distress  Head:  Normocephalic, no trauma  Eyes: Pupils equal, round, reactive to light; sclera anicteric; conjunctivae pink. No embolic phenomena. ENT: Oropharynx clear, without erythema, exudate, or thrush. No tenderness of sinuses. Mouth/throat: mucosa pink and moist. No lesions. Dentition in good repair. Neck:Supple, without lymphadenopathy.  Thyroid normal, No bruits. Pulmonary/Chest: Coarse breath sounds with bilateral rhonchi. On nasal oxygen. Cardiovascular: Regular rate and rhythm without murmurs, rubs, or gallops. Abdomen: Soft, non tender. Bowel sounds normal. No organomegaly  All four Extremities: No cyanosis, clubbing, edema, or effusions. Neurologic: No gross sensory or motor deficits. Skin: Warm and dry with good turgor. No signs of peripheral arterial or venous insufficiency. No ulcerations. No open wounds. Medical Decision Making -Laboratory:   I have independently reviewed/ordered the following labs:    CBC with Differential:   Recent Labs     09/01/21 0520 09/02/21 0515   WBC 6.0 9.4   HGB 12.1 11.5*   HCT 36.7 35.0*    192   LYMPHOPCT 8* 2*   MONOPCT 6 0*     BMP:   Recent Labs     09/01/21 0520 09/02/21  0515    142   K 3.5* 4.0    111*   CO2 21 21   BUN 31* 20   CREATININE 0.92* 0.73   MG 2.1  --      Hepatic Function Panel:   Recent Labs     09/01/21 0520 09/02/21 0515   PROT 5.5* 5.6*   LABALBU 2.8* 2.9*   BILITOT 0.42 0.26*   ALKPHOS 64 65   ALT 14 16   AST 21 31     No results for input(s): RPR in the last 72 hours. No results for input(s): HIV in the last 72 hours. No results for input(s): BC in the last 72 hours. Lab Results   Component Value Date    MUCUS NOT REPORTED 10/04/2020    RBC 3.68 09/02/2021    TRICHOMONAS NOT REPORTED 10/04/2020    WBC 9.4 09/02/2021    YEAST NOT REPORTED 10/04/2020    TURBIDITY CLEAR 08/31/2021     Lab Results   Component Value Date    CREATININE 0.73 09/02/2021    GLUCOSE 123 09/02/2021       Medical Decision Making-Imaging:     EXAMINATION:   CTA OF THE CHEST 8/31/2021 8:26 pm       TECHNIQUE:   CTA of the chest was performed after the administration of intravenous   contrast.  Multiplanar reformatted images are provided for review.  MIP   images are provided for review.  Dose modulation, iterative reconstruction,   and/or weight based adjustment of the mA/kV was utilized to reduce the   radiation dose to as low as reasonably achievable.       COMPARISON:   08/30/2021 and 08/21/2021       HISTORY:   ORDERING SYSTEM PROVIDED HISTORY: elevated d dimer and hypoxia   TECHNOLOGIST PROVIDED HISTORY:   elevated d dimer and hypoxia   Decision Support Exception - unselect if not a suspected or confirmed   emergency medical condition->Emergency Medical Condition (MA)       FINDINGS:   Pulmonary Arteries: No evidence of central, lobar or proximal segmental   pulmonary artery emboli.       Mediastinum: Cardiomegaly.  Coronary disease identified.  Trace pericardial   fluid.  No suspicious mediastinal or hilar adenopathy identified per CT   criteria.  Aortic vascular calcifications.       Lungs/pleura: Multifocal interstitial alveolar opacities throughout both   lungs with a subpleural distribution suggestive of multifocal atypical   infection/viral pneumonia.  Bibasilar consolidative changes suggestive of   areas of atelectasis.       Upper Abdomen: Multiple circumscribed lesions involving the right left   hepatic lobe suggestive of cyst..       Soft Tissues/Bones: Multilevel degenerate changes thoracic spine.  Spinal   stimulator leads identified           Impression   No evidence of central to segmental pulmonary emboli.       Multifocal airspace opacities suggestive multifocal atypical infection/viral   pneumonia.       Cardiomegaly with coronary artery disease       Medical Decision [id number]:       Medical Decision Making-Other:     Note:  · Labs, medications, radiologic studies were reviewed with personal review of films  · Large amounts of data were reviewed  · Discussed with nursing Staff, Discharge planner  · Infection Control and Prevention measures reviewed  · All prior entries were reviewed  · Administer medications as ordered  · Prognosis: Guarded  · Discharge planning reviewed  · Follow up as outpatient.     Thank you for allowing us to participate in the care of this patient. Please call with questions.     Maureen Ledbetter MD  Pager: [phone] - Office: [phone]

## 2021-09-02 NOTE — PROGRESS NOTES
Pt up to [facility] with standby assistance, tolerated well. sp02 remained 92-95 with heated high flow NC oxygen on at 40L and 61% while ambulating. Pt states she feels better this afternoon. Pt voided and returned to bed, states she will get up to chair when dinner comes. Pt refused to prone. Pt brief changed, alireza care completed, complete linen changed, pt refused bath at this time.

## 2021-09-02 NOTE — PLAN OF CARE
Problem: Airway Clearance - Ineffective  Goal: Achieve or maintain patent airway  Outcome: Ongoing   Pt able to cough and clear secreations  Problem: Gas Exchange - Impaired  Goal: Absence of hypoxia  Outcome: Ongoing   Pt requiring more oxygen to meet demands, will continue to monitor, pt now using heated high flow NC and will use CPAP at night as she uses at home. Problem: Gas Exchange - Impaired  Goal: Promote optimal lung function  Outcome: Ongoing   Pt requiring more oxygen to keep sp02 greater than 90%. Will continue to monitor. Problem: Breathing Pattern - Ineffective  Goal: Ability to achieve and maintain a regular respiratory rate  Outcome: Ongoing     Problem: Body Temperature -  Risk of, Imbalanced  Goal: Ability to maintain a body temperature within defined limits  Outcome: Ongoing   Will continue to monitor and give tylenol as needed. Problem:  Body Temperature -  Risk of, Imbalanced  Goal: Complications related to the disease process, condition or treatment will be avoided or minimized  Outcome: Ongoing     Problem: Isolation Precautions - Risk of Spread of Infection  Goal: Prevent transmission of infection  Outcome: Ongoing     Problem: Nutrition Deficits  Goal: Optimize nutritional status  Outcome: Ongoing     Problem: Risk for Fluid Volume Deficit  Goal: Maintain normal heart rhythm  Outcome: Ongoing     Problem: Risk for Fluid Volume Deficit  Goal: Maintain absence of muscle cramping  Outcome: Ongoing     Problem: Risk for Fluid Volume Deficit  Goal: Maintain normal serum potassium, sodium, calcium, phosphorus, and pH  Outcome: Ongoing     Problem: Loneliness or Risk for Loneliness  Goal: Demonstrate positive use of time alone when socialization is not possible  Outcome: Ongoing     Problem: Fatigue  Goal: Verbalize increase energy and improved vitality  Outcome: Ongoing     Problem: Patient Education: Go to Patient Education Activity  Goal: Patient/Family Education  Outcome: Ongoing

## 2021-09-02 NOTE — PROGRESS NOTES
Pt resting in bed, respirations 30-38, sp02 84-86.  Called Belkis RT and asked about trying heated high flow NC.

## 2021-09-02 NOTE — PROGRESS NOTES
Pt up to [facility] with standby assistance, tolerated fairly well. Pt shivering, pt states she feels cold and having chills. Temp 100.5 temporal. Tylenol given. Pt voided and returned to bed with assistance. sp02 88% on high flow oxygen NC at 10L. Call light in reach, bed alarm on.

## 2021-09-02 NOTE — PROGRESS NOTES
pain.    I reviewed her vital signs over the past 24 hours, lowest O2 sat was 86% on high flow cannula. Blood pressure stable. Heart rate is running in the 80s beats per minute. Blood work stable. D-dimer is now normal.  No leukocytosis. Still has elevated ferritin level and fibrinogen. Cardiac biomarkers are good except for elevated BNP which is expected after her aggressive fluid resuscitation. Past Medical History:   Diagnosis Date    Arthritis     Blockage of coronary artery of heart ([address] 2010    x 2 stents    CAD (coronary artery disease)     DDD (degenerative disc disease), cervical     cervical 3-6    Fibromyalgia     History of blood transfusion     History of kidney stones     last one \"about 10 years ago\"    Hyperlipidemia     Hypertension     Pancreatitis 2016    Sleep apnea     Thyroid disease     Wears glasses     for reading       CURRENT ALLERGIES: Lisinopril and Toradol [ketorolac tromethamine] REVIEW OF SYSTEMS: 14 systems were reviewed. Pertinent positives and negatives as above, all else negative.      Past Surgical History:   Procedure Laterality Date    ARTHROPLASTY Left 2/6/2020    LEFT FIRST CARPOMETACARPAL ARTHROPLASTY performed by Kat Houston DO at [place] [address]  2012    L4,L5,S1 hardware placed    BACK SURGERY  2018    SPINAL CORD STIMULATOR    CATARACT REMOVAL Bilateral 2016    CERVICAL FUSION N/A 8/17/2021    C3-6 ACDF, REMOVAL OF HARDWARE C6-7 performed by Hansel Trammell MD at [address]    COLONOSCOPY      normal    CORONARY ANGIOPLASTY  2010    x 2 stents    FINGER SURGERY      right thumb joint    HERNIA REPAIR Right     inguinal    HYSTERECTOMY      JOINT REPLACEMENT Right     knee    KIDNEY STONE SURGERY      x 3    LITHOTRIPSY      x 3    PARATHYROIDECTOMY      PARATHYROIDECTOMY      [place] PERCUT IMPLNT [address] N/A 8/8/2018    SPINAL CORD STIMULATOR IMPLANT performed by Susi Wylie Raji Bahena MD at [facility] 2019    Social History:  Social History     Tobacco Use    Smoking status: Former Smoker     Packs/day: 1.00     Years: 44.00     Pack years: 44.00     Types: Cigarettes     Quit date: 2010     Years since quittin.2    Smokeless tobacco: Never Used   Vaping Use    Vaping Use: Never used   Substance Use Topics    Alcohol use: No    Drug use: No        CURRENT MEDICATIONS:  Outpatient Medications Marked as Taking for the 21 encounter [facility] Encounter)   Medication Sig Dispense Refill    gabapentin (NEURONTIN) 300 MG capsule Take 600 mg by mouth nightly.  rOPINIRole (REQUIP) 2 MG tablet Take 2 mg by mouth nightly      Vitamin D (CHOLECALCIFEROL) 50 MCG (2000 UT) TABS tablet Take 1 tablet by mouth daily 30 tablet 0    zinc sulfate (ZINCATE) 220 (50 Zn) MG capsule Take 1 capsule by mouth daily 30 capsule 3    aspirin 81 MG EC tablet Take 81 mg by mouth daily      Multiple Vitamins-Minerals (THERAPEUTIC MULTIVITAMIN-MINERALS) tablet Take 1 tablet by mouth daily      hydrochlorothiazide (HYDRODIURIL) 25 MG tablet Take 25 mg by mouth Daily with supper       Calcium Carbonate-Vitamin D (OSCAL 500/200 D-3 PO) Take 1 tablet by mouth daily      carvedilol (COREG) 12.5 MG tablet Take 1 tablet by mouth 2 times daily (with meals) (Patient taking differently: Take 25 mg by mouth 2 times daily (with meals) ) 60 tablet 3    pantoprazole (PROTONIX) 40 MG tablet Take 1 tablet by mouth daily (Patient taking differently: Take 40 mg by mouth nightly ) 30 tablet 0    DULoxetine (CYMBALTA) 60 MG capsule Take 60 mg by mouth daily      gabapentin (NEURONTIN) 600 MG tablet Take 300 mg by mouth every morning.        pravastatin (PRAVACHOL) 80 MG tablet Take 80 mg by mouth nightly      rOPINIRole (REQUIP) 1 MG tablet Take 1 mg by mouth every morning         enoxaparin  40 mg SubCUTAneous BID    aspirin  81 mg Oral Daily    calcium-vitamin D  2 tablet Oral Daily    DULoxetine  60 mg Oral Daily    gabapentin  600 mg Oral Nightly    gabapentin  300 mg Oral QAM    therapeutic multivitamin-minerals  1 tablet Oral Daily    pantoprazole  40 mg Oral Daily    pravastatin  80 mg Oral Nightly    rOPINIRole  1 mg Oral QAM    rOPINIRole  2 mg Oral Nightly    Vitamin D  2,000 Units Oral Daily    zinc sulfate  50 mg Oral Daily    carvedilol  6.25 mg Oral BID WC    sodium chloride flush  10 mL IntraVENous 2 times per day    dexamethasone  6 mg Oral Daily    gabapentin  600 mg Oral Once       FAMILY HISTORY: family history includes Heart Disease in her father. PHYSICAL EXAM:   /71   Pulse 75   Temp 98.7 °F (37.1 °C) (Temporal)   Resp 29   Ht 5' 7\" (1.702 m)   Wt 202 lb (91.6 kg)   SpO2 91%   BMI 31.64 kg/m²  Body mass index is 31.64 kg/m². Constitutional: She is oriented to person, place, and time. She appears well-developed and well-nourished. In no acute distress. HEENT: Normocephalic and atraumatic. No JVD present. Carotid bruit is not present. No mass and no thyromegaly present. No lymphadenopathy present. Cardiovascular: Normal rate, regular rhythm, normal heart sounds and intact distal pulses. Exam reveals no gallop and no friction rub. No murmur. Pulmonary/Chest: Poor air entry bilaterally with discolored bilateral medium size crackles. Abdominal: Soft, non-tender. Bowel sounds and aorta are normal. She exhibits no organomegaly, mass or bruit. Extremities: No edema, cyanosis, or clubbing. Pulses are 2+ radial/carotid/dorsalis pedis and posterior tibial bilaterally. Neurological: She is alert and oriented to person, place, and time. No evidence of gross cranial nerve deficit. Coordination appeared normal.   Skin: Skin is warm and dry. There is no rash or diaphoresis. Psychiatric: She has a normal mood and affect.  Her speech is normal and behavior is normal.      MOST RECENT LABS ON RECORD:   Lab Results   Component Value Date    WBC 9.4 09/02/2021    HGB 11.5 (L) 09/02/2021    HCT 35.0 (L) 09/02/2021     09/02/2021    CHOL 142 05/10/2016    TRIG 451 (H) 05/10/2016    HDL 5 (L) 05/10/2016    LDLDIRECT 66 05/10/2016    ALT 16 09/02/2021    AST 31 09/02/2021     09/02/2021    K 4.0 09/02/2021     (H) 09/02/2021    CREATININE 0.73 09/02/2021    BUN 20 09/02/2021    CO2 21 09/02/2021    INR 1.1 09/02/2021    LABA1C 5.2 05/07/2016       ASSESSMENT:  Patient Active Problem List    Diagnosis Date Noted    Dyslipidemia     Pneumonia due to COVID-19 virus 08/31/2021    Acute respiratory failure with hypoxia (HCC)     Cervical spinal stenosis 08/17/2021    Acquired spondylolisthesis 01/23/2018    Spinal stenosis, lumbar region, without neurogenic claudication 01/23/2018    Postlaminectomy syndrome, lumbar region 01/23/2018    Chronic midline low back pain without sciatica 01/23/2018    DIC (disseminated intravascular coagulation) ([address] 05/08/2016    Coagulopathy ([address] 05/08/2016    Thrombocytopenia ([address] 05/08/2016    Acute cystitis with hematuria     Pneumonia due to organism     Acute cystitis without hematuria 05/07/2016    CAD (coronary artery disease) 05/07/2016    Hypertension 05/07/2016    Lactic acid acidosis 05/07/2016    JAZZMINE (acute kidney injury) ([address] 05/07/2016    Sepsis ([address] 05/07/2016       PLAN:  Patient with extensive cardiac history, PCI to RCA and LAD in 2010. Ischemic cardiomyopathy, chronic combined CHF, hypertension, dyslipidemia and fibromyalgia. Last cardiac catheterization done 2016, patent stents to RCA and LAD otherwise nonobstructive CAD. Currently admitted for COVID-19 pneumonia. Patient presented with hypoxemia and hypotension. Echo on 9/1/2021, ejection fraction is 45 to 50%. Suboptimal image quality so no regional wall motion abnormalities can be assessed. No significant valvular abnormalities. Mild diastolic dysfunction. Currently hemodynamically stable. Unfortunately she has hypoxemia and down to 86% and currently on high flow oxygen via nasal cannula. She was given 1 dose of Lasix this morning. Cardiac biomarkers are okay except for elevated BNP which is expected after the fluid resuscitation in the emergency room. Continue aspirin and Pravachol  I decreased her carvedilol dose to 6.25 mg twice a day yesterday because of hypotension. Agree with holding hydrochlorothiazide. We will give her intermittent doses of Lasix if needed. Continue management of COVID-19 pneumonia as per primary team and is per pulmonary. Closely monitor blood pressure as per unit protocol. Follow-up repeat lipid profile. Once again, thank you for allowing me to participate in this patients care. Please do not hesitate to contact me could I be of further assistance. Sincerely,  Chasidy Christie MD, MS, F.A.C.C. [facility]) [facility], [address] [place] [address]  Phone: [phone], Fax: [phone]      Based on the patients current medical conditions, I believe the risk of significant morbidity and mortality is:high    The documentation recorded by the scribe, accurately and completely reflects the services I personally performed and the decisions made by me. Chasidy Christie MD, F.A.C.C.  September 2, 2021

## 2021-09-02 NOTE — PROGRESS NOTES
Pt resting in bed quietly with eyes closed. Respirations even and unlabored. No distress noted. Assessment and vital signs as charted. Pt is A & O x 4 and denies pain at this time. Cardiac monitor continues to show NSR. Nasal canula remains in use at 6L. Pt denies any other needs at this time. Bed alarm on. Call light in reach. Will continue to monitor.

## 2021-09-03 LAB
ABSOLUTE EOS #: 0 K/UL (ref 0–0.44)
ABSOLUTE IMMATURE GRANULOCYTE: 0 K/UL (ref 0–0.3)
ABSOLUTE LYMPH #: 0.17 K/UL (ref 1.1–3.7)
ABSOLUTE MONO #: 0.26 K/UL (ref 0.1–1.2)
ALBUMIN SERPL-MCNC: 2.9 G/DL (ref 3.5–5.2)
ALBUMIN/GLOBULIN RATIO: 1 (ref 1–2.5)
ALP BLD-CCNC: 67 U/L (ref 35–104)
ALT SERPL-CCNC: 20 U/L (ref 5–33)
ANION GAP SERPL CALCULATED.3IONS-SCNC: 11 MMOL/L (ref 9–17)
AST SERPL-CCNC: 34 U/L
BASOPHILS # BLD: 0 % (ref 0–2)
BASOPHILS ABSOLUTE: 0 K/UL (ref 0–0.2)
BILIRUB SERPL-MCNC: 0.38 MG/DL (ref 0.3–1.2)
BUN BLDV-MCNC: 19 MG/DL (ref 8–23)
BUN/CREAT BLD: 24 (ref 9–20)
CALCIUM SERPL-MCNC: 8.6 MG/DL (ref 8.6–10.4)
CHLORIDE BLD-SCNC: 108 MMOL/L (ref 98–107)
CO2: 22 MMOL/L (ref 20–31)
CREAT SERPL-MCNC: 0.78 MG/DL (ref 0.5–0.9)
D-DIMER QUANTITATIVE: 0.93 MG/L FEU (ref 0–0.59)
DIFFERENTIAL TYPE: ABNORMAL
EOSINOPHILS RELATIVE PERCENT: 0 % (ref 1–4)
FERRITIN: 903 UG/L (ref 13–150)
FIBRINOGEN: 678 MG/DL (ref 179–518)
GFR AFRICAN AMERICAN: >60 ML/MIN
GFR NON-AFRICAN AMERICAN: >60 ML/MIN
GFR SERPL CREATININE-BSD FRML MDRD: ABNORMAL ML/MIN/{1.73_M2}
GFR SERPL CREATININE-BSD FRML MDRD: ABNORMAL ML/MIN/{1.73_M2}
GLUCOSE BLD-MCNC: 126 MG/DL (ref 70–99)
HCT VFR BLD CALC: 32.8 % (ref 36.3–47.1)
HEMOGLOBIN: 11.1 G/DL (ref 11.9–15.1)
IMMATURE GRANULOCYTES: 0 %
INR BLD: 1.1
LYMPHOCYTES # BLD: 2 % (ref 24–43)
MCH RBC QN AUTO: 31.4 PG (ref 25.2–33.5)
MCHC RBC AUTO-ENTMCNC: 33.8 G/DL (ref 28.4–34.8)
MCV RBC AUTO: 92.9 FL (ref 82.6–102.9)
MONOCYTES # BLD: 3 % (ref 3–12)
MORPHOLOGY: NORMAL
NRBC AUTOMATED: 0 PER 100 WBC
PARTIAL THROMBOPLASTIN TIME: 41.4 SEC (ref 23.9–33.8)
PDW BLD-RTO: 12.9 % (ref 11.8–14.4)
PLATELET # BLD: 198 K/UL (ref 138–453)
PLATELET ESTIMATE: ABNORMAL
PMV BLD AUTO: 9.3 FL (ref 8.1–13.5)
POTASSIUM SERPL-SCNC: 3.6 MMOL/L (ref 3.7–5.3)
PROTHROMBIN TIME: 13.8 SEC (ref 11.5–14.2)
RBC # BLD: 3.53 M/UL (ref 3.95–5.11)
RBC # BLD: ABNORMAL 10*6/UL
SEG NEUTROPHILS: 95 % (ref 36–65)
SEGMENTED NEUTROPHILS ABSOLUTE COUNT: 8.17 K/UL (ref 1.5–8.1)
SODIUM BLD-SCNC: 141 MMOL/L (ref 135–144)
TOTAL PROTEIN: 5.8 G/DL (ref 6.4–8.3)
WBC # BLD: 8.6 K/UL (ref 3.5–11.3)
WBC # BLD: ABNORMAL 10*3/UL

## 2021-09-03 PROCEDURE — 80053 COMPREHEN METABOLIC PANEL: CPT

## 2021-09-03 PROCEDURE — 2580000003 HC RX 258: Performed by: EMERGENCY MEDICINE

## 2021-09-03 PROCEDURE — 85025 COMPLETE CBC W/AUTO DIFF WBC: CPT

## 2021-09-03 PROCEDURE — 94660 CPAP INITIATION&MGMT: CPT

## 2021-09-03 PROCEDURE — 99233 SBSQ HOSP IP/OBS HIGH 50: CPT | Performed by: INTERNAL MEDICINE

## 2021-09-03 PROCEDURE — 85384 FIBRINOGEN ACTIVITY: CPT

## 2021-09-03 PROCEDURE — 85730 THROMBOPLASTIN TIME PARTIAL: CPT

## 2021-09-03 PROCEDURE — 85610 PROTHROMBIN TIME: CPT

## 2021-09-03 PROCEDURE — 6360000002 HC RX W HCPCS: Performed by: FAMILY MEDICINE

## 2021-09-03 PROCEDURE — 94761 N-INVAS EAR/PLS OXIMETRY MLT: CPT

## 2021-09-03 PROCEDURE — 2000000000 HC ICU R&B

## 2021-09-03 PROCEDURE — 6370000000 HC RX 637 (ALT 250 FOR IP): Performed by: INTERNAL MEDICINE

## 2021-09-03 PROCEDURE — 6370000000 HC RX 637 (ALT 250 FOR IP): Performed by: FAMILY MEDICINE

## 2021-09-03 PROCEDURE — 36415 COLL VENOUS BLD VENIPUNCTURE: CPT

## 2021-09-03 PROCEDURE — 85379 FIBRIN DEGRADATION QUANT: CPT

## 2021-09-03 PROCEDURE — 82728 ASSAY OF FERRITIN: CPT

## 2021-09-03 PROCEDURE — 2700000000 HC OXYGEN THERAPY PER DAY

## 2021-09-03 RX ORDER — AMLODIPINE BESYLATE 5 MG/1
5 TABLET ORAL DAILY
Status: DISCONTINUED | OUTPATIENT
Start: 2021-09-04 | End: 2021-09-07

## 2021-09-03 RX ADMIN — MULTIPLE VITAMINS W/ MINERALS TAB 1 TABLET: TAB at 08:02

## 2021-09-03 RX ADMIN — ENOXAPARIN SODIUM 40 MG: 40 INJECTION SUBCUTANEOUS at 20:29

## 2021-09-03 RX ADMIN — ASPIRIN 81 MG: 81 TABLET, COATED ORAL at 08:02

## 2021-09-03 RX ADMIN — DULOXETINE HYDROCHLORIDE 60 MG: 60 CAPSULE, DELAYED RELEASE ORAL at 08:02

## 2021-09-03 RX ADMIN — Medication 2000 UNITS: at 08:02

## 2021-09-03 RX ADMIN — GABAPENTIN 600 MG: 300 CAPSULE ORAL at 20:28

## 2021-09-03 RX ADMIN — ENOXAPARIN SODIUM 40 MG: 40 INJECTION SUBCUTANEOUS at 08:03

## 2021-09-03 RX ADMIN — GABAPENTIN 300 MG: 300 CAPSULE ORAL at 08:02

## 2021-09-03 RX ADMIN — CARVEDILOL 6.25 MG: 6.25 TABLET, FILM COATED ORAL at 17:08

## 2021-09-03 RX ADMIN — ZINC SULFATE 220 MG (50 MG) CAPSULE 50 MG: CAPSULE at 08:02

## 2021-09-03 RX ADMIN — PANTOPRAZOLE SODIUM 40 MG: 40 TABLET, DELAYED RELEASE ORAL at 08:02

## 2021-09-03 RX ADMIN — CARVEDILOL 6.25 MG: 6.25 TABLET, FILM COATED ORAL at 08:02

## 2021-09-03 RX ADMIN — SODIUM CHLORIDE, PRESERVATIVE FREE 10 ML: 5 INJECTION INTRAVENOUS at 08:03

## 2021-09-03 RX ADMIN — ROPINIROLE HYDROCHLORIDE 1 MG: 1 TABLET, FILM COATED ORAL at 08:02

## 2021-09-03 RX ADMIN — DEXAMETHASONE 6 MG: 4 TABLET ORAL at 08:03

## 2021-09-03 RX ADMIN — CALCIUM CARBONATE-VITAMIN D TAB 500 MG-200 UNIT 2 TABLET: 500-200 TAB at 08:02

## 2021-09-03 RX ADMIN — ROPINIROLE HYDROCHLORIDE 2 MG: 1 TABLET, FILM COATED ORAL at 20:28

## 2021-09-03 RX ADMIN — SODIUM CHLORIDE, PRESERVATIVE FREE 10 ML: 5 INJECTION INTRAVENOUS at 20:30

## 2021-09-03 RX ADMIN — PRAVASTATIN SODIUM 80 MG: 20 TABLET ORAL at 20:29

## 2021-09-03 ASSESSMENT — PAIN SCALES - GENERAL
PAINLEVEL_OUTOF10: 0
PAINLEVEL_OUTOF10: 0

## 2021-09-03 NOTE — PLAN OF CARE
Problem: Airway Clearance - Ineffective  Goal: Achieve or maintain patent airway  9/2/2021 2019 by Keith Antoine, RN  Outcome: Ongoing  Note: Pulse ox WNL. Heated high flow nasal canula on at 77% and 40L. Lungs clear upper and diminished mid and bases. SOB noted with exertion. Pt denies any discomfort. Will continue to assess. Problem: Gas Exchange - Impaired  Goal: Absence of hypoxia  9/2/2021 2019 by Keith Antoine, RN  Outcome: Ongoing  Note: No hypoxia noted. Will continue to monitor. Problem: Breathing Pattern - Ineffective  Goal: Ability to achieve and maintain a regular respiratory rate  9/2/2021 2019 by Keith Antoine, RN  Outcome: Ongoing  Note: Respiratory rate between 20-30. Will continue to monitor. Problem: Body Temperature -  Risk of, Imbalanced  Goal: Ability to maintain a body temperature within defined limits  9/2/2021 2019 by Keith Antoine, RN  Outcome: Ongoing  Note: Temperature is 99.2 at this time. Will continue to monitor. Problem: Risk for Fluid Volume Deficit  Goal: Maintain normal heart rhythm  9/2/2021 2019 by Keith Antoine, RN  Outcome: Ongoing  Note: Cardiac monitor on, NSR noted. VS stable and WNL. Will continue to assess. Problem: Falls - Risk of:  Goal: Will remain free from falls  Description: Will remain free from falls  9/2/2021 2019 by Keith Antoine, RN  Outcome: Ongoing  Note: Pt is at high risk for falls. Non skid socks on. Bed in low position and wheels locked. Fall sign posted and bracelet on. Siderails up x2. Bed alarm on. Call light within reach. Will continue to assess.

## 2021-09-03 NOTE — PLAN OF CARE
Problem: Airway Clearance - Ineffective  Goal: Achieve or maintain patent airway  9/3/2021 0848 by Ladonna Nava RN  Outcome: Ongoing  Note: Patient on high flow. Problem: Gas Exchange - Impaired  Goal: Absence of hypoxia  9/3/2021 0848 by Ladonna Nava RN  Outcome: Ongoing  Note: Pulse oximetry 90% or higher on high flow. Problem: Gas Exchange - Impaired  Goal: Promote optimal lung function  Outcome: Ongoing     Problem: Breathing Pattern - Ineffective  Goal: Ability to achieve and maintain a regular respiratory rate  9/3/2021 0848 by Ladonna Nava RN  Outcome: Ongoing  Note: Patient becomes tachypneic with exertion. Problem: Body Temperature -  Risk of, Imbalanced  Goal: Complications related to the disease process, condition or treatment will be avoided or minimized  Outcome: Ongoing     Problem: Isolation Precautions - Risk of Spread of Infection  Goal: Prevent transmission of infection  Outcome: Ongoing  Note: Droplet plus isolation ordered. Problem: Nutrition Deficits  Goal: Optimize nutritional status  Outcome: Ongoing     Problem: Risk for Fluid Volume Deficit  Goal: Maintain normal heart rhythm  9/3/2021 0848 by Ladonna Nava RN  Outcome: Ongoing  Note: Patient is in NSR. Telemetry ordered. Problem: Risk for Fluid Volume Deficit  Goal: Maintain absence of muscle cramping  Outcome: Ongoing     Problem: Risk for Fluid Volume Deficit  Goal: Maintain normal serum potassium, sodium, calcium, phosphorus, and pH  Outcome: Ongoing  Note: Labs ordered daily.       Problem: Loneliness or Risk for Loneliness  Goal: Demonstrate positive use of time alone when socialization is not possible  Outcome: Ongoing     Problem: Fatigue  Goal: Verbalize increase energy and improved vitality  Outcome: Ongoing  Note: Encouraged to rest.      Problem: Patient Education: Go to Patient Education Activity  Goal: Patient/Family Education  Outcome: Ongoing     Problem: Falls - Risk of:  Goal: Will remain free from falls  Description: Will remain free from falls  9/3/2021 0848 by Darshan Oates RN  Outcome: Ongoing  Note: Pt has no falls and is continuing to be monitored. Fall precautions remain intact. Bracelet on pt, star on, bed low and locked, alarm on, side rails up, call light and personal belongings within reach, and room clear and clean of clutter. Problem: Falls - Risk of:  Goal: Absence of physical injury  Description: Absence of physical injury  Outcome: Ongoing  Note: Patient one assist.      Problem: Nutrition  Goal: Optimal nutrition therapy  Outcome: Ongoing     Problem:  Body Temperature -  Risk of, Imbalanced  Goal: Ability to maintain a body temperature within defined limits  9/3/2021 0848 by Darshan Oates RN  Outcome: Completed

## 2021-09-03 NOTE — PROGRESS NOTES
[facility] -Progress Telemedicine Note  COVID 19 Patient  Today's Date and Time: 9/3/2021, 2:58 PM    Impression :   · COVID 19 Suspect  · COVID 19 Confirmed Infection- Pneumonia  · Covid tests:  · 8/21/2021 : Positive  · 8-23-21: Positive  · 8-31-21: Positive  · Hypoxia   · Hypotension- resolved  · Hx Fibromyalgia  · Essential HTN  Recommendations:   · Antibiotic Rx:  · Monitor off antibiotics  · Covid Rx  · Remdesivir. Completed at [facility]  · Decadron. Restart on 9/1/2021  · Prior cervical ACDF for degenerative disk disease at [facility] on 8-17-21. Medical Decision Making/Summary/Discussion:9/3/2021     · Patient admitted with suspected COVID 19 infection  · Covid test confirmed positive. · Patient with initial diagnosis of COVID-19 on 8/21/2021. Received partial treatment with remdesivir and Decadron at [facility].  Was sent home on oral Decadron. Initial chest x-ray shows some bibasilar atelectasis. · Patient has deteriorated since then and current chest x-ray on admission to [place] shows bilateral interstitial infiltrates. CT scan shows multifocal pneumonia. · Patient has developed significant hypoxia. and elevated CRP  · At this point in time remdesivir will no longer be efficacious  · Patient will need to continue with Decadron  · Unfortunately she is already out of the window for Actemra. She also does not qualify for monoclonal antibody. · She will need to be monitored for any potential bacterial superinfection  · Patient is at high risk for a complicated course.     Infection Control Recommendations   · Universal Precautions  · Airborne isolation  · Droplet Isolation    Antimicrobial Stewardship Recommendations       · Discontinuation of therapy  Coordination of Outpatient Care:   · Estimated Length of IV antimicrobials:TBD  · Patient will need Midline Catheter Insertion: TBD  · Patient will need PICC line Insertion: No  · Patient will need: Home Katherine KEVIN,  [facility],  LTAC:TBD  · Patient will need outpatient wound care:No    Chief complaint/reason for consultation:   · Concern for COVID infection      History of Present Illness:   Tena Pandya is a 66y.o.-year-old female who was initially admitted on 8/31/2021. Patient seen at the request of Diego Neri. INITIAL HISTORY:    Patient has a history of degenerative disc disease in the cervical area. She underwent an ACDF (anterior cervical discectomy and fusion) on 8/17/2021 at [facility] was discharged home the next day with a drain. A few days later she developed malaise, fevers, and some shortness of breath. She was evaluated at that point in time at [facility], ER were her temperature was noted to be 103 F and she had a positive Covid test on 8/23/2021. She was transferred back to [facility] where she was treated conservatively in regards to her cervical neck incision. The neck incision was described as being clean and dry. The drain was removed on 8/22/2021. She also received treatment for the Covid with remdesivir and Decadron. Her symptoms improved and she was discharged home on 8/25/2021 with a recommendation to continue Decadron for 3 more days. While at [facility] she also showed desaturation when off oxygen, therefore she was discharged on home oxygen with a pulse oximeter and request to follow with pulmonary medicine. There are no data on CRP or other inflammatory markers. Her chest x-ray on 8/21/2021 showed minimal bibasilar atelectasis. CT scan was not done. The patient then presented to [facility] on 8/31/2021 complaining of generalized fatigue, decreased appetite, weakness, cough, mild shortness of breath. She denied any signs of inflammation around her previous cervical neck incision. She was noted to have hypoxia with an O2 saturation of 75 on room air.   CT scan of the chest was performed which shows multifocal pneumonia with findings compatible with Covid pneumonia. Patient admitted because of concerns with COVID 19.    CURRENT EVALUATION : 9/3/2021    Afebrile  VS stable, HTN    Showing progressive deterioration in pulmonary function. Requiring higher levels of oxygenation. Patient exhibiting respiratory distress. Yes  Respiratory secretions: No    Patient receiving supplemental oxygen. High flow 10-->40 L/min  02 sat 92-->98-->96  RR 24-->26-->28      % FIO2: 61-->76  PEEP:      QTc:       NEWS Score: 0-4 Low risk group; 5-6: Medium risk group; 7 or above: High risk group  Parameters 3 2 1 0 1 2 3   Age    < 65   ? 65   RR ? 8  9-11 12-20  21-24 ? 25   O2 Sats ? 91 92-93 94-95 ? 96      Suppl O2  Yes  No      SBP ? 90  101-110 111-219   ? 220   HR ? 40  41-50 51-90  111-130 ? 131   Consciousness    Alert   Drowsiness, lethargy, or confusion   Temperature ? 35.0 C (95.0 F)  35.1-36.0 C 95.1-96.9 F 36.1-38.0 C 97.0-100.4 F 38.1-39.0 C 100.5-102.3 F ? 39.1 C ? 102.4 F      NEWS Score:   9/1/2021: 9 high risk    Overall Daily Picture:   · Unchanged     Presence of secondary bacterial Infection:    No   Additional antibiotics: No    Labs, X rays reviewed: 9/3/2021    BUN: 31-->20-->19  Cr: 0.92-->0.73-->0.78    WBC: 6.0-->6.2-->8.6  Hb: 12.1-->15.8-->11.1  Plat: 173-->161-->198    Absolute Neutrophils: 5.16  Absolute Lymphocytes: 0.48  Neutrophil/Lymphocyte Ratio: 10.7 high risk    CRP: 181.5  Ferritin: 789-->905  LDH: 250    Pro Calcitonin:      Cultures:  Urine:  ·   Blood:  · 3131x2 no growth  Sputum :  ·   Wound:       CXR:     CAT:  · 8-31-21: Multifocal pneumonia compatible with Covid 19    MRSA nasal screen 8/22/2021 -    Discussed with patient, RN, CC, IM. I have personally reviewed the past medical history, past surgical history, medications, social history, and family history, and I have updated the database accordingly.   Past Medical History:     Past Medical History:   Diagnosis Date    Arthritis     Blockage of coronary artery of heart ([facility] [address] 2010    x 2 stents    CAD (coronary artery disease)     DDD (degenerative disc disease), cervical     cervical 3-6    Fibromyalgia     History of blood transfusion     History of kidney stones     last one \"about 10 years ago\"    Hyperlipidemia     Hypertension     Pancreatitis 2016    Sleep apnea     Thyroid disease     Wears glasses     for reading       Past Surgical  History:     Past Surgical History:   Procedure Laterality Date    ARTHROPLASTY Left 2/6/2020    LEFT FIRST CARPOMETACARPAL ARTHROPLASTY performed by Rj Mcgill DO at [address]  2012    L4,L5,S1 hardware placed    BACK SURGERY  2018    SPINAL CORD STIMULATOR    CATARACT REMOVAL Bilateral 2016    CERVICAL FUSION N/A 8/17/2021    C3-6 ACDF, REMOVAL OF HARDWARE C6-7 performed by Caitie Wellington MD at [address]    COLONOSCOPY      normal   [address]  2010    x 2 stents    FINGER SURGERY      right thumb joint    HERNIA REPAIR Right     inguinal    HYSTERECTOMY      JOINT REPLACEMENT Right     knee    KIDNEY STONE SURGERY      x 3    LITHOTRIPSY      x 3    PARATHYROIDECTOMY      PARATHYROIDECTOMY      IN PERCUT IMPLNT NEUROELECT,EPIDURAL N/A 8/8/2018    SPINAL CORD STIMULATOR IMPLANT performed by Priscila Nuñez MD at [address] [facility] Right 03/2019       Medications:      enoxaparin  40 mg SubCUTAneous BID    aspirin  81 mg Oral Daily    calcium-vitamin D  2 tablet Oral Daily    DULoxetine  60 mg Oral Daily    gabapentin  600 mg Oral Nightly    gabapentin  300 mg Oral QAM    therapeutic multivitamin-minerals  1 tablet Oral Daily    pantoprazole  40 mg Oral Daily    pravastatin  80 mg Oral Nightly    rOPINIRole  1 mg Oral QAM    rOPINIRole  2 mg Oral Nightly    Vitamin D  2,000 Units Oral Daily    zinc sulfate  50 mg Oral Daily    carvedilol  6.25 mg Oral BID WC    sodium chloride flush  10 mL IntraVENous 2 times per day    dexamethasone  6 mg Oral Daily    gabapentin  600 mg Oral Once       Social History:     Social History     Socioeconomic History    Marital status:      Spouse name: Not on file    Number of children: Not on file    Years of education: Not on file    Highest education level: Not on file   Occupational History    Not on file   Tobacco Use    Smoking status: Former Smoker     Packs/day: 1.00     Years: 44.00     Pack years: 44.00     Types: Cigarettes     Quit date: 2010     Years since quittin.2    Smokeless tobacco: Never Used   Vaping Use    Vaping Use: Never used   Substance and Sexual Activity    Alcohol use: No    Drug use: No    Sexual activity: Not on file   Other Topics Concern    Not on file   Social History Narrative    Not on file     Social Determinants of Health     Financial Resource Strain:     Difficulty of Paying Living Expenses:    Food Insecurity:     Worried About [address] [employer] in the Last Year:     [email or web address] in the Last Year:    Transportation Needs:     Lack of Transportation (Medical):  Lack of Transportation (Non-Medical):    Physical Activity:     Days of Exercise per Week:     Minutes of Exercise per Session:    Stress:     Feeling of Stress :    Social Connections:     Frequency of Communication with Friends and Family:     Frequency of Social Gatherings with Friends and Family:     Attends Church Services:     Active Member of Clubs or Organizations:     Attends Club or Organization Meetings:     Marital Status:    Intimate Partner Violence:     Fear of Current or Ex-Partner:     Emotionally Abused:     Physically Abused:     Sexually Abused:        Family History:     Family History   Problem Relation Age of Onset    Heart Disease Father         Allergies:   Lisinopril and Toradol [ketorolac tromethamine]     Review of Systems:       Constitutional: No fevers or chills.  No systemic complaints  Head: No headaches  Eyes: No double vision or blurry vision. No conjunctival inflammation. ENT: No sore throat or runny nose. . No hearing loss, tinnitus or vertigo. Cardiovascular: No chest pain or palpitations. Shortness of breath. LOW  Lung: Shortness of breath, cough. No sputum production  Abdomen: No nausea, vomiting, diarrhea, or abdominal pain. Bubba Regulo No cramps. Genitourinary: No increased urinary frequency, or dysuria. No hematuria. No suprapubic or CVA pain  Musculoskeletal: No muscle aches or pains. No joint effusions, swelling or deformities  Hematologic: No bleeding or bruising. Neurologic: No headache, weakness, numbness, or tingling. Integument: No rash, no ulcers. Psychiatric: No depression. Endocrine: No polyuria, no polydipsia, no polyphagia. Physical Examination :     Patient Vitals for the past 8 hrs:   BP Temp Temp src Pulse Resp SpO2   09/03/21 1400 (!) 157/77 -- -- 71 28 96 %   09/03/21 1200 136/66 98.9 °F (37.2 °C) Temporal 71 24 94 %   09/03/21 1100 (!) 150/74 -- -- 72 21 92 %   09/03/21 1000 (!) 147/69 -- -- 69 25 94 %   09/03/21 0900 123/64 -- -- 80 (!) 32 97 %   09/03/21 0800 124/61 98 °F (36.7 °C) Temporal 83 18 91 %   09/03/21 0700 (!) 151/71 -- -- 65 24 (!) 89 %     General Appearance: Awake, alert, and in mild apparent distress  Head:  Normocephalic, no trauma  Eyes: Pupils equal, round, reactive to light; sclera anicteric; conjunctivae pink. No embolic phenomena. ENT: Oropharynx clear, without erythema, exudate, or thrush. No tenderness of sinuses. Mouth/throat: mucosa pink and moist. No lesions. Dentition in good repair. Neck:Supple, without lymphadenopathy. Thyroid normal, No bruits. Pulmonary/Chest: Coarse breath sounds with bilateral rhonchi. On nasal oxygen. Cardiovascular: Regular rate and rhythm without murmurs, rubs, or gallops. Abdomen: Soft, non tender.  Bowel sounds normal. No organomegaly  All four Extremities: No cyanosis, clubbing, edema, or effusions. Neurologic: No gross sensory or motor deficits. Skin: Warm and dry with good turgor. No signs of peripheral arterial or venous insufficiency. No ulcerations. No open wounds. Medical Decision Making -Laboratory:   I have independently reviewed/ordered the following labs:    CBC with Differential:   Recent Labs     09/02/21  0515 09/03/21 0515   WBC 9.4 8.6   HGB 11.5* 11.1*   HCT 35.0* 32.8*    198   LYMPHOPCT 2* 2*   MONOPCT 0* 3     BMP:   Recent Labs     09/01/21  0520 09/01/21  0520 09/02/21  0515 09/03/21  0515      < > 142 141   K 3.5*   < > 4.0 3.6*      < > 111* 108*   CO2 21   < > 21 22   BUN 31*   < > 20 19   CREATININE 0.92*   < > 0.73 0.78   MG 2.1  --   --   --     < > = values in this interval not displayed. Hepatic Function Panel:   Recent Labs     09/02/21 0515 09/03/21 0515   PROT 5.6* 5.8*   LABALBU 2.9* 2.9*   BILITOT 0.26* 0.38   ALKPHOS 65 67   ALT 16 20   AST 31 34*     No results for input(s): RPR in the last 72 hours. No results for input(s): HIV in the last 72 hours. No results for input(s): BC in the last 72 hours. Lab Results   Component Value Date    MUCUS NOT REPORTED 10/04/2020    RBC 3.53 09/03/2021    TRICHOMONAS NOT REPORTED 10/04/2020    WBC 8.6 09/03/2021    YEAST NOT REPORTED 10/04/2020    TURBIDITY CLEAR 08/31/2021     Lab Results   Component Value Date    CREATININE 0.78 09/03/2021    GLUCOSE 126 09/03/2021       Medical Decision Making-Imaging:     EXAMINATION:   CTA OF THE CHEST 8/31/2021 8:26 pm       TECHNIQUE:   CTA of the chest was performed after the administration of intravenous   contrast.  Multiplanar reformatted images are provided for review.  MIP   images are provided for review.  Dose modulation, iterative reconstruction,   and/or weight based adjustment of the mA/kV was utilized to reduce the   radiation dose to as low as reasonably achievable.       COMPARISON:   08/30/2021 and 08/21/2021       HISTORY:   [address] PROVIDED HISTORY: elevated d dimer and hypoxia   TECHNOLOGIST PROVIDED HISTORY:   elevated d dimer and hypoxia   Decision Support Exception - unselect if not a suspected or confirmed   emergency medical condition->Emergency Medical Condition (MA)       FINDINGS:   Pulmonary Arteries: No evidence of central, lobar or proximal segmental   pulmonary artery emboli.       Mediastinum: Cardiomegaly.  Coronary disease identified.  Trace pericardial   fluid.  No suspicious mediastinal or hilar adenopathy identified per CT   criteria.  Aortic vascular calcifications.       Lungs/pleura: Multifocal interstitial alveolar opacities throughout both   lungs with a subpleural distribution suggestive of multifocal atypical   infection/viral pneumonia.  Bibasilar consolidative changes suggestive of   areas of atelectasis.       Upper Abdomen: Multiple circumscribed lesions involving the right left   hepatic lobe suggestive of cyst..       Soft Tissues/Bones: Multilevel degenerate changes thoracic spine.  Spinal   stimulator leads identified           Impression   No evidence of central to segmental pulmonary emboli.       Multifocal airspace opacities suggestive multifocal atypical infection/viral   pneumonia.       Cardiomegaly with coronary artery disease       Medical Decision [id number]:       Medical Decision Making-Other:     Note:  · Labs, medications, radiologic studies were reviewed with personal review of films  · Large amounts of data were reviewed  · Discussed with nursing Staff, Discharge planner  · Infection Control and Prevention measures reviewed  · All prior entries were reviewed  · Administer medications as ordered  · Prognosis: Guarded  · Discharge planning reviewed  · Follow up as outpatient. Thank you for allowing us to participate in the care of this patient. Please call with questions.     Deidre Kebede MD  Pager: [phone] - Office: [phone]

## 2021-09-03 NOTE — PROGRESS NOTES
Pt resting in bed with eyes closed. Respirations even and unlabored. No distress noted. Assessment and vital signs as charted. Pt denies pain and is A & O x 4. Heated high flow nasal canula remains in use at 77% & 40L. RT went into pt's room to ask about home CPAP. Pt states she doesn't use home CPAP, only oxygen. RT updated writer. Cardiac monitor continues to show NSR. Pt denies any other needs. Bed alarm on. Call light in reach. Will continue to monitor.

## 2021-09-03 NOTE — PROGRESS NOTES
Pt resting in bed watching TV. Assessment and vitals as charted. Pt denies pain and is A & O x 4. Cardiac monitor continues to show NSR. Heated high flow nasal canula remains in use at 40L & 77%. New fluid bad hung for high flow machine. Evening meds given to pt. Pt took without difficulty. Pt denies any other needs at this time. Bed alarm on. Call light in reach. Will continue to monitor.

## 2021-09-03 NOTE — PROGRESS NOTES
Patient resting in bed. Sleep apnea noted. Pulse oximetry drops 87-88% while resting at times. Will continue to monitor.  Respirations easy and unlabored at rest.

## 2021-09-03 NOTE — PROGRESS NOTES
Progress Note  Marcia Correia MD    OBJECTIVE:    Patient seen for f/u of Pneumonia due to COVID-19 virus. She is requiring more oxygen . Has shortness of breath ,did not use c pap last night. ROS:   Constitutional: negative  for fevers, and negative for chills. Respiratory: positive for shortness of breath, positive for cough, and negative for wheezing  Cardiovascular: negative for chest pain, and negative for palpitations  Gastrointestinal: negative for abdominal pain, negative for nausea,negative for vomiting, negative for diarrhea, and negative for constipation     All other systems were reviewed with the patient and are negative unless otherwise stated in HPI    OBJECTIVE:  Vitals:   Temp: 98.9 °F (37.2 °C)  BP: (!) 151/71  Resp: 24  Pulse: 65  SpO2: (!) 89 %    24HR INTAKE/OUTPUT:      Intake/Output Summary (Last 24 hours) at 9/3/2021 0816  Last data filed at 9/3/2021 0400  Gross per 24 hour   Intake 1010 ml   Output 1400 ml   Net -390 ml     -----------------------------------------------------------------  Exam:  GEN:    Awake, alert and oriented x3. EYES:  EOMI, pupils equal   NECK: Supple. No lymphadenopathy. No carotid bruit  CVS:    regular rate and rhythm, no audible murmur  PULM:  diminished with bilat rhonchi, no acute respiratory distress  ABD:    Bowels sounds normal.  Abdomen is soft. No distention. no tenderness to palpation. EXT:   no edema bilaterally . No calf tenderness. NEURO: Moves all extremities. Motor and sensory are grossly intact  SKIN:  No rashes.   No skin lesions.    -----------------------------------------------------------------  Diagnostic Data:    · All available data reviewed  Lab Results   Component Value Date    WBC 8.6 09/03/2021    HGB 11.1 (L) 09/03/2021    MCV 92.9 09/03/2021     09/03/2021      Lab Results   Component Value Date    GLUCOSE 126 (H) 09/03/2021    BUN 19 09/03/2021    CREATININE 0.78 09/03/2021     09/03/2021    K 3.6 (L) 09/03/2021    CALCIUM 8.6 09/03/2021     (H) 09/03/2021    CO2 22 09/03/2021       PROBLEM LIST:  Principal Problem:    Pneumonia due to COVID-19 virus  Active Problems:    CAD (coronary artery disease)    Hypertension    Sepsis ([address]    Acute respiratory failure with hypoxia (HCC)    Dyslipidemia  Resolved Problems:    * No resolved hospital problems. *      ASSESSMENT / PLAN:  Pneumonia due to COVID-19 virus  · Continue current therapy of steroids,aerosols and oxygen  Off antibiotics  · Acute respiratory failure with hypoxia- continue oxygen, encourage to use c pap at night  · Nutrition status:  Well developed, well nourished with no malnutrition  · DVT prophylaxis: Lovenox   · High risk medications: none   · Disposition:  Discharge plan is home    Jennifer Ferreira MD , M.D.  9/3/2021  8:16 AM

## 2021-09-03 NOTE — PROGRESS NOTES
Mauro Roa am scribing for and in the presence of Orlando Meza MD, F.A.C.C..    Patient: Iggy Mcknight  : 1950  Date of Admission: 2021  Primary Care Physician: Nika Blackman  Today's Date: 9/3/2021    REASON FOR CONSULTATION: Fatigue (ongoing a couple days, states \"im going into sepsis again\", recent neck surgery and covid diagnosis ) and Shortness of Breath      HPI: Ms. Gail Estrada is a 66 y.o. female with history of myocardial infarction in stents to RCA and LAD. History of ischemic cardiomyopathy, hypertension and dyslipidemia. Patient admitted with shortness of breath, fatigue and generalized body aches. She has a recent diagnosis of Covid 19 and currently treated for COVID-19 pneumonia. Cardiology consulted because of the patient extensive cardiac history. I get her records from [facility] and she has the following cardiac intervention/testing  Left heart cath with MultiLink BMS on 2010. RCA stenting on 2010. Carotid Doppler X on 2015. No significant disease bilaterally. Stress test on 2017 no ischemia or scar. LV EF is 73% on gated SPECT. Lexiscan stress test on 2021 no ischemia or scar, ejection fraction 74%. Patient was diagnosed with COVID-19 pneumonia on 2021 and treated at [facility] with steroids, Remdesivir and oxygen and was discharged with home oxygen therapy. She started feeling worse again about 2 days ago. She was hypoxemic and hypotensive in the emergency room yesterday. Blood pressure improved after fluid bolus. Today she is doing the same. She still complaining of shortness of breath and dry persistent cough. Continues to stay on high flow oxygen via nasal cannula. Her O2 sat is good. She has some chest pain only with coughing. She continues to complain of generalized body aches. She said overall she is not feeling any different than yesterday.     I reviewed her vital signs over the past 24 hours, heart rate is very well controlled but her blood pressure is elevated. We decreased her carvedilol from 12.5 to 6.25 twice daily on admission because of hypotension. We also stopped her hydrochlorothiazide and we ended up giving her intermittent doses of Lasix. Her last blood pressure as of tonight is116/76 mmHg. Her O2 sat remained above 90% except for 1 reading early this morning that was 89% at 7 AM.      Past Medical History:   Diagnosis Date    Arthritis     Blockage of coronary artery of heart ([address] 2010    x 2 stents    CAD (coronary artery disease)     DDD (degenerative disc disease), cervical     cervical 3-6    Fibromyalgia     History of blood transfusion     History of kidney stones     last one \"about 10 years ago\"    Hyperlipidemia     Hypertension     Pancreatitis 2016    Sleep apnea     Thyroid disease     Wears glasses     for reading       CURRENT ALLERGIES: Lisinopril and Toradol [ketorolac tromethamine] REVIEW OF SYSTEMS: 14 systems were reviewed. Pertinent positives and negatives as above, all else negative.      Past Surgical History:   Procedure Laterality Date    ARTHROPLASTY Left 2/6/2020    LEFT FIRST CARPOMETACARPAL ARTHROPLASTY performed by Navdeep Murray DO at [facility]  2012    L4,L5,S1 hardware placed    BACK SURGERY  2018    SPINAL CORD STIMULATOR    CATARACT REMOVAL Bilateral 2016    CERVICAL FUSION N/A 8/17/2021    C3-6 ACDF, REMOVAL OF HARDWARE C6-7 performed by Rodolfo Parrish MD at [address]    COLONOSCOPY      normal    CORONARY ANGIOPLASTY  2010    x 2 stents    FINGER SURGERY      right thumb joint    HERNIA REPAIR Right     inguinal    HYSTERECTOMY      JOINT REPLACEMENT Right     knee    KIDNEY STONE SURGERY      x 3    LITHOTRIPSY      x 3    PARATHYROIDECTOMY      PARATHYROIDECTOMY      CO PERCUT IMPLNT NEUROELECT,EPIDURAL N/A 8/8/2018    SPINAL CORD STIMULATOR IMPLANT performed by Rocky Vinson MD at [facility] Right 2019    Social History:  Social History     Tobacco Use    Smoking status: Former Smoker     Packs/day: 1.00     Years: 44.00     Pack years: 44.00     Types: Cigarettes     Quit date: 2010     Years since quittin.2    Smokeless tobacco: Never Used   Vaping Use    Vaping Use: Never used   Substance Use Topics    Alcohol use: No    Drug use: No        CURRENT MEDICATIONS:  Outpatient Medications Marked as Taking for the 21 encounter [facility] Encounter)   Medication Sig Dispense Refill    gabapentin (NEURONTIN) 300 MG capsule Take 600 mg by mouth nightly.  rOPINIRole (REQUIP) 2 MG tablet Take 2 mg by mouth nightly      Vitamin D (CHOLECALCIFEROL) 50 MCG (2000 UT) TABS tablet Take 1 tablet by mouth daily 30 tablet 0    zinc sulfate (ZINCATE) 220 (50 Zn) MG capsule Take 1 capsule by mouth daily 30 capsule 3    aspirin 81 MG EC tablet Take 81 mg by mouth daily      Multiple Vitamins-Minerals (THERAPEUTIC MULTIVITAMIN-MINERALS) tablet Take 1 tablet by mouth daily      hydrochlorothiazide (HYDRODIURIL) 25 MG tablet Take 25 mg by mouth Daily with supper       Calcium Carbonate-Vitamin D (OSCAL 500/200 D-3 PO) Take 1 tablet by mouth daily      carvedilol (COREG) 12.5 MG tablet Take 1 tablet by mouth 2 times daily (with meals) (Patient taking differently: Take 25 mg by mouth 2 times daily (with meals) ) 60 tablet 3    pantoprazole (PROTONIX) 40 MG tablet Take 1 tablet by mouth daily (Patient taking differently: Take 40 mg by mouth nightly ) 30 tablet 0    DULoxetine (CYMBALTA) 60 MG capsule Take 60 mg by mouth daily      gabapentin (NEURONTIN) 600 MG tablet Take 300 mg by mouth every morning.        pravastatin (PRAVACHOL) 80 MG tablet Take 80 mg by mouth nightly      rOPINIRole (REQUIP) 1 MG tablet Take 1 mg by mouth every morning         enoxaparin  40 mg SubCUTAneous BID    aspirin  81 mg Oral Daily    Lab Results   Component Value Date    WBC 8.6 09/03/2021    HGB 11.1 (L) 09/03/2021    HCT 32.8 (L) 09/03/2021     09/03/2021    CHOL 142 05/10/2016    TRIG 451 (H) 05/10/2016    HDL 5 (L) 05/10/2016    LDLDIRECT 66 05/10/2016    ALT 20 09/03/2021    AST 34 (H) 09/03/2021     09/03/2021    K 3.6 (L) 09/03/2021     (H) 09/03/2021    CREATININE 0.78 09/03/2021    BUN 19 09/03/2021    CO2 22 09/03/2021    INR 1.1 09/03/2021    LABA1C 5.2 05/07/2016       ASSESSMENT:  Patient Active Problem List    Diagnosis Date Noted    Dyslipidemia     Pneumonia due to COVID-19 virus 08/31/2021    Acute respiratory failure with hypoxia (HCC)     Cervical spinal stenosis 08/17/2021    Acquired spondylolisthesis 01/23/2018    Spinal stenosis, lumbar region, without neurogenic claudication 01/23/2018    Postlaminectomy syndrome, lumbar region 01/23/2018    Chronic midline low back pain without sciatica 01/23/2018    DIC (disseminated intravascular coagulation) ([address] 05/08/2016    Coagulopathy (HCC) 05/08/2016    Thrombocytopenia ([address] 05/08/2016    Acute cystitis with hematuria     Pneumonia due to organism     Acute cystitis without hematuria 05/07/2016    CAD (coronary artery disease) 05/07/2016    Hypertension 05/07/2016    Lactic acid acidosis 05/07/2016    JAZZMINE (acute kidney injury) ([address] 05/07/2016    Sepsis ([facility] [address] 05/07/2016       PLAN:  Patient with extensive cardiac history, PCI to RCA and LAD in 2010. Ischemic cardiomyopathy, chronic combined CHF, hypertension, dyslipidemia and fibromyalgia. Last cardiac catheterization done 2016, patent stents to RCA and LAD otherwise nonobstructive CAD. Currently admitted for COVID-19 pneumonia. Patient presented with hypoxemia and hypotension. Echo on 9/1/2021, ejection fraction is 45 to 50%. Suboptimal image quality so no regional wall motion abnormalities can be assessed. No significant valvular abnormalities.   Mild diastolic dysfunction. Currently hemodynamically stable. Symptom wise, she is doing the same. She continues to complain of shortness of breath, persistent dry cough and chest pain with coughing. Currently on high flow oxygen via nasal cannula. Blood pressure stable over the past 24 hours. Occasionally elevated. Heart rate is very well controlled. Continue aspirin and Pravachol. Continue carvedilol 6.25 mg twice daily. Start amlodipine 5 mg daily starting tomorrow morning. Continue follow-up blood pressure as per unit protocol. Repeat cardiac biomarkers tomorrow morning including troponin and BNP. We will give intermittent doses of Lasix to keep her dry. Continue management of COVID-19 pneumonia as per primary team and is per pulmonary. Once again, thank you for allowing me to participate in this patients care. Please do not hesitate to contact me could I be of further assistance. Sincerely,  Mario Panchal MD, MS, F.A.C.C. [facility]) [facility], [address] [place] [address], [address]  Phone: [phone], Fax: [phone]      Based on the patients current medical conditions, I believe the risk of significant morbidity and mortality is:high    The documentation recorded by the scribe, accurately and completely reflects the services I personally performed and the decisions made by me. Mario Panchal MD, F.A.C.C.  September 3, 2021

## 2021-09-03 NOTE — PROGRESS NOTES
Patient up to commode then to chair at this time. Gait steady but weak and slow. Pulse oximetry drops to 88% on high flow with exertion. Lung sounds are diminished with fine crackles on right base. Patient denies any pain. Encouraged to sit up to chair and do acapella. Acapella done ten times.

## 2021-09-03 NOTE — PLAN OF CARE
Up in chair. Ready to go back to bed. C/O being tired. Respirations easy and shallow. Acapella done. Lungs diminished throughout with fine rales noted in posterior bases. Returned to bed with assistance. C/O weakness. High flow nasal cannula continued. Oxygen saturation with exertion remains at 93%.

## 2021-09-03 NOTE — PROGRESS NOTES
Pt resting in bed quietly with eyes closed. Respirations even and unlabored. No distress noted. Bed alarm on. Call light in reach. Will continue to monitor.

## 2021-09-03 NOTE — PROGRESS NOTES
Comprehensive Nutrition Assessment    Type and Reason for Visit:  Reassess    Nutrition Recommendations/Plan:   1. Continue current diet. 2. Continue current ONS. Nutrition Assessment:  Pt is on MVI w/minerals, D, and zinc due to COVID-19. PO is good at meals, 0% ONS taken. Pt asleep at this time. Malnutrition Assessment:  Malnutrition Status: At risk for malnutrition (Comment)    Context:  Acute Illness     Findings of the 6 clinical characteristics of malnutrition:  Energy Intake:  Mild decrease in energy intake (Comment) (3 days per patient)  Weight Loss:  No significant weight loss     Body Fat Loss:  Unable to assess     Muscle Mass Loss:  Unable to assess    Fluid Accumulation:  No significant fluid accumulation     Strength:  Not Performed    Estimated Daily Nutrient Needs:  Energy (kcal):  4375-1666 (18-20); Weight Used for Energy Requirements:  Current     Protein (g):  80-92 (1.3-1.5); Weight Used for Protein Requirements:  Ideal        Fluid (ml/day):  1800; Method Used for Fluid Requirements:  1 ml/kcal      Nutrition Related Findings:  appears well nourished      Wounds:  Surgical Incision       Current Nutrition Therapies:    ADULT DIET; Regular  Adult Oral Nutrition Supplement; Other Oral Supplement; Two Alex HN    Anthropometric Measures:  · Height: 5' 7\" (170.2 cm)  · Current Body Weight: 200 lb 6.4 oz (90.9 kg)   · Admission Body Weight: 204 lb (92.5 kg)    · Usual Body Weight:  (190-200#)     · Ideal Body Weight: 135 lbs; % Ideal Body Weight 146.5 %   · BMI: 31.4  · BMI Categories: Obese Class 1 (BMI 30.0-34. 9)       Nutrition Diagnosis:   · Altered nutrition-related lab values related to endocrine dysfuntion as evidenced by lab values      Nutrition Interventions:   Food and/or Nutrient Delivery:  Continue Current Diet, Start Oral Nutrition Supplement  Nutrition Education/Counseling:  Education initiated   Coordination of Nutrition Care:  Continue to monitor while inpatient    Goals:  PO >75% meals and supplements       Recent Labs     09/01/21  0520 09/01/21  0520 09/02/21  0515 09/03/21  0515     --  142 141   K 3.5*  --  4.0 3.6*     --  111* 108*   CO2 21  --  21 22   BUN 31*  --  20 19   CREATININE 0.92*  --  0.73 0.78   GLUCOSE 139*  --  123* 126*   ALT 14  --  16 20   ALKPHOS 64  --  65 67   GFR              < >                          < > = values in this interval not displayed.       Lab Results   Component Value Date    LABALBU 2.9 09/03/2021      Nutrition Monitoring and Evaluation:   Behavioral-Environmental Outcomes:  None Identified   Food/Nutrient Intake Outcomes:  Food and Nutrient Intake, Supplement Intake  Physical Signs/Symptoms Outcomes:  Biochemical Data, Weight     Discharge Planning:    Continue Oral Nutrition Supplement     Electronically signed by Roopa Chung RD, LD on 9/3/21 at 11:16 AM EDT    Contact: [phone]

## 2021-09-04 LAB
ABSOLUTE EOS #: 0.07 K/UL (ref 0–0.44)
ABSOLUTE IMMATURE GRANULOCYTE: 0 K/UL (ref 0–0.3)
ABSOLUTE LYMPH #: 0.49 K/UL (ref 1.1–3.7)
ABSOLUTE MONO #: 0.21 K/UL (ref 0.1–1.2)
ALBUMIN SERPL-MCNC: 2.7 G/DL (ref 3.5–5.2)
ALBUMIN/GLOBULIN RATIO: 0.9 (ref 1–2.5)
ALP BLD-CCNC: 73 U/L (ref 35–104)
ALT SERPL-CCNC: 48 U/L (ref 5–33)
ANION GAP SERPL CALCULATED.3IONS-SCNC: 11 MMOL/L (ref 9–17)
AST SERPL-CCNC: 55 U/L
BASOPHILS # BLD: 0 % (ref 0–2)
BASOPHILS ABSOLUTE: 0 K/UL (ref 0–0.2)
BILIRUB SERPL-MCNC: 0.36 MG/DL (ref 0.3–1.2)
BNP INTERPRETATION: ABNORMAL
BUN BLDV-MCNC: 19 MG/DL (ref 8–23)
BUN/CREAT BLD: 31 (ref 9–20)
CALCIUM SERPL-MCNC: 8.7 MG/DL (ref 8.6–10.4)
CHLORIDE BLD-SCNC: 105 MMOL/L (ref 98–107)
CHOLESTEROL/HDL RATIO: 4.4
CHOLESTEROL: 148 MG/DL
CO2: 23 MMOL/L (ref 20–31)
CREAT SERPL-MCNC: 0.61 MG/DL (ref 0.5–0.9)
D-DIMER QUANTITATIVE: 0.92 MG/L FEU (ref 0–0.59)
DIFFERENTIAL TYPE: ABNORMAL
EOSINOPHILS RELATIVE PERCENT: 1 % (ref 1–4)
FERRITIN: 1033 UG/L (ref 13–150)
FIBRINOGEN: 634 MG/DL (ref 179–518)
GFR AFRICAN AMERICAN: >60 ML/MIN
GFR NON-AFRICAN AMERICAN: >60 ML/MIN
GFR SERPL CREATININE-BSD FRML MDRD: ABNORMAL ML/MIN/{1.73_M2}
GFR SERPL CREATININE-BSD FRML MDRD: ABNORMAL ML/MIN/{1.73_M2}
GLUCOSE BLD-MCNC: 122 MG/DL (ref 70–99)
HCT VFR BLD CALC: 33.5 % (ref 36.3–47.1)
HDLC SERPL-MCNC: 34 MG/DL
HEMOGLOBIN: 11.3 G/DL (ref 11.9–15.1)
IMMATURE GRANULOCYTES: 0 %
INR BLD: 1.1
LDL CHOLESTEROL: 74 MG/DL (ref 0–130)
LYMPHOCYTES # BLD: 7 % (ref 24–43)
MCH RBC QN AUTO: 30.9 PG (ref 25.2–33.5)
MCHC RBC AUTO-ENTMCNC: 33.7 G/DL (ref 28.4–34.8)
MCV RBC AUTO: 91.5 FL (ref 82.6–102.9)
MONOCYTES # BLD: 3 % (ref 3–12)
MORPHOLOGY: NORMAL
NRBC AUTOMATED: 0 PER 100 WBC
PARTIAL THROMBOPLASTIN TIME: 33.4 SEC (ref 23.9–33.8)
PDW BLD-RTO: 12.6 % (ref 11.8–14.4)
PLATELET # BLD: 198 K/UL (ref 138–453)
PLATELET ESTIMATE: ABNORMAL
PMV BLD AUTO: 9.4 FL (ref 8.1–13.5)
POTASSIUM SERPL-SCNC: 3.9 MMOL/L (ref 3.7–5.3)
PRO-BNP: 2646 PG/ML
PROTHROMBIN TIME: 13.8 SEC (ref 11.5–14.2)
RBC # BLD: 3.66 M/UL (ref 3.95–5.11)
RBC # BLD: ABNORMAL 10*6/UL
SEG NEUTROPHILS: 89 % (ref 36–65)
SEGMENTED NEUTROPHILS ABSOLUTE COUNT: 6.23 K/UL (ref 1.5–8.1)
SODIUM BLD-SCNC: 139 MMOL/L (ref 135–144)
TOTAL PROTEIN: 5.7 G/DL (ref 6.4–8.3)
TRIGL SERPL-MCNC: 198 MG/DL
TROPONIN INTERP: NORMAL
TROPONIN T: NORMAL NG/ML
TROPONIN, HIGH SENSITIVITY: 9 NG/L (ref 0–14)
VLDLC SERPL CALC-MCNC: ABNORMAL MG/DL (ref 1–30)
WBC # BLD: 7 K/UL (ref 3.5–11.3)
WBC # BLD: ABNORMAL 10*3/UL

## 2021-09-04 PROCEDURE — 6360000002 HC RX W HCPCS: Performed by: FAMILY MEDICINE

## 2021-09-04 PROCEDURE — 84484 ASSAY OF TROPONIN QUANT: CPT

## 2021-09-04 PROCEDURE — 80061 LIPID PANEL: CPT

## 2021-09-04 PROCEDURE — 85384 FIBRINOGEN ACTIVITY: CPT

## 2021-09-04 PROCEDURE — 2580000003 HC RX 258: Performed by: EMERGENCY MEDICINE

## 2021-09-04 PROCEDURE — 2000000000 HC ICU R&B

## 2021-09-04 PROCEDURE — 94761 N-INVAS EAR/PLS OXIMETRY MLT: CPT

## 2021-09-04 PROCEDURE — 2700000000 HC OXYGEN THERAPY PER DAY

## 2021-09-04 PROCEDURE — 36415 COLL VENOUS BLD VENIPUNCTURE: CPT

## 2021-09-04 PROCEDURE — 85610 PROTHROMBIN TIME: CPT

## 2021-09-04 PROCEDURE — 6370000000 HC RX 637 (ALT 250 FOR IP): Performed by: FAMILY MEDICINE

## 2021-09-04 PROCEDURE — 99233 SBSQ HOSP IP/OBS HIGH 50: CPT | Performed by: INTERNAL MEDICINE

## 2021-09-04 PROCEDURE — 80053 COMPREHEN METABOLIC PANEL: CPT

## 2021-09-04 PROCEDURE — 94660 CPAP INITIATION&MGMT: CPT

## 2021-09-04 PROCEDURE — 85379 FIBRIN DEGRADATION QUANT: CPT

## 2021-09-04 PROCEDURE — 85730 THROMBOPLASTIN TIME PARTIAL: CPT

## 2021-09-04 PROCEDURE — 6370000000 HC RX 637 (ALT 250 FOR IP): Performed by: INTERNAL MEDICINE

## 2021-09-04 PROCEDURE — 82728 ASSAY OF FERRITIN: CPT

## 2021-09-04 PROCEDURE — 85025 COMPLETE CBC W/AUTO DIFF WBC: CPT

## 2021-09-04 PROCEDURE — 83880 ASSAY OF NATRIURETIC PEPTIDE: CPT

## 2021-09-04 RX ADMIN — ZINC SULFATE 220 MG (50 MG) CAPSULE 50 MG: CAPSULE at 08:37

## 2021-09-04 RX ADMIN — ASPIRIN 81 MG: 81 TABLET, COATED ORAL at 08:36

## 2021-09-04 RX ADMIN — AMLODIPINE BESYLATE 5 MG: 5 TABLET ORAL at 08:36

## 2021-09-04 RX ADMIN — CALCIUM CARBONATE-VITAMIN D TAB 500 MG-200 UNIT 2 TABLET: 500-200 TAB at 08:37

## 2021-09-04 RX ADMIN — ROPINIROLE HYDROCHLORIDE 2 MG: 1 TABLET, FILM COATED ORAL at 20:01

## 2021-09-04 RX ADMIN — MULTIPLE VITAMINS W/ MINERALS TAB 1 TABLET: TAB at 08:36

## 2021-09-04 RX ADMIN — ENOXAPARIN SODIUM 40 MG: 40 INJECTION SUBCUTANEOUS at 08:37

## 2021-09-04 RX ADMIN — PANTOPRAZOLE SODIUM 40 MG: 40 TABLET, DELAYED RELEASE ORAL at 08:37

## 2021-09-04 RX ADMIN — DEXAMETHASONE 6 MG: 4 TABLET ORAL at 08:36

## 2021-09-04 RX ADMIN — GABAPENTIN 600 MG: 300 CAPSULE ORAL at 20:00

## 2021-09-04 RX ADMIN — ROPINIROLE HYDROCHLORIDE 1 MG: 1 TABLET, FILM COATED ORAL at 08:36

## 2021-09-04 RX ADMIN — GABAPENTIN 300 MG: 300 CAPSULE ORAL at 08:36

## 2021-09-04 RX ADMIN — ENOXAPARIN SODIUM 40 MG: 40 INJECTION SUBCUTANEOUS at 20:01

## 2021-09-04 RX ADMIN — PRAVASTATIN SODIUM 80 MG: 20 TABLET ORAL at 20:00

## 2021-09-04 RX ADMIN — Medication 2000 UNITS: at 08:37

## 2021-09-04 RX ADMIN — CARVEDILOL 6.25 MG: 6.25 TABLET, FILM COATED ORAL at 17:14

## 2021-09-04 RX ADMIN — SODIUM CHLORIDE, PRESERVATIVE FREE 10 ML: 5 INJECTION INTRAVENOUS at 20:01

## 2021-09-04 RX ADMIN — SODIUM CHLORIDE, PRESERVATIVE FREE 10 ML: 5 INJECTION INTRAVENOUS at 08:37

## 2021-09-04 RX ADMIN — DULOXETINE HYDROCHLORIDE 60 MG: 60 CAPSULE, DELAYED RELEASE ORAL at 08:36

## 2021-09-04 RX ADMIN — CARVEDILOL 6.25 MG: 6.25 TABLET, FILM COATED ORAL at 08:36

## 2021-09-04 ASSESSMENT — PAIN SCALES - GENERAL
PAINLEVEL_OUTOF10: 0

## 2021-09-04 NOTE — PROGRESS NOTES
[facility] -Progress Telemedicine Note  COVID 19 Patient  Today's Date and Time: 9/4/2021, 9:14 AM    Impression :   · COVID 19 Suspect  · COVID 19 Confirmed Infection- Pneumonia  · Covid tests:  · 8/21/2021 : Positive  · 8-23-21: Positive  · 8-31-21: Positive  · Hypoxia   · Hypotension- resolved  · Hx Fibromyalgia  · Essential HTN      Patient evaluated by Telemedicine. Requesting Institution: [facility]  Provider Institution: [address] [facility] [address], [address] Person at [facility]: Ms Mauricio Ramesh, APRN- IM    Recommendations:   · Antibiotic Rx:  · Monitor off antibiotics  · Covid Rx  · Remdesivir. Completed at [facility]  · Decadron. Restart on 9/1/2021  · Prior cervical ACDF for degenerative disk disease at [facility] on 8-17-21. Medical Decision Making/Summary/Discussion:9/4/2021     · Patient admitted with suspected COVID 19 infection  · Covid test confirmed positive. · Patient with initial diagnosis of COVID-19 on 8/21/2021. Received partial treatment with remdesivir and Decadron at [facility].  Was sent home on oral Decadron. Initial chest x-ray shows some bibasilar atelectasis. · Patient has deteriorated since then and current chest x-ray on admission to [place] shows bilateral interstitial infiltrates. CT scan shows multifocal pneumonia. · Patient has developed significant hypoxia. and elevated CRP  · At this point in time remdesivir will no longer be efficacious  · Patient will need to continue with Decadron  · Unfortunately she is already out of the window for Actemra. She also does not qualify for monoclonal antibody. · She will need to be monitored for any potential bacterial superinfection  · Patient is at high risk for a complicated course.     Infection Control Recommendations   · Universal Precautions  · Airborne isolation  · Droplet Isolation    Antimicrobial Stewardship Recommendations       · Discontinuation of therapy  Coordination of Outpatient Care:   · Estimated Length of IV antimicrobials:TBD  · Patient will need Midline Catheter Insertion: TBD  · Patient will need PICC line Insertion: No  · Patient will need: Home IV , [place],  SNF,  LTAC:TBD  · Patient will need outpatient wound care:No    Chief complaint/reason for consultation:   · Concern for COVID infection      History of Present Illness:   Mar Orozco is a 66y.o.-year-old female who was initially admitted on 8/31/2021. Patient seen at the request of Susanna Armstrong. INITIAL HISTORY:    Patient has a history of degenerative disc disease in the cervical area. She underwent an ACDF (anterior cervical discectomy and fusion) on 8/17/2021 at [facility] was discharged home the next day with a drain. A few days later she developed malaise, fevers, and some shortness of breath. She was evaluated at that point in time at [facility], ER were her temperature was noted to be 103 F and she had a positive Covid test on 8/23/2021. She was transferred back to [facility] where she was treated conservatively in regards to her cervical neck incision. The neck incision was described as being clean and dry. The drain was removed on 8/22/2021. She also received treatment for the Covid with remdesivir and Decadron. Her symptoms improved and she was discharged home on 8/25/2021 with a recommendation to continue Decadron for 3 more days. While at [facility] she also showed desaturation when off oxygen, therefore she was discharged on home oxygen with a pulse oximeter and request to follow with pulmonary medicine. There are no data on CRP or other inflammatory markers. Her chest x-ray on 8/21/2021 showed minimal bibasilar atelectasis. CT scan was not done. The patient then presented to [facility] on 8/31/2021 complaining of generalized fatigue, decreased appetite, weakness, cough, mild shortness of breath.   She denied any signs of inflammation around her previous cervical neck incision. She was noted to have hypoxia with an O2 saturation of 75 on room air. CT scan of the chest was performed which shows multifocal pneumonia with findings compatible with Covid pneumonia. Patient admitted because of concerns with COVID 19.    CURRENT EVALUATION : 9/4/2021    Patient evaluated in the ICU. Afebrile  VS stable, HTN    Showing residual problems with pulmonary function. Requiring higher levels of oxygenation. Patient exhibiting respiratory distress. Yes  Respiratory secretions: No    Patient receiving supplemental oxygen. High flow 10-->40 L/min  02 sat 92-->98-->96  RR 24-->26-->28-->26      % FIO2: 61-->75  PEEP:      QTc:       NEWS Score: 0-4 Low risk group; 5-6: Medium risk group; 7 or above: High risk group  Parameters 3 2 1 0 1 2 3   Age    < 65   ? 65   RR ? 8  9-11 12-20  21-24 ? 25   O2 Sats ? 91 92-93 94-95 ? 96      Suppl O2  Yes  No      SBP ? 90  101-110 111-219   ? 220   HR ? 40  41-50 51-90  111-130 ? 131   Consciousness    Alert   Drowsiness, lethargy, or confusion   Temperature ? 35.0 C (95.0 F)  35.1-36.0 C 95.1-96.9 F 36.1-38.0 C 97.0-100.4 F 38.1-39.0 C 100.5-102.3 F ? 39.1 C ? 102.4 F      NEWS Score:   9/1/2021: 9 high risk    Overall Daily Picture:   · Unchanged     Presence of secondary bacterial Infection:    No   Additional antibiotics: No    Labs, X rays reviewed: 9/4/2021    BUN: 31-->20-->19  Cr: 0.92-->0.73-->0.78-->0.61    WBC: 6.0-->6.2-->8.6-->7.0  Hb: 12.1-->15.8-->11.3  Plat: 173-->161-->198    Absolute Neutrophils: 5.16  Absolute Lymphocytes: 0.48  Neutrophil/Lymphocyte Ratio: 10.7 high risk    CRP: 181.5  Ferritin: 789-->905  LDH: 250    Pro Calcitonin:      Cultures:  Urine:  ·   Blood:  · 3131x2 no growth  Sputum :  ·   Wound:       CXR:     CAT:  · 8-31-21: Multifocal pneumonia compatible with Covid 19    MRSA nasal screen 8/22/2021 -    Discussed with patient, RN, CC, IM.     I have Physically Abused:     Sexually Abused:        Family History:     Family History   Problem Relation Age of Onset    Heart Disease Father         Allergies:   Lisinopril and Toradol [ketorolac tromethamine]     Review of Systems:       Constitutional: No fevers or chills. No systemic complaints  Head: No headaches  Eyes: No double vision or blurry vision. No conjunctival inflammation. ENT: No sore throat or runny nose. . No hearing loss, tinnitus or vertigo. Cardiovascular: No chest pain or palpitations. Shortness of breath. LOW  Lung: Shortness of breath, cough. No sputum production  Abdomen: No nausea, vomiting, diarrhea, or abdominal pain. Darlynn Magic No cramps. Genitourinary: No increased urinary frequency, or dysuria. No hematuria. No suprapubic or CVA pain  Musculoskeletal: No muscle aches or pains. No joint effusions, swelling or deformities  Hematologic: No bleeding or bruising. Neurologic: No headache, weakness, numbness, or tingling. Integument: No rash, no ulcers. Psychiatric: No depression. Endocrine: No polyuria, no polydipsia, no polyphagia. Physical Examination :     Patient Vitals for the past 8 hrs:   BP Temp Temp src Pulse Resp SpO2 Weight   09/04/21 0800 (!) 147/76 -- -- 62 26 96 % --   09/04/21 0700 (!) 169/77 97.8 °F (36.6 °C) Temporal 61 (!) 34 (!) 88 % --   09/04/21 0600 (!) 159/67 -- -- 62 28 90 % --   09/04/21 0542 -- -- -- -- -- 91 % --   09/04/21 0500 (!) 168/78 -- -- 66 28 95 % 201 lb 6.4 oz (91.4 kg)   09/04/21 0400 (!) 161/75 -- -- 56 30 96 % --   09/04/21 0309 -- -- -- -- -- 91 % --   09/04/21 0300 (!) 155/72 -- -- 60 (!) 31 92 % --   09/04/21 0200 (!) 160/75 -- -- 60 28 (!) 88 % --   09/04/21 0133 -- -- -- -- 24 -- --     General Appearance: Awake, alert, and in mild apparent distress  Head:  Normocephalic, no trauma  Eyes: Pupils equal, round, reactive to light; sclera anicteric; conjunctivae pink. No embolic phenomena. ENT: Oropharynx clear, without erythema, exudate, or thrush. No tenderness of sinuses. Mouth/throat: mucosa pink and moist. No lesions. Dentition in good repair. Neck:Supple, without lymphadenopathy. Thyroid normal, No bruits. Pulmonary/Chest: Coarse breath sounds with bilateral rhonchi. On nasal oxygen. Cardiovascular: Regular rate and rhythm without murmurs, rubs, or gallops. Abdomen: Soft, non tender. Bowel sounds normal. No organomegaly  All four Extremities: No cyanosis, clubbing, edema, or effusions. Neurologic: No gross sensory or motor deficits. Skin: Warm and dry with good turgor. No signs of peripheral arterial or venous insufficiency. No ulcerations. No open wounds. Medical Decision Making -Laboratory:   I have independently reviewed/ordered the following labs:    CBC with Differential:   Recent Labs     09/03/21  0515 09/04/21  0500   WBC 8.6 7.0   HGB 11.1* 11.3*   HCT 32.8* 33.5*    198   LYMPHOPCT 2* 7*   MONOPCT 3 3     BMP:   Recent Labs     09/03/21  0515 09/04/21  0500    139   K 3.6* 3.9   * 105   CO2 22 23   BUN 19 19   CREATININE 0.78 0.61     Hepatic Function Panel:   Recent Labs     09/03/21  0515 09/04/21  0500   PROT 5.8* 5.7*   LABALBU 2.9* 2.7*   BILITOT 0.38 0.36   ALKPHOS 67 73   ALT 20 48*   AST 34* 55*     No results for input(s): RPR in the last 72 hours. No results for input(s): HIV in the last 72 hours. No results for input(s): BC in the last 72 hours. Lab Results   Component Value Date    MUCUS NOT REPORTED 10/04/2020    RBC 3.66 09/04/2021    TRICHOMONAS NOT REPORTED 10/04/2020    WBC 7.0 09/04/2021    YEAST NOT REPORTED 10/04/2020    TURBIDITY CLEAR 08/31/2021     Lab Results   Component Value Date    CREATININE 0.61 09/04/2021    GLUCOSE 122 09/04/2021       Medical Decision Making-Imaging:     EXAMINATION:   CTA OF THE CHEST 8/31/2021 8:26 pm       TECHNIQUE:   CTA of the chest was performed after the administration of intravenous   contrast.  Multiplanar reformatted images are provided for review.   MIP images are provided for review.  Dose modulation, iterative reconstruction,   and/or weight based adjustment of the mA/kV was utilized to reduce the   radiation dose to as low as reasonably achievable.       COMPARISON:   08/30/2021 and 08/21/2021       HISTORY:   ORDERING SYSTEM PROVIDED HISTORY: elevated d dimer and hypoxia   TECHNOLOGIST PROVIDED HISTORY:   elevated d dimer and hypoxia   Decision Support Exception - unselect if not a suspected or confirmed   emergency medical condition->Emergency Medical Condition (MA)       FINDINGS:   Pulmonary Arteries: No evidence of central, lobar or proximal segmental   pulmonary artery emboli.       Mediastinum: Cardiomegaly.  Coronary disease identified.  Trace pericardial   fluid.  No suspicious mediastinal or hilar adenopathy identified per CT   criteria.  Aortic vascular calcifications.       Lungs/pleura: Multifocal interstitial alveolar opacities throughout both   lungs with a subpleural distribution suggestive of multifocal atypical   infection/viral pneumonia.  Bibasilar consolidative changes suggestive of   areas of atelectasis.       Upper Abdomen: Multiple circumscribed lesions involving the right left   hepatic lobe suggestive of cyst..       Soft Tissues/Bones: Multilevel degenerate changes thoracic spine.  Spinal   stimulator leads identified           Impression   No evidence of central to segmental pulmonary emboli.       Multifocal airspace opacities suggestive multifocal atypical infection/viral   pneumonia.       Cardiomegaly with coronary artery disease       Medical Decision Srhedd-Hhnzoicw-Ynaws:       Medical Decision Making-Other:     Note:  · Labs, medications, radiologic studies were reviewed with personal review of films  · Large amounts of data were reviewed  · Discussed with nursing Staff, Discharge planner  · Infection Control and Prevention measures reviewed  · All prior entries were reviewed  · Administer medications as ordered  · Prognosis: Guarded  · Discharge planning reviewed  · Follow up as outpatient. Thank you for allowing us to participate in the care of this patient. Please call with questions.     Bonny Gitelman, MD  Pager: [phone] - Office: [phone]

## 2021-09-04 NOTE — PLAN OF CARE
Patient up to [facility]. Voided and had small BM. Patient then transferred to the bed. Oxygen saturation dropped to 83% with heated high flow on @ 40L and 75% of FIO2. After encouraging slow deep breaths, and  minutes  oxygen saturation increased to 90%. Patient turned self onto right side in bed. Denies further needs. Will continue to monitor.

## 2021-09-04 NOTE — PROGRESS NOTES
Aroused easily. Lab work drawn from left upper arm IV lock. Flushed with 10 ml of normal saline. Bi pap removed. Heated  High flow nasal cannula on. Up to bedside commode. Voided. Returned to bed. Monitor shows normal sinus rhythm at 65.

## 2021-09-04 NOTE — PLAN OF CARE
Problem: Airway Clearance - Ineffective  Goal: Achieve or maintain patent airway  Outcome: Ongoing  Note: Patient able to maintain a patent airway independently. Problem: Gas Exchange - Impaired  Goal: Absence of hypoxia  Outcome: Ongoing  Note: Patient continues with heated high flow oxygen to keep oxygen saturation >90%. Goal: Promote optimal lung function  Outcome: Ongoing  Note: Encouraged use of acapella every hour while awake. Problem: Breathing Pattern - Ineffective  Goal: Ability to achieve and maintain a regular respiratory rate  Outcome: Ongoing  Note: Continues with dyspnea on exertion. Encouraged slow deep breaths. Problem: Body Temperature -  Risk of, Imbalanced  Goal: Complications related to the disease process, condition or treatment will be avoided or minimized  Outcome: Met This Shift  Note: Patient afebrile this morning. Will continue to monitor temperature frequently. Problem: Isolation Precautions - Risk of Spread of Infection  Goal: Prevent transmission of infection  Outcome: Ongoing  Note: Remains in droplet plus isolation. Proper PPE worn when in patient's room. Problem: Nutrition Deficits  Goal: Optimize nutritional status  Outcome: Ongoing  Note: Encouraged patient to eat >75% of meals and to consume 100% of supplement. Problem: Risk for Fluid Volume Deficit  Goal: Maintain normal heart rhythm  Outcome: Completed  Goal: Maintain absence of muscle cramping  Outcome: Completed  Goal: Maintain normal serum potassium, sodium, calcium, phosphorus, and pH  Outcome: Ongoing  Note: Labs monitored daily. Results reported to MD.     Problem: Loneliness or Risk for Loneliness  Goal: Demonstrate positive use of time alone when socialization is not possible  Outcome: Ongoing  Note: Patient watching TV and conversing with family members on telephone.      Problem: Fatigue  Goal: Verbalize increase energy and improved vitality  Outcome: Met This Shift  Note: Patient verbalized that felt better today. Nursing staff to allow periods of rest.     Problem: Patient Education: Go to Patient Education Activity  Goal: Patient/Family Education  Outcome: Ongoing  Note: Patient and family kept up to date on plan of care. Problem: Falls - Risk of:  Goal: Will remain free from falls  Description: Will remain free from falls  Outcome: Ongoing  Note: Patient is alert and oriented and has demonstrated the use of using the call light for assistance before getting up. Education has been provided to defer the use of the bed alarm and/or restraint free alarm as the patient has shown competency in calling for assistance and verbalizing the risk. Goal: Absence of physical injury  Description: Absence of physical injury  Outcome: Met This Shift  Note: Patient remains free from physical injury.        Problem: Nutrition  Goal: Optimal nutrition therapy  Outcome: Ongoing

## 2021-09-04 NOTE — PROGRESS NOTES
Progress Note  Marcia Correia MD    OBJECTIVE:    Patient seen for f/u of Pneumonia due to COVID-19 virus. She is requiring high flow oxygen . Has shortness of breath ,did  use c pap last night. ROS:   Constitutional: negative  for fevers, and negative for chills. Respiratory: positive for shortness of breath, positive for cough, and negative for wheezing  Cardiovascular: negative for chest pain, and negative for palpitations  Gastrointestinal: negative for abdominal pain, negative for nausea,negative for vomiting, negative for diarrhea, and negative for constipation     All other systems were reviewed with the patient and are negative unless otherwise stated in HPI    OBJECTIVE:  Vitals:   Temp: 97.8 °F (36.6 °C)  BP: (!) 144/73  Resp: 27  Pulse: 61  SpO2: 94 %    24HR INTAKE/OUTPUT:      Intake/Output Summary (Last 24 hours) at 9/4/2021 1100  Last data filed at 9/4/2021 0859  Gross per 24 hour   Intake 630 ml   Output 1375 ml   Net -745 ml     -----------------------------------------------------------------  Exam:  GEN:    Awake, alert and oriented x3. EYES:  EOMI, pupils equal   NECK: Supple. No lymphadenopathy. No carotid bruit  CVS:    regular rate and rhythm, no audible murmur  PULM:  diminished with bilat rhonchi, no acute respiratory distress  ABD:    Bowels sounds normal.  Abdomen is soft. No distention. no tenderness to palpation. EXT:   no edema bilaterally . No calf tenderness. NEURO: Moves all extremities. Motor and sensory are grossly intact  SKIN:  No rashes.   No skin lesions.    -----------------------------------------------------------------  Diagnostic Data:    · All available data reviewed  Lab Results   Component Value Date    WBC 7.0 09/04/2021    HGB 11.3 (L) 09/04/2021    MCV 91.5 09/04/2021     09/04/2021      Lab Results   Component Value Date    GLUCOSE 122 (H) 09/04/2021    BUN 19 09/04/2021    CREATININE 0.61 09/04/2021     09/04/2021    K 3.9 09/04/2021 CALCIUM 8.7 09/04/2021     09/04/2021    CO2 23 09/04/2021       PROBLEM LIST:  Principal Problem:    Pneumonia due to COVID-19 virus  Active Problems:    CAD (coronary artery disease)    Hypertension    Sepsis ([facility] [address]    Acute respiratory failure with hypoxia (HCC)    Dyslipidemia  Resolved Problems:    * No resolved hospital problems. *      ASSESSMENT / PLAN:  Pneumonia due to COVID-19 virus  · Continue current therapy of steroids,aerosols and oxygen  · Acute respiratory failure with hypoxia- continue oxygen, encourage to use c pap at night  · Nutrition status:  Well developed, well nourished with no malnutrition  · DVT prophylaxis: Lovenox   · High risk medications: none   · Disposition:  Discharge plan is home    Collin Corey MD , M.D.  9/4/2021  11:00 AM

## 2021-09-05 LAB
ABSOLUTE EOS #: 0 K/UL (ref 0–0.44)
ABSOLUTE IMMATURE GRANULOCYTE: 0.06 K/UL (ref 0–0.3)
ABSOLUTE LYMPH #: 0.38 K/UL (ref 1.1–3.7)
ABSOLUTE MONO #: 0.13 K/UL (ref 0.1–1.2)
ALBUMIN SERPL-MCNC: 2.7 G/DL (ref 3.5–5.2)
ALBUMIN/GLOBULIN RATIO: 0.9 (ref 1–2.5)
ALP BLD-CCNC: 82 U/L (ref 35–104)
ALT SERPL-CCNC: 44 U/L (ref 5–33)
ANION GAP SERPL CALCULATED.3IONS-SCNC: 13 MMOL/L (ref 9–17)
AST SERPL-CCNC: 33 U/L
BASOPHILS # BLD: 0 % (ref 0–2)
BASOPHILS ABSOLUTE: 0 K/UL (ref 0–0.2)
BILIRUB SERPL-MCNC: 0.41 MG/DL (ref 0.3–1.2)
BUN BLDV-MCNC: 22 MG/DL (ref 8–23)
BUN/CREAT BLD: 29 (ref 9–20)
CALCIUM SERPL-MCNC: 9.3 MG/DL (ref 8.6–10.4)
CHLORIDE BLD-SCNC: 104 MMOL/L (ref 98–107)
CO2: 22 MMOL/L (ref 20–31)
CREAT SERPL-MCNC: 0.75 MG/DL (ref 0.5–0.9)
CULTURE: NORMAL
CULTURE: NORMAL
D-DIMER QUANTITATIVE: 1 MG/L FEU (ref 0–0.59)
DIFFERENTIAL TYPE: ABNORMAL
EOSINOPHILS RELATIVE PERCENT: 0 % (ref 1–4)
FERRITIN: 755 UG/L (ref 13–150)
FIBRINOGEN: 589 MG/DL (ref 179–518)
GFR AFRICAN AMERICAN: >60 ML/MIN
GFR NON-AFRICAN AMERICAN: >60 ML/MIN
GFR SERPL CREATININE-BSD FRML MDRD: ABNORMAL ML/MIN/{1.73_M2}
GFR SERPL CREATININE-BSD FRML MDRD: ABNORMAL ML/MIN/{1.73_M2}
GLUCOSE BLD-MCNC: 111 MG/DL (ref 70–99)
HCT VFR BLD CALC: 35.3 % (ref 36.3–47.1)
HEMOGLOBIN: 11.9 G/DL (ref 11.9–15.1)
IMMATURE GRANULOCYTES: 1 %
INR BLD: 1.1
LYMPHOCYTES # BLD: 6 % (ref 24–43)
Lab: NORMAL
Lab: NORMAL
MCH RBC QN AUTO: 31.1 PG (ref 25.2–33.5)
MCHC RBC AUTO-ENTMCNC: 33.7 G/DL (ref 28.4–34.8)
MCV RBC AUTO: 92.2 FL (ref 82.6–102.9)
MONOCYTES # BLD: 2 % (ref 3–12)
MORPHOLOGY: NORMAL
NRBC AUTOMATED: 0 PER 100 WBC
PARTIAL THROMBOPLASTIN TIME: 31.6 SEC (ref 23.9–33.8)
PDW BLD-RTO: 12.5 % (ref 11.8–14.4)
PLATELET # BLD: 217 K/UL (ref 138–453)
PLATELET ESTIMATE: ABNORMAL
PMV BLD AUTO: 9.9 FL (ref 8.1–13.5)
POTASSIUM SERPL-SCNC: 4 MMOL/L (ref 3.7–5.3)
PROTHROMBIN TIME: 13.6 SEC (ref 11.5–14.2)
RBC # BLD: 3.83 M/UL (ref 3.95–5.11)
RBC # BLD: ABNORMAL 10*6/UL
SEG NEUTROPHILS: 91 % (ref 36–65)
SEGMENTED NEUTROPHILS ABSOLUTE COUNT: 5.73 K/UL (ref 1.5–8.1)
SODIUM BLD-SCNC: 139 MMOL/L (ref 135–144)
SPECIMEN DESCRIPTION: NORMAL
SPECIMEN DESCRIPTION: NORMAL
TOTAL PROTEIN: 5.8 G/DL (ref 6.4–8.3)
WBC # BLD: 6.3 K/UL (ref 3.5–11.3)
WBC # BLD: ABNORMAL 10*3/UL

## 2021-09-05 PROCEDURE — 85025 COMPLETE CBC W/AUTO DIFF WBC: CPT

## 2021-09-05 PROCEDURE — 36415 COLL VENOUS BLD VENIPUNCTURE: CPT

## 2021-09-05 PROCEDURE — 94660 CPAP INITIATION&MGMT: CPT

## 2021-09-05 PROCEDURE — 85384 FIBRINOGEN ACTIVITY: CPT

## 2021-09-05 PROCEDURE — 2700000000 HC OXYGEN THERAPY PER DAY

## 2021-09-05 PROCEDURE — 6360000002 HC RX W HCPCS: Performed by: FAMILY MEDICINE

## 2021-09-05 PROCEDURE — 6370000000 HC RX 637 (ALT 250 FOR IP): Performed by: FAMILY MEDICINE

## 2021-09-05 PROCEDURE — 94761 N-INVAS EAR/PLS OXIMETRY MLT: CPT

## 2021-09-05 PROCEDURE — 2000000000 HC ICU R&B

## 2021-09-05 PROCEDURE — 80053 COMPREHEN METABOLIC PANEL: CPT

## 2021-09-05 PROCEDURE — 6370000000 HC RX 637 (ALT 250 FOR IP): Performed by: INTERNAL MEDICINE

## 2021-09-05 PROCEDURE — 99233 SBSQ HOSP IP/OBS HIGH 50: CPT | Performed by: INTERNAL MEDICINE

## 2021-09-05 PROCEDURE — 2580000003 HC RX 258: Performed by: EMERGENCY MEDICINE

## 2021-09-05 PROCEDURE — 85610 PROTHROMBIN TIME: CPT

## 2021-09-05 PROCEDURE — 85379 FIBRIN DEGRADATION QUANT: CPT

## 2021-09-05 PROCEDURE — 85730 THROMBOPLASTIN TIME PARTIAL: CPT

## 2021-09-05 PROCEDURE — 82728 ASSAY OF FERRITIN: CPT

## 2021-09-05 RX ADMIN — AMLODIPINE BESYLATE 5 MG: 5 TABLET ORAL at 08:44

## 2021-09-05 RX ADMIN — ZINC SULFATE 220 MG (50 MG) CAPSULE 50 MG: CAPSULE at 08:44

## 2021-09-05 RX ADMIN — DULOXETINE HYDROCHLORIDE 60 MG: 60 CAPSULE, DELAYED RELEASE ORAL at 08:44

## 2021-09-05 RX ADMIN — SODIUM CHLORIDE, PRESERVATIVE FREE 10 ML: 5 INJECTION INTRAVENOUS at 20:04

## 2021-09-05 RX ADMIN — CARVEDILOL 6.25 MG: 6.25 TABLET, FILM COATED ORAL at 17:03

## 2021-09-05 RX ADMIN — ENOXAPARIN SODIUM 40 MG: 40 INJECTION SUBCUTANEOUS at 08:45

## 2021-09-05 RX ADMIN — MULTIPLE VITAMINS W/ MINERALS TAB 1 TABLET: TAB at 08:45

## 2021-09-05 RX ADMIN — SODIUM CHLORIDE, PRESERVATIVE FREE 10 ML: 5 INJECTION INTRAVENOUS at 08:55

## 2021-09-05 RX ADMIN — PANTOPRAZOLE SODIUM 40 MG: 40 TABLET, DELAYED RELEASE ORAL at 08:45

## 2021-09-05 RX ADMIN — Medication 2000 UNITS: at 08:44

## 2021-09-05 RX ADMIN — ROPINIROLE HYDROCHLORIDE 2 MG: 1 TABLET, FILM COATED ORAL at 20:03

## 2021-09-05 RX ADMIN — DEXAMETHASONE 6 MG: 4 TABLET ORAL at 08:45

## 2021-09-05 RX ADMIN — ENOXAPARIN SODIUM 40 MG: 40 INJECTION SUBCUTANEOUS at 20:03

## 2021-09-05 RX ADMIN — GABAPENTIN 600 MG: 300 CAPSULE ORAL at 20:02

## 2021-09-05 RX ADMIN — PRAVASTATIN SODIUM 80 MG: 20 TABLET ORAL at 20:02

## 2021-09-05 RX ADMIN — GABAPENTIN 300 MG: 300 CAPSULE ORAL at 08:45

## 2021-09-05 RX ADMIN — CALCIUM CARBONATE-VITAMIN D TAB 500 MG-200 UNIT 2 TABLET: 500-200 TAB at 08:44

## 2021-09-05 RX ADMIN — CARVEDILOL 6.25 MG: 6.25 TABLET, FILM COATED ORAL at 08:44

## 2021-09-05 RX ADMIN — ROPINIROLE HYDROCHLORIDE 1 MG: 1 TABLET, FILM COATED ORAL at 08:45

## 2021-09-05 RX ADMIN — ASPIRIN 81 MG: 81 TABLET, COATED ORAL at 08:44

## 2021-09-05 ASSESSMENT — PAIN SCALES - GENERAL
PAINLEVEL_OUTOF10: 0

## 2021-09-05 NOTE — PROGRESS NOTES
Dietary  reports Pt disliked 2 haley HN, they tried ensure enlive per nurse's request and Pt is drinking the vanilla. ONS modified.    Paris Vigil, CHRISTIANO, LD 9/5/2021 12:10 PM

## 2021-09-05 NOTE — PLAN OF CARE
Patient up to [facility]. Oxygen saturation dropped to 85% with heated high flow on @ 78% of FIO2 and 50 L. Writer increased oxygen to 89% of FIO2 with oxygen saturation increased to 93%. Will continue to monitor.

## 2021-09-05 NOTE — PROGRESS NOTES
Progress Note  Marcia Correia MD    OBJECTIVE:    Patient seen for f/u of Pneumonia due to COVID-19 virus. She is requiring high flow oxygen . States her shortness of breath better but needs higher oxygen sat ,did  use bi pap last night. ROS:   Constitutional: negative  for fevers, and negative for chills. Respiratory: positive for shortness of breath, positive for cough, and negative for wheezing  Cardiovascular: negative for chest pain, and negative for palpitations  Gastrointestinal: negative for abdominal pain, negative for nausea,negative for vomiting, negative for diarrhea, and negative for constipation     All other systems were reviewed with the patient and are negative unless otherwise stated in HPI    OBJECTIVE:  Vitals:   Temp: 97 °F (36.1 °C)  BP: 130/75  Resp: 18  Pulse: 67  SpO2: 95 %    24HR INTAKE/OUTPUT:      Intake/Output Summary (Last 24 hours) at 9/5/2021 0900  Last data filed at 9/5/2021 0843  Gross per 24 hour   Intake 440 ml   Output 1175 ml   Net -735 ml     -----------------------------------------------------------------  Exam:  GEN:    Awake, alert and oriented x3. EYES:  EOMI, pupils equal   NECK: Supple. No lymphadenopathy. No carotid bruit  CVS:    regular rate and rhythm, no audible murmur  PULM:  diminished with bilat rhonchi, no acute respiratory distress  ABD:    Bowels sounds normal.  Abdomen is soft. No distention. no tenderness to palpation. EXT:   no edema bilaterally . No calf tenderness. NEURO: Moves all extremities. Motor and sensory are grossly intact  SKIN:  No rashes.   No skin lesions.    -----------------------------------------------------------------  Diagnostic Data:    · All available data reviewed  Lab Results   Component Value Date    WBC 6.3 09/05/2021    HGB 11.9 09/05/2021    MCV 92.2 09/05/2021     09/05/2021      Lab Results   Component Value Date    GLUCOSE 111 (H) 09/05/2021    BUN 22 09/05/2021    CREATININE 0.75 09/05/2021     09/05/2021    K 4.0 09/05/2021    CALCIUM 9.3 09/05/2021     09/05/2021    CO2 22 09/05/2021       PROBLEM LIST:  Principal Problem:    Pneumonia due to COVID-19 virus  Active Problems:    CAD (coronary artery disease)    Hypertension    Sepsis ([facility] [address]    Acute respiratory failure with hypoxia (HCC)    Dyslipidemia  Resolved Problems:    * No resolved hospital problems. *      ASSESSMENT / PLAN:  Pneumonia due to COVID-19 virus  · Continue current therapy of steroids,aerosols and oxygen  · Acute respiratory failure with hypoxia- continue oxygen, Bi pap at night  · Nutrition status:  Well developed, well nourished with no malnutrition  · DVT prophylaxis: Lovenox   · High risk medications: none   · Disposition:  Discharge plan is home    Lion Cobos MD , M.D.  9/5/2021  9:00 AM

## 2021-09-05 NOTE — PROGRESS NOTES
Patient BP slowly elevating, per patient she gets hypertensive, not abnormal for her, will continue to monitor BP.

## 2021-09-05 NOTE — PROGRESS NOTES
[facility] Note  COVID 19 Patient  Today's Date and Time: 9/5/2021, 10:00 AM    Impression :   · COVID 19 Suspect  · COVID 19 Confirmed Infection- Pneumonia  · Covid tests:  · 8/21/2021 : Positive  · 8-23-21: Positive  · 8-31-21: Positive  · Hypoxia   · Hypotension- resolved  · Hx Fibromyalgia  · Essential HTN      Patient evaluated by Telemedicine. Requesting Institution: [facility]  Provider Institution: [facility] [place], [facility] Person at [facility]: Ms Peyton Guerra, APRN- IM    Recommendations:   · Antibiotic Rx:  · Monitor off antibiotics  · Covid Rx  · [facility]  · Decadron. Restart on 9/1/2021  · Prior cervical ACDF for degenerative disk disease at [facility] on 8-17-21. Medical Decision Making/Summary/Discussion:9/5/2021     · Patient admitted with suspected COVID 19 infection  · Covid test confirmed positive. · Patient with initial diagnosis of COVID-19 on 8/21/2021. Received partial treatment with remdesivir and Decadron at [facility].  Was sent home on oral Decadron. Initial chest x-ray shows some bibasilar atelectasis. · Patient has deteriorated since then and current chest x-ray on admission to [place] shows bilateral interstitial infiltrates. CT scan shows multifocal pneumonia. · Patient has developed significant hypoxia. and elevated CRP  · At this point in time remdesivir will no longer be efficacious  · Patient will need to continue with Decadron  · Unfortunately she is already out of the window for Actemra. She also does not qualify for monoclonal antibody. · She will need to be monitored for any potential bacterial superinfection  · Patient is at high risk for a complicated course.     Infection Control Recommendations   · Universal Precautions  · Airborne isolation  · Droplet Isolation    Antimicrobial Stewardship Recommendations       · Discontinuation of therapy  Coordination of Outpatient Care:   · Estimated Length of IV antimicrobials:TBD  · Patient will need Midline Catheter Insertion: TBD  · Patient will need PICC line Insertion: No  · Patient will need: Home IV , Igorrimaikol,  SOCORRO,  LTAC:TBD  · Patient will need outpatient wound care:No    Chief complaint/reason for consultation:   · Concern for COVID infection      History of Present Illness:   Keren Beltrán is a 66y.o.-year-old female who was initially admitted on 8/31/2021. Patient seen at the request of Alberto Herrera. INITIAL HISTORY:    Patient has a history of degenerative disc disease in the cervical area. She underwent an ACDF (anterior cervical discectomy and fusion) on 8/17/2021 at [facility] was discharged home the next day with a drain. A few days later she developed malaise, fevers, and some shortness of breath. She was evaluated at that point in time at [facility], ER were her temperature was noted to be 103 F and she had a positive Covid test on 8/23/2021. She was transferred back to [facility] where she was treated conservatively in regards to her cervical neck incision. The neck incision was described as being clean and dry. The drain was removed on 8/22/2021. She also received treatment for the Covid with remdesivir and Decadron. Her symptoms improved and she was discharged home on 8/25/2021 with a recommendation to continue Decadron for 3 more days. While at [facility] she also showed desaturation when off oxygen, therefore she was discharged on home oxygen with a pulse oximeter and request to follow with pulmonary medicine. There are no data on CRP or other inflammatory markers. Her chest x-ray on 8/21/2021 showed minimal bibasilar atelectasis. CT scan was not done. The patient then presented to [facility] on 8/31/2021 complaining of generalized fatigue, decreased appetite, weakness, cough, mild shortness of breath.   She denied any signs of inflammation around her previous cervical neck incision. She was noted to have hypoxia with an O2 saturation of 75 on room air. CT scan of the chest was performed which shows multifocal pneumonia with findings compatible with Covid pneumonia. Patient admitted because of concerns with COVID 19.    CURRENT EVALUATION : 9/5/2021    Patient evaluated in the ICU. Afebrile  VS stable, HTN controlled    Showing residual problems with pulmonary function. Requiring higher levels of oxygenation. Patient exhibiting respiratory distress. Yes  Respiratory secretions: No    Patient receiving supplemental oxygen. High flow 10-->40-->50 L/min. Used BiPAP at nightime  02 sat 92-->98-->96-->94  RR 24-->26-->28-->26-->13      % FIO2: 61-->75-->78  PEEP:      QTc:       NEWS Score: 0-4 Low risk group; 5-6: Medium risk group; 7 or above: High risk group  Parameters 3 2 1 0 1 2 3   Age    < 65   ? 65   RR ? 8  9-11 12-20  21-24 ? 25   O2 Sats ?  91 92-93 94-95 ? 96      Suppl O2  Yes  No      SBP ? 90  101-110 111-219   ? 220   HR ? 40  41-50 51-90  111-130 ? 131   Consciousness    Alert   Drowsiness, lethargy, or confusion   Temperature ? 35.0 C (95.0 F)  35.1-36.0 C 95.1-96.9 F 36.1-38.0 C 97.0-100.4 F 38.1-39.0 C 100.5-102.3 F ? 39.1 C ? 102.4 F      NEWS Score:   9/1/2021: 9 high risk    Overall Daily Picture:   · Unchanged     Presence of secondary bacterial Infection:    No   Additional antibiotics: No    Labs, X rays reviewed: 9/5/2021    BUN: 31-->20-->19-->22  Cr: 0.92-->0.73-->0.78-->0.61-->0.75    WBC: 6.0-->6.2-->8.6-->7.0-->6.3  Hb: 12.1-->15.8-->11.3-->11.9  Plat: 173-->161-->198-->217    Absolute Neutrophils: 5.16  Absolute Lymphocytes: 0.48  Neutrophil/Lymphocyte Ratio: 10.7 high risk    CRP: 181.5  Ferritin: 789-->905  LDH: 250    Pro Calcitonin:      Cultures:  Urine:  ·   Blood:  · 3131x2 no growth  Sputum :  ·   Wound:       CXR:     CAT:  · 8-31-21: Multifocal pneumonia compatible with Covid 19    MRSA nasal screen 8/22/2021 -    Discussed with patient, RN, CC, IM. I have personally reviewed the past medical history, past surgical history, medications, social history, and family history, and I have updated the database accordingly.   Past Medical History:     Past Medical History:   Diagnosis Date    Arthritis     Blockage of coronary artery of heart ([facility] [address] 2010    x 2 stents    CAD (coronary artery disease)     DDD (degenerative disc disease), cervical     cervical 3-6    Fibromyalgia     History of blood transfusion     History of kidney stones     last one \"about 10 years ago\"    Hyperlipidemia     Hypertension     Pancreatitis 2016    Sleep apnea     Thyroid disease     Wears glasses     for reading       Past Surgical  History:     Past Surgical History:   Procedure Laterality Date    ARTHROPLASTY Left 2/6/2020    LEFT FIRST CARPOMETACARPAL ARTHROPLASTY performed by Martha Lozano DO at [facility]  2012    L4,L5,S1 hardware placed    BACK SURGERY  2018    SPINAL CORD STIMULATOR    CATARACT REMOVAL Bilateral 2016    CERVICAL FUSION N/A 8/17/2021    C3-6 ACDF, REMOVAL OF HARDWARE C6-7 performed by Yared Abrams MD at [address]    COLONOSCOPY      normal    CORONARY ANGIOPLASTY  2010    x 2 stents    FINGER SURGERY      right thumb joint    HERNIA REPAIR Right     inguinal    HYSTERECTOMY      JOINT REPLACEMENT Right     knee    KIDNEY STONE SURGERY      x 3    LITHOTRIPSY      x 3    PARATHYROIDECTOMY      PARATHYROIDECTOMY      MO PERCUT IMPLNT NEUROELECT,EPIDURAL N/A 8/8/2018    SPINAL CORD STIMULATOR IMPLANT performed by Luciana Valdovinos MD at [facility] Right 03/2019       Medications:      amLODIPine  5 mg Oral Daily    enoxaparin  40 mg SubCUTAneous BID    aspirin  81 mg Oral Daily    calcium-vitamin D  2 tablet Oral Daily    DULoxetine  60 mg Oral Daily    gabapentin  600 mg Oral Nightly    gabapentin  300 mg Oral QAM    therapeutic multivitamin-minerals  1 tablet Oral Daily    pantoprazole  40 mg Oral Daily    pravastatin  80 mg Oral Nightly    rOPINIRole  1 mg Oral QAM    rOPINIRole  2 mg Oral Nightly    Vitamin D  2,000 Units Oral Daily    zinc sulfate  50 mg Oral Daily    carvedilol  6.25 mg Oral BID WC    sodium chloride flush  10 mL IntraVENous 2 times per day    dexamethasone  6 mg Oral Daily    gabapentin  600 mg Oral Once       Social History:     Social History     Socioeconomic History    Marital status:      Spouse name: Not on file    Number of children: Not on file    Years of education: Not on file    Highest education level: Not on file   Occupational History    Not on file   Tobacco Use    Smoking status: Former Smoker     Packs/day: 1.00     Years: 44.00     Pack years: 44.00     Types: Cigarettes     Quit date: 2010     Years since quittin.2    Smokeless tobacco: Never Used   Vaping Use    Vaping Use: Never used   Substance and Sexual Activity    Alcohol use: No    Drug use: No    Sexual activity: Not on file   Other Topics Concern    Not on file   Social History Narrative    Not on file     Social Determinants of Health     Financial Resource Strain:     Difficulty of Paying Living Expenses:    Food Insecurity:     Worried About [address] [employer] in the Last Year:     [facility] in the Last Year:    Transportation Needs:     Lack of Transportation (Medical):      Lack of Transportation (Non-Medical):    Physical Activity:     Days of Exercise per Week:     Minutes of Exercise per Session:    Stress:     Feeling of Stress :    Social Connections:     Frequency of Communication with Friends and Family:     Frequency of Social Gatherings with Friends and Family:     Attends [employer] Services:     Active Member of Clubs or Organizations:     Attends Club or Organization Meetings:     Marital Status: Intimate Partner Violence:     Fear of Current or Ex-Partner:     Emotionally Abused:     Physically Abused:     Sexually Abused:        Family History:     Family History   Problem Relation Age of Onset    Heart Disease Father         Allergies:   Lisinopril and Toradol [ketorolac tromethamine]     Review of Systems:       Constitutional: No fevers or chills. No systemic complaints  Head: No headaches  Eyes: No double vision or blurry vision. No conjunctival inflammation. ENT: No sore throat or runny nose. . No hearing loss, tinnitus or vertigo. Cardiovascular: No chest pain or palpitations. Shortness of breath. LOW  Lung: Shortness of breath, cough. No sputum production  Abdomen: No nausea, vomiting, diarrhea, or abdominal pain. [place] St[place] No cramps. Genitourinary: No increased urinary frequency, or dysuria. No hematuria. No suprapubic or CVA pain  Musculoskeletal: No muscle aches or pains. No joint effusions, swelling or deformities  Hematologic: No bleeding or bruising. Neurologic: No headache, weakness, numbness, or tingling. Integument: No rash, no ulcers. Psychiatric: No depression. Endocrine: No polyuria, no polydipsia, no polyphagia.     Physical Examination :     Patient Vitals for the past 8 hrs:   BP Temp Temp src Pulse Resp SpO2 Weight   09/05/21 0912 (!) 112/56 -- -- 65 13 94 % --   09/05/21 0900 (!) 88/47 -- -- 74 23 94 % --   09/05/21 0800 130/75 -- -- 67 18 95 % --   09/05/21 0700 116/70 97 °F (36.1 °C) Temporal 68 24 93 % --   09/05/21 0600 (!) 164/64 -- -- 54 27 94 % --   09/05/21 0545 -- -- -- 61 17 96 % --   09/05/21 0530 -- -- -- 53 27 96 % --   09/05/21 0500 (!) 161/67 -- -- 51 26 93 % --   09/05/21 0430 (!) 164/66 -- -- 51 24 93 % --   09/05/21 0400 (!) 158/63 -- -- 54 20 94 % --   09/05/21 0330 -- -- -- 54 14 94 % 200 lb 11.2 oz (91 kg)   09/05/21 0320 (!) 118/103 97.2 °F (36.2 °C) Temporal 53 25 (!) 86 % --     General Appearance: Awake, alert, and in mild apparent distress  Head: Normocephalic, no trauma  Eyes: Pupils equal, round, reactive to light; sclera anicteric; conjunctivae pink. No embolic phenomena. ENT: Oropharynx clear, without erythema, exudate, or thrush. No tenderness of sinuses. Mouth/throat: mucosa pink and moist. No lesions. Dentition in good repair. Neck:Supple, without lymphadenopathy. Thyroid normal, No bruits. Pulmonary/Chest: Coarse breath sounds with bilateral rhonchi. On nasal oxygen. Cardiovascular: Regular rate and rhythm without murmurs, rubs, or gallops. Abdomen: Soft, non tender. Bowel sounds normal. No organomegaly  All four Extremities: No cyanosis, clubbing, edema, or effusions. Neurologic: No gross sensory or motor deficits. Skin: Warm and dry with good turgor. No signs of peripheral arterial or venous insufficiency. No ulcerations. No open wounds. Medical Decision Making -Laboratory:   I have independently reviewed/ordered the following labs:    CBC with Differential:   Recent Labs     09/04/21  0500 09/05/21  0510   WBC 7.0 6.3   HGB 11.3* 11.9   HCT 33.5* 35.3*    217   LYMPHOPCT 7* 6*   MONOPCT 3 2*     BMP:   Recent Labs     09/04/21  0500 09/05/21  0510    139   K 3.9 4.0    104   CO2 23 22   BUN 19 22   CREATININE 0.61 0.75     Hepatic Function Panel:   Recent Labs     09/04/21  0500 09/05/21  0510   PROT 5.7* 5.8*   LABALBU 2.7* 2.7*   BILITOT 0.36 0.41   ALKPHOS 73 82   ALT 48* 44*   AST 55* 33*     No results for input(s): RPR in the last 72 hours. No results for input(s): HIV in the last 72 hours. No results for input(s): BC in the last 72 hours.   Lab Results   Component Value Date    MUCUS NOT REPORTED 10/04/2020    RBC 3.83 09/05/2021    TRICHOMONAS NOT REPORTED 10/04/2020    WBC 6.3 09/05/2021    YEAST NOT REPORTED 10/04/2020    TURBIDITY CLEAR 08/31/2021     Lab Results   Component Value Date    CREATININE 0.75 09/05/2021    GLUCOSE 111 09/05/2021       Medical Decision Making-Imaging:     EXAMINATION:   CTA OF THE CHEST 8/31/2021 8:26 pm       TECHNIQUE:   CTA of the chest was performed after the administration of intravenous   contrast.  Multiplanar reformatted images are provided for review.  MIP   images are provided for review.  Dose modulation, iterative reconstruction,   and/or weight based adjustment of the mA/kV was utilized to reduce the   radiation dose to as low as reasonably achievable.       COMPARISON:   08/30/2021 and 08/21/2021       HISTORY:   ORDERING SYSTEM PROVIDED HISTORY: elevated d dimer and hypoxia   TECHNOLOGIST PROVIDED HISTORY:   elevated d dimer and hypoxia   Decision Support Exception - unselect if not a suspected or confirmed   emergency medical condition->Emergency Medical Condition (MA)       FINDINGS:   Pulmonary Arteries: No evidence of central, lobar or proximal segmental   pulmonary artery emboli.       Mediastinum: Cardiomegaly.  Coronary disease identified.  Trace pericardial   fluid.  No suspicious mediastinal or hilar adenopathy identified per CT   criteria.  Aortic vascular calcifications.       Lungs/pleura: Multifocal interstitial alveolar opacities throughout both   lungs with a subpleural distribution suggestive of multifocal atypical   infection/viral pneumonia.  Bibasilar consolidative changes suggestive of   areas of atelectasis.       Upper Abdomen: Multiple circumscribed lesions involving the right left   hepatic lobe suggestive of cyst..       Soft Tissues/Bones: Multilevel degenerate changes thoracic spine.  Spinal   stimulator leads identified           Impression   No evidence of central to segmental pulmonary emboli.       Multifocal airspace opacities suggestive multifocal atypical infection/viral   pneumonia.       Cardiomegaly with coronary artery disease       Medical Decision [id number]:       Medical Decision Making-Other:     Note:  · Labs, medications, radiologic studies were reviewed with personal review of films  · Large amounts of data were reviewed  · Discussed with nursing Staff, Discharge planner  · Infection Control and Prevention measures reviewed  · All prior entries were reviewed  · Administer medications as ordered  · Prognosis: Guarded  · Discharge planning reviewed  · Follow up as outpatient. Thank you for allowing us to participate in the care of this patient. Please call with questions.     Joel Wallace MD  Pager: [phone] - Office: [phone]

## 2021-09-05 NOTE — PLAN OF CARE
Problem: Airway Clearance - Ineffective  Goal: Achieve or maintain patent airway  9/5/2021 1008 by Diego Del Cid RN  Outcome: Ongoing  Note: Patient able to maintain a patent airway independently. 9/5/2021 0116 by Steffi Rockwell RN  Outcome: Ongoing  Note: Patient is in with covid  She is on high flow oxygen when awake and goes to Bipap when she is going to bed at HS  Patient oxygen levels have been 87 to 96%   Patient oxygen drops with exertion       Problem: Gas Exchange - Impaired  Goal: Absence of hypoxia  9/5/2021 1008 by Diego Del Cid RN  Outcome: Ongoing  Note: Patient remains on heated high flow oxygen. 9/5/2021 0116 by Steffi Rockwell RN  Outcome: Ongoing  Note: Patient oxygen levels drop below 90% with exertion at times    Goal: Promote optimal lung function  9/5/2021 1008 by Diego Del Cid RN  Outcome: Ongoing  Note: Patient encouraged to use acapella every hour while awake. 9/5/2021 0116 by Steffi Rockwell RN  Outcome: Ongoing  Note: Patient has been encouraged to prone, lay side to side, use acapella, deep breathe       Problem: Breathing Pattern - Ineffective  Goal: Ability to achieve and maintain a regular respiratory rate  9/5/2021 1008 by Diego Del Cid RN  Outcome: Ongoing  Note: Patient with dyspnea on exertion. Unlabored at rest.  9/5/2021 0116 by Steffi Rockwell RN  Outcome: Ongoing  Note: Patient is in with covid and RR rate is elevated at times  She is on high flow oxygen when awake and goes to Bipap when she is going to bed at HS  Patient oxygen levels have been 87 to 96%   Patient oxygen drops with exertion     Problem:  Body Temperature -  Risk of, Imbalanced  Goal: Complications related to the disease process, condition or treatment will be avoided or minimized  9/5/2021 1008 by iDego Del Cid RN  Outcome: Completed  9/5/2021 0116 by Steffi Rockwell RN  Outcome: Ongoing  Note: Patient is being actively treated to prevent complications      Problem: Isolation Precautions - Risk of Spread of Infection  Goal: Prevent transmission of infection  9/5/2021 1008 by Gen Wray RN  Outcome: Ongoing  Note: Patient remains in droplet plus isolation. Proper PPE worn. 9/5/2021 0116 by Alpa Wilkerson RN  Outcome: Ongoing  Note: Patient is continued on droplet plus isolation  Writer wears appropriate gear when in room      Problem: Nutrition Deficits  Goal: Optimize nutritional status  9/5/2021 1008 by Gen Wray RN  Outcome: Ongoing  Note: Patient encouraged to eat >75% of meals. 9/5/2021 0116 by Alpa Wilkerson RN  Outcome: Ongoing  Note: Patient nutrition is being monitored  Loss of appetite is present        Problem: Risk for Fluid Volume Deficit  Goal: Maintain normal serum potassium, sodium, calcium, phosphorus, and pH  9/5/2021 1008 by Gen Wray RN  Outcome: Ongoing  Note: Labs drawn daily and results reported to Dr. Donovan Atkinson. 9/5/2021 0116 by Alpa Wilkerson RN  Outcome: Ongoing  Note: See patient lab results      Problem: Loneliness or Risk for Loneliness  Goal: Demonstrate positive use of time alone when socialization is not possible  9/5/2021 1008 by Gen Wray RN  Outcome: Ongoing  Note: Patient watching TV and converses on telephone. 9/5/2021 0116 by Alpa Wilkerson RN  Outcome: Ongoing  Note: Patient has not expressed any feelings of loneliness      Problem: Fatigue  Goal: Verbalize increase energy and improved vitality  9/5/2021 1008 by Gen Wray RN  Outcome: Ongoing  Note: Patient verbalized to MD that she felt better today. 9/5/2021 0116 by Alpa Wilkerson RN  Outcome: Ongoing  Note: Patient gets up to [facility] with stand by assist  She moves slow      Problem: Patient Education: Go to Patient Education Activity  Goal: Patient/Family Education  9/5/2021 1008 by Gen Wray RN  Outcome: Ongoing  Note: Writer updated patient on plan of care. Patient verbalizes understanding.   9/5/2021 0116 by Alpa Wilkerson RN  Outcome: Ongoing     Problem: Falls - Risk

## 2021-09-05 NOTE — PLAN OF CARE
Activity  Goal: Patient/Family Education  Outcome: Ongoing     Problem: Falls - Risk of:  Goal: Will remain free from falls  Description: Will remain free from falls  Outcome: Ongoing  Note: Patient is free from falls   Goal: Absence of physical injury  Description: Absence of physical injury  Outcome: Ongoing  Note: No injuries sustained here      Problem: Nutrition  Goal: Optimal nutrition therapy  Outcome: Ongoing

## 2021-09-05 NOTE — PROGRESS NOTES
Bipap increased to 80% due to sats only 87-89% at 75%. Patient up to 90% saturation reading when writer left room, will monitor.      BP cuff adjusted on arm to see if improves BP readings

## 2021-09-05 NOTE — PROGRESS NOTES
Patient oxygen saturation was still dropping under 90, writer to room, patient has positioned herself on her back, writer aksed patient to prone or go lay on side.  She is laying on her right side now and is up to 95%

## 2021-09-06 LAB
ABSOLUTE EOS #: 0 K/UL (ref 0–0.44)
ABSOLUTE IMMATURE GRANULOCYTE: 0 K/UL (ref 0–0.3)
ABSOLUTE LYMPH #: 0.46 K/UL (ref 1.1–3.7)
ABSOLUTE MONO #: 0.26 K/UL (ref 0.1–1.2)
ALBUMIN SERPL-MCNC: 2.8 G/DL (ref 3.5–5.2)
ALBUMIN/GLOBULIN RATIO: 1 (ref 1–2.5)
ALP BLD-CCNC: 86 U/L (ref 35–104)
ALT SERPL-CCNC: 43 U/L (ref 5–33)
ANION GAP SERPL CALCULATED.3IONS-SCNC: 15 MMOL/L (ref 9–17)
AST SERPL-CCNC: 25 U/L
BASOPHILS # BLD: 0 % (ref 0–2)
BASOPHILS ABSOLUTE: 0 K/UL (ref 0–0.2)
BILIRUB SERPL-MCNC: 0.44 MG/DL (ref 0.3–1.2)
BUN BLDV-MCNC: 22 MG/DL (ref 8–23)
BUN/CREAT BLD: 33 (ref 9–20)
CALCIUM SERPL-MCNC: 9.4 MG/DL (ref 8.6–10.4)
CHLORIDE BLD-SCNC: 101 MMOL/L (ref 98–107)
CO2: 22 MMOL/L (ref 20–31)
CREAT SERPL-MCNC: 0.66 MG/DL (ref 0.5–0.9)
D-DIMER QUANTITATIVE: 1.24 MG/L FEU (ref 0–0.59)
DIFFERENTIAL TYPE: ABNORMAL
EOSINOPHILS RELATIVE PERCENT: 0 % (ref 1–4)
FERRITIN: 617 UG/L (ref 13–150)
FIBRINOGEN: 595 MG/DL (ref 179–518)
GFR AFRICAN AMERICAN: >60 ML/MIN
GFR NON-AFRICAN AMERICAN: >60 ML/MIN
GFR SERPL CREATININE-BSD FRML MDRD: ABNORMAL ML/MIN/{1.73_M2}
GFR SERPL CREATININE-BSD FRML MDRD: ABNORMAL ML/MIN/{1.73_M2}
GLUCOSE BLD-MCNC: 99 MG/DL (ref 70–99)
HCT VFR BLD CALC: 35.6 % (ref 36.3–47.1)
HEMOGLOBIN: 12 G/DL (ref 11.9–15.1)
IMMATURE GRANULOCYTES: 0 %
INR BLD: 1.1
LYMPHOCYTES # BLD: 7 % (ref 24–43)
MCH RBC QN AUTO: 30.6 PG (ref 25.2–33.5)
MCHC RBC AUTO-ENTMCNC: 33.7 G/DL (ref 28.4–34.8)
MCV RBC AUTO: 90.8 FL (ref 82.6–102.9)
MONOCYTES # BLD: 4 % (ref 3–12)
MORPHOLOGY: NORMAL
NRBC AUTOMATED: 0 PER 100 WBC
PARTIAL THROMBOPLASTIN TIME: 33.3 SEC (ref 23.9–33.8)
PDW BLD-RTO: 12.3 % (ref 11.8–14.4)
PLATELET # BLD: 242 K/UL (ref 138–453)
PLATELET ESTIMATE: ABNORMAL
PMV BLD AUTO: 9.9 FL (ref 8.1–13.5)
POTASSIUM SERPL-SCNC: 3.8 MMOL/L (ref 3.7–5.3)
PROTHROMBIN TIME: 13.8 SEC (ref 11.5–14.2)
RBC # BLD: 3.92 M/UL (ref 3.95–5.11)
RBC # BLD: ABNORMAL 10*6/UL
SEG NEUTROPHILS: 89 % (ref 36–65)
SEGMENTED NEUTROPHILS ABSOLUTE COUNT: 5.78 K/UL (ref 1.5–8.1)
SODIUM BLD-SCNC: 138 MMOL/L (ref 135–144)
TOTAL PROTEIN: 5.7 G/DL (ref 6.4–8.3)
WBC # BLD: 6.5 K/UL (ref 3.5–11.3)
WBC # BLD: ABNORMAL 10*3/UL

## 2021-09-06 PROCEDURE — 6360000002 HC RX W HCPCS: Performed by: FAMILY MEDICINE

## 2021-09-06 PROCEDURE — 6370000000 HC RX 637 (ALT 250 FOR IP): Performed by: INTERNAL MEDICINE

## 2021-09-06 PROCEDURE — 80053 COMPREHEN METABOLIC PANEL: CPT

## 2021-09-06 PROCEDURE — 85730 THROMBOPLASTIN TIME PARTIAL: CPT

## 2021-09-06 PROCEDURE — 82728 ASSAY OF FERRITIN: CPT

## 2021-09-06 PROCEDURE — 94660 CPAP INITIATION&MGMT: CPT

## 2021-09-06 PROCEDURE — 6370000000 HC RX 637 (ALT 250 FOR IP): Performed by: FAMILY MEDICINE

## 2021-09-06 PROCEDURE — 2580000003 HC RX 258: Performed by: EMERGENCY MEDICINE

## 2021-09-06 PROCEDURE — 85379 FIBRIN DEGRADATION QUANT: CPT

## 2021-09-06 PROCEDURE — 85610 PROTHROMBIN TIME: CPT

## 2021-09-06 PROCEDURE — 85384 FIBRINOGEN ACTIVITY: CPT

## 2021-09-06 PROCEDURE — 99233 SBSQ HOSP IP/OBS HIGH 50: CPT | Performed by: INTERNAL MEDICINE

## 2021-09-06 PROCEDURE — 36415 COLL VENOUS BLD VENIPUNCTURE: CPT

## 2021-09-06 PROCEDURE — 85025 COMPLETE CBC W/AUTO DIFF WBC: CPT

## 2021-09-06 PROCEDURE — 2000000000 HC ICU R&B

## 2021-09-06 RX ADMIN — ROPINIROLE HYDROCHLORIDE 1 MG: 1 TABLET, FILM COATED ORAL at 08:05

## 2021-09-06 RX ADMIN — GABAPENTIN 300 MG: 300 CAPSULE ORAL at 08:05

## 2021-09-06 RX ADMIN — SODIUM CHLORIDE, PRESERVATIVE FREE 10 ML: 5 INJECTION INTRAVENOUS at 08:32

## 2021-09-06 RX ADMIN — Medication 2000 UNITS: at 08:31

## 2021-09-06 RX ADMIN — DULOXETINE HYDROCHLORIDE 60 MG: 60 CAPSULE, DELAYED RELEASE ORAL at 08:05

## 2021-09-06 RX ADMIN — ZINC SULFATE 220 MG (50 MG) CAPSULE 50 MG: CAPSULE at 08:05

## 2021-09-06 RX ADMIN — CARVEDILOL 6.25 MG: 6.25 TABLET, FILM COATED ORAL at 17:15

## 2021-09-06 RX ADMIN — MULTIPLE VITAMINS W/ MINERALS TAB 1 TABLET: TAB at 08:05

## 2021-09-06 RX ADMIN — PANTOPRAZOLE SODIUM 40 MG: 40 TABLET, DELAYED RELEASE ORAL at 08:05

## 2021-09-06 RX ADMIN — PRAVASTATIN SODIUM 80 MG: 20 TABLET ORAL at 20:12

## 2021-09-06 RX ADMIN — ENOXAPARIN SODIUM 40 MG: 40 INJECTION SUBCUTANEOUS at 08:04

## 2021-09-06 RX ADMIN — CALCIUM CARBONATE-VITAMIN D TAB 500 MG-200 UNIT 2 TABLET: 500-200 TAB at 08:05

## 2021-09-06 RX ADMIN — DEXAMETHASONE 6 MG: 4 TABLET ORAL at 08:06

## 2021-09-06 RX ADMIN — GABAPENTIN 600 MG: 300 CAPSULE ORAL at 20:13

## 2021-09-06 RX ADMIN — ENOXAPARIN SODIUM 40 MG: 40 INJECTION SUBCUTANEOUS at 20:12

## 2021-09-06 RX ADMIN — SODIUM CHLORIDE, PRESERVATIVE FREE 10 ML: 5 INJECTION INTRAVENOUS at 20:12

## 2021-09-06 RX ADMIN — ROPINIROLE HYDROCHLORIDE 2 MG: 1 TABLET, FILM COATED ORAL at 20:13

## 2021-09-06 RX ADMIN — ASPIRIN 81 MG: 81 TABLET, COATED ORAL at 08:05

## 2021-09-06 ASSESSMENT — PAIN SCALES - GENERAL
PAINLEVEL_OUTOF10: 0

## 2021-09-06 NOTE — PROGRESS NOTES
Reassessment completed at this time as charted. Patient is on BiPAP at this time and laying on right side. Patient remains NSR on tele. Patient denies any needs at this time. Will continue to monitor.

## 2021-09-06 NOTE — PROGRESS NOTES
Assessment complete at this time as documented in flowsheet. Patient laying on right side when writer entered room. Patient is A&O at this time. Writer educated patient on continue use of the acapella and to lay on each side. Patient remains on heated high flow oxygen. Writer assisted patient to [facility]. Patient denies any further needs at this time. Will continue to monitor.

## 2021-09-06 NOTE — PLAN OF CARE
Problem: Airway Clearance - Ineffective  Goal: Achieve or maintain patent airway  9/5/2021 2050 by Jeremy Chery RN  Outcome: Ongoing     Problem: Gas Exchange - Impaired  Goal: Absence of hypoxia  9/5/2021 2050 by Jeremy Chery RN  Outcome: Ongoing  Note: Patient remains on HHF oxygen. O2 sats drop with exertion. Patient does become SOB with exertion also. Problem: Breathing Pattern - Ineffective  Goal: Ability to achieve and maintain a regular respiratory rate  9/5/2021 2050 by Jeremy Chery RN  Outcome: Ongoing  Note: Patient continues to be tachypneic. Problem: Isolation Precautions - Risk of Spread of Infection  Goal: Prevent transmission of infection  9/5/2021 2050 by Jeremy Chery RN  Outcome: Ongoing  Note: Patient remains on droplet plus contact isolation. Problem: Nutrition Deficits  Goal: Optimize nutritional status  9/5/2021 2050 by Jeremy Chery RN  Outcome: Ongoing     Problem: Falls - Risk of:  Goal: Will remain free from falls  Description: Will remain free from falls  9/5/2021 2050 by Jeremy Chery RN  Outcome: Ongoing  Note: Patient is alert and oriented and has demonstrated the use of using the call light for assistance before getting up. Education has been provided to defer the use of the bed alarm and/or restraint free alarm as the patient has shown competency in calling for assistance and verbalizing the risk. Patient has non-skid socks on, bed in lowest position and wheels locked, side rails 2/4 and call light within reach. Will continue to monitor. Problem: Skin Integrity:  Goal: Will show no infection signs and symptoms  Description: Will show no infection signs and symptoms  9/5/2021 2050 by Jeremy Chery RN  Outcome: Ongoing  Note: Skin assessment performed, problem areas noted in flowsheet. Patient skin is dry and intact. Will continue to monitor for redness or breakdown.

## 2021-09-06 NOTE — PROGRESS NOTES
Patient requesting not to wear BiPAP anymore. Patient placed back on HHF at 50L and 84%. Patient assisted to [facility] and O2 dropped to 84% but upon returning to bed patient was back up to 90% within one minute. Will continue to monitor.

## 2021-09-06 NOTE — PROGRESS NOTES
Patient asleep in bed when writer enters room, awakens easily to voice. Assessment and vitals completed at this time, see charting on all. Patient has high flow oxygen intact, with continuous pulse ox monitoring. Patient does have some SOB with exertion, non productive moist cough. She denies pain or dizziness at this time. Provided fresh water and she denies any further needs at this time. Call light is in reach.

## 2021-09-06 NOTE — PROGRESS NOTES
Progress Note  Marcia Correia MD    OBJECTIVE:    Patient seen for f/u of Pneumonia due to COVID-19 virus. She is requiring high flow oxygen . States her shortness of breath better ,needs higher oxygen sat ,  uses bi pap at night. ROS:   Constitutional: negative  for fevers, and negative for chills. Respiratory: positive for shortness of breath, positive for cough, and negative for wheezing  Cardiovascular: negative for chest pain, and negative for palpitations  Gastrointestinal: negative for abdominal pain, negative for nausea,negative for vomiting, negative for diarrhea, and negative for constipation     All other systems were reviewed with the patient and are negative unless otherwise stated in HPI    OBJECTIVE:  Vitals:   Temp: 97.2 °F (36.2 °C)  BP: (!) 102/49  Resp: 27  Pulse: 68  SpO2: 94 %    24HR INTAKE/OUTPUT:      Intake/Output Summary (Last 24 hours) at 9/6/2021 1448  Last data filed at 9/6/2021 0834  Gross per 24 hour   Intake 510 ml   Output 2200 ml   Net -1690 ml     -----------------------------------------------------------------  Exam:  GEN:    Awake, alert and oriented x3. EYES:  EOMI, pupils equal   NECK: Supple. No lymphadenopathy. No carotid bruit  CVS:    regular rate and rhythm, no audible murmur  PULM:  diminished with bilat rhonchi, no acute respiratory distress  ABD:    Bowels sounds normal.  Abdomen is soft. No distention. no tenderness to palpation. EXT:   no edema bilaterally . No calf tenderness. NEURO: Moves all extremities. Motor and sensory are grossly intact  SKIN:  No rashes.   No skin lesions.    -----------------------------------------------------------------  Diagnostic Data:    · All available data reviewed  Lab Results   Component Value Date    WBC 6.5 09/06/2021    HGB 12.0 09/06/2021    MCV 90.8 09/06/2021     09/06/2021      Lab Results   Component Value Date    GLUCOSE 99 09/06/2021    BUN 22 09/06/2021    CREATININE 0.66 09/06/2021     09/06/2021    K 3.8 09/06/2021    CALCIUM 9.4 09/06/2021     09/06/2021    CO2 22 09/06/2021       PROBLEM LIST:  Principal Problem:    Pneumonia due to COVID-19 virus  Active Problems:    CAD (coronary artery disease)    Hypertension    Sepsis ([facility] [address]    Acute respiratory failure with hypoxia (HCC)    Dyslipidemia  Resolved Problems:    * No resolved hospital problems. *      ASSESSMENT / PLAN:  Pneumonia due to COVID-19 virus  · Continue current therapy of steroids,aerosols and oxygen  · Acute respiratory failure with hypoxia- continue oxygen, Bi pap at night  · Nutrition status:  Well developed, well nourished with no malnutrition  · DVT prophylaxis: Lovenox   · High risk medications: none   · Disposition:  Discharge plan is home    Sukhi Liang MD , M.D.  9/6/2021  2:48 PM

## 2021-09-06 NOTE — PLAN OF CARE
Problem: Airway Clearance - Ineffective  Goal: Achieve or maintain patent airway  Outcome: Ongoing  Note: Patient still remains of heated high flow oxygenation     Problem: Gas Exchange - Impaired  Goal: Promote optimal lung function  Outcome: Ongoing  Note: Patient does accapella when instructed, coughs and deep breathes     Problem: Breathing Pattern - Ineffective  Goal: Ability to achieve and maintain a regular respiratory rate  Outcome: Ongoing  Note: Patients RR remains WNL while resting in bed, elevates with activity

## 2021-09-06 NOTE — PROGRESS NOTES
[facility] -Progress Telemedicine Note  COVID 19 Patient  Today's Date and Time: 9/6/2021, 9:28 AM    Impression :   · COVID 19 Suspect  · COVID 19 Confirmed Infection- Pneumonia  · Covid tests:  · 8/21/2021 : Positive  · 8-23-21: Positive  · 8-31-21: Positive  · Hypoxia   · Hypotension- resolved  · Hx Fibromyalgia  · Essential HTN      Patient evaluated by Telemedicine. Requesting Institution: [facility]  Provider Institution: [address] [facility] [place], [address] [facility] Person at [facility]: Ms Fermin Frank, APRN- IM    Recommendations:   · Antibiotic Rx:  · Monitor off antibiotics  · Covid Rx  · Remdesivir. Completed at [facility]  · Decadron. Restart on 9/1/2021  · Prior cervical ACDF for degenerative disk disease at [facility] on 8-17-21. Medical Decision Making/Summary/Discussion:9/6/2021     · Patient admitted with suspected COVID 19 infection  · Covid test confirmed positive. · Patient with initial diagnosis of COVID-19 on 8/21/2021. Received partial treatment with remdesivir and Decadron at [facility].  Was sent home on oral Decadron. Initial chest x-ray shows some bibasilar atelectasis. · Patient has deteriorated since then and current chest x-ray on admission to [place] shows bilateral interstitial infiltrates. CT scan shows multifocal pneumonia. · Patient has developed significant hypoxia. and elevated CRP  · At this point in time remdesivir will no longer be efficacious  · Patient will need to continue with Decadron  · Unfortunately she is already out of the window for Actemra. She also does not qualify for monoclonal antibody. · She will need to be monitored for any potential bacterial superinfection  · Patient is at high risk for a complicated course.     Infection Control Recommendations   · Universal Precautions  · Airborne isolation  · Droplet Isolation    Antimicrobial Stewardship Recommendations       · Discontinuation of therapy  Coordination of Outpatient Care:   · Estimated Length of IV antimicrobials:TBD  · Patient will need Midline Catheter Insertion: TBD  · Patient will need PICC line Insertion: No  · Patient will need: Home IV , [place],  SNF,  LTAC:TBD  · Patient will need outpatient wound care:No    Chief complaint/reason for consultation:   · Concern for COVID infection      History of Present Illness:   Peter Hand is a 66y.o.-year-old female who was initially admitted on 8/31/2021. Patient seen at the request of Vania Uribe. INITIAL HISTORY:    Patient has a history of degenerative disc disease in the cervical area. She underwent an ACDF (anterior cervical discectomy and fusion) on 8/17/2021 at [facility] was discharged home the next day with a drain. A few days later she developed malaise, fevers, and some shortness of breath. She was evaluated at that point in time at [address] [place] [address] were her temperature was noted to be 103 F and she had a positive Covid test on 8/23/2021. She was transferred back to [facility] where she was treated conservatively in regards to her cervical neck incision. The neck incision was described as being clean and dry. The drain was removed on 8/22/2021. She also received treatment for the Covid with remdesivir and Decadron. Her symptoms improved and she was discharged home on 8/25/2021 with a recommendation to continue Decadron for 3 more days. While at [facility] she also showed desaturation when off oxygen, therefore she was discharged on home oxygen with a pulse oximeter and request to follow with pulmonary medicine. There are no data on CRP or other inflammatory markers. Her chest x-ray on 8/21/2021 showed minimal bibasilar atelectasis. CT scan was not done. The patient then presented to [facility] on 8/31/2021 complaining of generalized fatigue, decreased appetite, weakness, cough, mild shortness of breath.   She denied any signs of inflammation around her previous cervical neck incision. She was noted to have hypoxia with an O2 saturation of 75 on room air. CT scan of the chest was performed which shows multifocal pneumonia with findings compatible with Covid pneumonia. Patient admitted because of concerns with COVID 19.    CURRENT EVALUATION : 9/6/2021    Patient evaluated in the ICU. Afebrile  VS stable    Showing residual problems with pulmonary function. Requiring high flow oxygenation    Patient exhibiting respiratory distress. Yes  Respiratory secretions: No    Patient receiving supplemental oxygen. High flow 10-->40-->50 L/min. Has declined BiPAP at nightime  02 sat 92-->98-->96-->94-->90  RR 24-->26-->28-->26-->13-->20      % FIO2: 61-->75-->78-->84  PEEP:      QTc:       NEWS Score: 0-4 Low risk group; 5-6: Medium risk group; 7 or above: High risk group  Parameters 3 2 1 0 1 2 3   Age    < 65   ? 65   RR ? 8  9-11 12-20  21-24 ? 25   O2 Sats ?  91 92-93 94-95 ? 96      Suppl O2  Yes  No      SBP ? 90  101-110 111-219   ? 220   HR ? 40  41-50 51-90  111-130 ? 131   Consciousness    Alert   Drowsiness, lethargy, or confusion   Temperature ? 35.0 C (95.0 F)  35.1-36.0 C 95.1-96.9 F 36.1-38.0 C 97.0-100.4 F 38.1-39.0 C 100.5-102.3 F ? 39.1 C ? 102.4 F      NEWS Score:   9/1/2021: 9 high risk    Overall Daily Picture:   · Unchanged     Presence of secondary bacterial Infection:    No   Additional antibiotics: No    Labs, X rays reviewed: 9/6/2021    BUN: 31-->20-->19-->22  Cr: 0.92-->0.73-->0.78-->0.61-->0.75-->0.66    WBC: 6.2-->8.6-->7.0-->6.5  Hb: 15.8-->11.3-->11.9-->12.0  Plat: 173-->161-->198-->217-->242    Absolute Neutrophils: 5.16  Absolute Lymphocytes: 0.48  Neutrophil/Lymphocyte Ratio: 10.7 high risk    CRP: 181.5  Ferritin: 789-->905  LDH: 250    Pro Calcitonin:      Cultures:  Urine:  ·   Blood:  · 3131x2 no growth  Sputum :  ·   Wound:       CXR:     CAT:  · 8-31-21: Multifocal pneumonia compatible with Covid 19    MRSA nasal screen 8/22/2021 -    Discussed with patient, RN, CC, IM. I have personally reviewed the past medical history, past surgical history, medications, social history, and family history, and I have updated the database accordingly.   Past Medical History:     Past Medical History:   Diagnosis Date    Arthritis     Blockage of coronary artery of heart ([address] 2010    x 2 stents    CAD (coronary artery disease)     DDD (degenerative disc disease), cervical     cervical 3-6    Fibromyalgia     History of blood transfusion     History of kidney stones     last one \"about 10 years ago\"    Hyperlipidemia     Hypertension     Pancreatitis 2016    Sleep apnea     Thyroid disease     Wears glasses     for reading       Past Surgical  History:     Past Surgical History:   Procedure Laterality Date    ARTHROPLASTY Left 2/6/2020    LEFT FIRST CARPOMETACARPAL ARTHROPLASTY performed by Fernando Arzola DO at [address]  2012    L4,L5,S1 hardware placed    BACK SURGERY  2018    SPINAL CORD STIMULATOR    CATARACT REMOVAL Bilateral 2016    CERVICAL FUSION N/A 8/17/2021    C3-6 ACDF, REMOVAL OF HARDWARE C6-7 performed by Susanna Curran MD at [address]    COLONOSCOPY      normal    CORONARY ANGIOPLASTY  2010    x 2 stents    FINGER SURGERY      right thumb joint    HERNIA REPAIR Right     inguinal    HYSTERECTOMY      JOINT REPLACEMENT Right     knee    KIDNEY STONE SURGERY      x 3    LITHOTRIPSY      x 3    PARATHYROIDECTOMY      PARATHYROIDECTOMY      MT PERCUT IMPLNT NEUROELECT,EPIDURAL N/A 8/8/2018    SPINAL CORD STIMULATOR IMPLANT performed by Jacobo Bennett MD at [facility] Right 03/2019       Medications:      amLODIPine  5 mg Oral Daily    enoxaparin  40 mg SubCUTAneous BID    aspirin  81 mg Oral Daily    calcium-vitamin D  2 tablet Oral Daily    DULoxetine  60 mg Oral Daily    gabapentin 600 mg Oral Nightly    gabapentin  300 mg Oral QAM    therapeutic multivitamin-minerals  1 tablet Oral Daily    pantoprazole  40 mg Oral Daily    pravastatin  80 mg Oral Nightly    rOPINIRole  1 mg Oral QAM    rOPINIRole  2 mg Oral Nightly    Vitamin D  2,000 Units Oral Daily    zinc sulfate  50 mg Oral Daily    carvedilol  6.25 mg Oral BID WC    sodium chloride flush  10 mL IntraVENous 2 times per day    dexamethasone  6 mg Oral Daily    gabapentin  600 mg Oral Once       Social History:     Social History     Socioeconomic History    Marital status:      Spouse name: Not on file    Number of children: Not on file    Years of education: Not on file    Highest education level: Not on file   Occupational History    Not on file   Tobacco Use    Smoking status: Former Smoker     Packs/day: 1.00     Years: 44.00     Pack years: 44.00     Types: Cigarettes     Quit date: 2010     Years since quittin.2    Smokeless tobacco: Never Used   Vaping Use    Vaping Use: Never used   Substance and Sexual Activity    Alcohol use: No    Drug use: No    Sexual activity: Not on file   Other Topics Concern    Not on file   Social History Narrative    Not on file     Social Determinants of Health     Financial Resource Strain:     Difficulty of Paying Living Expenses:    Food Insecurity:     Worried About [email or web address] in the Last Year:     [facility] in the Last Year:    Transportation Needs:     Lack of Transportation (Medical):      Lack of Transportation (Non-Medical):    Physical Activity:     Days of Exercise per Week:     Minutes of Exercise per Session:    Stress:     Feeling of Stress :    Social Connections:     Frequency of Communication with Friends and Family:     Frequency of Social Gatherings with Friends and Family:     Attends Adventism Services:     Active Member of Clubs or Organizations:     Attends Club or Organization Meetings:     Marital Status: Intimate Partner Violence:     Fear of Current or Ex-Partner:     Emotionally Abused:     Physically Abused:     Sexually Abused:        Family History:     Family History   Problem Relation Age of Onset    Heart Disease Father         Allergies:   Lisinopril and Toradol [ketorolac tromethamine]     Review of Systems:       Constitutional: No fevers or chills. No systemic complaints  Head: No headaches  Eyes: No double vision or blurry vision. No conjunctival inflammation. ENT: No sore throat or runny nose. . No hearing loss, tinnitus or vertigo. Cardiovascular: No chest pain or palpitations. Shortness of breath. LOW  Lung: Shortness of breath, cough. No sputum production  Abdomen: No nausea, vomiting, diarrhea, or abdominal pain. Darlynn Magic No cramps. Genitourinary: No increased urinary frequency, or dysuria. No hematuria. No suprapubic or CVA pain  Musculoskeletal: No muscle aches or pains. No joint effusions, swelling or deformities  Hematologic: No bleeding or bruising. Neurologic: No headache, weakness, numbness, or tingling. Integument: No rash, no ulcers. Psychiatric: No depression. Endocrine: No polyuria, no polydipsia, no polyphagia. Physical Examination :     Patient Vitals for the past 8 hrs:   BP Temp Temp src Pulse Resp SpO2 Weight   09/06/21 0800 (!) 91/49 96.8 °F (36 °C) Temporal 70 20 90 % --   09/06/21 0700 (!) 103/51 -- -- 65 21 91 % --   09/06/21 0600 (!) 133/58 -- -- 62 20 93 % 200 lb 4.8 oz (90.9 kg)   09/06/21 0500 (!) 161/65 -- -- 65 15 95 % --   09/06/21 0400 (!) 140/65 -- -- 57 23 96 % --   09/06/21 0300 (!) 151/77 97.4 °F (36.3 °C) Temporal 61 20 93 % --   09/06/21 0200 135/61 -- -- 59 24 96 % --   09/06/21 0139 -- -- -- -- 21 -- --     General Appearance: Awake, alert, and in mild apparent distress  Head:  Normocephalic, no trauma  Eyes: Pupils equal, round, reactive to light; sclera anicteric; conjunctivae pink. No embolic phenomena.   ENT: Oropharynx clear, without erythema, for review.  MIP   images are provided for review.  Dose modulation, iterative reconstruction,   and/or weight based adjustment of the mA/kV was utilized to reduce the   radiation dose to as low as reasonably achievable.       COMPARISON:   08/30/2021 and 08/21/2021       HISTORY:   ORDERING SYSTEM PROVIDED HISTORY: elevated d dimer and hypoxia   TECHNOLOGIST PROVIDED HISTORY:   elevated d dimer and hypoxia   Decision Support Exception - unselect if not a suspected or confirmed   emergency medical condition->Emergency Medical Condition (MA)       FINDINGS:   Pulmonary Arteries: No evidence of central, lobar or proximal segmental   pulmonary artery emboli.       Mediastinum: Cardiomegaly.  Coronary disease identified.  Trace pericardial   fluid.  No suspicious mediastinal or hilar adenopathy identified per CT   criteria.  Aortic vascular calcifications.       Lungs/pleura: Multifocal interstitial alveolar opacities throughout both   lungs with a subpleural distribution suggestive of multifocal atypical   infection/viral pneumonia.  Bibasilar consolidative changes suggestive of   areas of atelectasis.       Upper Abdomen: Multiple circumscribed lesions involving the right left   hepatic lobe suggestive of cyst..       Soft Tissues/Bones: Multilevel degenerate changes thoracic spine.  Spinal   stimulator leads identified           Impression   No evidence of central to segmental pulmonary emboli.       Multifocal airspace opacities suggestive multifocal atypical infection/viral   pneumonia.       Cardiomegaly with coronary artery disease       Medical Decision [id number]:       Medical Decision Making-Other:     Note:  · Labs, medications, radiologic studies were reviewed with personal review of films  · Large amounts of data were reviewed  · Discussed with nursing Staff, Discharge planner  · Infection Control and Prevention measures reviewed  · All prior entries were reviewed  · Administer medications as ordered  · Prognosis: Guarded  · Discharge planning reviewed  · Follow up as outpatient. Thank you for allowing us to participate in the care of this patient. Please call with questions.     Jessica Damon MD  Pager: [phone] - Office: [phone]

## 2021-09-07 LAB
ABSOLUTE EOS #: 0.06 K/UL (ref 0–0.44)
ABSOLUTE IMMATURE GRANULOCYTE: 0.06 K/UL (ref 0–0.3)
ABSOLUTE LYMPH #: 0.57 K/UL (ref 1.1–3.7)
ABSOLUTE MONO #: 0.11 K/UL (ref 0.1–1.2)
ALBUMIN SERPL-MCNC: 2.8 G/DL (ref 3.5–5.2)
ALBUMIN/GLOBULIN RATIO: 1 (ref 1–2.5)
ALP BLD-CCNC: 90 U/L (ref 35–104)
ALT SERPL-CCNC: 44 U/L (ref 5–33)
ANION GAP SERPL CALCULATED.3IONS-SCNC: 10 MMOL/L (ref 9–17)
AST SERPL-CCNC: 24 U/L
BASOPHILS # BLD: 0 % (ref 0–2)
BASOPHILS ABSOLUTE: 0 K/UL (ref 0–0.2)
BILIRUB SERPL-MCNC: 0.3 MG/DL (ref 0.3–1.2)
BUN BLDV-MCNC: 24 MG/DL (ref 8–23)
BUN/CREAT BLD: 32 (ref 9–20)
CALCIUM SERPL-MCNC: 9 MG/DL (ref 8.6–10.4)
CHLORIDE BLD-SCNC: 104 MMOL/L (ref 98–107)
CO2: 25 MMOL/L (ref 20–31)
CREAT SERPL-MCNC: 0.74 MG/DL (ref 0.5–0.9)
D-DIMER QUANTITATIVE: 1.75 MG/L FEU (ref 0–0.59)
DIFFERENTIAL TYPE: ABNORMAL
EOSINOPHILS RELATIVE PERCENT: 1 % (ref 1–4)
FERRITIN: 515 UG/L (ref 13–150)
FIBRINOGEN: 578 MG/DL (ref 179–518)
GFR AFRICAN AMERICAN: >60 ML/MIN
GFR NON-AFRICAN AMERICAN: >60 ML/MIN
GFR SERPL CREATININE-BSD FRML MDRD: ABNORMAL ML/MIN/{1.73_M2}
GFR SERPL CREATININE-BSD FRML MDRD: ABNORMAL ML/MIN/{1.73_M2}
GLUCOSE BLD-MCNC: 95 MG/DL (ref 70–99)
HCT VFR BLD CALC: 34.4 % (ref 36.3–47.1)
HEMOGLOBIN: 11.6 G/DL (ref 11.9–15.1)
IMMATURE GRANULOCYTES: 1 %
INR BLD: 1.1
LYMPHOCYTES # BLD: 10 % (ref 24–43)
MCH RBC QN AUTO: 30.8 PG (ref 25.2–33.5)
MCHC RBC AUTO-ENTMCNC: 33.7 G/DL (ref 28.4–34.8)
MCV RBC AUTO: 91.2 FL (ref 82.6–102.9)
MONOCYTES # BLD: 2 % (ref 3–12)
MORPHOLOGY: NORMAL
NRBC AUTOMATED: 0 PER 100 WBC
PARTIAL THROMBOPLASTIN TIME: 30.9 SEC (ref 23.9–33.8)
PDW BLD-RTO: 12.5 % (ref 11.8–14.4)
PLATELET # BLD: 222 K/UL (ref 138–453)
PLATELET ESTIMATE: ABNORMAL
PMV BLD AUTO: 9.8 FL (ref 8.1–13.5)
POTASSIUM SERPL-SCNC: 4 MMOL/L (ref 3.7–5.3)
PROTHROMBIN TIME: 13.7 SEC (ref 11.5–14.2)
RBC # BLD: 3.77 M/UL (ref 3.95–5.11)
RBC # BLD: ABNORMAL 10*6/UL
SEG NEUTROPHILS: 86 % (ref 36–65)
SEGMENTED NEUTROPHILS ABSOLUTE COUNT: 4.9 K/UL (ref 1.5–8.1)
SODIUM BLD-SCNC: 139 MMOL/L (ref 135–144)
TOTAL PROTEIN: 5.5 G/DL (ref 6.4–8.3)
WBC # BLD: 5.7 K/UL (ref 3.5–11.3)
WBC # BLD: ABNORMAL 10*3/UL

## 2021-09-07 PROCEDURE — 94761 N-INVAS EAR/PLS OXIMETRY MLT: CPT

## 2021-09-07 PROCEDURE — 82728 ASSAY OF FERRITIN: CPT

## 2021-09-07 PROCEDURE — 85025 COMPLETE CBC W/AUTO DIFF WBC: CPT

## 2021-09-07 PROCEDURE — 6360000002 HC RX W HCPCS: Performed by: FAMILY MEDICINE

## 2021-09-07 PROCEDURE — 6370000000 HC RX 637 (ALT 250 FOR IP): Performed by: FAMILY MEDICINE

## 2021-09-07 PROCEDURE — 6370000000 HC RX 637 (ALT 250 FOR IP): Performed by: INTERNAL MEDICINE

## 2021-09-07 PROCEDURE — APPSS30 APP SPLIT SHARED TIME 16-30 MINUTES: Performed by: NURSE PRACTITIONER

## 2021-09-07 PROCEDURE — 85610 PROTHROMBIN TIME: CPT

## 2021-09-07 PROCEDURE — 80053 COMPREHEN METABOLIC PANEL: CPT

## 2021-09-07 PROCEDURE — 85379 FIBRIN DEGRADATION QUANT: CPT

## 2021-09-07 PROCEDURE — 2700000000 HC OXYGEN THERAPY PER DAY

## 2021-09-07 PROCEDURE — 2000000000 HC ICU R&B

## 2021-09-07 PROCEDURE — 85384 FIBRINOGEN ACTIVITY: CPT

## 2021-09-07 PROCEDURE — 85730 THROMBOPLASTIN TIME PARTIAL: CPT

## 2021-09-07 PROCEDURE — 99233 SBSQ HOSP IP/OBS HIGH 50: CPT | Performed by: INTERNAL MEDICINE

## 2021-09-07 PROCEDURE — 2580000003 HC RX 258: Performed by: EMERGENCY MEDICINE

## 2021-09-07 PROCEDURE — 36415 COLL VENOUS BLD VENIPUNCTURE: CPT

## 2021-09-07 RX ORDER — AMLODIPINE BESYLATE 5 MG/1
5 TABLET ORAL NIGHTLY
Status: DISCONTINUED | OUTPATIENT
Start: 2021-09-08 | End: 2021-09-14 | Stop reason: HOSPADM

## 2021-09-07 RX ADMIN — ROPINIROLE HYDROCHLORIDE 1 MG: 1 TABLET, FILM COATED ORAL at 08:19

## 2021-09-07 RX ADMIN — DEXAMETHASONE 6 MG: 4 TABLET ORAL at 08:18

## 2021-09-07 RX ADMIN — CALCIUM CARBONATE-VITAMIN D TAB 500 MG-200 UNIT 2 TABLET: 500-200 TAB at 08:19

## 2021-09-07 RX ADMIN — CARVEDILOL 6.25 MG: 6.25 TABLET, FILM COATED ORAL at 08:19

## 2021-09-07 RX ADMIN — ROPINIROLE HYDROCHLORIDE 2 MG: 1 TABLET, FILM COATED ORAL at 20:17

## 2021-09-07 RX ADMIN — GABAPENTIN 600 MG: 300 CAPSULE ORAL at 20:18

## 2021-09-07 RX ADMIN — ZINC SULFATE 220 MG (50 MG) CAPSULE 50 MG: CAPSULE at 08:18

## 2021-09-07 RX ADMIN — AMLODIPINE BESYLATE 5 MG: 5 TABLET ORAL at 08:19

## 2021-09-07 RX ADMIN — ENOXAPARIN SODIUM 40 MG: 40 INJECTION SUBCUTANEOUS at 20:18

## 2021-09-07 RX ADMIN — PRAVASTATIN SODIUM 80 MG: 20 TABLET ORAL at 20:18

## 2021-09-07 RX ADMIN — PANTOPRAZOLE SODIUM 40 MG: 40 TABLET, DELAYED RELEASE ORAL at 08:19

## 2021-09-07 RX ADMIN — ENOXAPARIN SODIUM 40 MG: 40 INJECTION SUBCUTANEOUS at 08:18

## 2021-09-07 RX ADMIN — ASPIRIN 81 MG: 81 TABLET, COATED ORAL at 08:19

## 2021-09-07 RX ADMIN — DULOXETINE HYDROCHLORIDE 60 MG: 60 CAPSULE, DELAYED RELEASE ORAL at 08:19

## 2021-09-07 RX ADMIN — MULTIPLE VITAMINS W/ MINERALS TAB 1 TABLET: TAB at 08:19

## 2021-09-07 RX ADMIN — GABAPENTIN 300 MG: 300 CAPSULE ORAL at 08:18

## 2021-09-07 RX ADMIN — SODIUM CHLORIDE, PRESERVATIVE FREE 10 ML: 5 INJECTION INTRAVENOUS at 20:18

## 2021-09-07 RX ADMIN — Medication 2000 UNITS: at 08:19

## 2021-09-07 RX ADMIN — SODIUM CHLORIDE, PRESERVATIVE FREE 10 ML: 5 INJECTION INTRAVENOUS at 08:23

## 2021-09-07 ASSESSMENT — PAIN SCALES - GENERAL
PAINLEVEL_OUTOF10: 0

## 2021-09-07 NOTE — PROGRESS NOTES
Patient continues to rest with eyes closed right side, oxygen is at 96% saturation at this time on same high flow settings

## 2021-09-07 NOTE — PROGRESS NOTES
Patient up to [facility], she does have a drop in oxygen saturation while up from 86% to 90%. Patient is back in bed oxygen saturation now reads 90%. Patient also had low BP readings while up on [facility], cuff will be adjusted and rechecked. Patient also does not want to wear the Bipap at . She does not wear any kind of cpap or bipap at home, only oxygen at night which she was sent home with from her most recent stay at [place] [address]. Patient prefers the high flow NC as of this time. She denies any further needs and call light is in reach.

## 2021-09-07 NOTE — PLAN OF CARE
Problem: Airway Clearance - Ineffective  Goal: Achieve or maintain patent airway  Outcome: Ongoing  Note: Patient continues to wear high flow oxygen.  She is currently at FIO2 85% and 45L  Patient still gets SOB with exertion, has a moist NPC  She has been encouraged to sleep and move side to side since she cant prone  She has used her acapella        Problem: Gas Exchange - Impaired  Goal: Absence of hypoxia  Outcome: Ongoing  Note: Patient oxygen saturations due drop with use of BSC   Goal: Promote optimal lung function  Outcome: Ongoing  Note: Deep breathing, acapella, use of oxygen        Problem: Isolation Precautions - Risk of Spread of Infection  Goal: Prevent transmission of infection  Outcome: Ongoing  Note: Droplet plus isolation      Problem: Breathing Pattern - Ineffective  Goal: Ability to achieve and maintain a regular respiratory rate  Outcome: Ongoing  Note: See vitals charting   Patient has had some increased RR at times      Problem: Risk for Fluid Volume Deficit  Goal: Maintain normal serum potassium, sodium, calcium, phosphorus, and pH  Outcome: Ongoing  Note: See lab results     Problem: Fatigue  Goal: Verbalize increase energy and improved vitality  Outcome: Ongoing  Note: Noticed increase in mobility      Problem: Loneliness or Risk for Loneliness  Goal: Demonstrate positive use of time alone when socialization is not possible  Outcome: Ongoing  Note: No c/o lonliness      Problem: Patient Education: Go to Patient Education Activity  Goal: Patient/Family Education  Outcome: Ongoing     Problem: Nutrition  Goal: Optimal nutrition therapy  Outcome: Ongoing  Note: Patient is tolerating a regular diet with oral supplement nutrition shake     Problem: Falls - Risk of:  Goal: Will remain free from falls  Description: Will remain free from falls  Outcome: Ongoing  Note: Patient has been free from falls  She calls out appropriately for help  Goal: Absence of physical injury  Description: Absence of physical injury  Outcome: Ongoing  Note: No injuries sustained here      Problem: Skin Integrity:  Goal: Will show no infection signs and symptoms  Description: Will show no infection signs and symptoms  Outcome: Ongoing  Note: Patient has remained afebrile  Her WBC are normal     Goal: Absence of new skin breakdown  Description: Absence of new skin breakdown  Outcome: Ongoing  Note: See skin assessment charting

## 2021-09-07 NOTE — PROGRESS NOTES
[facility] -Progress Telemedicine Note  COVID 19 Patient  Today's Date and Time: 9/7/2021, 12:51 PM    Impression :   · COVID 19 Suspect  · COVID 19 Confirmed Infection- Pneumonia  · Covid tests:  · 8/21/2021 : Positive  · 8-23-21: Positive  · 8-31-21: Positive  · Hypoxia   · Hypotension- resolved  · Hx Fibromyalgia  · Essential HTN    Patient evaluated by Telemedicine. Requesting Institution: [facility]  Provider Institution: [address] [facility] [address], [address] [facility] Person at [facility]: Ms Sevilla Hashimoto, APRN- IM    Recommendations:   · Antibiotic Rx:  · Monitor off antibiotics  · Covid Rx  · Remdesivir. Completed at [facility]  · Decadron. Restart on 9/1/2021  · Prior cervical ACDF for degenerative disk disease at [facility] on 8-17-21. Medical Decision Making/Summary/Discussion:9/7/2021     · Patient admitted with suspected COVID 19 infection  · Covid test confirmed positive. · Patient with initial diagnosis of COVID-19 on 8/21/2021. Received partial treatment with remdesivir and Decadron at [facility].  Was sent home on oral Decadron. Initial chest x-ray shows some bibasilar atelectasis. · Patient has deteriorated since then and current chest x-ray on admission to [facility] shows bilateral interstitial infiltrates. CT scan shows multifocal pneumonia. · Patient has developed significant hypoxia. and elevated CRP  · At this point in time remdesivir will no longer be efficacious  · Patient will need to continue with Decadron  · Unfortunately she is already out of the window for Actemra. She also does not qualify for monoclonal antibody. · She will need to be monitored for any potential bacterial superinfection  · Patient is at high risk for a complicated course.     Infection Control Recommendations   · Universal Precautions  · Airborne isolation  · Droplet Isolation    Antimicrobial Stewardship Recommendations       · Discontinuation of therapy  Coordination of Outpatient Care:   · Estimated Length of IV antimicrobials:TBD  · Patient will need Midline Catheter Insertion: TBD  · Patient will need PICC line Insertion: No  · Patient will need: Home IV , [place],  SNF,  LTAC:TBD  · Patient will need outpatient wound care:No    Chief complaint/reason for consultation:   · Concern for COVID infection      History of Present Illness:   Herberth Collado is a 66y.o.-year-old female who was initially admitted on 8/31/2021. Patient seen at the request of Олег Elizalde. INITIAL HISTORY:    Patient has a history of degenerative disc disease in the cervical area. She underwent an ACDF (anterior cervical discectomy and fusion) on 8/17/2021 at [facility] was discharged home the next day with a drain. A few days later she developed malaise, fevers, and some shortness of breath. She was evaluated at that point in time at [facility], ER were her temperature was noted to be 103 F and she had a positive Covid test on 8/23/2021. She was transferred back to [facility] where she was treated conservatively in regards to her cervical neck incision. The neck incision was described as being clean and dry. The drain was removed on 8/22/2021. She also received treatment for the Covid with remdesivir and Decadron. Her symptoms improved and she was discharged home on 8/25/2021 with a recommendation to continue Decadron for 3 more days. While at [facility] she also showed desaturation when off oxygen, therefore she was discharged on home oxygen with a pulse oximeter and request to follow with pulmonary medicine. There are no data on CRP or other inflammatory markers. Her chest x-ray on 8/21/2021 showed minimal bibasilar atelectasis. CT scan was not done. The patient then presented to [facility] on 8/31/2021 complaining of generalized fatigue, decreased appetite, weakness, cough, mild shortness of breath.   She denied any signs of inflammation around her previous cervical neck incision. She was noted to have hypoxia with an O2 saturation of 75 on room air. CT scan of the chest was performed which shows multifocal pneumonia with findings compatible with Covid pneumonia. Patient admitted because of concerns with COVID 19.    CURRENT EVALUATION : 9/7/2021    Patient evaluated in the ICU. Afebrile  VS stable    Patient has continue to require hi-flow NC but her oxygen demands have slowly decreased. Patient exhibiting respiratory distress. Yes  Respiratory secretions: No    Patient receiving supplemental oxygen. High flow 10-->40-->50-->45 L/min.     02 sat 92-->98-->96-->94-->90-->96  RR 24-->26-->28-->26-->13-->20-->18      % FIO2: 61-->75-->78-->84-->82  PEEP:      QTc:       NEWS Score: 0-4 Low risk group; 5-6: Medium risk group; 7 or above: High risk group  Parameters 3 2 1 0 1 2 3   Age    < 65   ? 65   RR ? 8  9-11 12-20  21-24 ? 25   O2 Sats ? 91 92-93 94-95 ? 96      Suppl O2  Yes  No      SBP ? 90  101-110 111-219   ? 220   HR ? 40  41-50 51-90  111-130 ? 131   Consciousness    Alert   Drowsiness, lethargy, or confusion   Temperature ? 35.0 C (95.0 F)  35.1-36.0 C 95.1-96.9 F 36.1-38.0 C 97.0-100.4 F 38.1-39.0 C 100.5-102.3 F ? 39.1 C ? 102.4 F      NEWS Score:   9/1/2021: 9 high risk    Overall Daily Picture:   · Unchanged     Presence of secondary bacterial Infection:    No   Additional antibiotics: No    Labs, X rays reviewed: 9/7/2021    BUN: 24  Cr: 0.74    WBC: 5.7  Hb: 11.6  Plat: 222    Absolute Neutrophils: 5.16  Absolute Lymphocytes: 0.48  Neutrophil/Lymphocyte Ratio: 10.7 high risk    CRP: 181.5  Ferritin: 789-->905  LDH: 250    Pro Calcitonin:      Cultures:  Urine:  ·   Blood:  · 3131x2 no growth  Sputum :  ·   Wound:       CXR:     CAT:  · 8-31-21: Multifocal pneumonia compatible with Covid 19    MRSA nasal screen 8/22/2021 -    Discussed with patient, RN, CC, IM.     I have personally reviewed the past medical history, past surgical history, medications, social history, and family history, and I have updated the database accordingly.   Past Medical History:     Past Medical History:   Diagnosis Date    Arthritis     Blockage of coronary artery of heart ([facility] [address] 2010    x 2 stents    CAD (coronary artery disease)     DDD (degenerative disc disease), cervical     cervical 3-6    Fibromyalgia     History of blood transfusion     History of kidney stones     last one \"about 10 years ago\"    Hyperlipidemia     Hypertension     Pancreatitis 2016    Sleep apnea     Thyroid disease     Wears glasses     for reading       Past Surgical  History:     Past Surgical History:   Procedure Laterality Date    ARTHROPLASTY Left 2/6/2020    LEFT FIRST CARPOMETACARPAL ARTHROPLASTY performed by Kary Tariq DO at [facility]  2012    L4,L5,S1 hardware placed    BACK SURGERY  2018    SPINAL CORD STIMULATOR    CATARACT REMOVAL Bilateral 2016    CERVICAL FUSION N/A 8/17/2021    C3-6 ACDF, REMOVAL OF HARDWARE C6-7 performed by Tia Perales MD at [facility] [address]    COLONOSCOPY      normal    CORONARY ANGIOPLASTY  2010    x 2 stents    FINGER SURGERY      right thumb joint    HERNIA REPAIR Right     inguinal    HYSTERECTOMY      JOINT REPLACEMENT Right     knee    KIDNEY STONE SURGERY      x 3    LITHOTRIPSY      x 3    PARATHYROIDECTOMY      PARATHYROIDECTOMY      MN PERCUT IMPLNT NEUROELECT,EPIDURAL N/A 8/8/2018    SPINAL CORD STIMULATOR IMPLANT performed by Katy Carpenter MD at [place] [address] Right 03/2019       Medications:      amLODIPine  5 mg Oral Daily    enoxaparin  40 mg SubCUTAneous BID    aspirin  81 mg Oral Daily    calcium-vitamin D  2 tablet Oral Daily    DULoxetine  60 mg Oral Daily    gabapentin  600 mg Oral Nightly    gabapentin  300 mg Oral QAM    therapeutic multivitamin-minerals  1 tablet Oral Daily    pantoprazole  40 mg Oral Daily    pravastatin  80 mg Oral Nightly    rOPINIRole  1 mg Oral QAM    rOPINIRole  2 mg Oral Nightly    Vitamin D  2,000 Units Oral Daily    zinc sulfate  50 mg Oral Daily    carvedilol  6.25 mg Oral BID WC    sodium chloride flush  10 mL IntraVENous 2 times per day    dexamethasone  6 mg Oral Daily    gabapentin  600 mg Oral Once       Social History:     Social History     Socioeconomic History    Marital status:      Spouse name: Not on file    Number of children: Not on file    Years of education: Not on file    Highest education level: Not on file   Occupational History    Not on file   Tobacco Use    Smoking status: Former Smoker     Packs/day: 1.00     Years: 44.00     Pack years: 44.00     Types: Cigarettes     Quit date: 2010     Years since quittin.2    Smokeless tobacco: Never Used   Vaping Use    Vaping Use: Never used   Substance and Sexual Activity    Alcohol use: No    Drug use: No    Sexual activity: Not on file   Other Topics Concern    Not on file   Social History Narrative    Not on file     Social Determinants of Health     Financial Resource Strain:     Difficulty of Paying Living Expenses:    Food Insecurity:     Worried About [facility] in the Last Year:     [facility] in the Last Year:    Transportation Needs:     Lack of Transportation (Medical):      Lack of Transportation (Non-Medical):    Physical Activity:     Days of Exercise per Week:     Minutes of Exercise per Session:    Stress:     Feeling of Stress :    Social Connections:     Frequency of Communication with Friends and Family:     Frequency of Social Gatherings with Friends and Family:     Attends Alevism Services:     Active Member of Clubs or Organizations:     Attends Club or Organization Meetings:     Marital Status:    Intimate Partner Violence:     Fear of Current or Ex-Partner:     Emotionally Abused:     Physically Abused:  Sexually Abused:        Family History:     Family History   Problem Relation Age of Onset    Heart Disease Father         Allergies:   Lisinopril and Toradol [ketorolac tromethamine]     Review of Systems:       Constitutional: No fevers or chills. No systemic complaints  Head: No headaches  Eyes: No double vision or blurry vision. No conjunctival inflammation. ENT: No sore throat or runny nose. . No hearing loss, tinnitus or vertigo. Cardiovascular: No chest pain or palpitations. Shortness of breath. LOW  Lung: Shortness of breath, cough. No sputum production  Abdomen: No nausea, vomiting, diarrhea, or abdominal pain. Jaron Glow No cramps. Genitourinary: No increased urinary frequency, or dysuria. No hematuria. No suprapubic or CVA pain  Musculoskeletal: No muscle aches or pains. No joint effusions, swelling or deformities  Hematologic: No bleeding or bruising. Neurologic: No headache, weakness, numbness, or tingling. Integument: No rash, no ulcers. Psychiatric: No depression. Endocrine: No polyuria, no polydipsia, no polyphagia. Physical Examination :     Patient Vitals for the past 8 hrs:   BP Temp Temp src Pulse Resp SpO2   09/07/21 1200 (!) 110/56 -- -- 69 18 96 %   09/07/21 1130 (!) 106/58 -- -- 61 22 95 %   09/07/21 1100 (!) 96/43 -- -- 70 27 94 %   09/07/21 1030 (!) 100/58 -- -- 62 22 96 %   09/07/21 1000 (!) 90/46 -- -- 66 20 94 %   09/07/21 0955 (!) 87/46 -- -- 71 22 93 %   09/07/21 0950 (!) 90/41 -- -- 71 22 93 %   09/07/21 0800 (!) 162/82 -- -- 61 17 91 %   09/07/21 0700 (!) 152/67 96.5 °F (35.8 °C) Temporal 64 23 91 %   09/07/21 0600 (!) 147/60 -- -- 61 22 92 %   09/07/21 0500 131/71 -- -- 65 11 93 %     General Appearance: Awake, alert, and in mild apparent distress  Head:  Normocephalic, no trauma  Eyes: Pupils equal, round, reactive to light; sclera anicteric; conjunctivae pink. No embolic phenomena. ENT: Oropharynx clear, without erythema, exudate, or thrush. No tenderness of sinuses. Mouth/throat: mucosa pink and moist. No lesions. Dentition in good repair. Neck:Supple, without lymphadenopathy. Thyroid normal, No bruits. Pulmonary/Chest: Coarse breath sounds with bilateral rhonchi. On nasal oxygen. Cardiovascular: Regular rate and rhythm without murmurs, rubs, or gallops. Abdomen: Soft, non tender. Bowel sounds normal. No organomegaly  All four Extremities: No cyanosis, clubbing, edema, or effusions. Neurologic: No gross sensory or motor deficits. Skin: Warm and dry with good turgor. No signs of peripheral arterial or venous insufficiency. No ulcerations. No open wounds. Medical Decision Making -Laboratory:   I have independently reviewed/ordered the following labs:    CBC with Differential:   Recent Labs     09/06/21  0505 09/07/21  0525   WBC 6.5 5.7   HGB 12.0 11.6*   HCT 35.6* 34.4*    222   LYMPHOPCT 7* 10*   MONOPCT 4 2*     BMP:   Recent Labs     09/06/21  0505 09/07/21  0525    139   K 3.8 4.0    104   CO2 22 25   BUN 22 24*   CREATININE 0.66 0.74     Hepatic Function Panel:   Recent Labs     09/06/21  0505 09/07/21  0525   PROT 5.7* 5.5*   LABALBU 2.8* 2.8*   BILITOT 0.44 0.30   ALKPHOS 86 90   ALT 43* 44*   AST 25 24     No results for input(s): RPR in the last 72 hours. No results for input(s): HIV in the last 72 hours. No results for input(s): BC in the last 72 hours. Lab Results   Component Value Date    MUCUS NOT REPORTED 10/04/2020    RBC 3.77 09/07/2021    TRICHOMONAS NOT REPORTED 10/04/2020    WBC 5.7 09/07/2021    YEAST NOT REPORTED 10/04/2020    TURBIDITY CLEAR 08/31/2021     Lab Results   Component Value Date    CREATININE 0.74 09/07/2021    GLUCOSE 95 09/07/2021       Medical Decision Making-Imaging:     EXAMINATION:   CTA OF THE CHEST 8/31/2021 8:26 pm       TECHNIQUE:   CTA of the chest was performed after the administration of intravenous   contrast.  Multiplanar reformatted images are provided for review.   MIP   images are provided for review.  Dose modulation, iterative reconstruction,   and/or weight based adjustment of the mA/kV was utilized to reduce the   radiation dose to as low as reasonably achievable.       COMPARISON:   08/30/2021 and 08/21/2021       HISTORY:   ORDERING SYSTEM PROVIDED HISTORY: elevated d dimer and hypoxia   TECHNOLOGIST PROVIDED HISTORY:   elevated d dimer and hypoxia   Decision Support Exception - unselect if not a suspected or confirmed   emergency medical condition->Emergency Medical Condition (MA)       FINDINGS:   Pulmonary Arteries: No evidence of central, lobar or proximal segmental   pulmonary artery emboli.       Mediastinum: Cardiomegaly.  Coronary disease identified.  Trace pericardial   fluid.  No suspicious mediastinal or hilar adenopathy identified per CT   criteria.  Aortic vascular calcifications.       Lungs/pleura: Multifocal interstitial alveolar opacities throughout both   lungs with a subpleural distribution suggestive of multifocal atypical   infection/viral pneumonia.  Bibasilar consolidative changes suggestive of   areas of atelectasis.       Upper Abdomen: Multiple circumscribed lesions involving the right left   hepatic lobe suggestive of cyst..       Soft Tissues/Bones: Multilevel degenerate changes thoracic spine.  Spinal   stimulator leads identified           Impression   No evidence of central to segmental pulmonary emboli.       Multifocal airspace opacities suggestive multifocal atypical infection/viral   pneumonia.       Cardiomegaly with coronary artery disease       Medical Decision Goitgh-Eqhjbbvd-Sdgac:       Medical Decision Making-Other:     Note:  · Labs, medications, radiologic studies were reviewed with personal review of films  · Large amounts of data were reviewed  · Discussed with nursing Staff, Discharge planner  · Infection Control and Prevention measures reviewed  · All prior entries were reviewed  · Administer medications as ordered  · Prognosis: Guarded  · Discharge planning reviewed  · Follow up as outpatient. Thank you for allowing us to participate in the care of this patient. Please call with questions. Bryce Oates APRN - WANDA     ATTESTATION:    I have discussed the case, including pertinent history and exam findings with the APRN. I have evaluated the  History, physical findings and pictures of the patient and the key elements of the encounter have been performed by me. I have reviewed the laboratory data, other diagnostic studies and discussed them with the APRN. I have updated the medical record where necessary. I agree with the assessment, plan and orders as documented by the APRN.     Jinny Rao MD.      Pager: [phone] - Office: [phone]

## 2021-09-07 NOTE — PROGRESS NOTES
Patient was sleeping on right side.  Awakens easily for assessment and vitals  No changes noted, see charting on all  Patient denies pain or dizziness

## 2021-09-07 NOTE — PROGRESS NOTES
Patient awake in bed, playing on home I pad. Patient is alert and oriented at this time. Patient assessment and vitals completed, see charting on all. Patient denies any pain, dizziness, nausea, or numbness or tingling anywhere. She still is getting some SOB with exertion and has moist non productive cough at times. Patient has rales to Right lower lung base. Patient denies any needs, call light is in reach.

## 2021-09-07 NOTE — PROGRESS NOTES
Progress Note  Marcia Correia MD    OBJECTIVE:    Patient seen for f/u of Pneumonia due to COVID-19 virus. She is requiring high flow oxygen . States her shortness of breath better ,now on 45 lit with 82 % fio2 oxygen ,     ROS:   Constitutional: negative  for fevers, and negative for chills. Respiratory: positive for shortness of breath, positive for cough, and negative for wheezing  Cardiovascular: negative for chest pain, and negative for palpitations  Gastrointestinal: negative for abdominal pain, negative for nausea,negative for vomiting, negative for diarrhea, and negative for constipation     All other systems were reviewed with the patient and are negative unless otherwise stated in HPI    OBJECTIVE:  Vitals:   Temp: 96.5 °F (35.8 °C)  BP: (!) 152/67  Resp: 23  Pulse: 64  SpO2: 91 %    24HR INTAKE/OUTPUT:      Intake/Output Summary (Last 24 hours) at 9/7/2021 0758  Last data filed at 9/7/2021 0601  Gross per 24 hour   Intake 350 ml   Output 1350 ml   Net -1000 ml     -----------------------------------------------------------------  Exam:  GEN:    Awake, alert and oriented x3. EYES:  EOMI, pupils equal   NECK: Supple. No lymphadenopathy. No carotid bruit  CVS:    regular rate and rhythm, no audible murmur  PULM:  diminished with bilat rhonchi, no acute respiratory distress  ABD:    Bowels sounds normal.  Abdomen is soft. No distention. no tenderness to palpation. EXT:   no edema bilaterally . No calf tenderness. NEURO: Moves all extremities. Motor and sensory are grossly intact  SKIN:  No rashes.   No skin lesions.    -----------------------------------------------------------------  Diagnostic Data:    · All available data reviewed  Lab Results   Component Value Date    WBC 5.7 09/07/2021    HGB 11.6 (L) 09/07/2021    MCV 91.2 09/07/2021     09/07/2021      Lab Results   Component Value Date    GLUCOSE 95 09/07/2021    BUN 24 (H) 09/07/2021    CREATININE 0.74 09/07/2021     09/07/2021    K 4.0 09/07/2021    CALCIUM 9.0 09/07/2021     09/07/2021    CO2 25 09/07/2021       PROBLEM LIST:  Principal Problem:    Pneumonia due to COVID-19 virus  Active Problems:    CAD (coronary artery disease)    Hypertension    Sepsis ([facility] [address]    Acute respiratory failure with hypoxia (HCC)    Dyslipidemia  Resolved Problems:    * No resolved hospital problems. *      ASSESSMENT / PLAN:  Pneumonia due to COVID-19 virus  · Continue current therapy of steroids,aerosols and oxygen  · Acute respiratory failure with hypoxia- continue oxygen, Bi pap at night as needed  · Nutrition status:  Well developed, well nourished with no malnutrition  · DVT prophylaxis: Lovenox   · High risk medications: none   · Disposition:  Discharge plan is home    Marisa Leonard MD , M.D.  9/7/2021  7:53 AM

## 2021-09-08 LAB
ABSOLUTE EOS #: 0 K/UL (ref 0–0.44)
ABSOLUTE IMMATURE GRANULOCYTE: 0.24 K/UL (ref 0–0.3)
ABSOLUTE LYMPH #: 0.59 K/UL (ref 1.1–3.7)
ABSOLUTE MONO #: 0.12 K/UL (ref 0.1–1.2)
ALBUMIN SERPL-MCNC: 2.8 G/DL (ref 3.5–5.2)
ALBUMIN/GLOBULIN RATIO: 1 (ref 1–2.5)
ALP BLD-CCNC: 93 U/L (ref 35–104)
ALT SERPL-CCNC: 43 U/L (ref 5–33)
ANION GAP SERPL CALCULATED.3IONS-SCNC: 13 MMOL/L (ref 9–17)
AST SERPL-CCNC: 21 U/L
BASOPHILS # BLD: 0 % (ref 0–2)
BASOPHILS ABSOLUTE: 0 K/UL (ref 0–0.2)
BILIRUB SERPL-MCNC: 0.28 MG/DL (ref 0.3–1.2)
BUN BLDV-MCNC: 25 MG/DL (ref 8–23)
BUN/CREAT BLD: 36 (ref 9–20)
CALCIUM SERPL-MCNC: 8.5 MG/DL (ref 8.6–10.4)
CHLORIDE BLD-SCNC: 106 MMOL/L (ref 98–107)
CO2: 23 MMOL/L (ref 20–31)
CREAT SERPL-MCNC: 0.7 MG/DL (ref 0.5–0.9)
D-DIMER QUANTITATIVE: 1.02 MG/L FEU (ref 0–0.59)
DIFFERENTIAL TYPE: ABNORMAL
EOSINOPHILS RELATIVE PERCENT: 0 % (ref 1–4)
FERRITIN: 424 UG/L (ref 13–150)
FIBRINOGEN: 536 MG/DL (ref 179–518)
GFR AFRICAN AMERICAN: >60 ML/MIN
GFR NON-AFRICAN AMERICAN: >60 ML/MIN
GFR SERPL CREATININE-BSD FRML MDRD: ABNORMAL ML/MIN/{1.73_M2}
GFR SERPL CREATININE-BSD FRML MDRD: ABNORMAL ML/MIN/{1.73_M2}
GLUCOSE BLD-MCNC: 88 MG/DL (ref 70–99)
HCT VFR BLD CALC: 34.2 % (ref 36.3–47.1)
HEMOGLOBIN: 11.5 G/DL (ref 11.9–15.1)
IMMATURE GRANULOCYTES: 4 %
INR BLD: 1.1
LYMPHOCYTES # BLD: 10 % (ref 24–43)
MCH RBC QN AUTO: 30.7 PG (ref 25.2–33.5)
MCHC RBC AUTO-ENTMCNC: 33.6 G/DL (ref 28.4–34.8)
MCV RBC AUTO: 91.4 FL (ref 82.6–102.9)
MONOCYTES # BLD: 2 % (ref 3–12)
MORPHOLOGY: NORMAL
NRBC AUTOMATED: 0 PER 100 WBC
NUCLEATED RED BLOOD CELLS: 1 PER 100 WBC (ref 0–5)
PARTIAL THROMBOPLASTIN TIME: 32.5 SEC (ref 23.9–33.8)
PDW BLD-RTO: 12.7 % (ref 11.8–14.4)
PLATELET # BLD: 226 K/UL (ref 138–453)
PLATELET ESTIMATE: ABNORMAL
PMV BLD AUTO: 9.5 FL (ref 8.1–13.5)
POTASSIUM SERPL-SCNC: 4 MMOL/L (ref 3.7–5.3)
PROTHROMBIN TIME: 13.6 SEC (ref 11.5–14.2)
RBC # BLD: 3.74 M/UL (ref 3.95–5.11)
RBC # BLD: ABNORMAL 10*6/UL
SEG NEUTROPHILS: 84 % (ref 36–65)
SEGMENTED NEUTROPHILS ABSOLUTE COUNT: 4.95 K/UL (ref 1.5–8.1)
SODIUM BLD-SCNC: 142 MMOL/L (ref 135–144)
TOTAL PROTEIN: 5.5 G/DL (ref 6.4–8.3)
WBC # BLD: 5.9 K/UL (ref 3.5–11.3)
WBC # BLD: ABNORMAL 10*3/UL

## 2021-09-08 PROCEDURE — 99233 SBSQ HOSP IP/OBS HIGH 50: CPT | Performed by: INTERNAL MEDICINE

## 2021-09-08 PROCEDURE — 6370000000 HC RX 637 (ALT 250 FOR IP): Performed by: FAMILY MEDICINE

## 2021-09-08 PROCEDURE — 6360000002 HC RX W HCPCS: Performed by: FAMILY MEDICINE

## 2021-09-08 PROCEDURE — 80053 COMPREHEN METABOLIC PANEL: CPT

## 2021-09-08 PROCEDURE — 85730 THROMBOPLASTIN TIME PARTIAL: CPT

## 2021-09-08 PROCEDURE — 36415 COLL VENOUS BLD VENIPUNCTURE: CPT

## 2021-09-08 PROCEDURE — 85384 FIBRINOGEN ACTIVITY: CPT

## 2021-09-08 PROCEDURE — 85610 PROTHROMBIN TIME: CPT

## 2021-09-08 PROCEDURE — 82728 ASSAY OF FERRITIN: CPT

## 2021-09-08 PROCEDURE — 94761 N-INVAS EAR/PLS OXIMETRY MLT: CPT

## 2021-09-08 PROCEDURE — 6370000000 HC RX 637 (ALT 250 FOR IP): Performed by: INTERNAL MEDICINE

## 2021-09-08 PROCEDURE — 85025 COMPLETE CBC W/AUTO DIFF WBC: CPT

## 2021-09-08 PROCEDURE — 1200000000 HC SEMI PRIVATE

## 2021-09-08 PROCEDURE — 2580000003 HC RX 258: Performed by: EMERGENCY MEDICINE

## 2021-09-08 PROCEDURE — 2700000000 HC OXYGEN THERAPY PER DAY

## 2021-09-08 PROCEDURE — 85379 FIBRIN DEGRADATION QUANT: CPT

## 2021-09-08 RX ADMIN — Medication 2000 UNITS: at 09:19

## 2021-09-08 RX ADMIN — GABAPENTIN 600 MG: 300 CAPSULE ORAL at 20:26

## 2021-09-08 RX ADMIN — ENOXAPARIN SODIUM 40 MG: 40 INJECTION SUBCUTANEOUS at 20:27

## 2021-09-08 RX ADMIN — DULOXETINE HYDROCHLORIDE 60 MG: 60 CAPSULE, DELAYED RELEASE ORAL at 09:19

## 2021-09-08 RX ADMIN — AMLODIPINE BESYLATE 5 MG: 5 TABLET ORAL at 20:27

## 2021-09-08 RX ADMIN — CALCIUM CARBONATE-VITAMIN D TAB 500 MG-200 UNIT 2 TABLET: 500-200 TAB at 09:19

## 2021-09-08 RX ADMIN — DEXAMETHASONE 6 MG: 4 TABLET ORAL at 09:19

## 2021-09-08 RX ADMIN — ZINC SULFATE 220 MG (50 MG) CAPSULE 50 MG: CAPSULE at 09:19

## 2021-09-08 RX ADMIN — ENOXAPARIN SODIUM 40 MG: 40 INJECTION SUBCUTANEOUS at 09:19

## 2021-09-08 RX ADMIN — GABAPENTIN 300 MG: 300 CAPSULE ORAL at 09:18

## 2021-09-08 RX ADMIN — PANTOPRAZOLE SODIUM 40 MG: 40 TABLET, DELAYED RELEASE ORAL at 09:19

## 2021-09-08 RX ADMIN — ASPIRIN 81 MG: 81 TABLET, COATED ORAL at 09:18

## 2021-09-08 RX ADMIN — ROPINIROLE HYDROCHLORIDE 2 MG: 1 TABLET, FILM COATED ORAL at 20:26

## 2021-09-08 RX ADMIN — MULTIPLE VITAMINS W/ MINERALS TAB 1 TABLET: TAB at 09:19

## 2021-09-08 RX ADMIN — SODIUM CHLORIDE, PRESERVATIVE FREE 10 ML: 5 INJECTION INTRAVENOUS at 09:21

## 2021-09-08 RX ADMIN — PRAVASTATIN SODIUM 80 MG: 20 TABLET ORAL at 20:26

## 2021-09-08 RX ADMIN — SODIUM CHLORIDE, PRESERVATIVE FREE 10 ML: 5 INJECTION INTRAVENOUS at 20:27

## 2021-09-08 RX ADMIN — CARVEDILOL 6.25 MG: 6.25 TABLET, FILM COATED ORAL at 17:47

## 2021-09-08 RX ADMIN — ROPINIROLE HYDROCHLORIDE 1 MG: 1 TABLET, FILM COATED ORAL at 09:18

## 2021-09-08 RX ADMIN — CARVEDILOL 6.25 MG: 6.25 TABLET, FILM COATED ORAL at 09:18

## 2021-09-08 ASSESSMENT — PAIN SCALES - GENERAL
PAINLEVEL_OUTOF10: 0

## 2021-09-08 NOTE — PROGRESS NOTES
Progress Note  Marcia Correia MD    OBJECTIVE:    Patient seen for f/u of Pneumonia due to COVID-19 virus. She is requiring high flow oxygen . States her shortness of breath better ,now on 35 lit with 65 % fio2 oxygen ,     ROS:   Constitutional: negative  for fevers, and negative for chills. Respiratory: positive for shortness of breath, positive for cough, and negative for wheezing  Cardiovascular: negative for chest pain, and negative for palpitations  Gastrointestinal: negative for abdominal pain, negative for nausea,negative for vomiting, negative for diarrhea, and negative for constipation     All other systems were reviewed with the patient and are negative unless otherwise stated in HPI    OBJECTIVE:  Vitals:   Temp: 97.7 °F (36.5 °C)  BP: 127/60  Resp: 18  Pulse: 66  SpO2: 93 %    24HR INTAKE/OUTPUT:      Intake/Output Summary (Last 24 hours) at 9/8/2021 0834  Last data filed at 9/8/2021 0630  Gross per 24 hour   Intake 300 ml   Output 1600 ml   Net -1300 ml     -----------------------------------------------------------------  Exam:  GEN:    Awake, alert and oriented x3. EYES:  EOMI, pupils equal   NECK: Supple. No lymphadenopathy. No carotid bruit  CVS:    regular rate and rhythm, no audible murmur  PULM:  diminished with bilat rhonchi, no acute respiratory distress  ABD:    Bowels sounds normal.  Abdomen is soft. No distention. no tenderness to palpation. EXT:   no edema bilaterally . No calf tenderness. NEURO: Moves all extremities. Motor and sensory are grossly intact  SKIN:  No rashes.   No skin lesions.    -----------------------------------------------------------------  Diagnostic Data:    · All available data reviewed  Lab Results   Component Value Date    WBC 5.9 09/08/2021    HGB 11.5 (L) 09/08/2021    MCV 91.4 09/08/2021     09/08/2021      Lab Results   Component Value Date    GLUCOSE 88 09/08/2021    BUN 25 (H) 09/08/2021    CREATININE 0.70 09/08/2021     09/08/2021 K 4.0 09/08/2021    CALCIUM 8.5 (L) 09/08/2021     09/08/2021    CO2 23 09/08/2021       PROBLEM LIST:  Principal Problem:    Pneumonia due to COVID-19 virus  Active Problems:    CAD (coronary artery disease)    Hypertension    Sepsis ([address]    Acute respiratory failure with hypoxia (HCC)    Dyslipidemia  Resolved Problems:    * No resolved hospital problems. *      ASSESSMENT / PLAN:  Pneumonia due to COVID-19 virus  · Continue current therapy of steroids,aerosols and oxygen  · Acute respiratory failure with hypoxia- continue oxygen, Bi pap at night as needed  · Nutrition status:  Well developed, well nourished with no malnutrition  · DVT prophylaxis: Lovenox   · High risk medications: none   · [facility]  · Disposition:  Discharge plan is home    Nehal Parker MD , M.D.  9/8/2021  8:34 AM

## 2021-09-08 NOTE — PROGRESS NOTES
Patient up to [facility] with stand by assist. Her oxygen levels drop to 81-86% with getting up. She is back to bed at this time, dangling and eating some fruit. Saturation is 89%, will continue to monitor that oxygen levels improve. See flow sheet vitals charting.

## 2021-09-08 NOTE — PROGRESS NOTES
Patient has been able to be weaned back down to the 65% FIO2. Patient was up to [facility] and the lowest saturation was 88%. She is back in bed reassessment was completed, see charting on all  No rales heard this time just diminished lungs.      Offered to help wash patient up and she denies at this time

## 2021-09-08 NOTE — PROGRESS NOTES
[facility] Note  COVID 19 Patient  Today's Date and Time: 9/8/2021, 4:50 PM    Impression :   · COVID 19 Suspect  · COVID 19 Confirmed Infection- Pneumonia  · Covid tests:  · 8/21/2021 : Positive  · 8-23-21: Positive  · 8-31-21: Positive  · Hypoxia   · Hypotension- resolved  · Hx Fibromyalgia  · Essential HTN    Patient evaluated by Telemedicine. Requesting Institution: [facility]  Provider Institution: [address] [facility] [address] [place], [address] [facility] Person at [facility]: Ms Ayla Serra, APRN- IM    Recommendations:   · Antibiotic Rx:  · Monitor off antibiotics  · Covid Rx  · [facility]  · Decadron. Restart on 9/1/2021  · Prior cervical ACDF for degenerative disk disease at [facility] on 8-17-21. Medical Decision Making/Summary/Discussion:9/8/2021     · Patient admitted with suspected COVID 19 infection  · Covid test confirmed positive. · Patient with initial diagnosis of COVID-19 on 8/21/2021. Received partial treatment with remdesivir and Decadron at [facility].  Was sent home on oral Decadron. Initial chest x-ray shows some bibasilar atelectasis. · Patient has deteriorated since then and current chest x-ray on admission to [facility] shows bilateral interstitial infiltrates. CT scan shows multifocal pneumonia. · Patient has developed significant hypoxia. and elevated CRP  · At this point in time remdesivir will no longer be efficacious  · Patient will need to continue with Decadron  · Unfortunately she is already out of the window for Actemra. She also does not qualify for monoclonal antibody. · She will need to be monitored for any potential bacterial superinfection  · Patient is at high risk for a complicated course.   · Patient is slowly improving    Infection Control Recommendations   · Universal Precautions  · Airborne isolation  · Droplet Isolation    Antimicrobial Stewardship Recommendations · Discontinuation of therapy  Coordination of Outpatient Care:   · Estimated Length of IV antimicrobials:TBD  · Patient will need Midline Catheter Insertion: TBD  · Patient will need PICC line Insertion: No  · Patient will need: Home IV , [place],  SNF,  LTAC:TBD  · Patient will need outpatient wound care:No    Chief complaint/reason for consultation:   · Concern for COVID infection      History of Present Illness:   Yue Mcdonald is a 66y.o.-year-old female who was initially admitted on 8/31/2021. Patient seen at the request of Lucy Morales. INITIAL HISTORY:    Patient has a history of degenerative disc disease in the cervical area. She underwent an ACDF (anterior cervical discectomy and fusion) on 8/17/2021 at [facility] was discharged home the next day with a drain. A few days later she developed malaise, fevers, and some shortness of breath. She was evaluated at that point in time at [facility], ER were her temperature was noted to be 103 F and she had a positive Covid test on 8/23/2021. She was transferred back to [facility] where she was treated conservatively in regards to her cervical neck incision. The neck incision was described as being clean and dry. The drain was removed on 8/22/2021. She also received treatment for the Covid with remdesivir and Decadron. Her symptoms improved and she was discharged home on 8/25/2021 with a recommendation to continue Decadron for 3 more days. While at [facility] she also showed desaturation when off oxygen, therefore she was discharged on home oxygen with a pulse oximeter and request to follow with pulmonary medicine. There are no data on CRP or other inflammatory markers. Her chest x-ray on 8/21/2021 showed minimal bibasilar atelectasis. CT scan was not done. The patient then presented to [facility] on 8/31/2021 complaining of generalized fatigue, decreased appetite, weakness, cough, mild shortness of breath. She denied any signs of inflammation around her previous cervical neck incision. She was noted to have hypoxia with an O2 saturation of 75 on room air. CT scan of the chest was performed which shows multifocal pneumonia with findings compatible with Covid pneumonia. Patient admitted because of concerns with COVID 19.    CURRENT EVALUATION : 9/8/2021    Patient evaluated in the ICU. Afebrile  VS stable    Patient has continued to require hi-flow NC but her oxygen demands have slowly decreased. Patient exhibiting respiratory distress. Yes  Respiratory secretions: No    Patient receiving supplemental oxygen. High flow 10-->40-->50-->45 -->35 L/min.     02 sat 98-->96-->94-->90-->96  RR 28-->26-->13-->20-->18      % FIO2: 61-->75-->78-->84-->82-->64  PEEP:      QTc:       NEWS Score: 0-4 Low risk group; 5-6: Medium risk group; 7 or above: High risk group  Parameters 3 2 1 0 1 2 3   Age    < 65   ? 65   RR ? 8  9-11 12-20  21-24 ? 25   O2 Sats ?  91 92-93 94-95 ? 96      Suppl O2  Yes  No      SBP ? 90  101-110 111-219   ? 220   HR ? 40  41-50 51-90  111-130 ? 131   Consciousness    Alert   Drowsiness, lethargy, or confusion   Temperature ? 35.0 C (95.0 F)  35.1-36.0 C 95.1-96.9 F 36.1-38.0 C 97.0-100.4 F 38.1-39.0 C 100.5-102.3 F ? 39.1 C ? 102.4 F      NEWS Score:   9/1/2021: 9 high risk    Overall Daily Picture:   · Improved    Presence of secondary bacterial Infection:    No   Additional antibiotics: No    Labs, X rays reviewed: 9/8/2021    BUN: 24-->25  Cr: 0.74-->0.70    WBC: 5.7-->5.9  Hb: 11.6-->11.5  Plat: 222-->226    Absolute Neutrophils: 5.16  Absolute Lymphocytes: 0.48  Neutrophil/Lymphocyte Ratio: 10.7 high risk    CRP: 181.5  Ferritin: 789-->905  LDH: 250    Pro Calcitonin:      Cultures:  Urine:  ·   Blood:  · 3131x2 no growth  Sputum :  ·   Wound:       CXR:     CAT:  · 8-31-21: Multifocal pneumonia compatible with Covid 19    MRSA nasal screen 8/22/2021 -    Discussed with patient, RN, CC, IM. I have personally reviewed the past medical history, past surgical history, medications, social history, and family history, and I have updated the database accordingly.   Past Medical History:     Past Medical History:   Diagnosis Date    Arthritis     Blockage of coronary artery of heart ([address] 2010    x 2 stents    CAD (coronary artery disease)     DDD (degenerative disc disease), cervical     cervical 3-6    Fibromyalgia     History of blood transfusion     History of kidney stones     last one \"about 10 years ago\"    Hyperlipidemia     Hypertension     Pancreatitis 2016    Sleep apnea     Thyroid disease     Wears glasses     for reading       Past Surgical  History:     Past Surgical History:   Procedure Laterality Date    ARTHROPLASTY Left 2/6/2020    LEFT FIRST CARPOMETACARPAL ARTHROPLASTY performed by Claudette Bump, DO at [address]  2012    L4,L5,S1 hardware placed    BACK SURGERY  2018    SPINAL CORD STIMULATOR    CATARACT REMOVAL Bilateral 2016    CERVICAL FUSION N/A 8/17/2021    C3-6 ACDF, REMOVAL OF HARDWARE C6-7 performed by Brittnee Garnica MD at [address]    COLONOSCOPY      normal   [address]  2010    x 2 stents    FINGER SURGERY      right thumb joint    HERNIA REPAIR Right     inguinal    HYSTERECTOMY      JOINT REPLACEMENT Right     knee    KIDNEY STONE SURGERY      x 3    LITHOTRIPSY      x 3    PARATHYROIDECTOMY      PARATHYROIDECTOMY      UT PERCUT IMPLNT NEUROELECT,EPIDURAL N/A 8/8/2018    SPINAL CORD STIMULATOR IMPLANT performed by Orin Lowery MD at [address] [facility] Right 03/2019       Medications:      amLODIPine  5 mg Oral Nightly    enoxaparin  40 mg SubCUTAneous BID    aspirin  81 mg Oral Daily    calcium-vitamin D  2 tablet Oral Daily    DULoxetine  60 mg Oral Daily    gabapentin  600 mg Oral Nightly    gabapentin  300 mg Oral QAM    therapeutic multivitamin-minerals  1 tablet Oral Daily    pantoprazole  40 mg Oral Daily    pravastatin  80 mg Oral Nightly    rOPINIRole  1 mg Oral QAM    rOPINIRole  2 mg Oral Nightly    Vitamin D  2,000 Units Oral Daily    zinc sulfate  50 mg Oral Daily    carvedilol  6.25 mg Oral BID WC    sodium chloride flush  10 mL IntraVENous 2 times per day    dexamethasone  6 mg Oral Daily    gabapentin  600 mg Oral Once       Social History:     Social History     Socioeconomic History    Marital status:      Spouse name: Not on file    Number of children: Not on file    Years of education: Not on file    Highest education level: Not on file   Occupational History    Not on file   Tobacco Use    Smoking status: Former Smoker     Packs/day: 1.00     Years: 44.00     Pack years: 44.00     Types: Cigarettes     Quit date: 2010     Years since quittin.2    Smokeless tobacco: Never Used   Vaping Use    Vaping Use: Never used   Substance and Sexual Activity    Alcohol use: No    Drug use: No    Sexual activity: Not on file   Other Topics Concern    Not on file   Social History Narrative    Not on file     Social Determinants of Health     Financial Resource Strain:     Difficulty of Paying Living Expenses:    Food Insecurity:     Worried About [address] in the Last Year:     [address] in the Last Year:    Transportation Needs:     Lack of Transportation (Medical):      Lack of Transportation (Non-Medical):    Physical Activity:     Days of Exercise per Week:     Minutes of Exercise per Session:    Stress:     Feeling of Stress :    Social Connections:     Frequency of Communication with Friends and Family:     Frequency of Social Gatherings with Friends and Family:     Attends Temple Services:     Active Member of Clubs or Organizations:     Attends Club or Organization Meetings:     Marital Status:    Intimate Partner Violence:     Fear of Current or Ex-Partner:     Emotionally Abused:     Physically Abused:     Sexually Abused:        Family History:     Family History   Problem Relation Age of Onset    Heart Disease Father         Allergies:   Lisinopril and Toradol [ketorolac tromethamine]     Review of Systems:       Constitutional: No fevers or chills. No systemic complaints  Head: No headaches  Eyes: No double vision or blurry vision. No conjunctival inflammation. ENT: No sore throat or runny nose. . No hearing loss, tinnitus or vertigo. Cardiovascular: No chest pain or palpitations. Shortness of breath. LOW  Lung: Shortness of breath, cough. No sputum production  Abdomen: No nausea, vomiting, diarrhea, or abdominal pain. Marnell Chaitanya No cramps. Genitourinary: No increased urinary frequency, or dysuria. No hematuria. No suprapubic or CVA pain  Musculoskeletal: No muscle aches or pains. No joint effusions, swelling or deformities  Hematologic: No bleeding or bruising. Neurologic: No headache, weakness, numbness, or tingling. Integument: No rash, no ulcers. Psychiatric: No depression. Endocrine: No polyuria, no polydipsia, no polyphagia. Physical Examination :     Patient Vitals for the past 8 hrs:   BP Temp Temp src Pulse Resp SpO2   09/08/21 1321 -- -- -- 77 -- --   09/08/21 1320 115/81 96.3 °F (35.7 °C) Temporal 77 18 92 %   09/08/21 1200 -- -- -- 81 20 91 %   09/08/21 1100 108/64 98.2 °F (36.8 °C) Temporal 68 20 90 %   09/08/21 0917 (!) 106/58 -- -- 71 16 96 %     General Appearance: Awake, alert, and in mild apparent distress  Head:  Normocephalic, no trauma  Eyes: Pupils equal, round, reactive to light; sclera anicteric; conjunctivae pink. No embolic phenomena. ENT: Oropharynx clear, without erythema, exudate, or thrush. No tenderness of sinuses. Mouth/throat: mucosa pink and moist. No lesions. Dentition in good repair. Neck:Supple, without lymphadenopathy. Thyroid normal, No bruits. Pulmonary/Chest: Coarse breath sounds with bilateral rhonchi. 08/30/2021 and 08/21/2021       HISTORY:   ORDERING SYSTEM PROVIDED HISTORY: elevated d dimer and hypoxia   TECHNOLOGIST PROVIDED HISTORY:   elevated d dimer and hypoxia   Decision Support Exception - unselect if not a suspected or confirmed   emergency medical condition->Emergency Medical Condition (MA)       FINDINGS:   Pulmonary Arteries: No evidence of central, lobar or proximal segmental   pulmonary artery emboli.       Mediastinum: Cardiomegaly.  Coronary disease identified.  Trace pericardial   fluid.  No suspicious mediastinal or hilar adenopathy identified per CT   criteria.  Aortic vascular calcifications.       Lungs/pleura: Multifocal interstitial alveolar opacities throughout both   lungs with a subpleural distribution suggestive of multifocal atypical   infection/viral pneumonia.  Bibasilar consolidative changes suggestive of   areas of atelectasis.       Upper Abdomen: Multiple circumscribed lesions involving the right left   hepatic lobe suggestive of cyst..       Soft Tissues/Bones: Multilevel degenerate changes thoracic spine.  Spinal   stimulator leads identified           Impression   No evidence of central to segmental pulmonary emboli.       Multifocal airspace opacities suggestive multifocal atypical infection/viral   pneumonia.       Cardiomegaly with coronary artery disease       Medical Decision [id number]:       Medical Decision Making-Other:     Note:  · Labs, medications, radiologic studies were reviewed with personal review of films  · Large amounts of data were reviewed  · Discussed with nursing Staff, Discharge planner  · Infection Control and Prevention measures reviewed  · All prior entries were reviewed  · Administer medications as ordered  · Prognosis: Guarded  · Discharge planning reviewed  · Follow up as outpatient. Thank you for allowing us to participate in the care of this patient. Please call with questions.     Donald Young MD           Pager: [phone] - Office: [phone]

## 2021-09-08 NOTE — PLAN OF CARE
Problem: Airway Clearance - Ineffective  Goal: Achieve or maintain patent airway  9/8/2021 1018 by Madeleine Gunter RN  Outcome: Met This Shift  Note: Patient is able to maintain own airway. 9/8/2021 0412 by Timi Pizano RN  Outcome: Ongoing  Note: Patient continues to wear high flow oxygen. It has had to be increased in FiO2 for part of the night, but writer able to wean back down to 65% FIO2 and 35L. She continues to drop with ambulation. Patient has rales at times in lungs, but seems to have more air movement. Problem: Gas Exchange - Impaired  Goal: Absence of hypoxia  9/8/2021 1018 by Madeleine Gunter RN  Outcome: Ongoing  Note: Continues on heated high flow oxygen @ 35L @65% of FIO2.  9/8/2021 0412 by Timi Pizano RN  Outcome: Ongoing  Note: Drop in saturation with movement     Goal: Promote optimal lung function  9/8/2021 1018 by Madeleine Gunter RN  Outcome: Ongoing  Note: Patient demonstrates proper use of acapella. Encourage to continue to cough and deep breathe. 9/8/2021 0412 by Timi Pizano RN  Outcome: Ongoing  Note: Patient is using acapella, sleeping side to side since she cant prone. Problem: Breathing Pattern - Ineffective  Goal: Ability to achieve and maintain a regular respiratory rate  9/8/2021 1018 by Madeleine Gunter RN  Outcome: Ongoing  Note: Patient with dyspnea on exertion, otherwise respirations unlabored at rest.  9/8/2021 0412 by Timi Pizano RN  Outcome: Ongoing  Note: Patient has increased RR at times, see charted vitals       Problem: Isolation Precautions - Risk of Spread of Infection  Goal: Prevent transmission of infection  9/8/2021 1018 by Madeleine Gunter RN  Outcome: Ongoing  Note: Remains in droplet plus isolation.   9/8/2021 0412 by Timi Pizano RN  Outcome: Ongoing  Note: Staff adhering to droplet plus isolation measures  No visitors      Problem: Nutrition Deficits  Goal: Optimize nutritional status  9/8/2021 1018 by Madeleine Gunter RN  Outcome: Ongoing  Note: Appetite improved. Eating >75% of meals and consuming supplement. 9/8/2021 0412 by Jericho Crowley RN  Outcome: Ongoing  Note: Patient has had improved nutrition intake  She ate HS snack      Problem: Risk for Fluid Volume Deficit  Goal: Maintain normal serum potassium, sodium, calcium, phosphorus, and pH  9/8/2021 1018 by Leah Hatfield RN  Outcome: Ongoing  Note: Labs continue to be monitored daily. 9/8/2021 0412 by Jericho Crowley RN  Outcome: Ongoing  Note: See charted labs  Patient is getting us daily labs      Problem: Loneliness or Risk for Loneliness  Goal: Demonstrate positive use of time alone when socialization is not possible  9/8/2021 1018 by Leah Hatfield RN  Outcome: Ongoing  Note: Patient watching TV and coloring when in room alone. 9/8/2021 0412 by Jericho Crowley RN  Outcome: Ongoing  Note: Patient plays with I pad, watches TV, talks on phone      Problem: Fatigue  Goal: Verbalize increase energy and improved vitality  9/8/2021 1018 by Leah Hatfield RN  Outcome: Completed  9/8/2021 0412 by Jericho Crowley RN  Outcome: Ongoing  Note: Patient is moving more easier this HS than last   She is spending time in the chair now      Problem: Patient Education: Go to Patient Education Activity  Goal: Patient/Family Education  9/8/2021 1018 by Leah Hatfield RN  Outcome: Ongoing  Note: Patient kept up to date on plan of care. 9/8/2021 0412 by Jericho Crowley RN  Outcome: Ongoing     Problem: Falls - Risk of:  Goal: Will remain free from falls  Description: Will remain free from falls  9/8/2021 1018 by Leah Hatfield RN  Outcome: Ongoing  Note: At moderate risk for falls. Fall precautions in place. Call light within reach at all times.   9/8/2021 0412 by Jericho Crowley RN  Outcome: Ongoing  Note: Patient is free from falls   Goal: Absence of physical injury  Description: Absence of physical injury  9/8/2021 1018 by Leah Hatfield RN  Outcome: Met This Shift  Note: Patient remains without physical injury. Bed in low and locked position at all times. 9/8/2021 0412 by Donny Reyna RN  Outcome: Ongoing  Note: No physical injuries sustained here      Problem: Nutrition  Goal: Optimal nutrition therapy  9/8/2021 1018 by Kristian Nick RN  Outcome: Ongoing  9/8/2021 0412 by Donny Reyna RN  Outcome: Ongoing  Note: Patient nutrition status being monitored  Her weight is staying close day to day      Problem: Skin Integrity:  Goal: Will show no infection signs and symptoms  Description: Will show no infection signs and symptoms  9/8/2021 1018 by Kristian Nick RN  Outcome: Completed  9/8/2021 0412 by Donny Reyna RN  Outcome: Ongoing  Note: Patient is with normal WBC  She has been afebrile      Goal: Absence of new skin breakdown  Description: Absence of new skin breakdown  9/8/2021 1018 by Kristian Nick RN  Outcome: Ongoing  Note: Patient remains free from new skin breakdown. Patient able to turn and reposition self in bed and in chair.   9/8/2021 0412 by Donny Reyna RN  Outcome: Ongoing  Note: Patient has no changes to skin  See skin assessment

## 2021-09-08 NOTE — PLAN OF CARE
Problem: Airway Clearance - Ineffective  Goal: Achieve or maintain patent airway  Outcome: Ongoing  Note: Patient continues to wear high flow oxygen. It has had to be increased in FiO2 for part of the night, but writer able to wean back down to 65% FIO2 and 35L. She continues to drop with ambulation. Patient has rales at times in lungs, but seems to have more air movement. Problem: Gas Exchange - Impaired  Goal: Absence of hypoxia  Outcome: Ongoing  Note: Drop in saturation with movement     Goal: Promote optimal lung function  Outcome: Ongoing  Note: Patient is using acapella, sleeping side to side since she cant prone.       Problem: Breathing Pattern - Ineffective  Goal: Ability to achieve and maintain a regular respiratory rate  Outcome: Ongoing  Note: Patient has increased RR at times, see charted vitals       Problem: Isolation Precautions - Risk of Spread of Infection  Goal: Prevent transmission of infection  Outcome: Ongoing  Note: Staff adhering to droplet plus isolation measures  No visitors      Problem: Nutrition Deficits  Goal: Optimize nutritional status  Outcome: Ongoing  Note: Patient has had improved nutrition intake  She ate HS snack      Problem: Risk for Fluid Volume Deficit  Goal: Maintain normal serum potassium, sodium, calcium, phosphorus, and pH  Outcome: Ongoing  Note: See charted labs  Patient is getting us daily labs      Problem: Loneliness or Risk for Loneliness  Goal: Demonstrate positive use of time alone when socialization is not possible  Outcome: Ongoing  Note: Patient plays with I pad, watches TV, talks on phone      Problem: Fatigue  Goal: Verbalize increase energy and improved vitality  Outcome: Ongoing  Note: Patient is moving more easier this HS than last   She is spending time in the chair now      Problem: Patient Education: Go to Patient Education Activity  Goal: Patient/Family Education  Outcome: Ongoing     Problem: Falls - Risk of:  Goal: Will remain free from falls  Description: Will remain free from falls  Outcome: Ongoing  Note: Patient is free from falls   Goal: Absence of physical injury  Description: Absence of physical injury  Outcome: Ongoing  Note: No physical injuries sustained here      Problem: Nutrition  Goal: Optimal nutrition therapy  Outcome: Ongoing  Note: Patient nutrition status being monitored  Her weight is staying close day to day      Problem: Skin Integrity:  Goal: Will show no infection signs and symptoms  Description: Will show no infection signs and symptoms  Outcome: Ongoing  Note: Patient is with normal WBC  She has been afebrile      Goal: Absence of new skin breakdown  Description: Absence of new skin breakdown  Outcome: Ongoing  Note: Patient has no changes to skin  See skin assessment

## 2021-09-08 NOTE — PROGRESS NOTES
Patient oxygen was only reading 86-89%. Writer into room patient on back, asked to turn to side since she cant prone.    Oxygen still only about 89%, FIO2 increased to 75% at this time

## 2021-09-08 NOTE — PROGRESS NOTES
Writer entered room patient resting eyes closed and laying on right side. Oxygen saturations running a litle low 89-90%. FiO2 increased to 70% to assist to keep 90 and above.    No changes to assessment, see charting on all  Patient denies further needs

## 2021-09-08 NOTE — PROGRESS NOTES
[facility] Pt tolerated well. Writer introduced self to pt as new nurse. Pt placed on tele monitor. Vitals and reassessment obtained as charted. Pt denies any pain, remains on heated high flow sp02 wdl. Pt denies any further needs.

## 2021-09-09 LAB
ABSOLUTE EOS #: 0 K/UL (ref 0–0.44)
ABSOLUTE IMMATURE GRANULOCYTE: 0.19 K/UL (ref 0–0.3)
ABSOLUTE LYMPH #: 0.83 K/UL (ref 1.1–3.7)
ABSOLUTE MONO #: 0.19 K/UL (ref 0.1–1.2)
ALBUMIN SERPL-MCNC: 3 G/DL (ref 3.5–5.2)
ALBUMIN/GLOBULIN RATIO: 1.2 (ref 1–2.5)
ALP BLD-CCNC: 88 U/L (ref 35–104)
ALT SERPL-CCNC: 41 U/L (ref 5–33)
ANION GAP SERPL CALCULATED.3IONS-SCNC: 11 MMOL/L (ref 9–17)
AST SERPL-CCNC: 19 U/L
BASOPHILS # BLD: 0 % (ref 0–2)
BASOPHILS ABSOLUTE: 0 K/UL (ref 0–0.2)
BILIRUB SERPL-MCNC: 0.29 MG/DL (ref 0.3–1.2)
BUN BLDV-MCNC: 25 MG/DL (ref 8–23)
BUN/CREAT BLD: 38 (ref 9–20)
CALCIUM SERPL-MCNC: 8.8 MG/DL (ref 8.6–10.4)
CHLORIDE BLD-SCNC: 105 MMOL/L (ref 98–107)
CO2: 24 MMOL/L (ref 20–31)
CREAT SERPL-MCNC: 0.65 MG/DL (ref 0.5–0.9)
D-DIMER QUANTITATIVE: 0.82 MG/L FEU (ref 0–0.59)
DIFFERENTIAL TYPE: ABNORMAL
EOSINOPHILS RELATIVE PERCENT: 0 % (ref 1–4)
FERRITIN: 369 UG/L (ref 13–150)
FIBRINOGEN: 471 MG/DL (ref 179–518)
GFR AFRICAN AMERICAN: >60 ML/MIN
GFR NON-AFRICAN AMERICAN: >60 ML/MIN
GFR SERPL CREATININE-BSD FRML MDRD: ABNORMAL ML/MIN/{1.73_M2}
GFR SERPL CREATININE-BSD FRML MDRD: ABNORMAL ML/MIN/{1.73_M2}
GLUCOSE BLD-MCNC: 88 MG/DL (ref 70–99)
HCT VFR BLD CALC: 34 % (ref 36.3–47.1)
HEMOGLOBIN: 11.3 G/DL (ref 11.9–15.1)
IMMATURE GRANULOCYTES: 4 %
INR BLD: 1
LYMPHOCYTES # BLD: 17 % (ref 24–43)
MCH RBC QN AUTO: 30.4 PG (ref 25.2–33.5)
MCHC RBC AUTO-ENTMCNC: 33.2 G/DL (ref 28.4–34.8)
MCV RBC AUTO: 91.4 FL (ref 82.6–102.9)
MONOCYTES # BLD: 4 % (ref 3–12)
MORPHOLOGY: NORMAL
NRBC AUTOMATED: 0 PER 100 WBC
NUCLEATED RED BLOOD CELLS: 3 PER 100 WBC (ref 0–5)
PARTIAL THROMBOPLASTIN TIME: 31.5 SEC (ref 23.9–33.8)
PDW BLD-RTO: 12.8 % (ref 11.8–14.4)
PLATELET # BLD: 253 K/UL (ref 138–453)
PLATELET ESTIMATE: ABNORMAL
PMV BLD AUTO: 9.6 FL (ref 8.1–13.5)
POTASSIUM SERPL-SCNC: 3.9 MMOL/L (ref 3.7–5.3)
PROTHROMBIN TIME: 13.2 SEC (ref 11.5–14.2)
RBC # BLD: 3.72 M/UL (ref 3.95–5.11)
RBC # BLD: ABNORMAL 10*6/UL
SEG NEUTROPHILS: 75 % (ref 36–65)
SEGMENTED NEUTROPHILS ABSOLUTE COUNT: 3.64 K/UL (ref 1.5–8.1)
SODIUM BLD-SCNC: 140 MMOL/L (ref 135–144)
TOTAL PROTEIN: 5.6 G/DL (ref 6.4–8.3)
WBC # BLD: 4.9 K/UL (ref 3.5–11.3)
WBC # BLD: ABNORMAL 10*3/UL

## 2021-09-09 PROCEDURE — 82728 ASSAY OF FERRITIN: CPT

## 2021-09-09 PROCEDURE — 85730 THROMBOPLASTIN TIME PARTIAL: CPT

## 2021-09-09 PROCEDURE — 85384 FIBRINOGEN ACTIVITY: CPT

## 2021-09-09 PROCEDURE — 85379 FIBRIN DEGRADATION QUANT: CPT

## 2021-09-09 PROCEDURE — 2700000000 HC OXYGEN THERAPY PER DAY

## 2021-09-09 PROCEDURE — 80053 COMPREHEN METABOLIC PANEL: CPT

## 2021-09-09 PROCEDURE — 36415 COLL VENOUS BLD VENIPUNCTURE: CPT

## 2021-09-09 PROCEDURE — 1200000000 HC SEMI PRIVATE

## 2021-09-09 PROCEDURE — 2580000003 HC RX 258: Performed by: EMERGENCY MEDICINE

## 2021-09-09 PROCEDURE — 6370000000 HC RX 637 (ALT 250 FOR IP): Performed by: FAMILY MEDICINE

## 2021-09-09 PROCEDURE — 94761 N-INVAS EAR/PLS OXIMETRY MLT: CPT

## 2021-09-09 PROCEDURE — 99233 SBSQ HOSP IP/OBS HIGH 50: CPT | Performed by: INTERNAL MEDICINE

## 2021-09-09 PROCEDURE — APPSS30 APP SPLIT SHARED TIME 16-30 MINUTES: Performed by: NURSE PRACTITIONER

## 2021-09-09 PROCEDURE — 85025 COMPLETE CBC W/AUTO DIFF WBC: CPT

## 2021-09-09 PROCEDURE — 6370000000 HC RX 637 (ALT 250 FOR IP): Performed by: INTERNAL MEDICINE

## 2021-09-09 PROCEDURE — 85610 PROTHROMBIN TIME: CPT

## 2021-09-09 PROCEDURE — 6360000002 HC RX W HCPCS: Performed by: FAMILY MEDICINE

## 2021-09-09 RX ADMIN — CARVEDILOL 6.25 MG: 6.25 TABLET, FILM COATED ORAL at 17:08

## 2021-09-09 RX ADMIN — SODIUM CHLORIDE, PRESERVATIVE FREE 10 ML: 5 INJECTION INTRAVENOUS at 20:29

## 2021-09-09 RX ADMIN — SODIUM CHLORIDE, PRESERVATIVE FREE 10 ML: 5 INJECTION INTRAVENOUS at 07:35

## 2021-09-09 RX ADMIN — GABAPENTIN 300 MG: 300 CAPSULE ORAL at 07:35

## 2021-09-09 RX ADMIN — ASPIRIN 81 MG: 81 TABLET, COATED ORAL at 07:34

## 2021-09-09 RX ADMIN — CALCIUM CARBONATE-VITAMIN D TAB 500 MG-200 UNIT 2 TABLET: 500-200 TAB at 07:34

## 2021-09-09 RX ADMIN — AMLODIPINE BESYLATE 5 MG: 5 TABLET ORAL at 20:30

## 2021-09-09 RX ADMIN — MULTIPLE VITAMINS W/ MINERALS TAB 1 TABLET: TAB at 07:35

## 2021-09-09 RX ADMIN — ENOXAPARIN SODIUM 40 MG: 40 INJECTION SUBCUTANEOUS at 20:29

## 2021-09-09 RX ADMIN — PANTOPRAZOLE SODIUM 40 MG: 40 TABLET, DELAYED RELEASE ORAL at 07:35

## 2021-09-09 RX ADMIN — CARVEDILOL 6.25 MG: 6.25 TABLET, FILM COATED ORAL at 07:35

## 2021-09-09 RX ADMIN — DULOXETINE HYDROCHLORIDE 60 MG: 60 CAPSULE, DELAYED RELEASE ORAL at 07:34

## 2021-09-09 RX ADMIN — PRAVASTATIN SODIUM 80 MG: 20 TABLET ORAL at 20:29

## 2021-09-09 RX ADMIN — ZINC SULFATE 220 MG (50 MG) CAPSULE 50 MG: CAPSULE at 07:34

## 2021-09-09 RX ADMIN — Medication 2000 UNITS: at 07:35

## 2021-09-09 RX ADMIN — DEXAMETHASONE 6 MG: 4 TABLET ORAL at 07:34

## 2021-09-09 RX ADMIN — ROPINIROLE HYDROCHLORIDE 1 MG: 1 TABLET, FILM COATED ORAL at 07:34

## 2021-09-09 RX ADMIN — GABAPENTIN 600 MG: 300 CAPSULE ORAL at 20:29

## 2021-09-09 RX ADMIN — ENOXAPARIN SODIUM 40 MG: 40 INJECTION SUBCUTANEOUS at 07:35

## 2021-09-09 RX ADMIN — ROPINIROLE HYDROCHLORIDE 2 MG: 1 TABLET, FILM COATED ORAL at 20:30

## 2021-09-09 ASSESSMENT — PAIN SCALES - GENERAL
PAINLEVEL_OUTOF10: 0

## 2021-09-09 NOTE — PLAN OF CARE
Problem: Airway Clearance - Ineffective  Goal: Achieve or maintain patent airway  Note: Patient is awake and alert, able to cough and clear secretions as needed. Will continue to monitor. Problem: Gas Exchange - Impaired  Goal: Absence of hypoxia  Note: SpO2 being monitored continuously. Problem: Breathing Pattern - Ineffective  Goal: Ability to achieve and maintain a regular respiratory rate  Note: See respiratory assessment. Problem: Risk for Fluid Volume Deficit  Goal: Maintain normal serum potassium, sodium, calcium, phosphorus, and pH  Note: Will monitor daily labs. Problem: Falls - Risk of:  Goal: Will remain free from falls  Description: Will remain free from falls  Note: Fall assessment completed daily. Bed in lowest position. Call light within reach. Falling star posted to outside of door. Fall risk sticker in place on ID band. Side rails up 2/4. Patient ambulates independently to the bedside commode. Will continue to monitor. Problem: Skin Integrity:  Goal: Absence of new skin breakdown  Description: Absence of new skin breakdown  Note: No new skin breakdown noted.

## 2021-09-09 NOTE — PROGRESS NOTES
Pt laying in bed watching TV when writer entered the room. Pt is A&O x4. Vitals and assessment as charted. Pt denies pain. Pt educated on the use of acapella and proning. Pt stated she cannot prone but she will lay side to side. Call light within reach.

## 2021-09-09 NOTE — PROGRESS NOTES
Progress Note  Marcia Correia MD    OBJECTIVE:    Patient seen for f/u of Pneumonia due to COVID-19 virus. She is requiring high flow oxygen . States her shortness of breath better ,now on 35 lit with 55 % fio2 oxygen ,     ROS:   Constitutional: negative  for fevers, and negative for chills. Respiratory: positive for shortness of breath, positive for cough, and negative for wheezing  Cardiovascular: negative for chest pain, and negative for palpitations  Gastrointestinal: negative for abdominal pain, negative for nausea,negative for vomiting, negative for diarrhea, and negative for constipation     All other systems were reviewed with the patient and are negative unless otherwise stated in HPI    OBJECTIVE:  Vitals:   Temp: 97.8 °F (36.6 °C)  BP: (!) 141/78  Resp: 18  Pulse: 71  SpO2: 95 %    24HR INTAKE/OUTPUT:      Intake/Output Summary (Last 24 hours) at 9/9/2021 1645  Last data filed at 9/9/2021 1452  Gross per 24 hour   Intake 950 ml   Output 1000 ml   Net -50 ml     -----------------------------------------------------------------  Exam:  GEN:    Awake, alert and oriented x3. EYES:  EOMI, pupils equal   NECK: Supple. No lymphadenopathy. No carotid bruit  CVS:    regular rate and rhythm, no audible murmur  PULM:  diminished with bilat rhonchi, no acute respiratory distress  ABD:    Bowels sounds normal.  Abdomen is soft. No distention. no tenderness to palpation. EXT:   no edema bilaterally . No calf tenderness. NEURO: Moves all extremities. Motor and sensory are grossly intact  SKIN:  No rashes.   No skin lesions.    -----------------------------------------------------------------  Diagnostic Data:    · All available data reviewed  Lab Results   Component Value Date    WBC 4.9 09/09/2021    HGB 11.3 (L) 09/09/2021    MCV 91.4 09/09/2021     09/09/2021      Lab Results   Component Value Date    GLUCOSE 88 09/09/2021    BUN 25 (H) 09/09/2021    CREATININE 0.65 09/09/2021     09/09/2021    K 3.9 09/09/2021    CALCIUM 8.8 09/09/2021     09/09/2021    CO2 24 09/09/2021       PROBLEM LIST:  Principal Problem:    Pneumonia due to COVID-19 virus  Active Problems:    CAD (coronary artery disease)    Hypertension    Sepsis ([facility] [address]    Acute respiratory failure with hypoxia (HCC)    Dyslipidemia  Resolved Problems:    * No resolved hospital problems. *      ASSESSMENT / PLAN:  Pneumonia due to COVID-19 virus  · Continue current therapy of steroids,aerosols and oxygen  · Acute respiratory failure with hypoxia-  Improving,continue oxygen, Bi pap at night as needed  · Nutrition status:  Well developed, well nourished with no malnutrition  · DVT prophylaxis: Lovenox   · High risk medications: none   · [facility]  · Disposition:  Discharge plan is home    Marisa Leonard MD , M.D.  9/9/2021  4:45 PM

## 2021-09-09 NOTE — PROGRESS NOTES
[facility] -Progress Telemedicine Note  COVID 19 Patient  Today's Date and Time: 9/9/2021, 6:57 AM    Impression :   · COVID 19 Suspect  · COVID 19 Confirmed Infection- Pneumonia  · Covid tests:  · 8/21/2021 : Positive  · 8-23-21: Positive  · 8-31-21: Positive  · Hypoxia   · Hypotension- resolved  · Hx Fibromyalgia  · Essential HTN    Patient evaluated by Telemedicine. Requesting Institution: [facility]  Provider Institution: [address] [facility] [place], [address] [facility] Person at [facility]: Ms Keen Noah, APRN- IM    Recommendations:   · Antibiotic Rx:  · Monitor off antibiotics  · Covid Rx  · [facility]  · Decadron. Restart on 9/1/2021  · Prior cervical ACDF for degenerative disk disease at [facility] on 8-17-21. Medical Decision Making/Summary/Discussion:9/9/2021     · Patient admitted with suspected COVID 19 infection  · Covid test confirmed positive. · Patient with initial diagnosis of COVID-19 on 8/21/2021. Received partial treatment with remdesivir and Decadron at [facility] [place].  Was sent home on oral Decadron. Initial chest x-ray shows some bibasilar atelectasis. · Patient has deteriorated since then and current chest x-ray on admission to [facility] shows bilateral interstitial infiltrates. CT scan shows multifocal pneumonia. · Patient has developed significant hypoxia. and elevated CRP  · At this point in time remdesivir will no longer be efficacious  · Patient will need to continue with Decadron  · Unfortunately she is already out of the window for Actemra. She also does not qualify for monoclonal antibody. · She will need to be monitored for any potential bacterial superinfection  · Patient is at high risk for a complicated course.   · Patient is slowly improving    Infection Control Recommendations   · Universal Precautions  · Airborne isolation  · Droplet Isolation    Antimicrobial Stewardship Recommendations · Discontinuation of therapy  Coordination of Outpatient Care:   · Estimated Length of IV antimicrobials:TBD  · Patient will need Midline Catheter Insertion: TBD  · Patient will need PICC line Insertion: No  · Patient will need: Home IV , [place],  SNF,  LTAC:TBD  · Patient will need outpatient wound care:No    Chief complaint/reason for consultation:   · Concern for COVID infection      History of Present Illness:   Tigist Alfonso is a 66y.o.-year-old female who was initially admitted on 8/31/2021. Patient seen at the request of Jarvis Avila. INITIAL HISTORY:    Patient has a history of degenerative disc disease in the cervical area. She underwent an ACDF (anterior cervical discectomy and fusion) on 8/17/2021 at [facility] was discharged home the next day with a drain. A few days later she developed malaise, fevers, and some shortness of breath. She was evaluated at that point in time at [facility], ER were her temperature was noted to be 103 F and she had a positive Covid test on 8/23/2021. She was transferred back to [facility] where she was treated conservatively in regards to her cervical neck incision. The neck incision was described as being clean and dry. The drain was removed on 8/22/2021. She also received treatment for the Covid with remdesivir and Decadron. Her symptoms improved and she was discharged home on 8/25/2021 with a recommendation to continue Decadron for 3 more days. While at [facility] she also showed desaturation when off oxygen, therefore she was discharged on home oxygen with a pulse oximeter and request to follow with pulmonary medicine. There are no data on CRP or other inflammatory markers. Her chest x-ray on 8/21/2021 showed minimal bibasilar atelectasis. CT scan was not done. The patient then presented to [facility] on 8/31/2021 complaining of generalized fatigue, decreased appetite, weakness, cough, mild shortness of breath. She denied any signs of inflammation around her previous cervical neck incision. She was noted to have hypoxia with an O2 saturation of 75 on room air. CT scan of the chest was performed which shows multifocal pneumonia with findings compatible with Covid pneumonia. Patient admitted because of concerns with COVID 19.    CURRENT EVALUATION : 9/9/2021    Patient evaluated in the ICU. Afebrile  VS stable    Patient has continued to require hi-flow NC but her oxygen demands have slowly decreased. Patient exhibiting respiratory distress. Yes  Respiratory secretions: No    Patient receiving supplemental oxygen. High flow 10-->40-->50-->45 -->35 L/min.     02 sat 99  RR 18      % FIO2: 61-->75-->78-->84-->82-->64-->55  PEEP:      QTc:       NEWS Score: 0-4 Low risk group; 5-6: Medium risk group; 7 or above: High risk group  Parameters 3 2 1 0 1 2 3   Age    < 65   ? 65   RR ? 8  9-11 12-20  21-24 ? 25   O2 Sats ? 91 92-93 94-95 ? 96      Suppl O2  Yes  No      SBP ? 90  101-110 111-219   ? 220   HR ? 40  41-50 51-90  111-130 ? 131   Consciousness    Alert   Drowsiness, lethargy, or confusion   Temperature ? 35.0 C (95.0 F)  35.1-36.0 C 95.1-96.9 F 36.1-38.0 C 97.0-100.4 F 38.1-39.0 C 100.5-102.3 F ? 39.1 C ? 102.4 F      NEWS Score:   9/1/2021: 9 high risk    Overall Daily Picture:   · Improved    Presence of secondary bacterial Infection:    No   Additional antibiotics: No    Labs, X rays reviewed: 9/9/2021    BUN: 24-->25  Cr: 0.74-->0.70    WBC: 5.7-->5.9  Hb: 11.6-->11.5  Plat: 222-->226    Absolute Neutrophils: 5.16  Absolute Lymphocytes: 0.48  Neutrophil/Lymphocyte Ratio: 10.7 high risk    CRP: 181.5  Ferritin: 789-->905  LDH: 250    Pro Calcitonin:      Cultures:  Urine:  ·   Blood:  · 3131x2 no growth  Sputum :  ·   Wound:       CXR:     CAT:  · 8-31-21: Multifocal pneumonia compatible with Covid 19    MRSA nasal screen 8/22/2021 -    Discussed with patient, RN, CC, IM.     I have personally reviewed the past medical history, past surgical history, medications, social history, and family history, and I have updated the database accordingly.   Past Medical History:     Past Medical History:   Diagnosis Date    Arthritis     Blockage of coronary artery of heart ([address] 2010    x 2 stents    CAD (coronary artery disease)     DDD (degenerative disc disease), cervical     cervical 3-6    Fibromyalgia     History of blood transfusion     History of kidney stones     last one \"about 10 years ago\"    Hyperlipidemia     Hypertension     Pancreatitis 2016    Sleep apnea     Thyroid disease     Wears glasses     for reading       Past Surgical  History:     Past Surgical History:   Procedure Laterality Date    ARTHROPLASTY Left 2/6/2020    LEFT FIRST CARPOMETACARPAL ARTHROPLASTY performed by Karol Navarro DO at [address]  2012    L4,L5,S1 hardware placed    BACK SURGERY  2018    SPINAL CORD STIMULATOR    CATARACT REMOVAL Bilateral 2016    CERVICAL FUSION N/A 8/17/2021    C3-6 ACDF, REMOVAL OF HARDWARE C6-7 performed by Caroline Rasheed MD at [address]    COLONOSCOPY      normal    CORONARY ANGIOPLASTY  2010    x 2 stents    FINGER SURGERY      right thumb joint    HERNIA REPAIR Right     inguinal    HYSTERECTOMY      JOINT REPLACEMENT Right     knee    KIDNEY STONE SURGERY      x 3    LITHOTRIPSY      x 3    PARATHYROIDECTOMY      PARATHYROIDECTOMY      [place] PERCUT IMPLNT NEUROELECT,EPIDURAL N/A 8/8/2018    SPINAL CORD STIMULATOR IMPLANT performed by Jason Rosario MD at [facility] Right 03/2019       Medications:      amLODIPine  5 mg Oral Nightly    enoxaparin  40 mg SubCUTAneous BID    aspirin  81 mg Oral Daily    calcium-vitamin D  2 tablet Oral Daily    DULoxetine  60 mg Oral Daily    gabapentin  600 mg Oral Nightly    gabapentin  300 mg Oral QAM    therapeutic multivitamin-minerals  1 Physically Abused:     Sexually Abused:        Family History:     Family History   Problem Relation Age of Onset    Heart Disease Father         Allergies:   Lisinopril and Toradol [ketorolac tromethamine]     Review of Systems:       Constitutional: No fevers or chills. No systemic complaints  Head: No headaches  Eyes: No double vision or blurry vision. No conjunctival inflammation. ENT: No sore throat or runny nose. . No hearing loss, tinnitus or vertigo. Cardiovascular: No chest pain or palpitations. Shortness of breath. LOW  Lung: Shortness of breath, cough. No sputum production  Abdomen: No nausea, vomiting, diarrhea, or abdominal pain. Renzo Rathke No cramps. Genitourinary: No increased urinary frequency, or dysuria. No hematuria. No suprapubic or CVA pain  Musculoskeletal: No muscle aches or pains. No joint effusions, swelling or deformities  Hematologic: No bleeding or bruising. Neurologic: No headache, weakness, numbness, or tingling. Integument: No rash, no ulcers. Psychiatric: No depression. Endocrine: No polyuria, no polydipsia, no polyphagia. Physical Examination :     Patient Vitals for the past 8 hrs:   BP Temp Temp src Pulse Resp SpO2 Weight   09/09/21 0519 -- -- -- -- -- 97 % --   09/09/21 0516 -- -- -- -- -- -- 198 lb 14.4 oz (90.2 kg)   09/09/21 0016 125/79 96.9 °F (36.1 °C) Temporal 73 18 92 % --   09/09/21 0000 -- -- -- 65 -- -- --     General Appearance: Awake, alert, and in mild apparent distress  Head:  Normocephalic, no trauma  Eyes: Pupils equal, round, reactive to light; sclera anicteric; conjunctivae pink. No embolic phenomena. ENT: Oropharynx clear, without erythema, exudate, or thrush. No tenderness of sinuses. Mouth/throat: mucosa pink and moist. No lesions. Dentition in good repair. Neck:Supple, without lymphadenopathy. Thyroid normal, No bruits. Pulmonary/Chest: Coarse breath sounds with bilateral rhonchi. On nasal oxygen.    Cardiovascular: Regular rate and rhythm without murmurs, rubs, or gallops. Abdomen: Soft, non tender. Bowel sounds normal. No organomegaly  All four Extremities: No cyanosis, clubbing, edema, or effusions. Neurologic: No gross sensory or motor deficits. Skin: Warm and dry with good turgor. No signs of peripheral arterial or venous insufficiency. No ulcerations. No open wounds. Medical Decision Making -Laboratory:   I have independently reviewed/ordered the following labs:    CBC with Differential:   Recent Labs     09/07/21  0525 09/08/21 0520   WBC 5.7 5.9   HGB 11.6* 11.5*   HCT 34.4* 34.2*    226   LYMPHOPCT 10* 10*   MONOPCT 2* 2*     BMP:   Recent Labs     09/07/21 0525 09/08/21  0520    142   K 4.0 4.0    106   CO2 25 23   BUN 24* 25*   CREATININE 0.74 0.70     Hepatic Function Panel:   Recent Labs     09/07/21 0525 09/08/21  0520   PROT 5.5* 5.5*   LABALBU 2.8* 2.8*   BILITOT 0.30 0.28*   ALKPHOS 90 93   ALT 44* 43*   AST 24 21     No results for input(s): RPR in the last 72 hours. No results for input(s): HIV in the last 72 hours. No results for input(s): BC in the last 72 hours. Lab Results   Component Value Date    MUCUS NOT REPORTED 10/04/2020    RBC 3.74 09/08/2021    TRICHOMONAS NOT REPORTED 10/04/2020    WBC 5.9 09/08/2021    YEAST NOT REPORTED 10/04/2020    TURBIDITY CLEAR 08/31/2021     Lab Results   Component Value Date    CREATININE 0.70 09/08/2021    GLUCOSE 88 09/08/2021       Medical Decision Making-Imaging:     EXAMINATION:   CTA OF THE CHEST 8/31/2021 8:26 pm       TECHNIQUE:   CTA of the chest was performed after the administration of intravenous   contrast.  Multiplanar reformatted images are provided for review.  MIP   images are provided for review.  Dose modulation, iterative reconstruction,   and/or weight based adjustment of the mA/kV was utilized to reduce the   radiation dose to as low as reasonably achievable.       COMPARISON:   08/30/2021 and 08/21/2021       HISTORY:   [address] HISTORY: elevated d dimer and hypoxia   TECHNOLOGIST PROVIDED HISTORY:   elevated d dimer and hypoxia   Decision Support Exception - unselect if not a suspected or confirmed   emergency medical condition->Emergency Medical Condition (MA)       FINDINGS:   Pulmonary Arteries: No evidence of central, lobar or proximal segmental   pulmonary artery emboli.       Mediastinum: Cardiomegaly.  Coronary disease identified.  Trace pericardial   fluid.  No suspicious mediastinal or hilar adenopathy identified per CT   criteria.  Aortic vascular calcifications.       Lungs/pleura: Multifocal interstitial alveolar opacities throughout both   lungs with a subpleural distribution suggestive of multifocal atypical   infection/viral pneumonia.  Bibasilar consolidative changes suggestive of   areas of atelectasis.       Upper Abdomen: Multiple circumscribed lesions involving the right left   hepatic lobe suggestive of cyst..       Soft Tissues/Bones: Multilevel degenerate changes thoracic spine.  Spinal   stimulator leads identified           Impression   No evidence of central to segmental pulmonary emboli.       Multifocal airspace opacities suggestive multifocal atypical infection/viral   pneumonia.       Cardiomegaly with coronary artery disease       Medical Decision [id number]:       Medical Decision Making-Other:     Note:  · Labs, medications, radiologic studies were reviewed with personal review of films  · Large amounts of data were reviewed  · Discussed with nursing Staff, Discharge planner  · Infection Control and Prevention measures reviewed  · All prior entries were reviewed  · Administer medications as ordered  · Prognosis: Guarded  · Discharge planning reviewed  · Follow up as outpatient. Thank you for allowing us to participate in the care of this patient. Please call with questions.     BEAU Ackerman - CNP     ATTESTATION:    I have discussed the case, including pertinent history and exam findings with the APRN. I have evaluated the  History, physical findings and pictures of the patient and the key elements of the encounter have been performed by me. I have reviewed the laboratory data, other diagnostic studies and discussed them with the APRN. I have updated the medical record where necessary. I agree with the assessment, plan and orders as documented by the APRN.     Missy English MD.          Pager: [phone] - Office: [phone]

## 2021-09-10 LAB
ABSOLUTE EOS #: 0 K/UL (ref 0–0.44)
ABSOLUTE IMMATURE GRANULOCYTE: 0.06 K/UL (ref 0–0.3)
ABSOLUTE LYMPH #: 0.83 K/UL (ref 1.1–3.7)
ABSOLUTE MONO #: 0.28 K/UL (ref 0.1–1.2)
ALBUMIN SERPL-MCNC: 3.2 G/DL (ref 3.5–5.2)
ALBUMIN/GLOBULIN RATIO: 1.1 (ref 1–2.5)
ALP BLD-CCNC: 94 U/L (ref 35–104)
ALT SERPL-CCNC: 40 U/L (ref 5–33)
ANION GAP SERPL CALCULATED.3IONS-SCNC: 12 MMOL/L (ref 9–17)
AST SERPL-CCNC: 18 U/L
BASOPHILS # BLD: 0 % (ref 0–2)
BASOPHILS ABSOLUTE: 0 K/UL (ref 0–0.2)
BILIRUB SERPL-MCNC: 0.31 MG/DL (ref 0.3–1.2)
BUN BLDV-MCNC: 26 MG/DL (ref 8–23)
BUN/CREAT BLD: 34 (ref 9–20)
CALCIUM SERPL-MCNC: 9.2 MG/DL (ref 8.6–10.4)
CHLORIDE BLD-SCNC: 103 MMOL/L (ref 98–107)
CO2: 25 MMOL/L (ref 20–31)
CREAT SERPL-MCNC: 0.77 MG/DL (ref 0.5–0.9)
D-DIMER QUANTITATIVE: 0.64 MG/L FEU (ref 0–0.59)
DIFFERENTIAL TYPE: ABNORMAL
EOSINOPHILS RELATIVE PERCENT: 0 % (ref 1–4)
FIBRINOGEN: 488 MG/DL (ref 179–518)
GFR AFRICAN AMERICAN: >60 ML/MIN
GFR NON-AFRICAN AMERICAN: >60 ML/MIN
GFR SERPL CREATININE-BSD FRML MDRD: ABNORMAL ML/MIN/{1.73_M2}
GFR SERPL CREATININE-BSD FRML MDRD: ABNORMAL ML/MIN/{1.73_M2}
GLUCOSE BLD-MCNC: 98 MG/DL (ref 70–99)
HCT VFR BLD CALC: 37.8 % (ref 36.3–47.1)
HEMOGLOBIN: 12.4 G/DL (ref 11.9–15.1)
HIGH SENSITIVE C-REACTIVE PROTEIN: 8.8 MG/L
IMMATURE GRANULOCYTES: 1 %
INR BLD: 1
LYMPHOCYTES # BLD: 15 % (ref 24–43)
MCH RBC QN AUTO: 30.7 PG (ref 25.2–33.5)
MCHC RBC AUTO-ENTMCNC: 32.8 G/DL (ref 28.4–34.8)
MCV RBC AUTO: 93.6 FL (ref 82.6–102.9)
MONOCYTES # BLD: 5 % (ref 3–12)
MORPHOLOGY: NORMAL
NRBC AUTOMATED: 0 PER 100 WBC
PARTIAL THROMBOPLASTIN TIME: 32.1 SEC (ref 23.9–33.8)
PDW BLD-RTO: 13 % (ref 11.8–14.4)
PLATELET # BLD: 270 K/UL (ref 138–453)
PLATELET ESTIMATE: ABNORMAL
PMV BLD AUTO: 9.2 FL (ref 8.1–13.5)
POTASSIUM SERPL-SCNC: 4 MMOL/L (ref 3.7–5.3)
PROTHROMBIN TIME: 13.4 SEC (ref 11.5–14.2)
RBC # BLD: 4.04 M/UL (ref 3.95–5.11)
RBC # BLD: ABNORMAL 10*6/UL
SEG NEUTROPHILS: 79 % (ref 36–65)
SEGMENTED NEUTROPHILS ABSOLUTE COUNT: 4.33 K/UL (ref 1.5–8.1)
SODIUM BLD-SCNC: 140 MMOL/L (ref 135–144)
TOTAL PROTEIN: 6 G/DL (ref 6.4–8.3)
WBC # BLD: 5.5 K/UL (ref 3.5–11.3)
WBC # BLD: ABNORMAL 10*3/UL

## 2021-09-10 PROCEDURE — 6360000002 HC RX W HCPCS: Performed by: FAMILY MEDICINE

## 2021-09-10 PROCEDURE — 94761 N-INVAS EAR/PLS OXIMETRY MLT: CPT

## 2021-09-10 PROCEDURE — 99232 SBSQ HOSP IP/OBS MODERATE 35: CPT | Performed by: INTERNAL MEDICINE

## 2021-09-10 PROCEDURE — 1200000000 HC SEMI PRIVATE

## 2021-09-10 PROCEDURE — APPSS30 APP SPLIT SHARED TIME 16-30 MINUTES: Performed by: NURSE PRACTITIONER

## 2021-09-10 PROCEDURE — 2580000003 HC RX 258: Performed by: EMERGENCY MEDICINE

## 2021-09-10 PROCEDURE — 86141 C-REACTIVE PROTEIN HS: CPT

## 2021-09-10 PROCEDURE — 82728 ASSAY OF FERRITIN: CPT

## 2021-09-10 PROCEDURE — 85384 FIBRINOGEN ACTIVITY: CPT

## 2021-09-10 PROCEDURE — 6370000000 HC RX 637 (ALT 250 FOR IP): Performed by: INTERNAL MEDICINE

## 2021-09-10 PROCEDURE — 80053 COMPREHEN METABOLIC PANEL: CPT

## 2021-09-10 PROCEDURE — 85025 COMPLETE CBC W/AUTO DIFF WBC: CPT

## 2021-09-10 PROCEDURE — 2700000000 HC OXYGEN THERAPY PER DAY

## 2021-09-10 PROCEDURE — 85379 FIBRIN DEGRADATION QUANT: CPT

## 2021-09-10 PROCEDURE — 85730 THROMBOPLASTIN TIME PARTIAL: CPT

## 2021-09-10 PROCEDURE — 85610 PROTHROMBIN TIME: CPT

## 2021-09-10 PROCEDURE — 6370000000 HC RX 637 (ALT 250 FOR IP): Performed by: FAMILY MEDICINE

## 2021-09-10 PROCEDURE — 36415 COLL VENOUS BLD VENIPUNCTURE: CPT

## 2021-09-10 RX ORDER — DEXAMETHASONE 4 MG/1
6 TABLET ORAL DAILY
Status: DISCONTINUED | OUTPATIENT
Start: 2021-09-11 | End: 2021-09-14 | Stop reason: HOSPADM

## 2021-09-10 RX ADMIN — SODIUM CHLORIDE, PRESERVATIVE FREE 10 ML: 5 INJECTION INTRAVENOUS at 20:56

## 2021-09-10 RX ADMIN — MULTIPLE VITAMINS W/ MINERALS TAB 1 TABLET: TAB at 08:09

## 2021-09-10 RX ADMIN — ZINC SULFATE 220 MG (50 MG) CAPSULE 50 MG: CAPSULE at 08:09

## 2021-09-10 RX ADMIN — ENOXAPARIN SODIUM 40 MG: 40 INJECTION SUBCUTANEOUS at 20:56

## 2021-09-10 RX ADMIN — ENOXAPARIN SODIUM 40 MG: 40 INJECTION SUBCUTANEOUS at 08:09

## 2021-09-10 RX ADMIN — CALCIUM CARBONATE-VITAMIN D TAB 500 MG-200 UNIT 2 TABLET: 500-200 TAB at 08:09

## 2021-09-10 RX ADMIN — Medication 2000 UNITS: at 08:09

## 2021-09-10 RX ADMIN — ROPINIROLE HYDROCHLORIDE 2 MG: 1 TABLET, FILM COATED ORAL at 20:56

## 2021-09-10 RX ADMIN — CARVEDILOL 6.25 MG: 6.25 TABLET, FILM COATED ORAL at 17:07

## 2021-09-10 RX ADMIN — CARVEDILOL 6.25 MG: 6.25 TABLET, FILM COATED ORAL at 08:09

## 2021-09-10 RX ADMIN — GABAPENTIN 600 MG: 300 CAPSULE ORAL at 20:56

## 2021-09-10 RX ADMIN — AMLODIPINE BESYLATE 5 MG: 5 TABLET ORAL at 20:56

## 2021-09-10 RX ADMIN — SODIUM CHLORIDE, PRESERVATIVE FREE 10 ML: 5 INJECTION INTRAVENOUS at 08:10

## 2021-09-10 RX ADMIN — DULOXETINE HYDROCHLORIDE 60 MG: 60 CAPSULE, DELAYED RELEASE ORAL at 08:09

## 2021-09-10 RX ADMIN — PRAVASTATIN SODIUM 80 MG: 20 TABLET ORAL at 20:56

## 2021-09-10 RX ADMIN — ASPIRIN 81 MG: 81 TABLET, COATED ORAL at 08:09

## 2021-09-10 RX ADMIN — DEXAMETHASONE 6 MG: 4 TABLET ORAL at 08:09

## 2021-09-10 RX ADMIN — ROPINIROLE HYDROCHLORIDE 1 MG: 1 TABLET, FILM COATED ORAL at 08:09

## 2021-09-10 RX ADMIN — PANTOPRAZOLE SODIUM 40 MG: 40 TABLET, DELAYED RELEASE ORAL at 08:09

## 2021-09-10 RX ADMIN — GABAPENTIN 300 MG: 300 CAPSULE ORAL at 08:09

## 2021-09-10 ASSESSMENT — PAIN SCALES - GENERAL
PAINLEVEL_OUTOF10: 0

## 2021-09-10 NOTE — PROGRESS NOTES
Patient: Hailey Nguyen  : 1950  Date of Admission: 2021  Primary Care Physician: Efren Laguna  Today's Date: 9/10/2021    REASON FOR CONSULTATION: Fatigue (ongoing a couple days, states \"im going into sepsis again\", recent neck surgery and covid diagnosis ) and Shortness of Breath      HPI: Ms. Brendon Guzmán is a 66 y.o. female with history of myocardial infarction in stents to RCA and LAD. History of ischemic cardiomyopathy, hypertension and dyslipidemia. Patient admitted with shortness of breath, fatigue and generalized body aches. She has a recent diagnosis of Covid 19 and currently treated for COVID-19 pneumonia. Cardiology consulted because of the patient extensive cardiac history. I get her records from [facility] and she has the following cardiac intervention/testing  Left heart cath with MultiLink BMS on 2010. RCA stenting on 2010. Carotid Doppler X on 2015. No significant disease bilaterally. Stress test on 2017 no ischemia or scar. LV EF is 73% on gated SPECT. Lexiscan stress test on 2021 no ischemia or scar, ejection fraction 74%. Patient was diagnosed with COVID-19 pneumonia on 2021 and treated at [facility] with steroids, Remdesivir and oxygen and was discharged with home oxygen therapy. She started feeling worse again about 2 days later. She was hypoxemic and hypotensive in the emergency room yesterday. Blood pressure improved after fluid bolus. She did very well over the past few days. She is currently maintaining her oxygen saturation  with oxygen supplement via nasal cannula. She is practicing more prone positioning and she said it helps her breathing. Her muscle and joint pains are better but she said she has fibromyalgia so she always has pain. She continues to complain of a dry cough but it is more tolerable now than a few days ago. I reviewed her vital signs over the past 24 hours.   She is hemodynamically stable. Heart rate is in the 60s and 70s beats per minute. Oxygen saturation is above 90%. Labs reviewed, still has elevated high-sensitivity CRP and D-dimer. Kidney function is good. CBC is unremarkable. Past Medical History:   Diagnosis Date    Arthritis     Blockage of coronary artery of heart ([address] 2010    x 2 stents    CAD (coronary artery disease)     DDD (degenerative disc disease), cervical     cervical 3-6    Fibromyalgia     History of blood transfusion     History of kidney stones     last one \"about 10 years ago\"    Hyperlipidemia     Hypertension     Pancreatitis 2016    Sleep apnea     Thyroid disease     Wears glasses     for reading       CURRENT ALLERGIES: Lisinopril and Toradol [ketorolac tromethamine] REVIEW OF SYSTEMS: 14 systems were reviewed. Pertinent positives and negatives as above, all else negative.      Past Surgical History:   Procedure Laterality Date    ARTHROPLASTY Left 2/6/2020    LEFT FIRST CARPOMETACARPAL ARTHROPLASTY performed by Shannon Lees DO at [facility].  2012    L4,L5,S1 hardware placed    BACK SURGERY  2018    SPINAL CORD STIMULATOR    CATARACT REMOVAL Bilateral 2016    CERVICAL FUSION N/A 8/17/2021    C3-6 ACDF, REMOVAL OF HARDWARE C6-7 performed by Renato Tay MD at [address]    COLONOSCOPY      normal    CORONARY ANGIOPLASTY  2010    x 2 stents    FINGER SURGERY      right thumb joint    HERNIA REPAIR Right     inguinal    HYSTERECTOMY      JOINT REPLACEMENT Right     knee    KIDNEY STONE SURGERY      x 3    LITHOTRIPSY      x 3    PARATHYROIDECTOMY      PARATHYROIDECTOMY      [place] PERCUT IMPLNT NEUROELECT,EPIDURAL N/A 8/8/2018    SPINAL CORD STIMULATOR IMPLANT performed by Jillian Siegel MD at [address] [facility] Right 03/2019    Social History:  Social History     Tobacco Use    Smoking status: Former Smoker     Packs/day: 1.00     Years: TRIG 198 (H) 09/04/2021    HDL 34 (L) 09/04/2021    LDLDIRECT 66 05/10/2016    ALT 40 (H) 09/10/2021    AST 18 09/10/2021     09/10/2021    K 4.0 09/10/2021     09/10/2021    CREATININE 0.77 09/10/2021    BUN 26 (H) 09/10/2021    CO2 25 09/10/2021    INR 1.0 09/10/2021    LABA1C 5.2 05/07/2016       ASSESSMENT:  Patient Active Problem List    Diagnosis Date Noted    Dyslipidemia     Pneumonia due to COVID-19 virus 08/31/2021    Acute respiratory failure with hypoxia (HCC)     Cervical spinal stenosis 08/17/2021    Acquired spondylolisthesis 01/23/2018    Spinal stenosis, lumbar region, without neurogenic claudication 01/23/2018    Postlaminectomy syndrome, lumbar region 01/23/2018    Chronic midline low back pain without sciatica 01/23/2018    DIC (disseminated intravascular coagulation) ([address] 05/08/2016    Coagulopathy ([address] 05/08/2016    Thrombocytopenia ([address] 05/08/2016    Acute cystitis with hematuria     Pneumonia due to organism     Acute cystitis without hematuria 05/07/2016    CAD (coronary artery disease) 05/07/2016    Hypertension 05/07/2016    Lactic acid acidosis 05/07/2016    JAZZMINE (acute kidney injury) ([address] 05/07/2016    Sepsis ([facility] [address] 05/07/2016       PLAN:  Patient with extensive cardiac history, PCI to RCA and LAD in 2010. Ischemic cardiomyopathy, chronic combined CHF, hypertension, dyslipidemia and fibromyalgia. Last cardiac catheterization done 2016, patent stents to RCA and LAD otherwise nonobstructive CAD. Currently admitted for COVID-19 pneumonia. Patient presented with hypoxemia and hypotension. Echo on 9/1/2021, ejection fraction is 45 to 50%. Suboptimal image quality so no regional wall motion abnormalities can be assessed. No significant valvular abnormalities. Mild diastolic dysfunction. Currently hemodynamically stable. Symptom wise, she is improving. She is maintaining good O2 sat via nasal cannula. She continues to complain of dry cough.   She

## 2021-09-10 NOTE — PROGRESS NOTES
Progress Note    SUBJECTIVE:    Patient seen for f/u of Pneumonia due to COVID-19 virus. She sitting up in bed alert oriented no acute distress. Patient states she does feel better. She is afebrile. Her oxygen demands are decreasing. Continues to be on high flow but is decreasing. She is tolerating activity well. She states she is on 2 L of oxygen at home at nighttime. We did discuss the possibility that she may be on it continuously and she is agreeable to that. ROS:   Constitutional: negative  for fevers, and negative for chills.   Respiratory: positive for shortness of breath, positive for cough, and negative for wheezing  Cardiovascular: negative for chest pain, and negative for palpitations  Gastrointestinal: negative for abdominal pain, negative for nausea,negative for vomiting, negative for diarrhea, and negative for constipation     All other systems were reviewed with the patient and are negative unless otherwise stated in HPI      OBJECTIVE:      Vitals:   Vitals:    09/10/21 0646   BP: 129/88   Pulse: 66   Resp: 20   Temp: 97 °F (36.1 °C)   SpO2: 97%     Weight: 199 lb 4.8 oz (90.4 kg)   Height: 5' 7\" (170.2 cm)   -----------------------------------------------------------------  Exam:    CONSTITUTIONAL:  awake, alert, cooperative, no apparent distress, and appears stated age  EYES:  Lids and lashes normal, pupils equal, round and reactive to light, extra ocular muscles intact, sclera clear, conjunctiva normal  ENT:  normocepalic, without obvious abnormality, atraumatic  NECK:  supple, symmetrical, trachea midline, skin normal and no stridor  HEMATOLOGIC/LYMPHATICS:  no cervical lymphadenopathy and no supraclavicular lymphadenopathy  LUNGS: Fine rales mid lower posterior fields, no respiratory distress remains on high flow oxygen  CARDIOVASCULAR:  Normal apical impulse, regular rate and rhythm, normal S1 and S2, no S3 or S4, and no murmur noted  ABDOMEN:  No scars, normal bowel sounds, soft, non-distended, non-tender, no masses palpated, no hepatosplenomegally  MUSCULOSKELETAL:  there is no redness, warmth, or swelling of the joints  full range of motion noted  NEUROLOGIC: There is no extremities equally, follows commands, no deficits  SKIN:  no bruising or bleeding, normal skin color, texture, turgor, no redness, warmth, or swelling, no rashes and no lesions  EXT:     no cyanosis, clubbing or edema present    -----------------------------------------------------------------    Diagnostic Data:    · All available data reviewed  Lab Results   Component Value Date    WBC 5.5 09/10/2021    HGB 12.4 09/10/2021    MCV 93.6 09/10/2021     09/10/2021      Lab Results   Component Value Date    GLUCOSE 98 09/10/2021    BUN 26 (H) 09/10/2021    CREATININE 0.77 09/10/2021     09/10/2021    K 4.0 09/10/2021    CALCIUM 9.2 09/10/2021     09/10/2021    CO2 25 09/10/2021       CT CHEST PULMONARY EMBOLISM W CONTRAST   Final Result   No evidence of central to segmental pulmonary emboli. Multifocal airspace opacities suggestive multifocal atypical infection/viral   pneumonia. Cardiomegaly with coronary artery disease         XR CHEST PORTABLE   Final Result   Diffuse interstitial prominence which may be related to pulmonary vascular   congestion or interstitial pneumonitis. A small left effusion is present.              ASSESSMENT / PLAN:  Pneumonia due to COVID-19 virus  · Continue current therapy  · Continue vitamin C, D and zinc  · Completed remdesivir  · Continue Decadron  · Appreciate infectious disease  · No Actemra  · No monoclonal antibodies  · Potential bacterial superinfection risk  · Continues to be high risk for complicated course  · Respiratory failure with hypoxia  · Supplemental oxygen to maintain SPO2 greater than 92%  · Acapella  · Prone  · Out of bed with meals  · CAD  · Continue aspirin, Pravachol  · Hypertension  · Continue Coreg, Norvasc  · Sepsis  · Resolved  · Nutrition status: obesity, non-morbid: dietician consult initiated during hositalization  · DVT prophylaxis: Lovenox   · High risk medications: none   · Disposition:  Discharge plan is home      BEAU Vizcaino - CNP , BEAU, NP-C

## 2021-09-10 NOTE — PROGRESS NOTES
[facility] -Progress Telemedicine Note  COVID 19 Patient  Today's Date and Time: 9/10/2021, 8:58 AM    Impression :   · COVID 19 Suspect  · COVID 19 Confirmed Infection- Pneumonia  · Covid tests:  · 8/21/2021 : Positive  · 8-23-21: Positive  · 8-31-21: Positive  · Hypoxia   · Hypotension- resolved  · Hx Fibromyalgia  · Essential HTN    Patient evaluated by Telemedicine. Requesting Institution: [facility]  Provider Institution: [facility] [address] [place], [facility] Person at [address] [place]: Ms Arianne Castillo, APRN- IM    Recommendations:   · Antibiotic Rx:  · Monitor off antibiotics  · Covid Rx  · Remdesivir. Completed at [facility]  · Decadron. Restart on 9/1/2021  · Prior cervical ACDF for degenerative disk disease at [facility] on 8-17-21. Medical Decision Making/Summary/Discussion:9/10/2021     · Patient admitted with suspected COVID 19 infection  · Covid test confirmed positive. · Patient with initial diagnosis of COVID-19 on 8/21/2021. Received partial treatment with remdesivir and Decadron at [facility].  Was sent home on oral Decadron. Initial chest x-ray shows some bibasilar atelectasis. · Patient has deteriorated since then and current chest x-ray on admission to [place] shows bilateral interstitial infiltrates. CT scan shows multifocal pneumonia. · Patient has developed significant hypoxia. and elevated CRP  · At this point in time remdesivir will no longer be efficacious  · Patient will need to continue with Decadron  · Unfortunately she is already out of the window for Actemra. She also does not qualify for monoclonal antibody. · She will need to be monitored for any potential bacterial superinfection  · Patient is at high risk for a complicated course.   · Patient is slowly improving    Infection Control Recommendations   · Universal Precautions  · Airborne isolation  · Droplet Isolation    Antimicrobial Stewardship Recommendations · Discontinuation of therapy  Coordination of Outpatient Care:   · Estimated Length of IV antimicrobials:TBD  · Patient will need Midline Catheter Insertion: TBD  · Patient will need PICC line Insertion: No  · Patient will need: Home IV , [place],  SNF,  LTAC:TBD  · Patient will need outpatient wound care:No    Chief complaint/reason for consultation:   · Concern for COVID infection      History of Present Illness:   Malik Guo is a 66y.o.-year-old female who was initially admitted on 8/31/2021. Patient seen at the request of Hernan García. INITIAL HISTORY:    Patient has a history of degenerative disc disease in the cervical area. She underwent an ACDF (anterior cervical discectomy and fusion) on 8/17/2021 at [facility] was discharged home the next day with a drain. A few days later she developed malaise, fevers, and some shortness of breath. She was evaluated at that point in time at [facility], ER were her temperature was noted to be 103 F and she had a positive Covid test on 8/23/2021. She was transferred back to [facility] where she was treated conservatively in regards to her cervical neck incision. The neck incision was described as being clean and dry. The drain was removed on 8/22/2021. She also received treatment for the Covid with remdesivir and Decadron. Her symptoms improved and she was discharged home on 8/25/2021 with a recommendation to continue Decadron for 3 more days. While at [facility] she also showed desaturation when off oxygen, therefore she was discharged on home oxygen with a pulse oximeter and request to follow with pulmonary medicine. There are no data on CRP or other inflammatory markers. Her chest x-ray on 8/21/2021 showed minimal bibasilar atelectasis. CT scan was not done. The patient then presented to [facility] on 8/31/2021 complaining of generalized fatigue, decreased appetite, weakness, cough, mild shortness of breath. She denied any signs of inflammation around her previous cervical neck incision. She was noted to have hypoxia with an O2 saturation of 75 on room air. CT scan of the chest was performed which shows multifocal pneumonia with findings compatible with Covid pneumonia. Patient admitted because of concerns with COVID 19.    CURRENT EVALUATION : 9/10/2021    Patient evaluated in the ICU. Afebrile  VS stable    Hi-flow levels continue to slowly decrease     Patient exhibiting respiratory distress. Yes  Respiratory secretions: No    Patient receiving supplemental oxygen. High flow 10-->40-->50-->45 -->35 L/min.     02 sat 99-->97  RR 18-->20      % FIO2: 61-->75-->78-->84-->82-->64-->55-->44  PEEP:      QTc:       NEWS Score: 0-4 Low risk group; 5-6: Medium risk group; 7 or above: High risk group  Parameters 3 2 1 0 1 2 3   Age    < 65   ? 65   RR ? 8  9-11 12-20  21-24 ? 25   O2 Sats ? 91 92-93 94-95 ? 96      Suppl O2  Yes  No      SBP ? 90  101-110 111-219   ? 220   HR ? 40  41-50 51-90  111-130 ? 131   Consciousness    Alert   Drowsiness, lethargy, or confusion   Temperature ? 35.0 C (95.0 F)  35.1-36.0 C 95.1-96.9 F 36.1-38.0 C 97.0-100.4 F 38.1-39.0 C 100.5-102.3 F ? 39.1 C ? 102.4 F      NEWS Score:   9/1/2021: 9 high risk    Overall Daily Picture:   · Improved    Presence of secondary bacterial Infection:    No   Additional antibiotics: No    Labs, X rays reviewed: 9/10/2021    BUN: 24-->25-->26  Cr: 0.74-->0.70-->0.77    WBC: 5.7-->5.9-->5.5  Hb: 11.6-->11.5-->12.4  Plat: 222-->226-->270    Absolute Neutrophils: 5.16  Absolute Lymphocytes: 0.48  Neutrophil/Lymphocyte Ratio: 10.7 high risk    CRP: 181.5  Ferritin: 789-->905  LDH: 250    Pro Calcitonin:      Cultures:  Urine:  ·   Blood:  · 3131x2 no growth  Sputum :  ·   Wound:       CXR:     CAT:  · 8-31-21: Multifocal pneumonia compatible with Covid 19    MRSA nasal screen 8/22/2021 -    Discussed with patient, RN, CC, IM.     I have personally reviewed the past medical history, past surgical history, medications, social history, and family history, and I have updated the database accordingly.   Past Medical History:     Past Medical History:   Diagnosis Date    Arthritis     Blockage of coronary artery of heart ([address] 2010    x 2 stents    CAD (coronary artery disease)     DDD (degenerative disc disease), cervical     cervical 3-6    Fibromyalgia     History of blood transfusion     History of kidney stones     last one \"about 10 years ago\"    Hyperlipidemia     Hypertension     Pancreatitis 2016    Sleep apnea     Thyroid disease     Wears glasses     for reading       Past Surgical  History:     Past Surgical History:   Procedure Laterality Date    ARTHROPLASTY Left 2/6/2020    LEFT FIRST CARPOMETACARPAL ARTHROPLASTY performed by Pamela Maldonado DO at [address]  2012    L4,L5,S1 hardware placed    BACK SURGERY  2018    SPINAL CORD STIMULATOR    CATARACT REMOVAL Bilateral 2016    CERVICAL FUSION N/A 8/17/2021    C3-6 ACDF, REMOVAL OF HARDWARE C6-7 performed by Severa Moan, MD at [address]    COLONOSCOPY      normal    CORONARY ANGIOPLASTY  2010    x 2 stents    FINGER SURGERY      right thumb joint    HERNIA REPAIR Right     inguinal    HYSTERECTOMY      JOINT REPLACEMENT Right     knee    KIDNEY STONE SURGERY      x 3    LITHOTRIPSY      x 3    PARATHYROIDECTOMY      PARATHYROIDECTOMY      OH PERCUT IMPLNT NEUROELECT,EPIDURAL N/A 8/8/2018    SPINAL CORD STIMULATOR IMPLANT performed by Jose L Oswald MD at [facility] Right 03/2019       Medications:      amLODIPine  5 mg Oral Nightly    enoxaparin  40 mg SubCUTAneous BID    aspirin  81 mg Oral Daily    calcium-vitamin D  2 tablet Oral Daily    DULoxetine  60 mg Oral Daily    gabapentin  600 mg Oral Nightly    gabapentin  300 mg Oral QAM    therapeutic multivitamin-minerals  1 tablet Oral Daily    pantoprazole  40 mg Oral Daily    pravastatin  80 mg Oral Nightly    rOPINIRole  1 mg Oral QAM    rOPINIRole  2 mg Oral Nightly    Vitamin D  2,000 Units Oral Daily    zinc sulfate  50 mg Oral Daily    carvedilol  6.25 mg Oral BID WC    sodium chloride flush  10 mL IntraVENous 2 times per day    gabapentin  600 mg Oral Once       Social History:     Social History     Socioeconomic History    Marital status:      Spouse name: Not on file    Number of children: Not on file    Years of education: Not on file    Highest education level: Not on file   Occupational History    Not on file   Tobacco Use    Smoking status: Former Smoker     Packs/day: 1.00     Years: 44.00     Pack years: 44.00     Types: Cigarettes     Quit date: 2010     Years since quittin.3    Smokeless tobacco: Never Used   Vaping Use    Vaping Use: Never used   Substance and Sexual Activity    Alcohol use: No    Drug use: No    Sexual activity: Not on file   Other Topics Concern    Not on file   Social History Narrative    Not on file     Social Determinants of Health     Financial Resource Strain:     Difficulty of Paying Living Expenses:    Food Insecurity:     Worried About [facility] in the Last Year:     [facility] in the Last Year:    Transportation Needs:     Lack of Transportation (Medical):      Lack of Transportation (Non-Medical):    Physical Activity:     Days of Exercise per Week:     Minutes of Exercise per Session:    Stress:     Feeling of Stress :    Social Connections:     Frequency of Communication with Friends and Family:     Frequency of Social Gatherings with Friends and Family:     Attends Yarsani Services:     Active Member of Clubs or Organizations:     Attends Club or Organization Meetings:     Marital Status:    Intimate Partner Violence:     Fear of Current or Ex-Partner:     Emotionally Abused:     Physically Abused:     Sexually Abused:        Family History:     Family History   Problem Relation Age of Onset    Heart Disease Father         Allergies:   Lisinopril and Toradol [ketorolac tromethamine]     Review of Systems:       Constitutional: No fevers or chills. No systemic complaints  Head: No headaches  Eyes: No double vision or blurry vision. No conjunctival inflammation. ENT: No sore throat or runny nose. . No hearing loss, tinnitus or vertigo. Cardiovascular: No chest pain or palpitations. Shortness of breath. LOW  Lung: Shortness of breath, cough. No sputum production  Abdomen: No nausea, vomiting, diarrhea, or abdominal pain. Clenton Reas No cramps. Genitourinary: No increased urinary frequency, or dysuria. No hematuria. No suprapubic or CVA pain  Musculoskeletal: No muscle aches or pains. No joint effusions, swelling or deformities  Hematologic: No bleeding or bruising. Neurologic: No headache, weakness, numbness, or tingling. Integument: No rash, no ulcers. Psychiatric: No depression. Endocrine: No polyuria, no polydipsia, no polyphagia. Physical Examination :     Patient Vitals for the past 8 hrs:   BP Temp Temp src Pulse Resp SpO2 Weight   09/10/21 0646 129/88 97 °F (36.1 °C) Temporal 66 20 97 % --   09/10/21 0519 -- -- -- -- -- 94 % --   09/10/21 0438 -- -- -- -- -- -- 199 lb 4.8 oz (90.4 kg)   09/10/21 0146 -- -- -- -- -- 92 % --     General Appearance: Awake, alert, and in mild apparent distress  Head:  Normocephalic, no trauma  Eyes: Pupils equal, round, reactive to light; sclera anicteric; conjunctivae pink. No embolic phenomena. ENT: Oropharynx clear, without erythema, exudate, or thrush. No tenderness of sinuses. Mouth/throat: mucosa pink and moist. No lesions. Dentition in good repair. Neck:Supple, without lymphadenopathy. Thyroid normal, No bruits. Pulmonary/Chest: Coarse breath sounds with bilateral rhonchi. On nasal oxygen. Cardiovascular: Regular rate and rhythm without murmurs, rubs, or gallops.    Abdomen: Soft, non tender. Bowel sounds normal. No organomegaly  All four Extremities: No cyanosis, clubbing, edema, or effusions. Neurologic: No gross sensory or motor deficits. Skin: Warm and dry with good turgor. No signs of peripheral arterial or venous insufficiency. No ulcerations. No open wounds. Medical Decision Making -Laboratory:   I have independently reviewed/ordered the following labs:    CBC with Differential:   Recent Labs     09/09/21 0640 09/10/21  0445   WBC 4.9 5.5   HGB 11.3* 12.4   HCT 34.0* 37.8    270   LYMPHOPCT 17* 15*   MONOPCT 4 5     BMP:   Recent Labs     09/09/21  0640 09/10/21  0445    140   K 3.9 4.0    103   CO2 24 25   BUN 25* 26*   CREATININE 0.65 0.77     Hepatic Function Panel:   Recent Labs     09/09/21 0640 09/10/21  0445   PROT 5.6* 6.0*   LABALBU 3.0* 3.2*   BILITOT 0.29* 0.31   ALKPHOS 88 94   ALT 41* 40*   AST 19 18     No results for input(s): RPR in the last 72 hours. No results for input(s): HIV in the last 72 hours. No results for input(s): BC in the last 72 hours. Lab Results   Component Value Date    MUCUS NOT REPORTED 10/04/2020    RBC 4.04 09/10/2021    TRICHOMONAS NOT REPORTED 10/04/2020    WBC 5.5 09/10/2021    YEAST NOT REPORTED 10/04/2020    TURBIDITY CLEAR 08/31/2021     Lab Results   Component Value Date    CREATININE 0.77 09/10/2021    GLUCOSE 98 09/10/2021       Medical Decision Making-Imaging:     EXAMINATION:   CTA OF THE CHEST 8/31/2021 8:26 pm       TECHNIQUE:   CTA of the chest was performed after the administration of intravenous   contrast.  Multiplanar reformatted images are provided for review.  MIP   images are provided for review.  Dose modulation, iterative reconstruction,   and/or weight based adjustment of the mA/kV was utilized to reduce the   radiation dose to as low as reasonably achievable.       COMPARISON:   08/30/2021 and 08/21/2021       HISTORY:   ORDERING SYSTEM PROVIDED HISTORY: elevated d dimer and hypoxia TECHNOLOGIST PROVIDED HISTORY:   elevated d dimer and hypoxia   Decision Support Exception - unselect if not a suspected or confirmed   emergency medical condition->Emergency Medical Condition (MA)       FINDINGS:   Pulmonary Arteries: No evidence of central, lobar or proximal segmental   pulmonary artery emboli.       Mediastinum: Cardiomegaly.  Coronary disease identified.  Trace pericardial   fluid.  No suspicious mediastinal or hilar adenopathy identified per CT   criteria.  Aortic vascular calcifications.       Lungs/pleura: Multifocal interstitial alveolar opacities throughout both   lungs with a subpleural distribution suggestive of multifocal atypical   infection/viral pneumonia.  Bibasilar consolidative changes suggestive of   areas of atelectasis.       Upper Abdomen: Multiple circumscribed lesions involving the right left   hepatic lobe suggestive of cyst..       Soft Tissues/Bones: Multilevel degenerate changes thoracic spine.  Spinal   stimulator leads identified           Impression   No evidence of central to segmental pulmonary emboli.       Multifocal airspace opacities suggestive multifocal atypical infection/viral   pneumonia.       Cardiomegaly with coronary artery disease       Medical Decision [id number]:       Medical Decision Making-Other:     Note:  · Labs, medications, radiologic studies were reviewed with personal review of films  · Large amounts of data were reviewed  · Discussed with nursing Staff, Discharge planner  · Infection Control and Prevention measures reviewed  · All prior entries were reviewed  · Administer medications as ordered  · Prognosis: Guarded  · Discharge planning reviewed  · Follow up as outpatient. Thank you for allowing us to participate in the care of this patient. Please call with questions. BEAU Griffiths - CNP     ATTESTATION:    I have discussed the case, including pertinent history and exam findings with the APRN.  I have evaluated

## 2021-09-10 NOTE — PROGRESS NOTES
Pt sitting up in when playing on her tablet when writer entered the room. Vitals and assessment as charted. Pt denies pain. Patient stated she has been using her acapella all day. Pt denies any further needs. Call light within reach.

## 2021-09-11 LAB — FERRITIN: 367 UG/L (ref 13–150)

## 2021-09-11 PROCEDURE — 6370000000 HC RX 637 (ALT 250 FOR IP): Performed by: INTERNAL MEDICINE

## 2021-09-11 PROCEDURE — 6360000002 HC RX W HCPCS: Performed by: NURSE PRACTITIONER

## 2021-09-11 PROCEDURE — 94761 N-INVAS EAR/PLS OXIMETRY MLT: CPT

## 2021-09-11 PROCEDURE — 6360000002 HC RX W HCPCS: Performed by: FAMILY MEDICINE

## 2021-09-11 PROCEDURE — 2700000000 HC OXYGEN THERAPY PER DAY

## 2021-09-11 PROCEDURE — 2580000003 HC RX 258: Performed by: EMERGENCY MEDICINE

## 2021-09-11 PROCEDURE — APPSS30 APP SPLIT SHARED TIME 16-30 MINUTES: Performed by: NURSE PRACTITIONER

## 2021-09-11 PROCEDURE — 6370000000 HC RX 637 (ALT 250 FOR IP): Performed by: FAMILY MEDICINE

## 2021-09-11 PROCEDURE — 99233 SBSQ HOSP IP/OBS HIGH 50: CPT | Performed by: INTERNAL MEDICINE

## 2021-09-11 PROCEDURE — 1200000000 HC SEMI PRIVATE

## 2021-09-11 RX ADMIN — GABAPENTIN 600 MG: 300 CAPSULE ORAL at 20:33

## 2021-09-11 RX ADMIN — ENOXAPARIN SODIUM 40 MG: 40 INJECTION SUBCUTANEOUS at 20:34

## 2021-09-11 RX ADMIN — SODIUM CHLORIDE, PRESERVATIVE FREE 10 ML: 5 INJECTION INTRAVENOUS at 20:34

## 2021-09-11 RX ADMIN — ZINC SULFATE 220 MG (50 MG) CAPSULE 50 MG: CAPSULE at 10:01

## 2021-09-11 RX ADMIN — ROPINIROLE HYDROCHLORIDE 2 MG: 1 TABLET, FILM COATED ORAL at 20:33

## 2021-09-11 RX ADMIN — GABAPENTIN 300 MG: 300 CAPSULE ORAL at 10:02

## 2021-09-11 RX ADMIN — DEXAMETHASONE 6 MG: 4 TABLET ORAL at 10:01

## 2021-09-11 RX ADMIN — SODIUM CHLORIDE, PRESERVATIVE FREE 10 ML: 5 INJECTION INTRAVENOUS at 10:00

## 2021-09-11 RX ADMIN — MULTIPLE VITAMINS W/ MINERALS TAB 1 TABLET: TAB at 10:02

## 2021-09-11 RX ADMIN — CARVEDILOL 6.25 MG: 6.25 TABLET, FILM COATED ORAL at 10:00

## 2021-09-11 RX ADMIN — ENOXAPARIN SODIUM 40 MG: 40 INJECTION SUBCUTANEOUS at 10:02

## 2021-09-11 RX ADMIN — CALCIUM CARBONATE-VITAMIN D TAB 500 MG-200 UNIT 2 TABLET: 500-200 TAB at 10:01

## 2021-09-11 RX ADMIN — ROPINIROLE HYDROCHLORIDE 1 MG: 1 TABLET, FILM COATED ORAL at 10:02

## 2021-09-11 RX ADMIN — CARVEDILOL 6.25 MG: 6.25 TABLET, FILM COATED ORAL at 16:37

## 2021-09-11 RX ADMIN — DULOXETINE HYDROCHLORIDE 60 MG: 60 CAPSULE, DELAYED RELEASE ORAL at 10:01

## 2021-09-11 RX ADMIN — AMLODIPINE BESYLATE 5 MG: 5 TABLET ORAL at 20:33

## 2021-09-11 RX ADMIN — Medication 2000 UNITS: at 10:01

## 2021-09-11 RX ADMIN — PANTOPRAZOLE SODIUM 40 MG: 40 TABLET, DELAYED RELEASE ORAL at 10:02

## 2021-09-11 RX ADMIN — ASPIRIN 81 MG: 81 TABLET, COATED ORAL at 10:01

## 2021-09-11 RX ADMIN — PRAVASTATIN SODIUM 80 MG: 20 TABLET ORAL at 20:33

## 2021-09-11 ASSESSMENT — ENCOUNTER SYMPTOMS
GASTROINTESTINAL NEGATIVE: 1
BACK PAIN: 0
EYES NEGATIVE: 1
RESPIRATORY NEGATIVE: 1

## 2021-09-11 ASSESSMENT — PAIN SCALES - GENERAL
PAINLEVEL_OUTOF10: 0

## 2021-09-11 NOTE — PROGRESS NOTES
(H) 05/07/2016    TROPONINT NOT REPORTED 09/04/2021    CKTOTAL 77 09/02/2021    CKMB 3.6 09/02/2021    PROBNP 2,646 (H) 09/04/2021       ECHO-2D-M-MODE COMPLETE    Result Date: 9/1/2021  [address] [place] [address] Transthoracic Echocardiography Report (TTE)  Patient Name BRANDT FERRARA [facility]  Date of Study             09/01/2021               [facility]   Date of      1950  Gender                    Female  Birth   Age          66 year(s)  Race                         Room Number  I306        Height:                   67 inch, 170.18 cm   Corporate ID [id number]    Weight:                   197 pounds, 89.4 kg  #   Patient Acct [de-identified]   BSA:         2.01 m^2     BMI:        30.85  #                                                              kg/m^2   MR #         [id number]      Sonographer               Work,Coby   Accession #  [id number]  Interpreting Physician    Brady Davison   Fellow                   Referring Nurse                           Practitioner   Interpreting             Referring Physician       Kathy Bazan,  Fellow                                             [place]  Type of Study   TTE procedure:2D Echocardiogram, M-Mode, Doppler, Color Doppler. Procedure Date Date: 09/01/2021 Start: 10:04 AM Study Location: [facility] Indications:Coronary artery disease, Hypotension and Pneumonia due to Covid-19. History / Tech. Comments: Dx: CAD, hypotension, Covid-19 Hx: HTN, hyperlipidemia, stents Patient Status: Inpatient Height: 67 inches Weight: 197 pounds BSA: 2.01 m^2 BMI: 30.85 kg/m^2 BP: 119/68 mmHg Allergies   - *Unlisted:(toradol). CONCLUSIONS Summary Poor image quality, likely due to patient body habitus and/or lung disease. Global left ventricular function is difficult to assess but appears mildly reduced with an estimated EF of 45-50%. Mild left ventricular hypertrophy and with normal left ventricular cavity size. Unable to assess specific wall motion abnormalities due to image quality. Cusp Separation: 2.24 cm   Mitral:                                 Aortic   Valve Area (P1/2-Time): 2.88 cm^2       Peak Velocity: 1.05 m/s  Peak E-Wave: 0.61 m/s                   Mean Velocity: 0.81 m/s  Peak A-Wave: 0.78 m/s                   Peak Gradient: 4.42 mmHg  E/A Ratio: 0.78                         Mean Gradient: 2.83 mmHg  Peak Gradient: 1.47 mmHg                                          Acceleration Time: 49.73 msec  P1/2t: 76.38 msec                                          AV VTI: 15.29 cm   Tricuspid:   Peak TR Velocity: 2.36 m/s  Peak TR Gradient: 22.85796 mmHg  Diastology / Tissue Doppler Lateral Wall E' velocity:0.09 m/s Lateral Wall E/E':7.81    XR CHEST PORTABLE    Result Date: 8/31/2021  EXAMINATION: ONE XRAY VIEW OF THE CHEST 8/31/2021 6:57 pm COMPARISON: 21 August 2021 HISTORY: ORDERING SYSTEM PROVIDED HISTORY: sob TECHNOLOGIST PROVIDED HISTORY: sob FINDINGS: AP portable view of the chest time stamped at 1850 hours demonstrates paired intraspinal electrodes terminating in the lower thoracic area and ACDF hardware at the base of the neck. Heart size is normal.  Heart size is normal.  Interstitial markings are mildly increased which may be related to vascular congestion likely interstitial pneumonitis. There is suggestion of a small left effusion. No extrapleural air is seen. Osseous structures are age-appropriate. Diffuse interstitial prominence which may be related to pulmonary vascular congestion or interstitial pneumonitis. A small left effusion is present. CT CHEST PULMONARY EMBOLISM W CONTRAST    Result Date: 8/31/2021  EXAMINATION: CTA OF THE CHEST 8/31/2021 8:26 pm TECHNIQUE: CTA of the chest was performed after the administration of intravenous contrast.  Multiplanar reformatted images are provided for review. MIP images are provided for review.  Dose modulation, iterative reconstruction, and/or weight based adjustment of the mA/kV was utilized to reduce the radiation dose to as low as reasonably achievable. COMPARISON: 08/30/2021 and 08/21/2021 HISTORY: ORDERING SYSTEM PROVIDED HISTORY: elevated d dimer and hypoxia TECHNOLOGIST PROVIDED HISTORY: elevated d dimer and hypoxia Decision Support Exception - unselect if not a suspected or confirmed emergency medical condition->Emergency Medical Condition (MA) FINDINGS: Pulmonary Arteries: No evidence of central, lobar or proximal segmental pulmonary artery emboli. Mediastinum: Cardiomegaly. Coronary disease identified. Trace pericardial fluid. No suspicious mediastinal or hilar adenopathy identified per CT criteria. Aortic vascular calcifications. Lungs/pleura: Multifocal interstitial alveolar opacities throughout both lungs with a subpleural distribution suggestive of multifocal atypical infection/viral pneumonia. Bibasilar consolidative changes suggestive of areas of atelectasis. Upper Abdomen: Multiple circumscribed lesions involving the right left hepatic lobe suggestive of cyst.. Soft Tissues/Bones: Multilevel degenerate changes thoracic spine. Spinal stimulator leads identified     No evidence of central to segmental pulmonary emboli. Multifocal airspace opacities suggestive multifocal atypical infection/viral pneumonia. Cardiomegaly with coronary artery disease       ASSESSMENT:    Principal Problem:    Pneumonia due to COVID-19 virus  Active Problems:    CAD (coronary artery disease)    Hypertension    Sepsis ([address]    Acute respiratory failure with hypoxia (HCC)    Dyslipidemia  Resolved Problems:    * No resolved hospital problems.  *      Patient Active Problem List    Diagnosis Date Noted    Dyslipidemia     Pneumonia due to COVID-19 virus 08/31/2021    Acute respiratory failure with hypoxia ([address]     Cervical spinal stenosis 08/17/2021    Acquired spondylolisthesis 01/23/2018    Spinal stenosis, lumbar region, without neurogenic claudication 01/23/2018    Postlaminectomy syndrome, lumbar region 01/23/2018    Chronic midline low back pain without sciatica 01/23/2018    DIC (disseminated intravascular coagulation) ([facility] 05/08/2016    Coagulopathy ([facility] 05/08/2016    Thrombocytopenia ([facility] 05/08/2016    Acute cystitis with hematuria     Pneumonia due to organism     Acute cystitis without hematuria 05/07/2016    CAD (coronary artery disease) 05/07/2016    Hypertension 05/07/2016    Lactic acid acidosis 05/07/2016    JAZZMINE (acute kidney injury) ([facility] 05/07/2016    Sepsis ([facility] 05/07/2016       PLAN:  · COVID-19 virus protocol  · Infectious disease  · Critical Care Time: 0  · Home meds  · Discussed with patient about proning and respiratory care. Again.       Omayra Baldwin MD , M.D.

## 2021-09-11 NOTE — PROGRESS NOTES
[facility] Telemedicine Note  COVID 19 Patient  Today's Date and Time: 9/11/2021, 1:14 PM    Impression :   · COVID 19 Suspect  · COVID 19 Confirmed Infection- Pneumonia  · Covid tests:  · 8/21/2021 : Positive  · 8-23-21: Positive  · 8-31-21: Positive  · Hypoxia   · Hypotension- resolved  · Hx Fibromyalgia  · Essential HTN    Patient evaluated by Telemedicine. Requesting Institution: Merged with [facility]  Provider Institution: [address] [place] [address],[address], [address] [facility] Person at [address] [facility]: Ms Lane Otoniel, APRN- IM    Recommendations:   · Antibiotic Rx:  · Monitor off antibiotics  · Covid Rx  · Remdesivir. Completed at [facility]  · Decadron. Restart on 9/1/2021  · Prior cervical ACDF for degenerative disk disease at [facility] on 8-17-21. Medical Decision Making/Summary/Discussion:9/11/2021     · Patient admitted with suspected COVID 19 infection  · Covid test confirmed positive. · Patient with initial diagnosis of COVID-19 on 8/21/2021. Received partial treatment with remdesivir and Decadron at [facility].  Was sent home on oral Decadron. Initial chest x-ray shows some bibasilar atelectasis. · Patient has deteriorated since then and current chest x-ray on admission to [facility] shows bilateral interstitial infiltrates. CT scan shows multifocal pneumonia. · Patient has developed significant hypoxia. and elevated CRP  · At this point in time remdesivir will no longer be efficacious  · Patient will need to continue with Decadron  · Unfortunately she is already out of the window for Actemra. She also does not qualify for monoclonal antibody. · She will need to be monitored for any potential bacterial superinfection  · Patient is at high risk for a complicated course.   · Patient is slowly improving    Infection Control Recommendations   · Universal Precautions  · Airborne isolation  · Droplet Isolation    Antimicrobial Stewardship Recommendations · Discontinuation of therapy  Coordination of Outpatient Care:   · Estimated Length of IV antimicrobials:TBD  · Patient will need Midline Catheter Insertion: TBD  · Patient will need PICC line Insertion: No  · Patient will need: Home IV , [place],  SNF,  LTAC:TBD  · Patient will need outpatient wound care:No    Chief complaint/reason for consultation:   · Concern for COVID infection      History of Present Illness:   Lianne Marie is a 66y.o.-year-old female who was initially admitted on 8/31/2021. Patient seen at the request of Mariel Fuller. INITIAL HISTORY:    Patient has a history of degenerative disc disease in the cervical area. She underwent an ACDF (anterior cervical discectomy and fusion) on 8/17/2021 at [facility] was discharged home the next day with a drain. A few days later she developed malaise, fevers, and some shortness of breath. She was evaluated at that point in time at [facility], ER were her temperature was noted to be 103 F and she had a positive Covid test on 8/23/2021. She was transferred back to [facility] where she was treated conservatively in regards to her cervical neck incision. The neck incision was described as being clean and dry. The drain was removed on 8/22/2021. She also received treatment for the Covid with remdesivir and Decadron. Her symptoms improved and she was discharged home on 8/25/2021 with a recommendation to continue Decadron for 3 more days. While at [facility] she also showed desaturation when off oxygen, therefore she was discharged on home oxygen with a pulse oximeter and request to follow with pulmonary medicine. There are no data on CRP or other inflammatory markers. Her chest x-ray on 8/21/2021 showed minimal bibasilar atelectasis. CT scan was not done. The patient then presented to [facility] on 8/31/2021 complaining of generalized fatigue, decreased appetite, weakness, cough, mild shortness of breath. She denied any signs of inflammation around her previous cervical neck incision. She was noted to have hypoxia with an O2 saturation of 75 on room air. CT scan of the chest was performed which shows multifocal pneumonia with findings compatible with Covid pneumonia. Patient admitted because of concerns with COVID 19.    CURRENT EVALUATION : 9/11/2021    Patient evaluated in the ICU. Afebrile  VS stable    The patient is oxygenating well on 5 L NC.     CRP has greatly decreased 181.5-->8.8    Patient exhibiting respiratory distress. Yes  Respiratory secretions: No    Patient receiving supplemental oxygen. High flow--> 5 L NC    02 sat 99-->97-->99  RR 18-->20-->18      % FIO2: 61-->75-->78-->84-->82-->64-->55-->44  PEEP:      QTc:       NEWS Score: 0-4 Low risk group; 5-6: Medium risk group; 7 or above: High risk group  Parameters 3 2 1 0 1 2 3   Age    < 65   ? 65   RR ? 8  9-11 12-20  21-24 ? 25   O2 Sats ?  91 92-93 94-95 ? 96      Suppl O2  Yes  No      SBP ? 90  101-110 111-219   ? 220   HR ? 40  41-50 51-90  111-130 ? 131   Consciousness    Alert   Drowsiness, lethargy, or confusion   Temperature ? 35.0 C (95.0 F)  35.1-36.0 C 95.1-96.9 F 36.1-38.0 C 97.0-100.4 F 38.1-39.0 C 100.5-102.3 F ? 39.1 C ? 102.4 F      NEWS Score:   9/1/2021: 9 high risk    Overall Daily Picture:   · Improved    Presence of secondary bacterial Infection:    No   Additional antibiotics: No    Labs, X rays reviewed: 9/11/2021    BUN: 24-->25-->26  Cr: 0.74-->0.70-->0.77    WBC: 5.7-->5.9-->5.5  Hb: 11.6-->11.5-->12.4  Plat: 222-->226-->270    Absolute Neutrophils: 5.16  Absolute Lymphocytes: 0.48  Neutrophil/Lymphocyte Ratio: 10.7 high risk    CRP: 181.5-->8.8  Ferritin: 789-->905  LDH: 250    Pro Calcitonin:      Cultures:  Urine:  ·   Blood:  · 3131x2 no growth  Sputum :  ·   Wound:       CXR:     CAT:  · 8-31-21: Multifocal pneumonia compatible with Covid 19    MRSA nasal screen 8/22/2021 -    Discussed with patient, RN, CC, IM. I have personally reviewed the past medical history, past surgical history, medications, social history, and family history, and I have updated the database accordingly.   Past Medical History:     Past Medical History:   Diagnosis Date    Arthritis     Blockage of coronary artery of heart ([address] 2010    x 2 stents    CAD (coronary artery disease)     DDD (degenerative disc disease), cervical     cervical 3-6    Fibromyalgia     History of blood transfusion     History of kidney stones     last one \"about 10 years ago\"    Hyperlipidemia     Hypertension     Pancreatitis 2016    Sleep apnea     Thyroid disease     Wears glasses     for reading       Past Surgical  History:     Past Surgical History:   Procedure Laterality Date    ARTHROPLASTY Left 2/6/2020    LEFT FIRST CARPOMETACARPAL ARTHROPLASTY performed by Geovany Johnston DO at [facility]  2012    L4,L5,S1 hardware placed    BACK SURGERY  2018    SPINAL CORD STIMULATOR    CATARACT REMOVAL Bilateral 2016    CERVICAL FUSION N/A 8/17/2021    C3-6 ACDF, REMOVAL OF HARDWARE C6-7 performed by Reyna Almazan MD at [address]    COLONOSCOPY      normal   [address]  2010    x 2 stents    FINGER SURGERY      right thumb joint    HERNIA REPAIR Right     inguinal    HYSTERECTOMY      JOINT REPLACEMENT Right     knee    KIDNEY STONE SURGERY      x 3    LITHOTRIPSY      x 3    PARATHYROIDECTOMY      PARATHYROIDECTOMY      UT PERCUT IMPLNT NEUROELECT,EPIDURAL N/A 8/8/2018    SPINAL CORD STIMULATOR IMPLANT performed by Abbi Garcia MD at [facility] Right 03/2019       Medications:      dexamethasone  6 mg Oral Daily    amLODIPine  5 mg Oral Nightly    enoxaparin  40 mg SubCUTAneous BID    aspirin  81 mg Oral Daily    calcium-vitamin D  2 tablet Oral Daily    DULoxetine  60 mg Oral Daily    gabapentin  600 mg Oral Nightly    gabapentin  300 mg Oral QAM    therapeutic multivitamin-minerals  1 tablet Oral Daily    pantoprazole  40 mg Oral Daily    pravastatin  80 mg Oral Nightly    rOPINIRole  1 mg Oral QAM    rOPINIRole  2 mg Oral Nightly    Vitamin D  2,000 Units Oral Daily    zinc sulfate  50 mg Oral Daily    carvedilol  6.25 mg Oral BID WC    sodium chloride flush  10 mL IntraVENous 2 times per day    gabapentin  600 mg Oral Once       Social History:     Social History     Socioeconomic History    Marital status:      Spouse name: Not on file    Number of children: Not on file    Years of education: Not on file    Highest education level: Not on file   Occupational History    Not on file   Tobacco Use    Smoking status: Former Smoker     Packs/day: 1.00     Years: 44.00     Pack years: 44.00     Types: Cigarettes     Quit date: 2010     Years since quittin.3    Smokeless tobacco: Never Used   Vaping Use    Vaping Use: Never used   Substance and Sexual Activity    Alcohol use: No    Drug use: No    Sexual activity: Not on file   Other Topics Concern    Not on file   Social History Narrative    Not on file     Social Determinants of Health     Financial Resource Strain:     Difficulty of Paying Living Expenses:    Food Insecurity:     Worried About [address] in the Last Year:     [address] in the Last Year:    Transportation Needs:     Lack of Transportation (Medical):      Lack of Transportation (Non-Medical):    Physical Activity:     Days of Exercise per Week:     Minutes of Exercise per Session:    Stress:     Feeling of Stress :    Social Connections:     Frequency of Communication with Friends and Family:     Frequency of Social Gatherings with Friends and Family:     Attends [employer] Services:     Active Member of Clubs or Organizations:     Attends Club or Organization Meetings:     Marital Status:    Intimate Partner Violence:     Fear of Current or Ex-Partner:     Emotionally Abused:     Physically Abused:     Sexually Abused:        Family History:     Family History   Problem Relation Age of Onset    Heart Disease Father         Allergies:   Lisinopril and Toradol [ketorolac tromethamine]     Review of Systems:       Constitutional: No fevers or chills. No systemic complaints  Head: No headaches  Eyes: No double vision or blurry vision. No conjunctival inflammation. ENT: No sore throat or runny nose. . No hearing loss, tinnitus or vertigo. Cardiovascular: No chest pain or palpitations. Shortness of breath. LOW  Lung: Shortness of breath, cough. No sputum production  Abdomen: No nausea, vomiting, diarrhea, or abdominal pain. [place] No cramps. Genitourinary: No increased urinary frequency, or dysuria. No hematuria. No suprapubic or CVA pain  Musculoskeletal: No muscle aches or pains. No joint effusions, swelling or deformities  Hematologic: No bleeding or bruising. Neurologic: No headache, weakness, numbness, or tingling. Integument: No rash, no ulcers. Psychiatric: No depression. Endocrine: No polyuria, no polydipsia, no polyphagia. Physical Examination :     Patient Vitals for the past 8 hrs:   BP Temp Temp src Pulse Resp SpO2   09/11/21 1100 -- -- -- 70 -- 99 %   09/11/21 0945 104/63 97.5 °F (36.4 °C) Temporal 75 18 90 %   09/11/21 0745 -- -- -- -- -- 96 %     General Appearance: Awake, alert, and in mild apparent distress  Head:  Normocephalic, no trauma  Eyes: Pupils equal, round, reactive to light; sclera anicteric; conjunctivae pink. No embolic phenomena. ENT: Oropharynx clear, without erythema, exudate, or thrush. No tenderness of sinuses. Mouth/throat: mucosa pink and moist. No lesions. Dentition in good repair. Neck:Supple, without lymphadenopathy. Thyroid normal, No bruits. Pulmonary/Chest: Coarse breath sounds with bilateral rhonchi. On nasal oxygen. Cardiovascular: Regular rate and rhythm without murmurs, rubs, or gallops.    Abdomen: Soft, non tender. Bowel sounds normal. No organomegaly  All four Extremities: No cyanosis, clubbing, edema, or effusions. Neurologic: No gross sensory or motor deficits. Skin: Warm and dry with good turgor. No signs of peripheral arterial or venous insufficiency. No ulcerations. No open wounds. Medical Decision Making -Laboratory:   I have independently reviewed/ordered the following labs:    CBC with Differential:   Recent Labs     09/09/21 0640 09/10/21  0445   WBC 4.9 5.5   HGB 11.3* 12.4   HCT 34.0* 37.8    270   LYMPHOPCT 17* 15*   MONOPCT 4 5     BMP:   Recent Labs     09/09/21  0640 09/10/21  0445    140   K 3.9 4.0    103   CO2 24 25   BUN 25* 26*   CREATININE 0.65 0.77     Hepatic Function Panel:   Recent Labs     09/09/21 0640 09/10/21  0445   PROT 5.6* 6.0*   LABALBU 3.0* 3.2*   BILITOT 0.29* 0.31   ALKPHOS 88 94   ALT 41* 40*   AST 19 18     No results for input(s): RPR in the last 72 hours. No results for input(s): HIV in the last 72 hours. No results for input(s): BC in the last 72 hours. Lab Results   Component Value Date    MUCUS NOT REPORTED 10/04/2020    RBC 4.04 09/10/2021    TRICHOMONAS NOT REPORTED 10/04/2020    WBC 5.5 09/10/2021    YEAST NOT REPORTED 10/04/2020    TURBIDITY CLEAR 08/31/2021     Lab Results   Component Value Date    CREATININE 0.77 09/10/2021    GLUCOSE 98 09/10/2021       Medical Decision Making-Imaging:     EXAMINATION:   CTA OF THE CHEST 8/31/2021 8:26 pm       TECHNIQUE:   CTA of the chest was performed after the administration of intravenous   contrast.  Multiplanar reformatted images are provided for review.  MIP   images are provided for review.  Dose modulation, iterative reconstruction,   and/or weight based adjustment of the mA/kV was utilized to reduce the   radiation dose to as low as reasonably achievable.       COMPARISON:   08/30/2021 and 08/21/2021       HISTORY:   ORDERING SYSTEM PROVIDED HISTORY: elevated d dimer and hypoxia TECHNOLOGIST PROVIDED HISTORY:   elevated d dimer and hypoxia   Decision Support Exception - unselect if not a suspected or confirmed   emergency medical condition->Emergency Medical Condition (MA)       FINDINGS:   Pulmonary Arteries: No evidence of central, lobar or proximal segmental   pulmonary artery emboli.       Mediastinum: Cardiomegaly.  Coronary disease identified.  Trace pericardial   fluid.  No suspicious mediastinal or hilar adenopathy identified per CT   criteria.  Aortic vascular calcifications.       Lungs/pleura: Multifocal interstitial alveolar opacities throughout both   lungs with a subpleural distribution suggestive of multifocal atypical   infection/viral pneumonia.  Bibasilar consolidative changes suggestive of   areas of atelectasis.       Upper Abdomen: Multiple circumscribed lesions involving the right left   hepatic lobe suggestive of cyst..       Soft Tissues/Bones: Multilevel degenerate changes thoracic spine.  Spinal   stimulator leads identified           Impression   No evidence of central to segmental pulmonary emboli.       Multifocal airspace opacities suggestive multifocal atypical infection/viral   pneumonia.       Cardiomegaly with coronary artery disease       Medical Decision [id number]:       Medical Decision Making-Other:     Note:  · Labs, medications, radiologic studies were reviewed with personal review of films  · Large amounts of data were reviewed  · Discussed with nursing Staff, Discharge planner  · Infection Control and Prevention measures reviewed  · All prior entries were reviewed  · Administer medications as ordered  · Prognosis: Guarded  · Discharge planning reviewed  · Follow up as outpatient. Thank you for allowing us to participate in the care of this patient. Please call with questions. BEAU Crooks - CNP ATTESTATION:    I have discussed the case, including pertinent history and exam findings with the APRN.  I have evaluated the History, physical findings and pictures of the patient and the key elements of the encounter have been performed by me. I have reviewed the laboratory data, other diagnostic studies and discussed them with the APRN. I have updated the medical record where necessary. I agree with the assessment, plan and orders as documented by the APRN.     Cj Lopez MD.              Pager: [phone] - Office: [phone]

## 2021-09-11 NOTE — PROGRESS NOTES
Pt is resting in bed, call light within reach. No resp distress noted/reported t/o shift. Pt is independent to [facility], transfers herself as needed, BSC at bedside at all times, no concerns at this time. Pt is doing well, appears to be very comfortable. O2 at [facility], see charting. Pt denies pain, denies needs. Will continue to monitor.

## 2021-09-11 NOTE — PROGRESS NOTES
Vital signs and assessment completed. Assessment unchanged from previous shift assessment. Patient got up to use BSC and return to bed. BSC emptied. Water pitcher refilled. Trash taken out. Telemetry battery changed. Patient denies any other needs at this time. Call light, bedside table and personal belongings within reach. Will continue to monitor.

## 2021-09-11 NOTE — PROGRESS NOTES
Vital signs and assessment completed. Patient sating at 91% on 5L of nasal cannula. Patient denies any pain or SOB. Patient has been getting up to the [facility] independently and tolerating it well. Patient was reinforced on education on the acapella and reinforced encouragement to use acapella at least once hourly while awake. Patient also reinforced and educated patient on importance of moving, rolling and proning even for a small amount of time. Patient acknowledged and verbalized understanding. Patient denies any other needs at this time. Call light, bedside table and personal belongings within reach. Will continue to monitor.

## 2021-09-12 PROCEDURE — 6370000000 HC RX 637 (ALT 250 FOR IP): Performed by: FAMILY MEDICINE

## 2021-09-12 PROCEDURE — APPSS30 APP SPLIT SHARED TIME 16-30 MINUTES: Performed by: NURSE PRACTITIONER

## 2021-09-12 PROCEDURE — 6360000002 HC RX W HCPCS: Performed by: FAMILY MEDICINE

## 2021-09-12 PROCEDURE — 6360000002 HC RX W HCPCS: Performed by: NURSE PRACTITIONER

## 2021-09-12 PROCEDURE — 99232 SBSQ HOSP IP/OBS MODERATE 35: CPT | Performed by: INTERNAL MEDICINE

## 2021-09-12 PROCEDURE — 2700000000 HC OXYGEN THERAPY PER DAY

## 2021-09-12 PROCEDURE — 6370000000 HC RX 637 (ALT 250 FOR IP): Performed by: INTERNAL MEDICINE

## 2021-09-12 PROCEDURE — 2580000003 HC RX 258: Performed by: EMERGENCY MEDICINE

## 2021-09-12 PROCEDURE — 1200000000 HC SEMI PRIVATE

## 2021-09-12 PROCEDURE — 94761 N-INVAS EAR/PLS OXIMETRY MLT: CPT

## 2021-09-12 RX ADMIN — GABAPENTIN 600 MG: 300 CAPSULE ORAL at 22:06

## 2021-09-12 RX ADMIN — ASPIRIN 81 MG: 81 TABLET, COATED ORAL at 08:34

## 2021-09-12 RX ADMIN — DEXAMETHASONE 6 MG: 4 TABLET ORAL at 08:35

## 2021-09-12 RX ADMIN — ROPINIROLE HYDROCHLORIDE 2 MG: 1 TABLET, FILM COATED ORAL at 22:06

## 2021-09-12 RX ADMIN — ROPINIROLE HYDROCHLORIDE 1 MG: 1 TABLET, FILM COATED ORAL at 08:41

## 2021-09-12 RX ADMIN — MULTIPLE VITAMINS W/ MINERALS TAB 1 TABLET: TAB at 08:35

## 2021-09-12 RX ADMIN — CARVEDILOL 6.25 MG: 6.25 TABLET, FILM COATED ORAL at 08:35

## 2021-09-12 RX ADMIN — Medication 2000 UNITS: at 08:35

## 2021-09-12 RX ADMIN — ZINC SULFATE 220 MG (50 MG) CAPSULE 50 MG: CAPSULE at 08:35

## 2021-09-12 RX ADMIN — ENOXAPARIN SODIUM 40 MG: 40 INJECTION SUBCUTANEOUS at 22:05

## 2021-09-12 RX ADMIN — SODIUM CHLORIDE, PRESERVATIVE FREE 10 ML: 5 INJECTION INTRAVENOUS at 08:35

## 2021-09-12 RX ADMIN — SODIUM CHLORIDE, PRESERVATIVE FREE 10 ML: 5 INJECTION INTRAVENOUS at 22:05

## 2021-09-12 RX ADMIN — GABAPENTIN 300 MG: 300 CAPSULE ORAL at 08:35

## 2021-09-12 RX ADMIN — PANTOPRAZOLE SODIUM 40 MG: 40 TABLET, DELAYED RELEASE ORAL at 08:35

## 2021-09-12 RX ADMIN — AMLODIPINE BESYLATE 5 MG: 5 TABLET ORAL at 22:06

## 2021-09-12 RX ADMIN — CARVEDILOL 6.25 MG: 6.25 TABLET, FILM COATED ORAL at 17:02

## 2021-09-12 RX ADMIN — CALCIUM CARBONATE-VITAMIN D TAB 500 MG-200 UNIT 2 TABLET: 500-200 TAB at 08:35

## 2021-09-12 RX ADMIN — PRAVASTATIN SODIUM 80 MG: 20 TABLET ORAL at 22:06

## 2021-09-12 RX ADMIN — ENOXAPARIN SODIUM 40 MG: 40 INJECTION SUBCUTANEOUS at 08:35

## 2021-09-12 RX ADMIN — DULOXETINE HYDROCHLORIDE 60 MG: 60 CAPSULE, DELAYED RELEASE ORAL at 08:35

## 2021-09-12 ASSESSMENT — PAIN SCALES - GENERAL
PAINLEVEL_OUTOF10: 0

## 2021-09-12 NOTE — PROGRESS NOTES
Vital signs and assessment completed. BSC hat emptied. Water pitcher refilled. Education reinforced on proning, acapella and frequent repositioning. Patient denies any other needs at this time. Call light, bedside table and personal belongings within reach. Will continue to monitor.

## 2021-09-12 NOTE — PROGRESS NOTES
Progress Note    SUBJECTIVE:  FU related to denies shortness of breath. Still on quite a bit of oxygen. No fevers or chills. OBJECTIVE:    Vitals:   TEMPERATURE:  Current - Temp: 97.4 °F (36.3 °C);  Max - Temp  Av.6 °F (36.4 °C)  Min: 97.2 °F (36.2 °C)  Max: 98.1 °F (36.7 °C)  RESPIRATIONS RANGE: Resp  Av.8  Min: 18  Max: 20  PULSE RANGE: Pulse  Av.7  Min: 69  Max: 86  BLOOD PRESSURE RANGE:  Systolic (72LWA), ADN:658 , Min:93 , EBW:099   ; Diastolic (83BHW), IN, Min:52, Max:86    PULSE OXIMETRY RANGE: SpO2  Av.3 %  Min: 90 %  Max: 99 %  24HR INTAKE/OUTPUT:      Intake/Output Summary (Last 24 hours) at 2021 0929  Last data filed at 2021 0313  Gross per 24 hour   Intake 525 ml   Output 1350 ml   Net -825 ml     -----------------------------------------------------------------  Exam:  General: alert  HEENT: Supple neck & negative  Heart: Regular  Lungs: Fairly clear just diminished  Abdomen: Normal & soft, No tenderness and BS normal  Extremities:  No edema   Neuro: NonFocal     -----------------------------------------------------------------  Diagnostic Data:  Lab Results   Component Value Date    WBC 5.5 09/10/2021    HGB 12.4 09/10/2021     09/10/2021       Lab Results   Component Value Date    BUN 26 (H) 09/10/2021    CREATININE 0.77 09/10/2021     09/10/2021    K 4.0 09/10/2021    CALCIUM 9.2 09/10/2021     09/10/2021    CO2 25 09/10/2021    LABGLOM >60 09/10/2021       Lab Results   Component Value Date    WBCUA 50  10/04/2020    RBCUA 0 TO 2 10/04/2020    EPITHUA 5 TO 10 10/04/2020    LEUKOCYTESUR NEGATIVE 2021    SPECGRAV 1.020 2021    GLUCOSEU NEGATIVE 2021    KETUA NEGATIVE 2021    PROTEINU NEGATIVE 2021    HGBUR NEGATIVE 2021    CASTUA NOT REPORTED 10/04/2020    CRYSTUA NOT REPORTED 10/04/2020    BACTERIA 2+ (A) 10/04/2020    YEAST NOT REPORTED 10/04/2020       Lab Results   Component Value Date    MYOGLOBIN 148 (H) 05/07/2016    TROPONINT NOT REPORTED 09/04/2021    CKTOTAL 77 09/02/2021    CKMB 3.6 09/02/2021    PROBNP 2,646 (H) 09/04/2021       ECHO-2D-M-MODE COMPLETE    Result Date: 9/1/2021  [address] [place] [address] Transthoracic Echocardiography Report (TTE)  Patient Name BRANDT FERRARA [facility]  Date of Study             09/01/2021               [facility]   Date of      1950  Gender                    Female  Birth   Age          66 year(s)  Race                         Room Number  I306        Height:                   67 inch, 170.18 cm   Corporate ID [id number]    Weight:                   197 pounds, 89.4 kg  #   Patient Acct [de-identified]   BSA:         2.01 m^2     BMI:        30.85  #                                                              kg/m^2   MR #         [id number]      Sonographer               Work,Coby   Accession #  [id number]  Interpreting Physician    Brady Davison   Fellow                   Referring Nurse                           Practitioner   Interpreting             Referring Physician       Israel Olivo,  Fellow                                             [place]  Type of Study   TTE procedure:2D Echocardiogram, M-Mode, Doppler, Color Doppler. Procedure Date Date: 09/01/2021 Start: 10:04 AM Study Location: [facility] Indications:Coronary artery disease, Hypotension and Pneumonia due to Covid-19. History / Tech. Comments: Dx: CAD, hypotension, Covid-19 Hx: HTN, hyperlipidemia, stents Patient Status: Inpatient Height: 67 inches Weight: 197 pounds BSA: 2.01 m^2 BMI: 30.85 kg/m^2 BP: 119/68 mmHg Allergies   - *Unlisted:(toradol). CONCLUSIONS Summary Poor image quality, likely due to patient body habitus and/or lung disease. Global left ventricular function is difficult to assess but appears mildly reduced with an estimated EF of 45-50%. Mild left ventricular hypertrophy and with normal left ventricular cavity size.  Unable to assess specific wall motion abnormalities due to image quality. Mild mitral and tricuspid regurgitation. Evidence of mild diastolic dysfunction is seen. Signature ----------------------------------------------------------------------------  Electronically signed by Coby Santamaria(Sonographer) on 2021 10:46 AM ---------------------------------------------------------------------------- ----------------------------------------------------------------------------  Electronically signed by Brady Davison(Interpreting physician) on 2021  06:00 PM ---------------------------------------------------------------------------- FINDINGS Left Atrium Left atrium is normal in size. Left Ventricle Poor image quality, likely due to patient body habitus and/or lung disease. Global left ventricular function is difficult to assess but appears mildly reduced with an estimated EF of 45-50%. Mild left ventricular hypertrophy and with normal left ventricular cavity size. Unable to assess specific wall motion abnormalities due to image quality. Right Atrium Right atrium is normal in size. Right Ventricle Normal right ventricular size and function. Mitral Valve Normal mitral valve structure with mild mitral regurgitation. Aortic Valve Normal aortic valve structure and function without stenosis or regurgitation. Tricuspid Valve Normal tricuspid valve structure with mild tricuspid regurgitation. Pulmonic Valve The pulmonic valve is normal in structure. Pericardial Effusion No significant pericardial effusion is seen. Miscellaneous Evidence of mild diastolic dysfunction is seen. Normal aortic root dimension.  M-mode / 2D Measurements & Calculations:   LVIDd:4.66 cm(3.7 - 5.6 cm)      Diastolic UECBVX:54.2 ml  DAOFB:3.60 cm(2.2 - 4.0 cm)      Systolic NZGJZZ:00.95 ml  IVSd:1.16 cm(0.6 - 1.1 cm)       Aortic Root:3.38 cm(2.0 - 3.7 cm)  LVPWd:0.98 cm(0.6 - 1.1 cm)      LA Dimension: 3.34 cm(1.9 - 4.0 cm)  Fractional Shortenin.83 %    LA volume/Index: 41 ml /20m^2  Calculated LVEF (%): 50.56 % AV Cusp Separation: 2.24 cm   Mitral:                                 Aortic   Valve Area (P1/2-Time): 2.88 cm^2       Peak Velocity: 1.05 m/s  Peak E-Wave: 0.61 m/s                   Mean Velocity: 0.81 m/s  Peak A-Wave: 0.78 m/s                   Peak Gradient: 4.42 mmHg  E/A Ratio: 0.78                         Mean Gradient: 2.83 mmHg  Peak Gradient: 1.47 mmHg                                          Acceleration Time: 49.73 msec  P1/2t: 76.38 msec                                          AV VTI: 15.29 cm   Tricuspid:   Peak TR Velocity: 2.36 m/s  Peak TR Gradient: 22.42845 mmHg  Diastology / Tissue Doppler Lateral Wall E' velocity:0.09 m/s Lateral Wall E/E':7.81    XR CHEST PORTABLE    Result Date: 8/31/2021  EXAMINATION: ONE XRAY VIEW OF THE CHEST 8/31/2021 6:57 pm COMPARISON: 21 August 2021 HISTORY: ORDERING SYSTEM PROVIDED HISTORY: sob TECHNOLOGIST PROVIDED HISTORY: sob FINDINGS: AP portable view of the chest time stamped at 1850 hours demonstrates paired intraspinal electrodes terminating in the lower thoracic area and ACDF hardware at the base of the neck. Heart size is normal.  Heart size is normal.  Interstitial markings are mildly increased which may be related to vascular congestion likely interstitial pneumonitis. There is suggestion of a small left effusion. No extrapleural air is seen. Osseous structures are age-appropriate. Diffuse interstitial prominence which may be related to pulmonary vascular congestion or interstitial pneumonitis. A small left effusion is present. CT CHEST PULMONARY EMBOLISM W CONTRAST    Result Date: 8/31/2021  EXAMINATION: CTA OF THE CHEST 8/31/2021 8:26 pm TECHNIQUE: CTA of the chest was performed after the administration of intravenous contrast.  Multiplanar reformatted images are provided for review. MIP images are provided for review.  Dose modulation, iterative reconstruction, and/or weight based adjustment of the mA/kV was utilized to reduce the radiation dose to as low as reasonably achievable. COMPARISON: 08/30/2021 and 08/21/2021 HISTORY: ORDERING SYSTEM PROVIDED HISTORY: elevated d dimer and hypoxia TECHNOLOGIST PROVIDED HISTORY: elevated d dimer and hypoxia Decision Support Exception - unselect if not a suspected or confirmed emergency medical condition->Emergency Medical Condition (MA) FINDINGS: Pulmonary Arteries: No evidence of central, lobar or proximal segmental pulmonary artery emboli. Mediastinum: Cardiomegaly. Coronary disease identified. Trace pericardial fluid. No suspicious mediastinal or hilar adenopathy identified per CT criteria. Aortic vascular calcifications. Lungs/pleura: Multifocal interstitial alveolar opacities throughout both lungs with a subpleural distribution suggestive of multifocal atypical infection/viral pneumonia. Bibasilar consolidative changes suggestive of areas of atelectasis. Upper Abdomen: Multiple circumscribed lesions involving the right left hepatic lobe suggestive of cyst.. Soft Tissues/Bones: Multilevel degenerate changes thoracic spine. Spinal stimulator leads identified     No evidence of central to segmental pulmonary emboli. Multifocal airspace opacities suggestive multifocal atypical infection/viral pneumonia. Cardiomegaly with coronary artery disease       ASSESSMENT:    Principal Problem:    Pneumonia due to COVID-19 virus  Active Problems:    CAD (coronary artery disease)    Hypertension    Sepsis ([address]    Acute respiratory failure with hypoxia (HCC)    Dyslipidemia  Resolved Problems:    * No resolved hospital problems.  *      Patient Active Problem List    Diagnosis Date Noted    Dyslipidemia     Pneumonia due to COVID-19 virus 08/31/2021    Acute respiratory failure with hypoxia ([address]     Cervical spinal stenosis 08/17/2021    Acquired spondylolisthesis 01/23/2018    Spinal stenosis, lumbar region, without neurogenic claudication 01/23/2018    Postlaminectomy syndrome, lumbar region 01/23/2018    Chronic midline low back pain without sciatica 01/23/2018    DIC (disseminated intravascular coagulation) ([facility] 05/08/2016    Coagulopathy ([facility] 05/08/2016    Thrombocytopenia ([facility] 05/08/2016    Acute cystitis with hematuria     Pneumonia due to organism     Acute cystitis without hematuria 05/07/2016    CAD (coronary artery disease) 05/07/2016    Hypertension 05/07/2016    Lactic acid acidosis 05/07/2016    JAZZMINE (acute kidney injury) ([facility] 05/07/2016    Sepsis ([facility] 05/07/2016       PLAN:  · COVID-19 virus protocol  · Infectious disease  · Slowly improving  · Critical Care Time: 0  · Home meds  · Discussed with patient about proning and respiratory care. Again.       Pippa Sagastume MD , M.D.

## 2021-09-12 NOTE — PROGRESS NOTES
· Discontinuation of therapy  Coordination of Outpatient Care:   · Estimated Length of IV antimicrobials:TBD  · Patient will need Midline Catheter Insertion: TBD  · Patient will need PICC line Insertion: No  · Patient will need: Home IV , [place],  SNF,  LTAC:TBD  · Patient will need outpatient wound care:No    Chief complaint/reason for consultation:   · Concern for COVID infection      History of Present Illness:   Sierra Morillo is a 66y.o.-year-old female who was initially admitted on 8/31/2021. Patient seen at the request of Skip Villeda. INITIAL HISTORY:    Patient has a history of degenerative disc disease in the cervical area. She underwent an ACDF (anterior cervical discectomy and fusion) on 8/17/2021 at [facility] was discharged home the next day with a drain. A few days later she developed malaise, fevers, and some shortness of breath. She was evaluated at that point in time at [facility], ER were her temperature was noted to be 103 F and she had a positive Covid test on 8/23/2021. She was transferred back to [facility] where she was treated conservatively in regards to her cervical neck incision. The neck incision was described as being clean and dry. The drain was removed on 8/22/2021. She also received treatment for the Covid with remdesivir and Decadron. Her symptoms improved and she was discharged home on 8/25/2021 with a recommendation to continue Decadron for 3 more days. While at [facility] she also showed desaturation when off oxygen, therefore she was discharged on home oxygen with a pulse oximeter and request to follow with pulmonary medicine. There are no data on CRP or other inflammatory markers. Her chest x-ray on 8/21/2021 showed minimal bibasilar atelectasis. CT scan was not done. The patient then presented to [facility] on 8/31/2021 complaining of generalized fatigue, decreased appetite, weakness, cough, mild shortness of breath. She denied any signs of inflammation around her previous cervical neck incision. She was noted to have hypoxia with an O2 saturation of 75 on room air. CT scan of the chest was performed which shows multifocal pneumonia with findings compatible with Covid pneumonia. Patient admitted because of concerns with COVID 19.    CURRENT EVALUATION : 9/12/2021    Patient evaluated in the ICU. Afebrile  VS stable    The patient is oxygenating well on 3 L NC.     CRP has greatly decreased 181.5-->8.8    Patient exhibiting respiratory distress. Yes  Respiratory secretions: No    Patient receiving supplemental oxygen. High flow--> 5-->3 L NC    02 sat 99-->97-->99-->97  RR 18-->20-->18-->20      % FIO2: 61-->75-->78-->84-->82-->64-->55-->44  PEEP:      QTc:       NEWS Score: 0-4 Low risk group; 5-6: Medium risk group; 7 or above: High risk group  Parameters 3 2 1 0 1 2 3   Age    < 65   ? 65   RR ? 8  9-11 12-20  21-24 ? 25   O2 Sats ?  91 92-93 94-95 ? 96      Suppl O2  Yes  No      SBP ? 90  101-110 111-219   ? 220   HR ? 40  41-50 51-90  111-130 ? 131   Consciousness    Alert   Drowsiness, lethargy, or confusion   Temperature ? 35.0 C (95.0 F)  35.1-36.0 C 95.1-96.9 F 36.1-38.0 C 97.0-100.4 F 38.1-39.0 C 100.5-102.3 F ? 39.1 C ? 102.4 F      NEWS Score:   9/1/2021: 9 high risk    Overall Daily Picture:   · Improved    Presence of secondary bacterial Infection:    No   Additional antibiotics: No    Labs, X rays reviewed: 9/12/2021    BUN: 24-->25-->26  Cr: 0.74-->0.70-->0.77    WBC: 5.7-->5.9-->5.5  Hb: 11.6-->11.5-->12.4  Plat: 222-->226-->270    Absolute Neutrophils: 5.16  Absolute Lymphocytes: 0.48  Neutrophil/Lymphocyte Ratio: 10.7 high risk    CRP: 181.5-->8.8  Ferritin: 789-->905  LDH: 250    Pro Calcitonin:      Cultures:  Urine:  ·   Blood:  · 3131x2 no growth  Sputum :  ·   Wound:       CXR:     CAT:  · 8-31-21: Multifocal pneumonia compatible with Covid 19    MRSA nasal screen 8/22/2021 -    Discussed with patient, RN, CC, IM. I have personally reviewed the past medical history, past surgical history, medications, social history, and family history, and I have updated the database accordingly.   Past Medical History:     Past Medical History:   Diagnosis Date    Arthritis     Blockage of coronary artery of heart ([address] 2010    x 2 stents    CAD (coronary artery disease)     DDD (degenerative disc disease), cervical     cervical 3-6    Fibromyalgia     History of blood transfusion     History of kidney stones     last one \"about 10 years ago\"    Hyperlipidemia     Hypertension     Pancreatitis 2016    Sleep apnea     Thyroid disease     Wears glasses     for reading       Past Surgical  History:     Past Surgical History:   Procedure Laterality Date    ARTHROPLASTY Left 2/6/2020    LEFT FIRST CARPOMETACARPAL ARTHROPLASTY performed by Geovany Johnston DO at [address]  2012    L4,L5,S1 hardware placed    BACK SURGERY  2018    SPINAL CORD STIMULATOR    CATARACT REMOVAL Bilateral 2016    CERVICAL FUSION N/A 8/17/2021    C3-6 ACDF, REMOVAL OF HARDWARE C6-7 performed by Reyna Almazan MD at [address]    COLONOSCOPY      normal    CORONARY ANGIOPLASTY  2010    x 2 stents    FINGER SURGERY      right thumb joint    HERNIA REPAIR Right     inguinal    HYSTERECTOMY      JOINT REPLACEMENT Right     knee    KIDNEY STONE SURGERY      x 3    LITHOTRIPSY      x 3    PARATHYROIDECTOMY      PARATHYROIDECTOMY      MS PERCUT IMPLNT NEUROELECT,EPIDURAL N/A 8/8/2018    SPINAL CORD STIMULATOR IMPLANT performed by Abbi Garcia MD at [facility] 03/2019       Medications:      dexamethasone  6 mg Oral Daily    amLODIPine  5 mg Oral Nightly    enoxaparin  40 mg SubCUTAneous BID    aspirin  81 mg Oral Daily    calcium-vitamin D  2 tablet Oral Daily    DULoxetine  60 mg Oral Daily    gabapentin  600 mg Oral Nightly    gabapentin  300 mg Oral QAM    therapeutic multivitamin-minerals  1 tablet Oral Daily    pantoprazole  40 mg Oral Daily    pravastatin  80 mg Oral Nightly    rOPINIRole  1 mg Oral QAM    rOPINIRole  2 mg Oral Nightly    Vitamin D  2,000 Units Oral Daily    zinc sulfate  50 mg Oral Daily    carvedilol  6.25 mg Oral BID WC    sodium chloride flush  10 mL IntraVENous 2 times per day    gabapentin  600 mg Oral Once       Social History:     Social History     Socioeconomic History    Marital status:      Spouse name: Not on file    Number of children: Not on file    Years of education: Not on file    Highest education level: Not on file   Occupational History    Not on file   Tobacco Use    Smoking status: Former Smoker     Packs/day: 1.00     Years: 44.00     Pack years: 44.00     Types: Cigarettes     Quit date: 2010     Years since quittin.3    Smokeless tobacco: Never Used   Vaping Use    Vaping Use: Never used   Substance and Sexual Activity    Alcohol use: No    Drug use: No    Sexual activity: Not on file   Other Topics Concern    Not on file   Social History Narrative    Not on file     Social Determinants of Health     Financial Resource Strain:     Difficulty of Paying Living Expenses:    Food Insecurity:     Worried About [facility] in the Last Year:     [facility] in the Last Year:    Transportation Needs:     Lack of Transportation (Medical):      Lack of Transportation (Non-Medical):    Physical Activity:     Days of Exercise per Week:     Minutes of Exercise per Session:    Stress:     Feeling of Stress :    Social Connections:     Frequency of Communication with Friends and Family:     Frequency of Social Gatherings with Friends and Family:     Attends [employer] Services:     Active Member of Clubs or Organizations:     Attends Club or Organization Meetings:     Marital Status:    Intimate Partner Violence:     Fear of PROVIDED HISTORY:   elevated d dimer and hypoxia   Decision Support Exception - unselect if not a suspected or confirmed   emergency medical condition->Emergency Medical Condition (MA)       FINDINGS:   Pulmonary Arteries: No evidence of central, lobar or proximal segmental   pulmonary artery emboli.       Mediastinum: Cardiomegaly.  Coronary disease identified.  Trace pericardial   fluid.  No suspicious mediastinal or hilar adenopathy identified per CT   criteria.  Aortic vascular calcifications.       Lungs/pleura: Multifocal interstitial alveolar opacities throughout both   lungs with a subpleural distribution suggestive of multifocal atypical   infection/viral pneumonia.  Bibasilar consolidative changes suggestive of   areas of atelectasis.       Upper Abdomen: Multiple circumscribed lesions involving the right left   hepatic lobe suggestive of cyst..       Soft Tissues/Bones: Multilevel degenerate changes thoracic spine.  Spinal   stimulator leads identified           Impression   No evidence of central to segmental pulmonary emboli.       Multifocal airspace opacities suggestive multifocal atypical infection/viral   pneumonia.       Cardiomegaly with coronary artery disease       Medical Decision Mqjaku-Injfyotm-Nrchb:       Medical Decision Making-Other:     Note:  · Labs, medications, radiologic studies were reviewed with personal review of films  · Large amounts of data were reviewed  · Discussed with nursing Staff, Discharge planner  · Infection Control and Prevention measures reviewed  · All prior entries were reviewed  · Administer medications as ordered  · Prognosis: Guarded  · Discharge planning reviewed  · Follow up as outpatient. Thank you for allowing us to participate in the care of this patient. Please call with questions. BEAU Ackerman - CNP     ATTESTATION:    I have discussed the case, including pertinent history and exam findings with the APRN.  I have evaluated the  History, physical findings and pictures of the patient and the key elements of the encounter have been performed by me. I have reviewed the laboratory data, other diagnostic studies and discussed them with the APRN. I have updated the medical record where necessary. I agree with the assessment, plan and orders as documented by the APRN.     Fredis Purcell MD.              Pager: [phone] - Office: [phone]

## 2021-09-12 NOTE — PROGRESS NOTES
Writer to bedside to complete morning assessment. Upon entry to room, pt sitting at edge of bed eating breakfast, respirations even and unlabored while on 3 liters of oxygen per nasal cannula. Vitals obtained and assessment completed, see flow sheet for details. Morning medications given. Pt denies further needs from writer at this time. Call light in reach. Will continue to monitor.

## 2021-09-12 NOTE — PROGRESS NOTES
Nightly medications given. Patient denies any other needs at this time. Call light, bedside table and personal belongings within reach. Will continue to monitor.

## 2021-09-12 NOTE — PROGRESS NOTES
Vital signs and assessment completed. Assessment unchanged from previous shift assessment. BSC hat emptied. Water pitcher refilled. Trash emptied. Patient denies any other needs at this time. Call light, bedside table and personal belongings within reach. Will continue to monitor.

## 2021-09-13 ENCOUNTER — APPOINTMENT (OUTPATIENT)
Dept: GENERAL RADIOLOGY | Age: 71
DRG: 871 | End: 2021-09-13
Payer: MEDICARE

## 2021-09-13 PROCEDURE — 6360000002 HC RX W HCPCS: Performed by: FAMILY MEDICINE

## 2021-09-13 PROCEDURE — 71045 X-RAY EXAM CHEST 1 VIEW: CPT

## 2021-09-13 PROCEDURE — 2580000003 HC RX 258: Performed by: EMERGENCY MEDICINE

## 2021-09-13 PROCEDURE — 6370000000 HC RX 637 (ALT 250 FOR IP): Performed by: FAMILY MEDICINE

## 2021-09-13 PROCEDURE — 99222 1ST HOSP IP/OBS MODERATE 55: CPT | Performed by: INTERNAL MEDICINE

## 2021-09-13 PROCEDURE — 6360000002 HC RX W HCPCS: Performed by: NURSE PRACTITIONER

## 2021-09-13 PROCEDURE — 6370000000 HC RX 637 (ALT 250 FOR IP): Performed by: INTERNAL MEDICINE

## 2021-09-13 PROCEDURE — APPSS30 APP SPLIT SHARED TIME 16-30 MINUTES: Performed by: NURSE PRACTITIONER

## 2021-09-13 PROCEDURE — 99233 SBSQ HOSP IP/OBS HIGH 50: CPT | Performed by: INTERNAL MEDICINE

## 2021-09-13 PROCEDURE — 2700000000 HC OXYGEN THERAPY PER DAY

## 2021-09-13 PROCEDURE — 94761 N-INVAS EAR/PLS OXIMETRY MLT: CPT

## 2021-09-13 PROCEDURE — 1200000000 HC SEMI PRIVATE

## 2021-09-13 RX ADMIN — CARVEDILOL 6.25 MG: 6.25 TABLET, FILM COATED ORAL at 16:29

## 2021-09-13 RX ADMIN — SODIUM CHLORIDE, PRESERVATIVE FREE 10 ML: 5 INJECTION INTRAVENOUS at 09:37

## 2021-09-13 RX ADMIN — SODIUM CHLORIDE, PRESERVATIVE FREE 10 ML: 5 INJECTION INTRAVENOUS at 21:08

## 2021-09-13 RX ADMIN — ENOXAPARIN SODIUM 40 MG: 40 INJECTION SUBCUTANEOUS at 21:07

## 2021-09-13 RX ADMIN — ENOXAPARIN SODIUM 40 MG: 40 INJECTION SUBCUTANEOUS at 09:36

## 2021-09-13 RX ADMIN — GABAPENTIN 600 MG: 300 CAPSULE ORAL at 21:07

## 2021-09-13 RX ADMIN — MULTIPLE VITAMINS W/ MINERALS TAB 1 TABLET: TAB at 09:37

## 2021-09-13 RX ADMIN — AMLODIPINE BESYLATE 5 MG: 5 TABLET ORAL at 21:07

## 2021-09-13 RX ADMIN — PANTOPRAZOLE SODIUM 40 MG: 40 TABLET, DELAYED RELEASE ORAL at 09:37

## 2021-09-13 RX ADMIN — GABAPENTIN 300 MG: 300 CAPSULE ORAL at 09:37

## 2021-09-13 RX ADMIN — ROPINIROLE HYDROCHLORIDE 1 MG: 1 TABLET, FILM COATED ORAL at 09:37

## 2021-09-13 RX ADMIN — ZINC SULFATE 220 MG (50 MG) CAPSULE 50 MG: CAPSULE at 09:37

## 2021-09-13 RX ADMIN — CALCIUM CARBONATE-VITAMIN D TAB 500 MG-200 UNIT 2 TABLET: 500-200 TAB at 09:37

## 2021-09-13 RX ADMIN — DEXAMETHASONE 6 MG: 4 TABLET ORAL at 09:36

## 2021-09-13 RX ADMIN — CARVEDILOL 6.25 MG: 6.25 TABLET, FILM COATED ORAL at 09:43

## 2021-09-13 RX ADMIN — DULOXETINE HYDROCHLORIDE 60 MG: 60 CAPSULE, DELAYED RELEASE ORAL at 09:37

## 2021-09-13 RX ADMIN — PRAVASTATIN SODIUM 80 MG: 20 TABLET ORAL at 21:07

## 2021-09-13 RX ADMIN — ASPIRIN 81 MG: 81 TABLET, COATED ORAL at 09:37

## 2021-09-13 RX ADMIN — ROPINIROLE HYDROCHLORIDE 2 MG: 1 TABLET, FILM COATED ORAL at 21:06

## 2021-09-13 RX ADMIN — Medication 2000 UNITS: at 09:37

## 2021-09-13 ASSESSMENT — ENCOUNTER SYMPTOMS
EYES NEGATIVE: 1
DIARRHEA: 0
VOMITING: 0
RECTAL PAIN: 0
VOICE CHANGE: 0
COUGH: 1
ALLERGIC/IMMUNOLOGIC NEGATIVE: 1
SHORTNESS OF BREATH: 1
NAUSEA: 0
SINUS PAIN: 0
ABDOMINAL DISTENTION: 0
TROUBLE SWALLOWING: 0

## 2021-09-13 ASSESSMENT — PAIN SCALES - GENERAL: PAINLEVEL_OUTOF10: 0

## 2021-09-13 NOTE — PROGRESS NOTES
The patient is awake and resting in her bed quietly at this time. Vitals have been checked and are within the normal. Patient's respiration sis even and unlabored as of now and O2 saturation is 93% on 3 l/min O2 cannula. Patient denies of any pain or discomfort at this time. Further needs are assessed. Safety precautions are in place. Will continue to monitor.

## 2021-09-13 NOTE — PROGRESS NOTES
[facility] Telemedicine Note  COVID 19 Patient  Today's Date and Time: 9/13/2021, 10:37 AM    Impression :   · COVID 19 Suspect  · COVID 19 Confirmed Infection- Pneumonia  · Covid tests:  · 8/21/2021 : Positive  · 8-23-21: Positive  · 8-31-21: Positive  · Hypoxia   · Hypotension- resolved  · Hx Fibromyalgia  · Essential HTN    Patient evaluated by Telemedicine. Requesting Institution: [facility]  Provider Institution: [address] [place] [address],[facility] [address] [place], [address] Person at [facility]: Ms Lane Otoniel, APRN- IM    Recommendations:   · Antibiotic Rx:  · Monitor off antibiotics  · Covid Rx  · Remdesivir. Completed at [facility]  · Decadron. Restart on 9/1/2021  · Prior cervical ACDF for degenerative disk disease at [address] [place] [address] on 8-17-21. Medical Decision Making/Summary/Discussion:9/13/2021     · Patient admitted with suspected COVID 19 infection  · Covid test confirmed positive. · Patient with initial diagnosis of COVID-19 on 8/21/2021. Received partial treatment with remdesivir and Decadron at [facility].  Was sent home on oral Decadron. Initial chest x-ray shows some bibasilar atelectasis. · Patient has deteriorated since then and current chest x-ray on admission to [facility] shows bilateral interstitial infiltrates. CT scan shows multifocal pneumonia. · Patient has developed significant hypoxia. and elevated CRP  · At this point in time remdesivir will no longer be efficacious  · Patient will need to continue with Decadron  · Unfortunately she is already out of the window for Actemra. She also does not qualify for monoclonal antibody. · She will need to be monitored for any potential bacterial superinfection  · Patient is at high risk for a complicated course.   · Patient is slowly improving    Infection Control Recommendations   · Universal Precautions  · Airborne isolation  · Droplet Isolation    Antimicrobial Stewardship Recommendations · Discontinuation of therapy  Coordination of Outpatient Care:   · Estimated Length of IV antimicrobials:TBD  · Patient will need Midline Catheter Insertion: TBD  · Patient will need PICC line Insertion: No  · Patient will need: Home IV , [place],  SNF,  LTAC:TBD  · Patient will need outpatient wound care:No    Chief complaint/reason for consultation:   · Concern for COVID infection      History of Present Illness:   Anthony Ruano is a 66y.o.-year-old female who was initially admitted on 8/31/2021. Patient seen at the request of Marcos Camacho. INITIAL HISTORY:    Patient has a history of degenerative disc disease in the cervical area. She underwent an ACDF (anterior cervical discectomy and fusion) on 8/17/2021 at [facility] was discharged home the next day with a drain. A few days later she developed malaise, fevers, and some shortness of breath. She was evaluated at that point in time at [facility], ER were her temperature was noted to be 103 F and she had a positive Covid test on 8/23/2021. She was transferred back to [facility] where she was treated conservatively in regards to her cervical neck incision. The neck incision was described as being clean and dry. The drain was removed on 8/22/2021. She also received treatment for the Covid with remdesivir and Decadron. Her symptoms improved and she was discharged home on 8/25/2021 with a recommendation to continue Decadron for 3 more days. While at [facility] she also showed desaturation when off oxygen, therefore she was discharged on home oxygen with a pulse oximeter and request to follow with pulmonary medicine. There are no data on CRP or other inflammatory markers. Her chest x-ray on 8/21/2021 showed minimal bibasilar atelectasis. CT scan was not done. The patient then presented to [facility] on 8/31/2021 complaining of generalized fatigue, decreased appetite, weakness, cough, mild shortness of breath. She denied any signs of inflammation around her previous cervical neck incision. She was noted to have hypoxia with an O2 saturation of 75 on room air. CT scan of the chest was performed which shows multifocal pneumonia with findings compatible with Covid pneumonia. Patient admitted because of concerns with COVID 19.    CURRENT EVALUATION : 9/13/2021    Patient evaluated in the ICU. Afebrile  VS stable    The patient is doing better today on 3 L NC    CRP has greatly decreased 181.5-->8.8    Patient exhibiting respiratory distress. Yes  Respiratory secretions: No    Patient receiving supplemental oxygen. High flow--> 5-->3 L NC    02 sat 100  RR 18      % FIO2:   PEEP:      QTc:       NEWS Score: 0-4 Low risk group; 5-6: Medium risk group; 7 or above: High risk group  Parameters 3 2 1 0 1 2 3   Age    < 65   ? 65   RR ? 8  9-11 12-20  21-24 ? 25   O2 Sats ? 91 92-93 94-95 ? 96      Suppl O2  Yes  No      SBP ? 90  101-110 111-219   ? 220   HR ? 40  41-50 51-90  111-130 ? 131   Consciousness    Alert   Drowsiness, lethargy, or confusion   Temperature ? 35.0 C (95.0 F)  35.1-36.0 C 95.1-96.9 F 36.1-38.0 C 97.0-100.4 F 38.1-39.0 C 100.5-102.3 F ? 39.1 C ? 102.4 F      NEWS Score:   9/1/2021: 9 high risk    Overall Daily Picture:   · Improved    Presence of secondary bacterial Infection:    No   Additional antibiotics: No    Labs, X rays reviewed: 9/13/2021    BUN: 24-->25-->26  Cr: 0.74-->0.70-->0.77    WBC: 5.7-->5.9-->5.5  Hb: 11.6-->11.5-->12.4  Plat: 222-->226-->270    Absolute Neutrophils: 5.16  Absolute Lymphocytes: 0.48  Neutrophil/Lymphocyte Ratio: 10.7 high risk    CRP: 181.5-->8.8  Ferritin: 789-->905  LDH: 250    Pro Calcitonin:      Cultures:  Urine:  ·   Blood:  · 3131x2 no growth  Sputum :  ·   Wound:       CXR:     CAT:  · 8-31-21: Multifocal pneumonia compatible with Covid 19    MRSA nasal screen 8/22/2021 -    Discussed with patient, RN, CC, IM.     I have personally reviewed the past medical history, past surgical history, medications, social history, and family history, and I have updated the database accordingly.   Past Medical History:     Past Medical History:   Diagnosis Date    Arthritis     Blockage of coronary artery of heart ([address] 2010    x 2 stents    CAD (coronary artery disease)     DDD (degenerative disc disease), cervical     cervical 3-6    Fibromyalgia     History of blood transfusion     History of kidney stones     last one \"about 10 years ago\"    Hyperlipidemia     Hypertension     Pancreatitis 2016    Sleep apnea     Thyroid disease     Wears glasses     for reading       Past Surgical  History:     Past Surgical History:   Procedure Laterality Date    ARTHROPLASTY Left 2/6/2020    LEFT FIRST CARPOMETACARPAL ARTHROPLASTY performed by Ama Centeno DO [facility]  2012    L4,L5,S1 hardware placed    BACK SURGERY  2018    SPINAL CORD STIMULATOR    CATARACT REMOVAL Bilateral 2016    CERVICAL FUSION N/A 8/17/2021    C3-6 ACDF, REMOVAL OF HARDWARE C6-7 performed by Wade Emerson MD at [address]    COLONOSCOPY      normal    CORONARY ANGIOPLASTY  2010    x 2 stents    FINGER SURGERY      right thumb joint    HERNIA REPAIR Right     inguinal    HYSTERECTOMY      JOINT REPLACEMENT Right     knee    KIDNEY STONE SURGERY      x 3    LITHOTRIPSY      x 3    PARATHYROIDECTOMY      PARATHYROIDECTOMY      [place] PERCUT IMPLNT NEUROELECT,EPIDURAL N/A 8/8/2018    SPINAL CORD STIMULATOR IMPLANT performed by Cliff Olivo MD at [address] Right 03/2019       Medications:      dexamethasone  6 mg Oral Daily    amLODIPine  5 mg Oral Nightly    enoxaparin  40 mg SubCUTAneous BID    aspirin  81 mg Oral Daily    calcium-vitamin D  2 tablet Oral Daily    DULoxetine  60 mg Oral Daily    gabapentin  600 mg Oral Nightly    gabapentin  300 mg Oral QAM    therapeutic multivitamin-minerals  1 tablet Oral Daily    pantoprazole  40 mg Oral Daily    pravastatin  80 mg Oral Nightly    rOPINIRole  1 mg Oral QAM    rOPINIRole  2 mg Oral Nightly    Vitamin D  2,000 Units Oral Daily    zinc sulfate  50 mg Oral Daily    carvedilol  6.25 mg Oral BID WC    sodium chloride flush  10 mL IntraVENous 2 times per day    gabapentin  600 mg Oral Once       Social History:     Social History     Socioeconomic History    Marital status:      Spouse name: Not on file    Number of children: Not on file    Years of education: Not on file    Highest education level: Not on file   Occupational History    Not on file   Tobacco Use    Smoking status: Former Smoker     Packs/day: 1.00     Years: 44.00     Pack years: 44.00     Types: Cigarettes     Quit date: 2010     Years since quittin.3    Smokeless tobacco: Never Used   Vaping Use    Vaping Use: Never used   Substance and Sexual Activity    Alcohol use: No    Drug use: No    Sexual activity: Not on file   Other Topics Concern    Not on file   Social History Narrative    Not on file     Social Determinants of Health     Financial Resource Strain:     Difficulty of Paying Living Expenses:    Food Insecurity:     Worried About [facility] in the Last Year:     [facility] in the Last Year:    Transportation Needs:     Lack of Transportation (Medical):      Lack of Transportation (Non-Medical):    Physical Activity:     Days of Exercise per Week:     Minutes of Exercise per Session:    Stress:     Feeling of Stress :    Social Connections:     Frequency of Communication with Friends and Family:     Frequency of Social Gatherings with Friends and Family:     Attends Christianity Services:     Active Member of Clubs or Organizations:     Attends Club or Organization Meetings:     Marital Status:    Intimate Partner Violence:     Fear of Current or Ex-Partner:     Emotionally Abused:     Physically Abused:     Sexually Abused:        Family History:     Family History   Problem Relation Age of Onset    Heart Disease Father         Allergies:   Lisinopril and Toradol [ketorolac tromethamine]     Review of Systems:       Constitutional: No fevers or chills. No systemic complaints  Head: No headaches  Eyes: No double vision or blurry vision. No conjunctival inflammation. ENT: No sore throat or runny nose. . No hearing loss, tinnitus or vertigo. Cardiovascular: No chest pain or palpitations. Shortness of breath. LOW  Lung: Shortness of breath, cough. No sputum production  Abdomen: No nausea, vomiting, diarrhea, or abdominal pain. [place] No cramps. Genitourinary: No increased urinary frequency, or dysuria. No hematuria. No suprapubic or CVA pain  Musculoskeletal: No muscle aches or pains. No joint effusions, swelling or deformities  Hematologic: No bleeding or bruising. Neurologic: No headache, weakness, numbness, or tingling. Integument: No rash, no ulcers. Psychiatric: No depression. Endocrine: No polyuria, no polydipsia, no polyphagia. Physical Examination :     Patient Vitals for the past 8 hrs:   BP Temp Temp src Pulse Resp SpO2   09/13/21 0728 120/80 96.9 °F (36.1 °C) Temporal 68 18 100 %     General Appearance: Awake, alert, and in mild apparent distress  Head:  Normocephalic, no trauma  Eyes: Pupils equal, round, reactive to light; sclera anicteric; conjunctivae pink. No embolic phenomena. ENT: Oropharynx clear, without erythema, exudate, or thrush. No tenderness of sinuses. Mouth/throat: mucosa pink and moist. No lesions. Dentition in good repair. Neck:Supple, without lymphadenopathy. Thyroid normal, No bruits. Pulmonary/Chest: Coarse breath sounds with bilateral rhonchi. On nasal oxygen. Cardiovascular: Regular rate and rhythm without murmurs, rubs, or gallops. Abdomen: Soft, non tender.  Bowel sounds normal. No organomegaly  All four Extremities: No cyanosis, clubbing, edema, or effusions. Neurologic: No gross sensory or motor deficits. Skin: Warm and dry with good turgor. No signs of peripheral arterial or venous insufficiency. No ulcerations. No open wounds. Medical Decision Making -Laboratory:   I have independently reviewed/ordered the following labs:    CBC with Differential:   No results for input(s): WBC, HGB, HCT, PLT, SEGSPCT, BANDSPCT, LYMPHOPCT, MONOPCT, EOSPCT in the last 72 hours. BMP:   No results for input(s): NA, K, CL, CO2, BUN, CREATININE, MG in the last 72 hours. Invalid input(s): CA  Hepatic Function Panel:   No results for input(s): PROT, LABALBU, BILIDIR, IBILI, BILITOT, ALKPHOS, ALT, AST in the last 72 hours. No results for input(s): RPR in the last 72 hours. No results for input(s): HIV in the last 72 hours. No results for input(s): BC in the last 72 hours. Lab Results   Component Value Date    MUCUS NOT REPORTED 10/04/2020    RBC 4.04 09/10/2021    TRICHOMONAS NOT REPORTED 10/04/2020    WBC 5.5 09/10/2021    YEAST NOT REPORTED 10/04/2020    TURBIDITY CLEAR 08/31/2021     Lab Results   Component Value Date    CREATININE 0.77 09/10/2021    GLUCOSE 98 09/10/2021       Medical Decision Making-Imaging:     EXAMINATION:   CTA OF THE CHEST 8/31/2021 8:26 pm       TECHNIQUE:   CTA of the chest was performed after the administration of intravenous   contrast.  Multiplanar reformatted images are provided for review.  MIP   images are provided for review.  Dose modulation, iterative reconstruction,   and/or weight based adjustment of the mA/kV was utilized to reduce the   radiation dose to as low as reasonably achievable.       COMPARISON:   08/30/2021 and 08/21/2021       HISTORY:   ORDERING SYSTEM PROVIDED HISTORY: elevated d dimer and hypoxia   TECHNOLOGIST PROVIDED HISTORY:   elevated d dimer and hypoxia   Decision Support Exception - unselect if not a suspected or confirmed   emergency medical condition->Emergency Medical Condition (MA)       FINDINGS: Pulmonary Arteries: No evidence of central, lobar or proximal segmental   pulmonary artery emboli.       Mediastinum: Cardiomegaly.  Coronary disease identified.  Trace pericardial   fluid.  No suspicious mediastinal or hilar adenopathy identified per CT   criteria.  Aortic vascular calcifications.       Lungs/pleura: Multifocal interstitial alveolar opacities throughout both   lungs with a subpleural distribution suggestive of multifocal atypical   infection/viral pneumonia.  Bibasilar consolidative changes suggestive of   areas of atelectasis.       Upper Abdomen: Multiple circumscribed lesions involving the right left   hepatic lobe suggestive of cyst..       Soft Tissues/Bones: Multilevel degenerate changes thoracic spine.  Spinal   stimulator leads identified           Impression   No evidence of central to segmental pulmonary emboli.       Multifocal airspace opacities suggestive multifocal atypical infection/viral   pneumonia.       Cardiomegaly with coronary artery disease       Medical Decision Xxryac-Cptkleiz-Zszvn:       Medical Decision Making-Other:     Note:  · Labs, medications, radiologic studies were reviewed with personal review of films  · Large amounts of data were reviewed  · Discussed with nursing Staff, Discharge planner  · Infection Control and Prevention measures reviewed  · All prior entries were reviewed  · Administer medications as ordered  · Prognosis: Guarded  · Discharge planning reviewed      Thank you for allowing us to participate in the care of this patient. Please call with questions. Jordy Chapman, APRN - CNP     ATTESTATION:    I have discussed the case, including pertinent history and exam findings with the APRN. I have evaluated the  History, physical findings and pictures of the patient and the key elements of the encounter have been performed by me. I have reviewed the laboratory data, other diagnostic studies and discussed them with the APRN.  I have updated the medical record where necessary. I agree with the assessment, plan and orders as documented by the APRN.     Madelin Somers MD.            Pager: [phone] - Office: [phone]

## 2021-09-13 NOTE — CONSULTS
PULMONARY & CRITICAL CARE MEDICINE CONSULT NOTE     Patient:  Sherryle Purl  MRN: [id number]  Admit date: 8/31/2021  Primary Care Physician: Rowan De Dios MD  Consulting Physician: Jose Meehan MD  CODE Status: DNR-CCA  LOS: 13     Telemedicine/virtual visual visit  Patient was seen as a consultation evaluated virtual visual visit with nursing staff present during this visual visit  SUBJECTIVE     CHIEF COMPLAINT/REASON FOR CONSULT:    Acute hypoxic respiratory failure/COVID-19 pneumonia    HISTORY OF PRESENT ILLNESS:  The patient is a 66 y.o. female history of hypertension, hyperlipidemia, coronary artery disease status post stent, obesity. Initially presented to hospital apparently on 08/21/2029 1 at that time with history of cough and shortness of breath. She was apparently in the hospital and then was admitted to [facility] according patient she was there for 3 to 4 days and she was discharged apparently on oxygen at night from the hospital.  She presented to [facility] on 08/31/2021 with worsening shortness of breath and cough apparently initially was hypotensive with sepsis received antibiotic she was treated with remdesivir and she had been on Decadron. On admission 08/31/2020 when she had a chest x-ray done which shows increasing infiltrate as compared to chest x-ray on 08/21/2021 and a CT scan of the chest was negative for pulmonary embolism but shows bilateral pulmonary opacities and infiltrate and right lower lobe area of consolidation. She has been seen by infectious disease was treated with antibiotic currently off antibiotic. She had been on oxygen since admitted and she has been on Decadron for about 2 weeks or more now. She remains on nasal cannula apparently she was on 5 L 2 days ago she was weaned down to 3 L to 4 L but last night she was increased back again to 5 L and this morning she is down to 3 L saturating 97% 99%.   According to patient she is feeling better her cough and shortness of breath is improving she is ambulated only up to the end of the bed at this time. She did have history of smoking for 30 years 1 to 1-1/2 pack/day but according patient she was never diagnosed with COPD she does not take any inhalers at home. She was never diagnosed with sleep apnea and never had a sleep study done. PAST MEDICAL HISTORY:        Diagnosis Date    Arthritis     Blockage of coronary artery of heart ([address] 2010    x 2 stents    CAD (coronary artery disease)     DDD (degenerative disc disease), cervical     cervical 3-6    Fibromyalgia     History of blood transfusion     History of kidney stones     last one \"about 10 years ago\"    Hyperlipidemia     Hypertension     Pancreatitis 2016    Sleep apnea     Thyroid disease     Wears glasses     for reading     PAST SURGICAL HISTORY:        Procedure Laterality Date    ARTHROPLASTY Left 2/6/2020    LEFT FIRST CARPOMETACARPAL ARTHROPLASTY performed by Geovany Johnston DO at [facility]  2012    L4,L5,S1 hardware placed    BACK SURGERY  2018    SPINAL CORD STIMULATOR    CATARACT REMOVAL Bilateral 2016    CERVICAL FUSION N/A 8/17/2021    C3-6 ACDF, REMOVAL OF HARDWARE C6-7 performed by Reyna Almazan MD at [facility] [address]    COLONOSCOPY      normal    CORONARY ANGIOPLASTY  2010    x 2 stents    FINGER SURGERY      right thumb joint    HERNIA REPAIR Right     inguinal    HYSTERECTOMY      JOINT REPLACEMENT Right     knee    KIDNEY STONE SURGERY      x 3    LITHOTRIPSY      x 3    PARATHYROIDECTOMY      PARATHYROIDECTOMY      TN PERCUT IMPLNT Derick Alvarenga N/A 8/8/2018    SPINAL CORD STIMULATOR IMPLANT performed by Abbi Garcia MD at [address] [place] [address] Right 03/2019     FAMILY HISTORY:       Problem Relation Age of Onset    Heart Disease Father      SOCIAL HISTORY:   TOBACCO:   reports that she quit smoking about 11 years ago.  Her smoking use included cigarettes. She has a 44.00 pack-year smoking history. She has never used smokeless tobacco.  ETOH:  reports no history of alcohol use. DRUGS: reports no history of drug use. ALLERGIES:    Allergies   Allergen Reactions    Lisinopril      Pt uncertain of the reaction    Toradol [Ketorolac Tromethamine] Swelling     Swelling at site of injection         HOME MEDICATIONS:  Prior to Admission medications    Medication Sig Start Date End Date Taking? Authorizing Provider   gabapentin (NEURONTIN) 300 MG capsule Take 600 mg by mouth nightly. Yes Historical Provider, MD   rOPINIRole (REQUIP) 2 MG tablet Take 2 mg by mouth nightly   Yes Historical Provider, MD   Vitamin D (CHOLECALCIFEROL) 50 MCG (2000 UT) TABS tablet Take 1 tablet by mouth daily 8/26/21  Yes Daryle Mash, MD   zinc sulfate (ZINCATE) 220 (50 Zn) MG capsule Take 1 capsule by mouth daily 8/26/21  Yes Daryle Mash, MD   aspirin 81 MG EC tablet Take 81 mg by mouth daily   Yes Historical Provider, MD   Multiple Vitamins-Minerals (THERAPEUTIC MULTIVITAMIN-MINERALS) tablet Take 1 tablet by mouth daily   Yes Historical Provider, MD   hydrochlorothiazide (HYDRODIURIL) 25 MG tablet Take 25 mg by mouth Daily with supper    Yes Historical Provider, MD   Calcium Carbonate-Vitamin D (OSCAL 500/200 D-3 PO) Take 1 tablet by mouth daily   Yes Historical Provider, MD   carvedilol (COREG) 12.5 MG tablet Take 1 tablet by mouth 2 times daily (with meals)  Patient taking differently: Take 25 mg by mouth 2 times daily (with meals)  5/16/16  Yes Brandt Moreno MD   pantoprazole (PROTONIX) 40 MG tablet Take 1 tablet by mouth daily  Patient taking differently: Take 40 mg by mouth nightly  5/16/16  Yes Brandt Moreno MD   DULoxetine (CYMBALTA) 60 MG capsule Take 60 mg by mouth daily   Yes Historical Provider, MD   gabapentin (NEURONTIN) 600 MG tablet Take 300 mg by mouth every morning.     Yes Historical Provider, MD   pravastatin (PRAVACHOL) 80 MG tablet Take 80 mg by mouth nightly   Yes Historical Provider, MD   rOPINIRole (REQUIP) 1 MG tablet Take 1 mg by mouth every morning    Yes Historical Provider, MD     IMMUNIZATIONS:    There is no immunization history on file for this patient. REVIEW OF SYSTEMS:  Review of Systems   Constitutional: Positive for fatigue. Negative for fever. HENT: Positive for postnasal drip. Negative for mouth sores, sinus pain, trouble swallowing and voice change. Eyes: Negative. Respiratory: Positive for cough and shortness of breath. Cardiovascular: Negative for chest pain and leg swelling. Gastrointestinal: Negative for abdominal distention, diarrhea, nausea, rectal pain and vomiting. Endocrine: Negative. Genitourinary: Negative for dysuria, frequency and hematuria. Musculoskeletal: Positive for arthralgias. Negative for myalgias. Skin: Negative. Allergic/Immunologic: Negative. Neurological: Negative for dizziness, speech difficulty and headaches. Hematological: Negative for adenopathy. Does not bruise/bleed easily. Psychiatric/Behavioral: Negative. OBJECTIVE     PaO2/FiO2 RATIO:  No results for input(s): POCPO2 in the last 72 hours.    FiO2 : 44 %     VITAL SIGNS:   LAST:  /63   Pulse 78   Temp 97.1 °F (36.2 °C) (Temporal)   Resp 18   Ht 5' 7\" (1.702 m)   Wt 196 lb 14.4 oz (89.3 kg)   SpO2 97%   BMI 30.84 kg/m²   8-24 HR RANGE:  TEMP Temp  Av.2 °F (36.2 °C)  Min: 96.9 °F (36.1 °C)  Max: 97.6 °F (17.9 °C)   BP Systolic (62PGK), KMN:985 , Min:110 , RMN:909      Diastolic (60YXO), VCR:69, Min:63, Max:83     PULSE Pulse  Av.4  Min: 68  Max: 81   RR Resp  Av  Min: 18  Max: 18   O2 SAT SpO2  Av.5 %  Min: 97 %  Max: 100 %   OXYGEN DELIVERY O2 Flow Rate (L/min)  Avg: 3 L/min  Min: 3 L/min  Max: 3 L/min        SYSTEMIC EXAMINATION: Limited visual exam  General appearance - Ill-appearing, not in respiratory distress  Mental status - awake & alert, follows commands  Eyes - pupils equal and reactive, sclera anicteric  Mouth -large tongue  Neck -Short thick neck  Chest -no distress noted there is no intercostal recession or use of accessory muscles  Heart -not examined  Abdomen -not examined  Neurological - non-focal.  Moves all extremities  Extremities -not examined  Skin -not examined    DATA REVIEW     Medications: Current Inpatient  Scheduled Meds:   dexamethasone  6 mg Oral Daily    amLODIPine  5 mg Oral Nightly    enoxaparin  40 mg SubCUTAneous BID    aspirin  81 mg Oral Daily    calcium-vitamin D  2 tablet Oral Daily    DULoxetine  60 mg Oral Daily    gabapentin  600 mg Oral Nightly    gabapentin  300 mg Oral QAM    therapeutic multivitamin-minerals  1 tablet Oral Daily    pantoprazole  40 mg Oral Daily    pravastatin  80 mg Oral Nightly    rOPINIRole  1 mg Oral QAM    rOPINIRole  2 mg Oral Nightly    Vitamin D  2,000 Units Oral Daily    zinc sulfate  50 mg Oral Daily    carvedilol  6.25 mg Oral BID WC    sodium chloride flush  10 mL IntraVENous 2 times per day    gabapentin  600 mg Oral Once     Continuous Infusions:   sodium chloride         INPUT/OUTPUT:  In: 1100 [P.O.:1100]  Out: 3700 [Urine:3700]  Date 09/13/21 0000 - 09/13/21 2359   Shift 4076-3659 5822-1944 8806-6789 24 Hour Total   INTAKE   P.O.(mL/kg/hr) 700(1) 400  1100   Shift Total(mL/kg) 700(7.8) 400(4.5)  1100(12.3)   OUTPUT   Urine(mL/kg/hr) 800(1.1)   800   Shift Total(mL/kg) 800(9)   800(9)   Weight (kg) 89.3 89.3 89.3 89.3        LABS:  ABGs:   No results for input(s): POCPH, POCPCO2, POCPO2, POCHCO3, DEDL6FKG in the last 72 hours. CBC:   No results for input(s): WBC, HGB, HCT, MCV, PLT, LABLYMP, MID, GRAN, LYMPHOPCT, MIDPERCENT, GRANULOCYTES, RBC, MCH, MCHC, RDW in the last 72 hours. Invalid input(s): RELATIVEPERCENT  CRP:   No results for input(s): CRP in the last 72 hours. LDH:   No results for input(s): LDH in the last 72 hours.   BMP:   No results for input(s): NA, K, CL, CO2, BUN, CREATININE, GLUCOSE, PHOS, IONCA in the last 72 hours. Invalid input(s):  MG, CA  Liver Function Test:   No results for input(s): PROT, LABALBU, ALT, AST, GGT, ALKPHOS, BILITOT in the last 72 hours. Coagulation Profile:   No results for input(s): INR, PROTIME, APTT in the last 72 hours. D-Dimer:  No results for input(s): DDIMER in the last 72 hours. Ferritin:    No results for input(s): FERRITIN in the last 72 hours. Lactic Acid:  No results for input(s): LACTA in the last 72 hours. Cardiac Enzymes:  No results for input(s): CKTOTAL, CKMB, CKMBINDEX, TROPONINI in the last 72 hours. Invalid input(s): TROPONIN, HSTROP  BNP/ProBNP:   No results for input(s): BNP, PROBNP in the last 72 hours. Triglycerides:  No results for input(s): TRIG in the last 72 hours. Microbiology:  Urine Culture:  No components found for: CURINE  Blood Culture:  No components found for: CBLOOD, CFUNGUSBL  Sputum Culture:  No components found for: CSPUTUM  No results for input(s): SPECDESC, SPECIAL, CULTURE, STATUS, ORG, CDIFFTOXPCR, CAMPYLOBPCR, SALMONELLAPC, SHIGAPCR, SHIGELLAPCR, MPNEUG, MPNEUM, LACTOQL in the last 72 hours. No results for input(s): SPUTUM, SPECIAL, CULTURE, STATUS, ORG, CDIFFTOXPCR, MPNEUM, MPNEUG in the last 72 hours. Invalid input(s): Clint Lebron, [facility],  [address] [facility]     Radiology Reports:  CT CHEST PULMONARY EMBOLISM W CONTRAST   Final Result   No evidence of central to segmental pulmonary emboli. Multifocal airspace opacities suggestive multifocal atypical infection/viral   pneumonia. Cardiomegaly with coronary artery disease         XR CHEST PORTABLE   Final Result   Diffuse interstitial prominence which may be related to pulmonary vascular   congestion or interstitial pneumonitis. A small left effusion is present.          XR CHEST PORTABLE    (Results Pending)        Echocardiogram:   Results for orders placed during the hospital encounter of 08/31/21    ECHO-2D-M-MODE COMPLETE    Narrative  [facility]    Transthoracic Echocardiography Report (TTE)    [facility]  Date of Study             09/01/2021  [facility]    Date of      1950  Gender                    Female  Birth    Age          66 year(s)  Race                          Room Number  I306        Height:                   67 inch, 170.18 cm    Corporate ID [id number]    Weight:                   197 pounds, 89.4 kg  #    Patient Acct [de-identified]   BSA:         2.01 m^2     BMI:        30.85  #                                                              kg/m^2    MR #         [id number]      Sonographer               Rosalio,Coby    Accession #  [id number]  Interpreting Physician    Brady Davison    Fellow                   Referring Nurse  Practitioner    Interpreting             Referring Physician       Delano Govea,  Micaela Don    Type of Study    TTE procedure:2D Echocardiogram, M-Mode, Doppler, Color Doppler. Procedure Date  Date: 09/01/2021 Start: 10:04 AM    Study Location: [facility]    Indications:Coronary artery disease, Hypotension and Pneumonia due to  Covid-19. History / Tech. Comments:  Dx: CAD, hypotension, Covid-19  Hx: HTN, hyperlipidemia, stents    Patient Status: Inpatient    Height: 67 inches Weight: 197 pounds BSA: 2.01 m^2 BMI: 30.85 kg/m^2    BP: 119/68 mmHg    Allergies  - *Unlisted:(toradol). CONCLUSIONS    Summary  Poor image quality, likely due to patient body habitus and/or lung disease. Global left ventricular function is difficult to assess but appears mildly  reduced with an estimated EF of 45-50%. Mild left ventricular hypertrophy and with normal left ventricular cavity  size. Unable to assess specific wall motion abnormalities due to image quality. Mild mitral and tricuspid regurgitation.   Evidence of mild diastolic dysfunction is seen.    Signature  ----------------------------------------------------------------------------  Electronically signed by Coby Santamaria(Sonographer) on 2021 10:46 AM  ----------------------------------------------------------------------------    ----------------------------------------------------------------------------  Electronically signed by Brady Davison(Interpreting physician) on 2021  06:00 PM  ----------------------------------------------------------------------------  FINDINGS  Left Atrium  Left atrium is normal in size. Left Ventricle  Poor image quality, likely due to patient body habitus and/or lung disease. Global left ventricular function is difficult to assess but appears mildly  reduced with an estimated EF of 45-50%. Mild left ventricular hypertrophy and with normal left ventricular cavity  size. Unable to assess specific wall motion abnormalities due to image quality. Right Atrium  Right atrium is normal in size. Right Ventricle  Normal right ventricular size and function. Mitral Valve  Normal mitral valve structure with mild mitral regurgitation. Aortic Valve  Normal aortic valve structure and function without stenosis or  regurgitation. Tricuspid Valve  Normal tricuspid valve structure with mild tricuspid regurgitation. Pulmonic Valve  The pulmonic valve is normal in structure. Pericardial Effusion  No significant pericardial effusion is seen. Miscellaneous  Evidence of mild diastolic dysfunction is seen. Normal aortic root dimension.     M-mode / 2D Measurements & Calculations:    LVIDd:4.66 cm(3.7 - 5.6 cm)      Diastolic DSZCPV:46.0 ml  VTSGQ:0.11 cm(2.2 - 4.0 cm)      Systolic VYIDXZ:98.12 ml  IVSd:1.16 cm(0.6 - 1.1 cm)       Aortic Root:3.38 cm(2.0 - 3.7 cm)  LVPWd:0.98 cm(0.6 - 1.1 cm)      LA Dimension: 3.34 cm(1.9 - 4.0 cm)  Fractional Shortenin.83 %    LA volume/Index: 41 ml /20m^2  Calculated LVEF (%): 50.56 %     AV Cusp Separation: 2.24 cm    Mitral: Aortic    Valve Area (P1/2-Time): 2.88 cm^2       Peak Velocity: 1.05 m/s  Peak E-Wave: 0.61 m/s                   Mean Velocity: 0.81 m/s  Peak A-Wave: 0.78 m/s                   Peak Gradient: 4.42 mmHg  E/A Ratio: 0.78                         Mean Gradient: 2.83 mmHg  Peak Gradient: 1.47 mmHg  Acceleration Time: 49.73 msec  P1/2t: 76.38 msec  AV VTI: 15.29 cm    Tricuspid:    Peak TR Velocity: 2.36 m/s  Peak TR Gradient: 22.18836 mmHg    Diastology / Tissue Doppler    Lateral Wall E' velocity:0.09 m/s  Lateral Wall E/E':7.81       ASSESSMENT AND PLAN     Assessment:    // Acute hypoxic respiratory failure  // Bilateral multifocal pneumonia due to COVID 19 infection  // Acute respiratory distress syndrome secondary to Covid pneumonia  // Sepsis due to COVID 19 resolved  // Obesity  // Hypertension and hyperlipidemia  // Coronary artery disease history of stent  // History of smoking greater than 30-pack-year  // Probable obstructive sleep apnea    Plan:    I personally interviewed and evaluated the patient on original visit; reviewed interval history, interpreted all available radiographic and laboratory data at the time of service. Patient is currently on oxygen at 3 to 4 L maintaining saturation above 92%  Continue and wean supplemental oxygen to keep oxygen saturation >90%  If unable to wean O2 then she will likely need home O2 during the daytime and if she does not require home O2 during the daytime patient does have O2 at night  Her last chest x-ray was done on 08/31/2021 recommend to get a chest x-ray PA lateral view although she is clinically better and chest x-ray will not affect the time of discharge. Recommend to follow-up with infectious disease about timing of discontinuing the airborne precaution as she was diagnosed Covid initially with symptoms on 08/21/2021.   She is currently on Decadron for more than 2 weeks recommend prednisone taper dose on discharge starting from 30 mg to 20 mg to 10 mg taper every 2 to 3 days  Continue with bronchodilators as needed  Encourage prone position when sleeping, incentive spirometry  Continue pulmonary toilet, aspiration precautions   Monitor I/O, electrolytes with a goal of even/negative fluid balance  Tolerating oral diet  DVT prophylaxis with Lovenox 40 mg twice daily to continue  Antimicrobials reviewed; off antibiotic  Monitor CRP, LDH, AST/ALT, D-Dimer, Ferritin if needed  Glycemic control per primary service  Physical/occupational therapy  She will need outpatient sleep study. We will need follow-up in office in 3 to 4 weeks will need follow-up chest x-ray in 3 to 4 weeks. She had long history of smoking most likely will need outpatient pulmonary function test once acute issues resolved    Discussed with Dorian Urias with primary service. Discussed with nursing staff, treatment plan discussed pain      Eugenio Adorno MD  Pulmonary and Critical Care Medicine           9/13/2021, 1:18 PM    This patient was evaluated in the context of the global SARS-CoV-2 (COVID-19) pandemic, which necessitated considerations that the patient either has COVID-19 infection or is at risk of infection with COVID-19. Institutional protocols and algorithms that pertain to the evaluation & management of patients with COVID-19 or those at risk for COVID-19 are in a state of rapid changes based on information released by regulatory bodies including the CDC and federal and state organizations. These policies and algorithms were followed during the patient's care. Please note that this chart was generated using voice recognition Dragon dictation software. Although every effort was made to ensure the accuracy of this automated transcription, some errors in transcription may have occurred.

## 2021-09-13 NOTE — PROGRESS NOTES
Progress Note    SUBJECTIVE:    Patient seen for f/u of Pneumonia due to COVID-19 virus. She sitting up in bed alert oriented no acute distress. Patient is currently on 5 L of oxygen. She is tolerating activity well. She has no complaints. Her appetite is improved. Denies nausea vomiting diarrhea    ROS:   Constitutional: negative  for fevers, and negative for chills.   Respiratory: positive for shortness of breath, positive for cough, and negative for wheezing  Cardiovascular: negative for chest pain, and negative for palpitations  Gastrointestinal: negative for abdominal pain, negative for nausea,negative for vomiting, negative for diarrhea, and negative for constipation     All other systems were reviewed with the patient and are negative unless otherwise stated in HPI      OBJECTIVE:      Vitals:   Vitals:    09/13/21 1631   BP:    Pulse:    Resp:    Temp:    SpO2: 93%     Weight: 196 lb 14.4 oz (89.3 kg)   Height: 5' 7\" (170.2 cm)   -----------------------------------------------------------------  Exam:    CONSTITUTIONAL:  awake, alert, cooperative, no apparent distress, and appears stated age  EYES:  Lids and lashes normal, pupils equal, round and reactive to light, extra ocular muscles intact, sclera clear, conjunctiva normal  ENT:  normocepalic, without obvious abnormality, atraumatic  NECK:  supple, symmetrical, trachea midline, skin normal and no stridor  HEMATOLOGIC/LYMPHATICS:  no cervical lymphadenopathy and no supraclavicular lymphadenopathy  LUNGS: Fine rales mid lower posterior fields, no respiratory distress   CARDIOVASCULAR:  Normal apical impulse, regular rate and rhythm, normal S1 and S2, no S3 or S4, and no murmur noted  ABDOMEN:  No scars, normal bowel sounds, soft, non-distended, non-tender, no masses palpated, no hepatosplenomegally  MUSCULOSKELETAL:  there is no redness, warmth, or swelling of the joints  full range of motion noted  NEUROLOGIC: There is no extremities equally, follows home      Danna Best, APRN - CNP , APRN, NP-C

## 2021-09-14 VITALS
TEMPERATURE: 96.9 F | HEART RATE: 62 BPM | WEIGHT: 196.5 LBS | DIASTOLIC BLOOD PRESSURE: 71 MMHG | BODY MASS INDEX: 30.84 KG/M2 | HEIGHT: 67 IN | RESPIRATION RATE: 16 BRPM | SYSTOLIC BLOOD PRESSURE: 130 MMHG | OXYGEN SATURATION: 93 %

## 2021-09-14 PROCEDURE — 2580000003 HC RX 258: Performed by: EMERGENCY MEDICINE

## 2021-09-14 PROCEDURE — 6360000002 HC RX W HCPCS: Performed by: NURSE PRACTITIONER

## 2021-09-14 PROCEDURE — 94761 N-INVAS EAR/PLS OXIMETRY MLT: CPT

## 2021-09-14 PROCEDURE — 6370000000 HC RX 637 (ALT 250 FOR IP): Performed by: FAMILY MEDICINE

## 2021-09-14 PROCEDURE — 6360000002 HC RX W HCPCS: Performed by: FAMILY MEDICINE

## 2021-09-14 PROCEDURE — 6370000000 HC RX 637 (ALT 250 FOR IP): Performed by: INTERNAL MEDICINE

## 2021-09-14 PROCEDURE — 2700000000 HC OXYGEN THERAPY PER DAY

## 2021-09-14 RX ORDER — CARVEDILOL 6.25 MG/1
6.25 TABLET ORAL 2 TIMES DAILY WITH MEALS
Qty: 60 TABLET | Refills: 3 | Status: SHIPPED | OUTPATIENT
Start: 2021-09-14

## 2021-09-14 RX ORDER — PREDNISONE 10 MG/1
TABLET ORAL
Qty: 18 TABLET | Refills: 0 | Status: SHIPPED | OUTPATIENT
Start: 2021-09-14 | End: 2021-09-23

## 2021-09-14 RX ORDER — AMLODIPINE BESYLATE 5 MG/1
5 TABLET ORAL NIGHTLY
Qty: 30 TABLET | Refills: 3 | Status: SHIPPED | OUTPATIENT
Start: 2021-09-14

## 2021-09-14 RX ORDER — PANTOPRAZOLE SODIUM 40 MG/1
40 TABLET, DELAYED RELEASE ORAL NIGHTLY
Qty: 30 TABLET | Refills: 0
Start: 2021-09-14

## 2021-09-14 RX ADMIN — ASPIRIN 81 MG: 81 TABLET, COATED ORAL at 08:42

## 2021-09-14 RX ADMIN — GABAPENTIN 300 MG: 300 CAPSULE ORAL at 08:43

## 2021-09-14 RX ADMIN — DULOXETINE HYDROCHLORIDE 60 MG: 60 CAPSULE, DELAYED RELEASE ORAL at 08:42

## 2021-09-14 RX ADMIN — CALCIUM CARBONATE-VITAMIN D TAB 500 MG-200 UNIT 2 TABLET: 500-200 TAB at 08:42

## 2021-09-14 RX ADMIN — Medication 2000 UNITS: at 08:42

## 2021-09-14 RX ADMIN — MULTIPLE VITAMINS W/ MINERALS TAB 1 TABLET: TAB at 08:42

## 2021-09-14 RX ADMIN — ZINC SULFATE 220 MG (50 MG) CAPSULE 50 MG: CAPSULE at 08:42

## 2021-09-14 RX ADMIN — ROPINIROLE HYDROCHLORIDE 1 MG: 1 TABLET, FILM COATED ORAL at 08:42

## 2021-09-14 RX ADMIN — CARVEDILOL 6.25 MG: 6.25 TABLET, FILM COATED ORAL at 07:01

## 2021-09-14 RX ADMIN — ENOXAPARIN SODIUM 40 MG: 40 INJECTION SUBCUTANEOUS at 08:42

## 2021-09-14 RX ADMIN — DEXAMETHASONE 6 MG: 4 TABLET ORAL at 08:43

## 2021-09-14 RX ADMIN — PANTOPRAZOLE SODIUM 40 MG: 40 TABLET, DELAYED RELEASE ORAL at 08:43

## 2021-09-14 RX ADMIN — SODIUM CHLORIDE, PRESERVATIVE FREE 10 ML: 5 INJECTION INTRAVENOUS at 08:42

## 2021-09-14 NOTE — DISCHARGE SUMMARY
Discharge Summary    Marycarmen Newsome  :  1950  MRN:  [id number]    Admit date:  2021      Discharge date: 2021     Admitting Physician:  Lindy Muñoz MD    Discharge Diagnoses:    Principal Problem:    Pneumonia due to COVID-19 virus  Active Problems:    CAD (coronary artery disease)    Hypertension    Sepsis ([address]    Acute respiratory failure with hypoxia ([address]    Dyslipidemia  Resolved Problems:    * No resolved hospital problems. *      Hospital Course:   Marycarmen Newsome is a 66 y.o. female admitted with COVID-19 pneumonia and acute respiratory failure with hypoxia. She presented to the emergency room with complaints of fatigue and shortness of breath. Patient felt at that time she was becoming septic. Patient recently had neck surgery as well as diagnosed with Covid. She does have history of CAD, fibromyalgia, hypertension and sleep apnea. Patient was hypotensive initially. She originally was seen in the emergency room on 2021 with complaints of shortness of breath she was diagnosed with Covid at that time and treated at [facility] for COVID-19 infection with remdesivir, steroids, oxygen and discharged home with oxygen that she states she uses at nighttime. Patient stated she was feeling worse approximately 2 days before this admission. Upon arrival patient was hypoxic and hypotensive and was admitted for probable sepsis due to COVID-19 pneumonia. Patient's blood pressure improved with fluid boluses and oxygen was tolerated at 4 L. Patient had infectious disease and pulmonology consulted. Patient did progress to high flow oxygen however is currently down to room air. Patient did not receive Actemra per infectious disease orders. Patient was provided vitamin C, D and zinc.  She was provided Decadron as well as another course of remdesivir. Patient states she feels well. She is tolerating diet and activity well.   She will be discharged home today due to extensive treatment with dexamethasone she will go home with a steroid taper. She will follow-up with pulmonology as well. She will have home health and will get a chest x-ray in 1 week.     Consultants:  Dr. Samaria Thompson, infectious disease; Dr. Ifrah Bishop pulmonology    Procedures: none    Complications: none    Discharge Condition: fair    Exam:  CONSTITUTIONAL:  awake, alert, cooperative, no apparent distress, and appears stated age  EYES:  Lids and lashes normal, pupils equal, round and reactive to light, extra ocular muscles intact, sclera clear, conjunctiva normal  ENT:  normocepalic, without obvious abnormality, atraumatic  NECK:  supple, symmetrical, trachea midline, skin normal and no stridor  HEMATOLOGIC/LYMPHATICS:  no cervical lymphadenopathy and no supraclavicular lymphadenopathy  LUNGS: Fine rales mid lower posterior fields scattered wheeze, no respiratory distress   CARDIOVASCULAR:  Normal apical impulse, regular rate and rhythm, normal S1 and S2, no S3 or S4, and no murmur noted  ABDOMEN:  No scars, normal bowel sounds, soft, non-distended, non-tender, no masses palpated, no hepatosplenomegally  MUSCULOSKELETAL:  there is no redness, warmth, or swelling of the joints  full range of motion noted  NEUROLOGIC: There is no extremities equally, follows commands, no deficits  SKIN:  no bruising or bleeding, normal skin color, texture, turgor, no redness, warmth, or swelling, no rashes and no lesions  EXT:      no cyanosis, clubbing or edema present      Significant Diagnostic Studies:   Lab Results   Component Value Date    WBC 5.5 09/10/2021    HGB 12.4 09/10/2021     09/10/2021       Lab Results   Component Value Date    BUN 26 (H) 09/10/2021    CREATININE 0.77 09/10/2021     09/10/2021    K 4.0 09/10/2021    CALCIUM 9.2 09/10/2021     09/10/2021    CO2 25 09/10/2021    LABGLOM >60 09/10/2021       Lab Results   Component Value Date    WBCUA 50  10/04/2020    RBCUA 0 TO 2 10/04/2020 assess but appears mildly reduced with an estimated EF of 45-50%. Mild left ventricular hypertrophy and with normal left ventricular cavity size. Unable to assess specific wall motion abnormalities due to image quality. Mild mitral and tricuspid regurgitation. Evidence of mild diastolic dysfunction is seen. Signature ----------------------------------------------------------------------------  Electronically signed by Coby Santamaria(Sonographer) on 09/01/2021 10:46 AM ---------------------------------------------------------------------------- ----------------------------------------------------------------------------  Electronically signed by Brady Davison(Interpreting physician) on 09/01/2021  06:00 PM ---------------------------------------------------------------------------- FINDINGS Left Atrium Left atrium is normal in size. Left Ventricle Poor image quality, likely due to patient body habitus and/or lung disease. Global left ventricular function is difficult to assess but appears mildly reduced with an estimated EF of 45-50%. Mild left ventricular hypertrophy and with normal left ventricular cavity size. Unable to assess specific wall motion abnormalities due to image quality. Right Atrium Right atrium is normal in size. Right Ventricle Normal right ventricular size and function. Mitral Valve Normal mitral valve structure with mild mitral regurgitation. Aortic Valve Normal aortic valve structure and function without stenosis or regurgitation. Tricuspid Valve Normal tricuspid valve structure with mild tricuspid regurgitation. Pulmonic Valve The pulmonic valve is normal in structure. Pericardial Effusion No significant pericardial effusion is seen. Miscellaneous Evidence of mild diastolic dysfunction is seen. Normal aortic root dimension.  M-mode / 2D Measurements & Calculations:   LVIDd:4.66 cm(3.7 - 5.6 cm)      Diastolic XFWNWQ:51.7 ml  ZRBRO:4.37 cm(2.2 - 4.0 cm)      Systolic CAXXDF:26.61 ml  IVSd:1.16 cm(0.6 - 1.1 cm)       Aortic Root:3.38 cm(2.0 - 3.7 cm)  LVPWd:0.98 cm(0.6 - 1.1 cm)      LA Dimension: 3.34 cm(1.9 - 4.0 cm)  Fractional Shortenin.83 %    LA volume/Index: 41 ml /20m^2  Calculated LVEF (%): 50.56 %     AV Cusp Separation: 2.24 cm   Mitral:                                 Aortic   Valve Area (P1/2-Time): 2.88 cm^2       Peak Velocity: 1.05 m/s  Peak E-Wave: 0.61 m/s                   Mean Velocity: 0.81 m/s  Peak A-Wave: 0.78 m/s                   Peak Gradient: 4.42 mmHg  E/A Ratio: 0.78                         Mean Gradient: 2.83 mmHg  Peak Gradient: 1.47 mmHg                                          Acceleration Time: 49.73 msec  P1/2t: 76.38 msec                                          AV VTI: 15.29 cm   Tricuspid:   Peak TR Velocity: 2.36 m/s  Peak TR Gradient: 22.36339 mmHg  Diastology / Tissue Doppler Lateral Wall E' velocity:0.09 m/s Lateral Wall E/E':7.81    XR CHEST PORTABLE    Result Date: 2021  EXAMINATION: ONE XRAY VIEW OF THE CHEST 2021 6:57 pm COMPARISON: 2021 HISTORY: ORDERING SYSTEM PROVIDED HISTORY: sob TECHNOLOGIST PROVIDED HISTORY: sob FINDINGS: AP portable view of the chest time stamped at 1850 hours demonstrates paired intraspinal electrodes terminating in the lower thoracic area and ACDF hardware at the base of the neck. Heart size is normal.  Heart size is normal.  Interstitial markings are mildly increased which may be related to vascular congestion likely interstitial pneumonitis. There is suggestion of a small left effusion. No extrapleural air is seen. Osseous structures are age-appropriate. Diffuse interstitial prominence which may be related to pulmonary vascular congestion or interstitial pneumonitis. A small left effusion is present.      CT CHEST PULMONARY EMBOLISM W CONTRAST    Result Date: 2021  EXAMINATION: CTA OF THE CHEST 2021 8:26 pm TECHNIQUE: CTA of the chest was performed after the administration of intravenous contrast. Multiplanar reformatted images are provided for review. MIP images are provided for review. Dose modulation, iterative reconstruction, and/or weight based adjustment of the mA/kV was utilized to reduce the radiation dose to as low as reasonably achievable. COMPARISON: 08/30/2021 and 08/21/2021 HISTORY: ORDERING SYSTEM PROVIDED HISTORY: elevated d dimer and hypoxia TECHNOLOGIST PROVIDED HISTORY: elevated d dimer and hypoxia Decision Support Exception - unselect if not a suspected or confirmed emergency medical condition->Emergency Medical Condition (MA) FINDINGS: Pulmonary Arteries: No evidence of central, lobar or proximal segmental pulmonary artery emboli. Mediastinum: Cardiomegaly. Coronary disease identified. Trace pericardial fluid. No suspicious mediastinal or hilar adenopathy identified per CT criteria. Aortic vascular calcifications. Lungs/pleura: Multifocal interstitial alveolar opacities throughout both lungs with a subpleural distribution suggestive of multifocal atypical infection/viral pneumonia. Bibasilar consolidative changes suggestive of areas of atelectasis. Upper Abdomen: Multiple circumscribed lesions involving the right left hepatic lobe suggestive of cyst.. Soft Tissues/Bones: Multilevel degenerate changes thoracic spine. Spinal stimulator leads identified     No evidence of central to segmental pulmonary emboli. Multifocal airspace opacities suggestive multifocal atypical infection/viral pneumonia.  Cardiomegaly with coronary artery disease       Assessment and Plan:  Patient Active Problem List    Diagnosis Date Noted    Dyslipidemia     Pneumonia due to COVID-19 virus 08/31/2021    Acute respiratory failure with hypoxia (HCC)     Cervical spinal stenosis 08/17/2021    Acquired spondylolisthesis 01/23/2018    Spinal stenosis, lumbar region, without neurogenic claudication 01/23/2018    Postlaminectomy syndrome, lumbar region 01/23/2018    Chronic midline low back pain without tablet  Take 1 tablet by mouth daily             zinc sulfate (ZINCATE) 220 (50 Zn) MG capsule  Take 1 capsule by mouth daily                 Patient Instructions:    Activity: activity as tolerated  Diet: regular diet  Wound Care: none needed  Other: Chest x-ray in 1 week    Disposition:   Discharge to Home with home health    Follow up:  Patient will be followed by Efren Laguna MD in 1-2 weeks [place]: Dr. Marylee Marshall 1 month    CORE MEASURES on Discharge (if applicable)  ACE/ARB in CHF: NA  Statin in [place]: NA  ASA in [place]: NA  Statin in CVA: NA  Antiplatelet in CVA: NA    Total time spent on discharge services: 45 minutes    Including the following activities:  Evaluation and Management of patient  Discussion with patient and/or surrogate about current care plan  Coordination with Case Management and/or   Coordination of care with Consultants (if applicable)   Coordination of care with Receiving Facility Physician (if applicable)  Completion of DME forms (if applicable)  Preparation of Discharge Summary  Preparation of Medication Reconciliation  Preparation of Discharge Prescriptions    Signed:  BEAU Morillo CNP, BEAU, NP-C  9/14/2021, 11:01 AM

## 2021-09-14 NOTE — PROGRESS NOTES
Referral made to [facility] as Patient is being discharged home today and will resume their services.   Spoke with Jess Boston and discharge paperwork FAXED to [facility] her request.    CHARLEY Macario  9/14/2021

## 2021-09-14 NOTE — PROGRESS NOTES
Patient given discharge instructions, denies any questions. Iv and tele discontinued at this time. Patient was given her belongings and assisted in getting dressed. Awaiting a ride from her daughter.

## 2021-09-14 NOTE — PROGRESS NOTES
Comprehensive Nutrition Assessment    Type and Reason for Visit:  Reassess    Nutrition Recommendations/Plan:   1. Continue current diet. 2. Continue ONS. Nutrition Assessment:  Continued improving oral intakes aeb PO % of meals and supplements. On dexamethasone, MVI w/minerals, vitamin D, and zinc. Pt reports doing well and hopes to go home today. PO has been % of meals and supplements. Malnutrition Assessment:  Malnutrition Status: At risk for malnutrition (Comment)    Context:  Acute Illness     Findings of the 6 clinical characteristics of malnutrition:  Energy Intake:  Mild decrease in energy intake (Comment) (3 days per patient)  Weight Loss:  No significant weight loss     Body Fat Loss:  Unable to assess     Muscle Mass Loss:  Unable to assess    Fluid Accumulation:  No significant fluid accumulation     Strength:  Not Performed    Estimated Daily Nutrient Needs:  Energy (kcal):  9864-6650 (18-20); Weight Used for Energy Requirements:  Current     Protein (g):  80-92 (1.3-1.5); Weight Used for Protein Requirements:  Ideal        Fluid (ml/day):  1800; Method Used for Fluid Requirements:  1 ml/kcal      Nutrition Related Findings:  appears well nourished      Wounds:  Surgical Incision       Current Nutrition Therapies:    ADULT DIET; Regular  Adult Oral Nutrition Supplement; Standard 4 oz Oral Supplement    Anthropometric Measures:  · Height: 5' 7\" (170.2 cm)  · Current Body Weight: 196 lb 8 oz (89.1 kg)   · Admission Body Weight: 195 lb 8 oz (88.7 kg)    · Usual Body Weight: 201 lb (91.2 kg) (197-204#)     · Ideal Body Weight: 135 lbs; % Ideal Body Weight 145.6 %   · BMI: 30.8  · BMI Categories: Obese Class 1 (BMI 30.0-34. 9)       Nutrition Diagnosis:   · Altered nutrition-related lab values related to endocrine dysfuntion as evidenced by lab values      Nutrition Interventions:   Food and/or Nutrient Delivery:  Continue Oral Nutrition Supplement, Continue Current Diet  Nutrition Education/Counseling:  Education initiated   Coordination of Nutrition Care:  Continue to monitor while inpatient    Goals:  PO >75% meals and supplements       Nutrition Monitoring and Evaluation:   Behavioral-Environmental Outcomes:  None Identified   Food/Nutrient Intake Outcomes:  Food and Nutrient Intake, Supplement Intake  Physical Signs/Symptoms Outcomes:  Biochemical Data, Weight     Discharge Planning:    Continue current diet, Continue Oral Nutrition Supplement     Electronically signed by Leandro Lafleur RD, LD on 9/14/21 at 10:28 AM EDT    Contact: [phone]

## 2021-09-14 NOTE — PROGRESS NOTES
Progress Note    SUBJECTIVE:    Patient seen for f/u of Pneumonia due to COVID-19 virus. She sitting up in bed alert oriented no acute distress. Patient is currently on room air and tolerating well. She is tolerating activity well. She has no complaints. Her appetite is improved. Denies nausea vomiting diarrhea    ROS:   Constitutional: negative  for fevers, and negative for chills.   Respiratory: Negative for shortness of breath, positive for occasional cough, and negative for wheezing  Cardiovascular: negative for chest pain, and negative for palpitations  Gastrointestinal: negative for abdominal pain, negative for nausea,negative for vomiting, negative for diarrhea, and negative for constipation     All other systems were reviewed with the patient and are negative unless otherwise stated in HPI      OBJECTIVE:      Vitals:   Vitals:    09/14/21 0731   BP:    Pulse: 62   Resp:    Temp:    SpO2: 93%     Weight: 196 lb 8 oz (89.1 kg)   Height: 5' 7\" (170.2 cm)   -----------------------------------------------------------------  Exam:    CONSTITUTIONAL:  awake, alert, cooperative, no apparent distress, and appears stated age  EYES:  Lids and lashes normal, pupils equal, round and reactive to light, extra ocular muscles intact, sclera clear, conjunctiva normal  ENT:  normocepalic, without obvious abnormality, atraumatic  NECK:  supple, symmetrical, trachea midline, skin normal and no stridor  HEMATOLOGIC/LYMPHATICS:  no cervical lymphadenopathy and no supraclavicular lymphadenopathy  LUNGS: Fine rales mid lower posterior fields scattered wheeze, no respiratory distress   CARDIOVASCULAR:  Normal apical impulse, regular rate and rhythm, normal S1 and S2, no S3 or S4, and no murmur noted  ABDOMEN:  No scars, normal bowel sounds, soft, non-distended, non-tender, no masses palpated, no hepatosplenomegally  MUSCULOSKELETAL:  there is no redness, warmth, or swelling of the joints  full range of motion noted  NEUROLOGIC: There is no extremities equally, follows commands, no deficits  SKIN:  no bruising or bleeding, normal skin color, texture, turgor, no redness, warmth, or swelling, no rashes and no lesions  EXT:     no cyanosis, clubbing or edema present    -----------------------------------------------------------------    Diagnostic Data:    · All available data reviewed  Lab Results   Component Value Date    WBC 5.5 09/10/2021    HGB 12.4 09/10/2021    MCV 93.6 09/10/2021     09/10/2021      Lab Results   Component Value Date    GLUCOSE 98 09/10/2021    BUN 26 (H) 09/10/2021    CREATININE 0.77 09/10/2021     09/10/2021    K 4.0 09/10/2021    CALCIUM 9.2 09/10/2021     09/10/2021    CO2 25 09/10/2021       XR CHEST PORTABLE   Final Result   Worsening bilateral infiltrates and consolidation         CT CHEST PULMONARY EMBOLISM W CONTRAST   Final Result   No evidence of central to segmental pulmonary emboli. Multifocal airspace opacities suggestive multifocal atypical infection/viral   pneumonia. Cardiomegaly with coronary artery disease         XR CHEST PORTABLE   Final Result   Diffuse interstitial prominence which may be related to pulmonary vascular   congestion or interstitial pneumonitis. A small left effusion is present.              ASSESSMENT / PLAN:  Pneumonia due to COVID-19 virus  · Continue current therapy  · Continue vitamin C, D and zinc  · Completed remdesivir  · Continue Decadron  · Appreciate infectious disease  · No Actemra  · No monoclonal antibodies  · Potential bacterial superinfection risk  · Respiratory failure with hypoxia  · Supplemental oxygen to maintain SPO2 greater than 92%-currently on room air  · Acapella  · Prone  · Out of bed with meals  · CAD  · Continue aspirin, Pravachol  · Hypertension  · Continue Coreg, Norvasc  · Sepsis  · Resolved  · Nutrition status: obesity, non-morbid: dietician consult initiated during hositalization  · DVT prophylaxis: Lovenox   · High risk medications: none   · Disposition:  Discharge plan is home  today today with steroid taper. She will have repeat chest x-ray in 1 week. I will have her follow-up with pulmonology as an outpatient. Will have home health as well for PT OT, aids, nursing to assess cardiopulmonary status.     BEAU Loja - CNP , BEAU, NP-C

## 2021-09-14 NOTE — PROGRESS NOTES
Left a message for Dr. Te Vergara office this morning around 10 AM to set up follow appointment. Had to leave a message. Dr. Mg People office called back @ [phone] PM to set up a doctors appointment. Explain patient has already been discharge. They are going to call her at home to set up an appointment.     CHARLEY Lopez

## 2021-09-21 ENCOUNTER — HOSPITAL ENCOUNTER (OUTPATIENT)
Age: 71
Discharge: HOME | End: 2021-09-23
Payer: MEDICARE

## 2021-09-21 ENCOUNTER — HOSPITAL ENCOUNTER (OUTPATIENT)
Dept: GENERAL RADIOLOGY | Age: 71
Discharge: HOME | End: 2021-09-23
Payer: MEDICARE

## 2021-09-21 DIAGNOSIS — J96.01 ACUTE RESPIRATORY FAILURE WITH HYPOXIA (HCC): ICD-10-CM

## 2021-09-21 DIAGNOSIS — U07.1 PNEUMONIA DUE TO COVID-19 VIRUS: ICD-10-CM

## 2021-09-21 DIAGNOSIS — J12.82 PNEUMONIA DUE TO COVID-19 VIRUS: ICD-10-CM

## 2021-09-21 PROCEDURE — 71046 X-RAY EXAM CHEST 2 VIEWS: CPT

## 2021-10-21 ENCOUNTER — OFFICE VISIT (OUTPATIENT)
Dept: PULMONOLOGY | Age: 71
End: 2021-10-21
Payer: MEDICARE

## 2021-10-21 VITALS
HEART RATE: 95 BPM | RESPIRATION RATE: 18 BRPM | OXYGEN SATURATION: 93 % | WEIGHT: 202.1 LBS | SYSTOLIC BLOOD PRESSURE: 113 MMHG | HEIGHT: 66 IN | BODY MASS INDEX: 32.48 KG/M2 | DIASTOLIC BLOOD PRESSURE: 75 MMHG | TEMPERATURE: 97.8 F

## 2021-10-21 DIAGNOSIS — Z86.16 HISTORY OF COVID-19: Primary | ICD-10-CM

## 2021-10-21 DIAGNOSIS — J96.01 ACUTE RESPIRATORY FAILURE WITH HYPOXIA (HCC): ICD-10-CM

## 2021-10-21 DIAGNOSIS — G47.33 OSA (OBSTRUCTIVE SLEEP APNEA): ICD-10-CM

## 2021-10-21 DIAGNOSIS — Z87.891 HISTORY OF SMOKING 30 OR MORE PACK YEARS: ICD-10-CM

## 2021-10-21 PROCEDURE — 99214 OFFICE O/P EST MOD 30 MIN: CPT | Performed by: INTERNAL MEDICINE

## 2021-10-21 RX ORDER — ALBUTEROL SULFATE 90 UG/1
2 AEROSOL, METERED RESPIRATORY (INHALATION) 4 TIMES DAILY PRN
Qty: 18 G | Refills: 5 | Status: SHIPPED | OUTPATIENT
Start: 2021-10-21

## 2021-10-21 NOTE — PROGRESS NOTES
OUTPATIENT PULMONARY PROGRESS NOTE      Patient:  Martha Camilo  MRN: [id number]    Consulting Physician: Betsy Lozano MD  Reason for Consult: History of Covid pneumonia  Primacy Care Physician: Ronal Shirley MD    HISTORY OF PRESENT ILLNESS:   The patient is a 66 y.o. female   She was seen in the hospital when she was admitted with COVID-19 pneumonia acute hypoxic respiratory failure. She has history of hypertension, hyperlipidemia, coronary artery disease status post stent, obesity. Initially presented to hospital apparently on 08/21/2021 with history of cough and shortness of breath. She was transferred to and was admitted to [facility] she was there for 3 to 4 days and she was discharged apparently on nocturnal from the hospital.  She presented to [facility] on 08/31/2021 with worsening shortness of breath and cough apparently initially was hypotensive with sepsis received antibiotic she was treated with remdesivir and she had been on Decadron. On admission 08/31/2021 when she had a chest x-ray done which shows increasing infiltrate as compared to chest x-ray on 08/21/2021 and a CT scan of the chest was negative for pulmonary embolism but shows bilateral pulmonary opacities and infiltrate and right lower lobe area of consolidation. She has been seen by infectious disease was treated with antibiotic currently off antibiotic. During her stay she was on oxygen and she was on 5 L and gradually weaned down. She was treated with Decadron for 2 weeks. Her PO2 FiO2 ratio improved and she was saturating better and before discharge apparently she was ambulating and she was discharged home without oxygen during the daytime as her room air saturation was greater than 90%. She she was discharged from the hospital she has been doing better. She has mild intermittent occasional cough and cough has been better her tiredness and fatigue is gradually improving.   She does get shortness of breath on increasing activity or exertion. She does not complain of wheezing currently except occasionally. She denies nocturnal awakening with cough wheezing chest tightness or shortness of breath. She does not complain of orthopnea PND or pedal edema. According to patient she is using oxygen at night as she was advised before. She was never diagnosed with sleep apnea but she does have history of snoring and she usually get more snoring when she is on her back. When she wake up in the morning she does feel tired. She does have some time during the daytime and take occasional nap also. She does have history of smoking for 30 years 1-1.5 pack per day she quit smoking in 2010. She was never diagnosed with COPD for the last 1-1/2 years though she did have shortness of breath on exertion activity and on going up on the stairs but never had chronic cough chronic sputum production or wheezing she was never been on inhalers.     Past Medical History:        Diagnosis Date    Arthritis     Blockage of coronary artery of heart ([address] 2010    x 2 stents    CAD (coronary artery disease)     DDD (degenerative disc disease), cervical     cervical 3-6    Fibromyalgia     History of blood transfusion     History of kidney stones     last one \"about 10 years ago\"    Hyperlipidemia     Hypertension     Pancreatitis 2016    Sleep apnea     Thyroid disease     Wears glasses     for reading       Past Surgical History:        Procedure Laterality Date    ARTHROPLASTY Left 2/6/2020    LEFT FIRST CARPOMETACARPAL ARTHROPLASTY performed by Cliff Tovar DO at [address]  2012    L4,L5,S1 hardware placed    BACK SURGERY  2018    SPINAL CORD STIMULATOR    CATARACT REMOVAL Bilateral 2016    CERVICAL FUSION N/A 8/17/2021    C3-6 ACDF, REMOVAL OF HARDWARE C6-7 performed by Judie Larson MD at [address] [facility]    COLONOSCOPY      normal   [address]  2010    x 2 stents    FINGER SURGERY      right thumb joint    HERNIA REPAIR Right     inguinal    HYSTERECTOMY      JOINT REPLACEMENT Right     knee    KIDNEY STONE SURGERY      x 3    LITHOTRIPSY      x 3    PARATHYROIDECTOMY      PARATHYROIDECTOMY      CA PERCUT IMPLNT NEUROELECT,EPIDURAL N/A 8/8/2018    SPINAL CORD STIMULATOR IMPLANT performed by Jamie Dugan MD [address] Right 03/2019       Allergies: Allergies   Allergen Reactions    Lisinopril      Pt uncertain of the reaction    Toradol [Ketorolac Tromethamine] Swelling     Swelling at site of injection         Home Meds:   Outpatient Encounter Medications as of 10/21/2021   Medication Sig Dispense Refill    carvedilol (COREG) 6.25 MG tablet Take 1 tablet by mouth 2 times daily (with meals) 60 tablet 3    amLODIPine (NORVASC) 5 MG tablet Take 1 tablet by mouth nightly 30 tablet 3    pantoprazole (PROTONIX) 40 MG tablet Take 1 tablet by mouth nightly 30 tablet 0    gabapentin (NEURONTIN) 300 MG capsule Take 300 mg by mouth every morning.  rOPINIRole (REQUIP) 2 MG tablet Take 2 mg by mouth nightly      Vitamin D (CHOLECALCIFEROL) 50 MCG (2000 UT) TABS tablet Take 1 tablet by mouth daily 30 tablet 0    zinc sulfate (ZINCATE) 220 (50 Zn) MG capsule Take 1 capsule by mouth daily 30 capsule 3    aspirin 81 MG EC tablet Take 81 mg by mouth daily      Multiple Vitamins-Minerals (THERAPEUTIC MULTIVITAMIN-MINERALS) tablet Take 1 tablet by mouth daily      hydrochlorothiazide (HYDRODIURIL) 25 MG tablet Take 25 mg by mouth Daily with supper       Calcium Carbonate-Vitamin D (OSCAL 500/200 D-3 PO) Take 1 tablet by mouth daily      DULoxetine (CYMBALTA) 60 MG capsule Take 60 mg by mouth daily      gabapentin (NEURONTIN) 600 MG tablet Take 600 mg by mouth nightly.        pravastatin (PRAVACHOL) 80 MG tablet Take 80 mg by mouth nightly      rOPINIRole (REQUIP) 1 MG tablet Take 1 mg by mouth every morning        No facility-administered encounter medications on file as of 10/21/2021. Social History:   TOBACCO:   reports that she quit smoking about 11 years ago. Her smoking use included cigarettes. She has a 44.00 pack-year smoking history. She has never used smokeless tobacco.  ETOH:   reports current alcohol use. OCCUPATION:      Family History:       Problem Relation Age of Onset    Heart Disease Father        Immunizations: There is no immunization history on file for this patient. REVIEW OF SYSTEMS:  CONSTITUTIONAL: Positive for fatigue, negative for  fevers, chills, sweats, anorexia, and weight loss  EYES:  negative for  double vision, blurred vision, dry eyes, eye discharge, visual disturbance, redness, and icterus  HEENT: Positive for intermittent nasal congestion and postnasal drip negative for  hearing loss, tinnitus, ear drainage, earaches,epistaxis, sore throat, hoarseness, voice change.   RESPIRATORY: Positive for mild intermittent dry cough, dyspnea on exertion negative cough with sputum, wheezing, hemoptysis, chest pain, and pleuritic pain  CARDIOVASCULAR: Positive for dyspnea on exertion negative for  chest pain, palpitations, orthopnea, PND, exertional chest pressure/discomfort, fatigue, edema, syncope  GASTROINTESTINAL:  negative for nausea, vomiting, diarrhea, constipation, abdominal pain, abdominal mass, abdominal distention, jaundice, dysphagia, reflux, odynophagia, hematemesis, and hemtochezia  GENITOURINARY:  negative for frequency, dysuria, nocturia, and hematuria  HEMATOLOGIC/LYMPHATIC:  negative for easy bruising, bleeding, lymphadenopathy, and petechiae  ALLERGIC/IMMUNOLOGIC:  negative for recurrent infections, urticaria, hay fever, angioedema, anaphylaxis, and drug reactions  ENDOCRINE:  negative for heat intolerance, cold intolerance, tremor, and weight changes  MUSCULOSKELETAL:  negative for  myalgias, arthralgias, joint swelling, stiff joints, and muscle weakness  NEUROLOGICAL: negative for headaches, dizziness, seizures, memory problems, speech problems, visual disturbance, gait problems, tremor, dysphagia, weakness, numbness, syncope, and tingling  BEHAVIOR/PSYCH:  negative for decreased sleep, decreased energy level, increased energy level, poor concentration, depressed mood, and anxiety        Physical Exam:    Vitals: /75 (Site: Left Upper Arm, Position: Sitting, Cuff Size: Medium Adult)   Pulse 95   Temp 97.8 °F (36.6 °C) (Temporal)   Resp 18   Ht 5' 6\" (1.676 m)   Wt 202 lb 1.6 oz (91.7 kg)   SpO2 93%   BMI 32.62 kg/m²   Last 3 weights: Wt Readings from Last 3 Encounters:   10/21/21 202 lb 1.6 oz (91.7 kg)   09/14/21 196 lb 8 oz (89.1 kg)   08/22/21 204 lb 12.8 oz (92.9 kg)     Body mass index is 32.62 kg/m². Physical Examination:   General appearance - alert, well appearing, and in no distress, overweight and acyanotic, in no respiratory distress  Mental status - alert, oriented to person, place, and time  Eyes - pupils equal and reactive, extraocular eye movements intact, sclera anicteric  Ears - right ear normal, left ear normal  Nose - normal and patent, no erythema, discharge or polyps  Mouth - mucous membranes moist, pharynx normal without lesions and large tongue Mallampati 2  Neck - supple, no significant adenopathy, short thick neck  Chest - no tachypnea, retractions or cyanosis bilateral symmetrical chest movement, normal resonance on percussion, air entry is present bilaterally and symmetrical, she has mild intermittent basal crackles, no expiratory wheezing rhonchi.   Heart - normal rate, regular rhythm, normal S1, S2, no murmurs, rubs, clicks or gallops  Abdomen - soft, nontender, nondistended, no masses or organomegaly  Neurological - alert, oriented, normal speech, no focal findings or movement disorder noted  Extremities - peripheral pulses normal, no pedal edema, no clubbing or cyanosis  Skin - normal coloration and turgor, no rashes, no suspicious skin lesions noted       LABS:    CBC:   WBC   Date Value Ref Range Status   09/10/2021 5.5 3.5 - 11.3 k/uL Final   09/09/2021 4.9 3.5 - 11.3 k/uL Corrected     Comment:     ADJUSTED FOR NUCLEATED RBC'S  CORRECTED ON 09/09 AT 0809: PREVIOUSLY REPORTED AS 5.0     09/08/2021 5.9 3.5 - 11.3 k/uL Final     Hemoglobin   Date Value Ref Range Status   09/10/2021 12.4 11.9 - 15.1 g/dL Final   09/09/2021 11.3 (L) 11.9 - 15.1 g/dL Final   09/08/2021 11.5 (L) 11.9 - 15.1 g/dL Final     Platelets   Date Value Ref Range Status   09/10/2021 270 138 - 453 k/uL Final   09/09/2021 253 138 - 453 k/uL Final   09/08/2021 226 138 - 453 k/uL Final     BMP:   Sodium   Date Value Ref Range Status   09/10/2021 140 135 - 144 mmol/L Final   09/09/2021 140 135 - 144 mmol/L Final   09/08/2021 142 135 - 144 mmol/L Final     Potassium   Date Value Ref Range Status   09/10/2021 4.0 3.7 - 5.3 mmol/L Final   09/09/2021 3.9 3.7 - 5.3 mmol/L Final   09/08/2021 4.0 3.7 - 5.3 mmol/L Final     Potassium reflex Magnesium   Date Value Ref Range Status   08/25/2021 3.6 3.5 - 5.2 meq/L Final   08/24/2021 3.4 (L) 3.5 - 5.2 meq/L Final     Chloride   Date Value Ref Range Status   09/10/2021 103 98 - 107 mmol/L Final   09/09/2021 105 98 - 107 mmol/L Final   09/08/2021 106 98 - 107 mmol/L Final     CO2   Date Value Ref Range Status   09/10/2021 25 20 - 31 mmol/L Final   09/09/2021 24 20 - 31 mmol/L Final   09/08/2021 23 20 - 31 mmol/L Final     BUN   Date Value Ref Range Status   09/10/2021 26 (H) 8 - 23 mg/dL Final   09/09/2021 25 (H) 8 - 23 mg/dL Final   09/08/2021 25 (H) 8 - 23 mg/dL Final     CREATININE   Date Value Ref Range Status   09/10/2021 0.77 0.50 - 0.90 mg/dL Final   09/09/2021 0.65 0.50 - 0.90 mg/dL Final   09/08/2021 0.70 0.50 - 0.90 mg/dL Final     Glucose   Date Value Ref Range Status   09/10/2021 98 70 - 99 mg/dL Final   09/09/2021 88 70 - 99 mg/dL Final   09/08/2021 88 70 - 99 mg/dL Final     Hepatic:     Amylase: No results found for: AMYLASE  Lipase:   Lipase   Date Value Ref Range Status   05/09/2016 59 13 - 60 U/L Final     Comment:     [employer] [address] [facility], [address] [place] [address] [phone]     CARDIAC ENZYMES:     BNP: No results found for: BNP  Lipids:       INR:     Thyroid: No results found for: TSH  Urinalysis:     Cultures:-  -----------------------------------------------------------------    ABGs: No results found for: PHART, PO2ART, DPU5CWU    Pulmonary Functions Testing Results:    No results found for: FEV1, FVC, YGC4MEU, TLC, DLCO    CXR  Last chest x-ray 09/21/2021. Bilateral pulmonary infiltrate and areas of consolidation    CT Scans  CTA chest on 08/31/2021 showed no pulmonary embolism but bilateral pulmonary opacities and infiltrate in right lower lobe area of consolidation. ECHO:      Assessment and Plan       ICD-10-CM    1. History of COVID-19  Z86.16    2. Acute respiratory failure with hypoxia (HCC)  J96.01    3. PABLO (obstructive sleep apnea)  G47.33    4. History of smoking 30 or more pack years  Z87.891            Assessment:    She has Covid pneumonia with prolonged stay in the hospital with acute hypoxic respiratory failure and had slow and gradual recovery she was taken off oxygen when she was discharged from the hospital illness. Total duration of illness was about 4 weeks between 2 hospitalization.   She did have history of smoking for 30 years she possibly have underlying COPD as she had some dyspnea on exertion even before Covid for 1-1/2-year only on exertion activity but she never had pulmonary function test.  Her chest x-ray in CT scan of the chest showed bilateral pulmonary infiltrate and opacities last chest x-ray was 09/21/2021 which showed persistent bilateral infiltrate but that was just about 1 week after she was discharged from the hospital.  She will need follow-up chest x-ray in 4 weeks and if history of persistent infiltrate may need CT scan of the chest.  I have discussed with her that she will need a screening yearly low-dose CT scan once her infiltrate resolved. She will also need pulmonary function test as baseline but will wait for 4 to 6 weeks for resolution of infiltrate before pulmonary function test.    She is on nocturnal O2 when she was noted to have nocturnal hypoxia when she was in the hospital in [facility]. She has symptoms of sleep apnea and likely sleep apnea causing nocturnal hypoxia. Discussed with her about getting sleep study and she agrees sleep study requested and likely she will need CPAP titration and initiate CPAP if baseline study shows sleep apnea. Plan and recommendation:    Chest x-ray to be done in 4 weeks. Albuterol to be used as needed. Likely after pulmonary function test she will need LAMA. She will need pulmonary function test and will order when we will see her  She will need probably a CT scan of the chest depending upon the next chest x-ray. She will need yearly LDCT screening because of history of smoking for 30 years. Vaccinations recommended annually for flu. Covid vaccination recommended and discussed with patient  Up to date with vaccinations from pulm perspective   Maintain an active lifestyle   CXR reviewed  CT chest reviewed    Sleep study baseline and with CPAP titration if baseline study shows sleep apnea  Wt loss is recommended and discussed  Follow good sleep hygeine instructions  Given sleep hygeine instructions  Questions answered pertaining to diagnosis and management explained importance of compliance with therapy       Follow-up in the office in 6 weeks 6 weeks    It was my pleasure to evaluate Iram Nieto today. Please call with questions.     Cheng Osborne MD, MD             10/21/2021, 3:26 PM

## 2021-10-21 NOTE — PATIENT INSTRUCTIONS
SURVEY:    You may be receiving a survey from [employer] regarding your visit today. Please complete the survey to enable us to provide the highest quality of care to you and your family. If you cannot score us a very good on any question, please call the office to discuss how we could have made your experience a very good one. Thank you.

## 2021-11-11 ENCOUNTER — HOSPITAL ENCOUNTER (OUTPATIENT)
Dept: GENERAL RADIOLOGY | Age: 71
Discharge: HOME | End: 2021-11-13
Payer: MEDICARE

## 2021-11-11 ENCOUNTER — HOSPITAL ENCOUNTER (OUTPATIENT)
Age: 71
Discharge: HOME | End: 2021-11-13
Payer: MEDICARE

## 2021-11-11 DIAGNOSIS — Z86.16 HISTORY OF COVID-19: ICD-10-CM

## 2021-11-11 PROCEDURE — 71046 X-RAY EXAM CHEST 2 VIEWS: CPT

## 2022-03-07 NOTE — PROGRESS NOTES
Patient uses call light states needing to urinate. Patient very weak, up to [facility] x 2 assist and back to bed x 1 assist.  States just feeling very tired and weak. Denies any other complaints or any further needs. saO2 only decreases to 90-91% with frequent rest breaks. Explained again to patient about covid patients positioning prone, or side to side. Patient agrees to lying on left side and does with assistance. Explained to patient about lying prone or on sides d/t covid diagnosis. Patient verbalizes understanding.   Continue to monitor 70

## 2022-05-18 ENCOUNTER — HOSPITAL ENCOUNTER (OUTPATIENT)
Dept: CT IMAGING | Age: 72
Discharge: HOME | End: 2022-05-20
Payer: MEDICARE

## 2022-05-18 ENCOUNTER — HOSPITAL ENCOUNTER (OUTPATIENT)
Dept: GENERAL RADIOLOGY | Age: 72
Discharge: HOME | End: 2022-05-20
Payer: MEDICARE

## 2022-05-18 VITALS
SYSTOLIC BLOOD PRESSURE: 152 MMHG | RESPIRATION RATE: 16 BRPM | DIASTOLIC BLOOD PRESSURE: 81 MMHG | OXYGEN SATURATION: 96 % | HEART RATE: 68 BPM

## 2022-05-18 DIAGNOSIS — M48.062 SPINAL STENOSIS, LUMBAR REGION WITH NEUROGENIC CLAUDICATION: ICD-10-CM

## 2022-05-18 DIAGNOSIS — M43.16 SPONDYLOLISTHESIS OF LUMBAR REGION: ICD-10-CM

## 2022-05-18 DIAGNOSIS — Z98.1 ARTHRODESIS PRESENT: ICD-10-CM

## 2022-05-18 PROCEDURE — 6360000004 HC RX CONTRAST MEDICATION: Performed by: PHYSICIAN ASSISTANT

## 2022-05-18 PROCEDURE — 72265 MYELOGRAPHY L-S SPINE: CPT

## 2022-05-18 PROCEDURE — 72132 CT LUMBAR SPINE W/DYE: CPT

## 2022-05-18 RX ORDER — ACETAMINOPHEN 325 MG/1
650 TABLET ORAL EVERY 4 HOURS PRN
Status: DISCONTINUED | OUTPATIENT
Start: 2022-05-18 | End: 2022-05-21 | Stop reason: HOSPADM

## 2022-05-18 RX ADMIN — IOHEXOL 10 ML: 240 INJECTION, SOLUTION INTRATHECAL; INTRAVASCULAR; INTRAVENOUS; ORAL at 13:46

## 2022-05-18 NOTE — PROGRESS NOTES
Patient sitting on edge of bed, denies dizziness/lightheadedness. Band aid to lower back noted clean/dry/intact.

## 2022-05-18 NOTE — BRIEF OP NOTE
Brief Postoperative Note    Tena Pandya  YOB: 1950  [id number]    Pre-operative Diagnosis: low back pain    Post-operative Diagnosis: Same    Procedure: lumbar myelogram    Anesthesia: Local    Surgeons/Assistants: Rickey Hawk MD    Estimated Blood Loss: 0    Complications: None    Specimens: Was Not Obtained    Findings: successful lumbar myelogram     Electronically signed by Rickey Hawk MD on 5/18/2022 at 1:30 PM

## 2022-05-18 NOTE — PROGRESS NOTES
Patient brought to pre/post area to continue recovery. Alert and oriented, denies pain. Will continue to monitor.

## 2022-08-18 ENCOUNTER — HOSPITAL ENCOUNTER (OUTPATIENT)
Dept: NUCLEAR MEDICINE | Age: 72
Discharge: HOME | End: 2022-08-20
Payer: MEDICARE

## 2022-08-18 DIAGNOSIS — M89.8X6 PAIN OF RIGHT TIBIA: ICD-10-CM

## 2022-08-18 PROCEDURE — A9503 TC99M MEDRONATE: HCPCS | Performed by: ORTHOPAEDIC SURGERY

## 2022-08-18 PROCEDURE — 3430000000 HC RX DIAGNOSTIC RADIOPHARMACEUTICAL: Performed by: ORTHOPAEDIC SURGERY

## 2022-08-18 PROCEDURE — 78315 BONE IMAGING 3 PHASE: CPT

## 2022-08-18 RX ORDER — TC 99M MEDRONATE 20 MG/10ML
25 INJECTION, POWDER, LYOPHILIZED, FOR SOLUTION INTRAVENOUS
Status: COMPLETED | OUTPATIENT
Start: 2022-08-18 | End: 2022-08-18

## 2022-08-18 RX ADMIN — TC 99M MEDRONATE 25 MILLICURIE: 20 INJECTION, POWDER, LYOPHILIZED, FOR SOLUTION INTRAVENOUS at 10:15

## 2022-10-08 ENCOUNTER — HOSPITAL ENCOUNTER (EMERGENCY)
Age: 72
Discharge: HOME | End: 2022-10-08
Payer: MEDICARE

## 2022-10-08 ENCOUNTER — APPOINTMENT (OUTPATIENT)
Dept: GENERAL RADIOLOGY | Age: 72
End: 2022-10-08
Payer: MEDICARE

## 2022-10-08 VITALS
DIASTOLIC BLOOD PRESSURE: 61 MMHG | TEMPERATURE: 97.9 F | HEART RATE: 88 BPM | SYSTOLIC BLOOD PRESSURE: 120 MMHG | RESPIRATION RATE: 16 BRPM | OXYGEN SATURATION: 94 %

## 2022-10-08 DIAGNOSIS — L03.115 CELLULITIS OF RIGHT FOOT: Primary | ICD-10-CM

## 2022-10-08 LAB
ABSOLUTE EOS #: 0.14 K/UL (ref 0–0.44)
ABSOLUTE IMMATURE GRANULOCYTE: 0.04 K/UL (ref 0–0.3)
ABSOLUTE LYMPH #: 1.06 K/UL (ref 1.1–3.7)
ABSOLUTE MONO #: 0.67 K/UL (ref 0.1–1.2)
ALBUMIN SERPL-MCNC: 3.9 G/DL (ref 3.5–5.2)
ALBUMIN/GLOBULIN RATIO: 1.8 (ref 1–2.5)
ALP BLD-CCNC: 109 U/L (ref 35–104)
ALT SERPL-CCNC: 22 U/L (ref 5–33)
ANION GAP SERPL CALCULATED.3IONS-SCNC: 10 MMOL/L (ref 9–17)
AST SERPL-CCNC: 18 U/L
BASOPHILS # BLD: 0 % (ref 0–2)
BASOPHILS ABSOLUTE: <0.03 K/UL (ref 0–0.2)
BILIRUB SERPL-MCNC: 0.3 MG/DL (ref 0.3–1.2)
BUN BLDV-MCNC: 20 MG/DL (ref 8–23)
BUN/CREAT BLD: 19 (ref 9–20)
CALCIUM SERPL-MCNC: 9.7 MG/DL (ref 8.6–10.4)
CHLORIDE BLD-SCNC: 104 MMOL/L (ref 98–107)
CO2: 25 MMOL/L (ref 20–31)
CREAT SERPL-MCNC: 1.08 MG/DL (ref 0.5–0.9)
EOSINOPHILS RELATIVE PERCENT: 2 % (ref 1–4)
GFR SERPL CREATININE-BSD FRML MDRD: 54 ML/MIN/1.73M2
GLUCOSE BLD-MCNC: 109 MG/DL (ref 70–99)
HCT VFR BLD CALC: 34.2 % (ref 36.3–47.1)
HEMOGLOBIN: 11.3 G/DL (ref 11.9–15.1)
IMMATURE GRANULOCYTES: 1 %
LACTIC ACID: 0.9 MMOL/L (ref 0.5–2.2)
LYMPHOCYTES # BLD: 18 % (ref 24–43)
MCH RBC QN AUTO: 30.9 PG (ref 25.2–33.5)
MCHC RBC AUTO-ENTMCNC: 33 G/DL (ref 28.4–34.8)
MCV RBC AUTO: 93.4 FL (ref 82.6–102.9)
MONOCYTES # BLD: 11 % (ref 3–12)
NRBC AUTOMATED: 0 PER 100 WBC
PDW BLD-RTO: 13.9 % (ref 11.8–14.4)
PLATELET # BLD: 222 K/UL (ref 138–453)
PMV BLD AUTO: 9.3 FL (ref 8.1–13.5)
POTASSIUM SERPL-SCNC: 4 MMOL/L (ref 3.7–5.3)
RBC # BLD: 3.66 M/UL (ref 3.95–5.11)
SEG NEUTROPHILS: 68 % (ref 36–65)
SEGMENTED NEUTROPHILS ABSOLUTE COUNT: 4.07 K/UL (ref 1.5–8.1)
SODIUM BLD-SCNC: 139 MMOL/L (ref 135–144)
TOTAL PROTEIN: 6.1 G/DL (ref 6.4–8.3)
WBC # BLD: 6 K/UL (ref 3.5–11.3)

## 2022-10-08 PROCEDURE — 73630 X-RAY EXAM OF FOOT: CPT

## 2022-10-08 PROCEDURE — 83605 ASSAY OF LACTIC ACID: CPT

## 2022-10-08 PROCEDURE — 96365 THER/PROPH/DIAG IV INF INIT: CPT

## 2022-10-08 PROCEDURE — 99284 EMERGENCY DEPT VISIT MOD MDM: CPT

## 2022-10-08 PROCEDURE — 2500000003 HC RX 250 WO HCPCS: Performed by: PHYSICIAN ASSISTANT

## 2022-10-08 PROCEDURE — 36415 COLL VENOUS BLD VENIPUNCTURE: CPT

## 2022-10-08 PROCEDURE — 80053 COMPREHEN METABOLIC PANEL: CPT

## 2022-10-08 PROCEDURE — 85025 COMPLETE CBC W/AUTO DIFF WBC: CPT

## 2022-10-08 RX ORDER — CLINDAMYCIN HYDROCHLORIDE 300 MG/1
300 CAPSULE ORAL 3 TIMES DAILY
Qty: 21 CAPSULE | Refills: 0 | Status: SHIPPED | OUTPATIENT
Start: 2022-10-08 | End: 2022-10-15

## 2022-10-08 RX ORDER — CLINDAMYCIN PHOSPHATE 900 MG/50ML
900 INJECTION INTRAVENOUS ONCE
Status: COMPLETED | OUTPATIENT
Start: 2022-10-08 | End: 2022-10-08

## 2022-10-08 RX ADMIN — CLINDAMYCIN PHOSPHATE 900 MG: 900 INJECTION, SOLUTION INTRAVENOUS at 18:20

## 2022-10-08 ASSESSMENT — PAIN - FUNCTIONAL ASSESSMENT: PAIN_FUNCTIONAL_ASSESSMENT: NONE - DENIES PAIN

## 2022-10-08 ASSESSMENT — ENCOUNTER SYMPTOMS
ROS SKIN COMMENTS: SEE HPI
RESPIRATORY NEGATIVE: 1
GASTROINTESTINAL NEGATIVE: 1

## 2022-10-08 NOTE — ED PROVIDER NOTES
[facility]  EMERGENCY DEPARTMENT ENCOUNTER      Pt Name: Arabella Vuong  MRN: [id number]  [place] 1950  Date of evaluation: 10/8/2022  Provider: Cece Steen PA-C    CHIEF COMPLAINT       Chief Complaint   Patient presents with    Wound Infection     Pt reports left great toe abscess, drainage stared yesterday. HISTORY OF PRESENT ILLNESS   (Location/Symptom, Timing/Onset, Context/Setting, Quality, Duration, Modifying Factors, Severity)  Note limiting factors. Arabella Vuong is a 67 y.o. female who presents to the emergency department for evaluation of atraumatic right great toe increased redness swelling and pain that began Thursday but worsened yesterday. Reports history of peripheral neuropathies due to back surgery years ago. Patient denies trauma history of fever nausea or vomiting. Patient reports mild drainage, scribed as clear with some blood. No other symptoms noted, patient has no additional pain sites or complaints at present. Patient denies history of DM or being immunocompromised. Patient reports she is followed by podiatrist.    HPI    Nursing Notes were reviewed. REVIEW OF SYSTEMS    (2-9 systems for level 4, 10 or more for level 5)     Review of Systems   Constitutional: Negative. Respiratory: Negative. Gastrointestinal: Negative. Genitourinary: Negative. Musculoskeletal:  Positive for arthralgias. See hpi   Skin:  Positive for rash and wound. See hpi   Psychiatric/Behavioral: Negative. Except as noted above the remainder of the review of systems was reviewed and negative.        PAST MEDICAL HISTORY     Past Medical History:   Diagnosis Date    Arthritis     Blockage of coronary artery of heart ([facility] [address] 2010    x 2 stents    CAD (coronary artery disease)     DDD (degenerative disc disease), cervical     cervical 3-6    Fibromyalgia     History of blood transfusion     History of kidney stones     last one \"about 10 years ago\"    Hyperlipidemia     Hypertension     Pancreatitis 2016    Sleep apnea     Thyroid disease     Wears glasses     for reading         SURGICAL HISTORY       Past Surgical History:   Procedure Laterality Date    ARTHROPLASTY Left 2/6/2020    LEFT FIRST CARPOMETACARPAL ARTHROPLASTY performed by Jamaal Reynoso DO at [address]  2012    L4,L5,S1 hardware placed    BACK SURGERY  2018    SPINAL CORD STIMULATOR    CATARACT REMOVAL Bilateral 2016    CERVICAL FUSION N/A 8/17/2021    C3-6 ACDF, REMOVAL OF HARDWARE C6-7 performed by Gina Borden MD at [facility]    COLONOSCOPY      normal    CORONARY ANGIOPLASTY  2010    x 2 stents    FINGER SURGERY      right thumb joint    HERNIA REPAIR Right     inguinal    HYSTERECTOMY (CERVIX STATUS UNKNOWN)      JOINT REPLACEMENT Right     knee    KIDNEY STONE SURGERY      x 3    LITHOTRIPSY      x 3    PARATHYROIDECTOMY      PARATHYROIDECTOMY      NE PERCUT IMPLNT NEUROELECT,EPIDURAL N/A 8/8/2018    SPINAL CORD STIMULATOR IMPLANT performed by Geovanni Ma MD at [facility] Right 03/2019         CURRENT MEDICATIONS       Previous Medications    ALBUTEROL SULFATE HFA (VENTOLIN HFA) 108 (90 BASE) MCG/ACT INHALER    Inhale 2 puffs into the lungs 4 times daily as needed for Wheezing    AMLODIPINE (NORVASC) 5 MG TABLET    Take 1 tablet by mouth nightly    ASPIRIN 81 MG EC TABLET    Take 81 mg by mouth daily    CALCIUM CARBONATE-VITAMIN D (OSCAL 500/200 D-3 PO)    Take 1 tablet by mouth daily    CARVEDILOL (COREG) 6.25 MG TABLET    Take 1 tablet by mouth 2 times daily (with meals)    DULOXETINE (CYMBALTA) 60 MG CAPSULE    Take 60 mg by mouth daily    GABAPENTIN (NEURONTIN) 300 MG CAPSULE    Take 300 mg by mouth every morning. GABAPENTIN (NEURONTIN) 600 MG TABLET    Take 600 mg by mouth nightly.      HYDROCHLOROTHIAZIDE (HYDRODIURIL) 25 MG TABLET    Take 25 mg by mouth Daily with supper     MULTIPLE VITAMINS-MINERALS (THERAPEUTIC MULTIVITAMIN-MINERALS) TABLET    Take 1 tablet by mouth daily    PANTOPRAZOLE (PROTONIX) 40 MG TABLET    Take 1 tablet by mouth nightly    PRAVASTATIN (PRAVACHOL) 80 MG TABLET    Take 80 mg by mouth nightly    ROPINIROLE (REQUIP) 1 MG TABLET    Take 1 mg by mouth every morning     ROPINIROLE (REQUIP) 2 MG TABLET    Take 2 mg by mouth nightly    VITAMIN D (CHOLECALCIFEROL) 50 MCG (2000 UT) TABS TABLET    Take 1 tablet by mouth daily    ZINC SULFATE (ZINCATE) 220 (50 ZN) MG CAPSULE    Take 1 capsule by mouth daily       ALLERGIES     Lisinopril and Toradol [ketorolac tromethamine]    FAMILY HISTORY       Family History   Problem Relation Age of Onset    Heart Disease Father           SOCIAL HISTORY       Social History     Socioeconomic History    Marital status:      Spouse name: None    Number of children: None    Years of education: None    Highest education level: None   Tobacco Use    Smoking status: Former     Packs/day: 1.00     Years: 44.00     Pack years: 44.00     Types: Cigarettes     Quit date: 2010     Years since quittin.3    Smokeless tobacco: Never   Vaping Use    Vaping Use: Never used   Substance and Sexual Activity    Alcohol use: Yes     Comment: rarely    Drug use: No       SCREENINGS         [place] Coma Scale  Eye Opening: Spontaneous  Best Verbal Response: Oriented  Best Motor Response: Obeys commands  [place] Coma Scale Score: 15                     CIWA Assessment  BP: 120/61  Heart Rate: 88                 PHYSICAL EXAM    (up to 7 for level 4, 8 or more for level 5)     ED Triage Vitals [10/08/22 1745]   BP Temp Temp Source Heart Rate Resp SpO2 Height Weight   (!) 143/63 97.9 °F (36.6 °C) Tympanic 88 16 94 % -- --       Physical Exam  Vitals and nursing note reviewed. Constitutional:       General: She is not in acute distress. Appearance: Normal appearance. She is not ill-appearing. HENT:      Head: Normocephalic.    Cardiovascular: Rate and Rhythm: Normal rate and regular rhythm. Pulses: Normal pulses. Heart sounds: Normal heart sounds. Pulmonary:      Effort: Pulmonary effort is normal.      Breath sounds: Normal breath sounds. Abdominal:      Palpations: Abdomen is soft. Musculoskeletal:         General: Normal range of motion. Cervical back: Normal range of motion. Feet:    Skin:     General: Skin is warm. Capillary Refill: Capillary refill takes less than 2 seconds. Findings: Lesion and rash present. Neurological:      General: No focal deficit present. Mental Status: She is alert and oriented to person, place, and time. Psychiatric:         Mood and Affect: Mood normal.         Behavior: Behavior normal.       DIAGNOSTIC RESULTS           RADIOLOGY:   Non-plain film images such as CT, Ultrasound and MRI are read by the radiologist. Plain radiographic images are visualized and preliminarily interpreted by the emergency physician with the below findings:        Interpretation per the Radiologist below, if available at the time of this note:    XR FOOT RIGHT (MIN 3 VIEWS)   Final Result   1. No acute osseous abnormality of the right foot. 2. Forefoot soft tissue swelling. No soft tissue gas or radiopaque foreign   body.                ED BEDSIDE ULTRASOUND:   Performed by ED Physician - none    LABS:  Labs Reviewed   COMPREHENSIVE METABOLIC PANEL - Abnormal; Notable for the following components:       Result Value    Glucose 109 (*)     Creatinine 1.08 (*)     Est, Glom Filt Rate 54 (*)     Alkaline Phosphatase 109 (*)     Total Protein 6.1 (*)     All other components within normal limits   CBC WITH AUTO DIFFERENTIAL - Abnormal; Notable for the following components:    RBC 3.66 (*)     Hemoglobin 11.3 (*)     Hematocrit 34.2 (*)     Seg Neutrophils 68 (*)     Lymphocytes 18 (*)     Immature Granulocytes 1 (*)     Absolute Lymph # 1.06 (*)     All other components within normal limits 38:42:01 PM      PATIENT REFERRED TO:  No follow-up provider specified. DISCHARGE MEDICATIONS:  New Prescriptions    No medications on file     Controlled Substances Monitoring:     No flowsheet data found.     (Please note that portions of this note were completed with a voice recognition program.  Efforts were made to edit the dictations but occasionally words are mis-transcribed.)    Antoinette Argueta PA-C (electronically signed)  Attending Emergency Physician           Antoinette Argueta PA-C  10/08/22 3608

## 2022-11-03 ENCOUNTER — HOSPITAL ENCOUNTER (OUTPATIENT)
Dept: MRI IMAGING | Age: 72
Discharge: HOME | End: 2022-11-05
Payer: MEDICARE

## 2022-11-03 DIAGNOSIS — L97.512 ULCER OF RIGHT SECOND TOE WITH FAT LAYER EXPOSED (HCC): ICD-10-CM

## 2022-11-03 DIAGNOSIS — L97.512 ULCER OF RIGHT SECOND TOE, WITH FAT LAYER EXPOSED (HCC): ICD-10-CM

## 2022-11-03 DIAGNOSIS — M89.9 BONE LESION: ICD-10-CM

## 2022-11-03 DIAGNOSIS — M25.571 RIGHT ANKLE PAIN, UNSPECIFIED CHRONICITY: ICD-10-CM

## 2022-11-03 DIAGNOSIS — M25.471 RIGHT ANKLE SWELLING: ICD-10-CM

## 2022-11-03 PROCEDURE — 73721 MRI JNT OF LWR EXTRE W/O DYE: CPT

## 2022-11-03 PROCEDURE — 73718 MRI LOWER EXTREMITY W/O DYE: CPT

## 2022-11-17 ENCOUNTER — OFFICE VISIT (OUTPATIENT)
Dept: ORTHOPEDIC SURGERY | Age: 72
End: 2022-11-17
Payer: MEDICARE

## 2022-11-17 VITALS — BODY MASS INDEX: 32.47 KG/M2 | WEIGHT: 202 LBS | HEIGHT: 66 IN | RESPIRATION RATE: 12 BRPM

## 2022-11-17 DIAGNOSIS — M96.1 POST LAMINECTOMY SYNDROME: ICD-10-CM

## 2022-11-17 DIAGNOSIS — M48.061 SPINAL STENOSIS, LUMBAR REGION, WITHOUT NEUROGENIC CLAUDICATION: ICD-10-CM

## 2022-11-17 DIAGNOSIS — M96.1 POSTLAMINECTOMY SYNDROME, LUMBAR REGION: Primary | ICD-10-CM

## 2022-11-17 DIAGNOSIS — T84.84XA PAINFUL ORTHOPAEDIC HARDWARE (HCC): ICD-10-CM

## 2022-11-17 PROCEDURE — 99213 OFFICE O/P EST LOW 20 MIN: CPT | Performed by: ORTHOPAEDIC SURGERY

## 2022-11-17 PROCEDURE — 1123F ACP DISCUSS/DSCN MKR DOCD: CPT | Performed by: ORTHOPAEDIC SURGERY

## 2022-11-17 NOTE — PROGRESS NOTES
Patient ID: Cristobal Muñoz is a 67 y.o. female    Chief Compliant:  Chief Complaint   Patient presents with    Back Pain        Diagnostic imaging:  CT myelogram from May of this year patient with history of L4 to sacrum PLIF ankylosed medial pedicle breaches at L4 and L5      Assessment and Plan:  1. Acquired spondylolisthesis    2. Postlaminectomy syndrome, lumbar region    3. Spinal stenosis, lumbar region, without neurogenic claudication    4. Post laminectomy syndrome      Refer to previous clinic notes. Patient with historically a spinal cord stimulator placement battery was subsequently moved to a less painful location nevertheless the patient has not used her spinal cord stimulator for over 2 years has some discomfort over the hardware and wishes to have the spinal cord stimulator removed    Bilateral low back pain with bi-radicular buttocks pain    Proceed with SCS removal, patient is agreeable to surgical intervention    We discussed risks of surgery and recovery process. Risks include infection, bleeding, neurologic injury, systemic and anesthetic complications, no relief of pain, need for future surgery. Follow up 2 weeks after surgery    HPI:  This is a 67 y.o. female who presents to the clinic today for discussion for SCS removal.     Patient last seen in 2020 with diminished utility of SCS. Patient is following with pain management at [facility]. She states her SCS has not been operational since she last was here. Patient has bilateral low back pain with bi-radicular buttocks pain. She does not have worsened pain with extensive ambulation or standing for extended periods of time. Review of Systems   All other systems reviewed and are negative.       Past History:    Current Outpatient Medications:     albuterol sulfate HFA (VENTOLIN HFA) 108 (90 Base) MCG/ACT inhaler, Inhale 2 puffs into the lungs 4 times daily as needed for Wheezing, Disp: 18 g, Rfl: 5    carvedilol (COREG) 6.25 MG tablet, Take 1 tablet by mouth 2 times daily (with meals), Disp: 60 tablet, Rfl: 3    amLODIPine (NORVASC) 5 MG tablet, Take 1 tablet by mouth nightly, Disp: 30 tablet, Rfl: 3    pantoprazole (PROTONIX) 40 MG tablet, Take 1 tablet by mouth nightly, Disp: 30 tablet, Rfl: 0    gabapentin (NEURONTIN) 300 MG capsule, Take 300 mg by mouth every morning. , Disp: , Rfl:     rOPINIRole (REQUIP) 2 MG tablet, Take 2 mg by mouth nightly, Disp: , Rfl:     Vitamin D (CHOLECALCIFEROL) 50 MCG (2000 UT) TABS tablet, Take 1 tablet by mouth daily, Disp: 30 tablet, Rfl: 0    zinc sulfate (ZINCATE) 220 (50 Zn) MG capsule, Take 1 capsule by mouth daily, Disp: 30 capsule, Rfl: 3    aspirin 81 MG EC tablet, Take 81 mg by mouth daily, Disp: , Rfl:     Multiple Vitamins-Minerals (THERAPEUTIC MULTIVITAMIN-MINERALS) tablet, Take 1 tablet by mouth daily, Disp: , Rfl:     hydrochlorothiazide (HYDRODIURIL) 25 MG tablet, Take 25 mg by mouth Daily with supper , Disp: , Rfl:     Calcium Carbonate-Vitamin D (OSCAL 500/200 D-3 PO), Take 1 tablet by mouth daily, Disp: , Rfl:     DULoxetine (CYMBALTA) 60 MG capsule, Take 60 mg by mouth daily, Disp: , Rfl:     gabapentin (NEURONTIN) 600 MG tablet, Take 600 mg by mouth nightly. , Disp: , Rfl:     pravastatin (PRAVACHOL) 80 MG tablet, Take 80 mg by mouth nightly, Disp: , Rfl:     rOPINIRole (REQUIP) 1 MG tablet, Take 1 mg by mouth every morning , Disp: , Rfl:   Allergies   Allergen Reactions    Lisinopril      Pt uncertain of the reaction    Toradol [Ketorolac Tromethamine] Swelling     Swelling at site of injection     Social History     Socioeconomic History    Marital status:       Spouse name: Not on file    Number of children: Not on file    Years of education: Not on file    Highest education level: Not on file   Occupational History    Not on file   Tobacco Use    Smoking status: Former     Packs/day: 1.00     Years: 44.00     Pack years: 44.00     Types: Cigarettes     Quit date: 5/25/2010 Years since quittin.4    Smokeless tobacco: Never   Vaping Use    Vaping Use: Never used   Substance and Sexual Activity    Alcohol use: Yes     Comment: rarely    Drug use: No    Sexual activity: Not on file   Other Topics Concern    Not on file   Social History Narrative    Not on file     Social Determinants of Health     Financial Resource Strain: Not on file   Food Insecurity: Not on file   Transportation Needs: Not on file   Physical Activity: Not on file   Stress: Not on file   Social Connections: Not on file   Intimate Partner Violence: Not on file   Housing Stability: Not on file     Past Medical History:   Diagnosis Date    Arthritis     Blockage of coronary artery of heart ([facility] [address] 2010    x 2 stents    CAD (coronary artery disease)     DDD (degenerative disc disease), cervical     cervical 3-6    Fibromyalgia     History of blood transfusion     History of kidney stones     last one \"about 10 years ago\"    Hyperlipidemia     Hypertension     Pancreatitis 2016    Sleep apnea     Thyroid disease     Wears glasses     for reading     Past Surgical History:   Procedure Laterality Date    ARTHROPLASTY Left 2020    LEFT FIRST CARPOMETACARPAL ARTHROPLASTY performed by Elsa Hedrick DO at [address] [place] [address]      L4,L5,S1 hardware placed    [address] [place] [address] Bilateral 2016    CERVICAL FUSION N/A 2021    C3-6 ACDF, REMOVAL OF HARDWARE C6-7 performed by Jett Peng MD at [place] [address]    COLONOSCOPY      normal    CORONARY ANGIOPLASTY  2010    x 2 stents    FINGER SURGERY      right thumb joint    HERNIA REPAIR Right     inguinal    HYSTERECTOMY (CERVIX STATUS UNKNOWN)      JOINT REPLACEMENT Right     knee    KIDNEY STONE SURGERY      x 3    LITHOTRIPSY      x 3    PARATHYROIDECTOMY      PARATHYROIDECTOMY      [place] PERCUT IMPLNT NEUROELECT,EPIDURAL N/A 2018    SPINAL CORD STIMULATOR IMPLANT performed by Christine Ceja MD at [facility] Right 03/2019     Family History   Problem Relation Age of Onset    Heart Disease Father         Physical Exam:  Vitals signs and nursing note reviewed. Constitutional:       Appearance: well-developed. HENT:      Head: Normocephalic and atraumatic. Nose: Nose normal.   Eyes:      Conjunctiva/sclera: Conjunctivae normal.   Neck:      Musculoskeletal: Normal range of motion and neck supple. Pulmonary:      Effort: Pulmonary effort is normal. No respiratory distress. Musculoskeletal:      Comments: Normal gait     Skin:     General: Skin is warm and dry. Neurological:      Mental Status: Alert and oriented to person, place, and time. Sensory: No sensory deficit. Psychiatric:         Behavior: Behavior normal.         Thought Content: Thought content normal.        Provider Attestation:  Blank Lee, personally performed the services described in this documentation. All medical record entries made by the scribe were at my direction and in my presence. I have reviewed the chart and discharge instructions and agree that the records reflect my personal performance and is accurate and complete. Junior Juan Jane MD 11/17/22       Scribe Attestation:  By signing my name below, Kisha Farley, attest that this documentation has been prepared under the direction and in the presence of Dr. Fadi Lovett. Electronically signed: Real Irwin, 11/17/22     Please note that this chart was generated using voice recognition Dragon dictation software. Although every effort was made to ensure the accuracy of this automated transcription, some errors in transcription may have occurred.

## 2022-11-26 NOTE — H&P
HISTORY and [address]       NAME:  Ramona Castrejon  MRN: [id number]   YOB: 1950   Date: 11/28/2022   Age: 67 y.o. Gender: female     COMPLAINT AND PRESENT HISTORY:   Ramona Castrejon is 67 y.o.,  female, presents for pre-anesthesia/admission testing for SPINAL CORD STIMULATOR REMOVAL per Dr. Tigist Tinajero  . Primary dx: PAINFUL HARDWARE.    HPI:  See below partial  of the note from office visit per Dr Tigist Tinajero on 11/17/2022  ( viewed). This is a 67 y.o. female who presents to the clinic today for discussion for SCS removal.      Patient last seen in 2020 with diminished utility of SCS. Patient is following with pain management at [facility]. She states her SCS has not been operational since she last was here. Patient has bilateral low back pain with bi-radicular buttocks pain. She does not have worsened pain with extensive ambulation or standing for extended periods of time. Update HPI:  Ramona Castrejon is 67 y.o.,  female, states she has spinal cord stimulator for the past two years which it  did not working. Pt C/O of tenderness with palpation over the spinal cord battery on the left lower side of her back. She has intermittent bilateral low back pain as aching/stabbing. With no numbness or tingling in her lower legs. No recent falls or trauma. No redness, swelling or rashes. Pt is following up with pain management for the sacral area pain and she had two steroid injection in the sacral area in the end of July with good pain relieve . Pain aggravated by walking, standing and bending over increase her lower back pain. Pt is on tylenol PRN for pain . RECENT IMAGING R/T HPI   MRI ANKLE RIGHT WO CONTRAST    Result Date: 11/4/2022  1. There appears to be a pes planovalgus deformity of the foot and there is evidence of a probable chronic tear of the distal spring ligament. The other ligaments of the ankle and foot appear intact.  2. There is a hallux valgus deformity with probable mild osteoarthritis of the first MTP joint and first metatarsal-sesamoid joints. 3. Remote healed fracture deformity of the second metatarsal shaft and postsurgical changes from remote osteotomy of the distal shaft of the first metatarsal and prior bunionectomy of the medial aspect of the first metatarsal head. 4. No MRI evidence of osteomyelitis is seen. 5. There is atrophy of the musculature of the foot which is most compatible with the sequela of a long-standing peripheral neuropathy. 6. There is a small ovoid cystlike focus along the plantar and lateral aspect of the second MTP joint capsule and this is most compatible with either a retinacular cyst or ganglion cyst. 7. There appears to be linear postsurgical scarring within the soft tissues in the region of the tarsal tunnel. No space-occupying mass is seen in the tarsal tunnel. 8. Please note that multiple images of the MRI of the foot are degraded by motion artifact. MRI FOOT RIGHT WO CONTRAST    Result Date: 11/4/2022  1. There appears to be a pes planovalgus deformity of the foot and there is evidence of a probable chronic tear of the distal spring ligament. The other ligaments of the ankle and foot appear intact. 2. There is a hallux valgus deformity with probable mild osteoarthritis of the first MTP joint and first metatarsal-sesamoid joints. 3. Remote healed fracture deformity of the second metatarsal shaft and postsurgical changes from remote osteotomy of the distal shaft of the first metatarsal and prior bunionectomy of the medial aspect of the first metatarsal head. 4. No MRI evidence of osteomyelitis is seen. 5. There is atrophy of the musculature of the foot which is most compatible with the sequela of a long-standing peripheral neuropathy.  6. There is a small ovoid cystlike focus along the plantar and lateral aspect of the second MTP joint capsule and this is most compatible with either a retinacular cyst or ganglion cyst. 7. There appears to be linear postsurgical scarring within the soft tissues in the region of the tarsal tunnel. No space-occupying mass is seen in the tarsal tunnel. 8. Please note that multiple images of the MRI of the foot are degraded by motion artifact. Review of additional significant medical hx:    CAD, HLD  Pt had blockage of coronary artery of heart and she had 2 stents on 2010   MEDICATION R/T CONDITION : Coreg ,amlodipine, ASA 81 mg. Pt follow up with her PCP she see her every 6 months the last office visit was last June     Her cardiology Dr Natalie Muñoz last office visit was last July she see him every six months . BP Readings from Last 3 Encounters:   11/28/22 122/73   10/08/22 120/61   05/18/22 (!) 152/81     Covid 19 positive , pneumonia  2021   She was in the hospital for two weeks , no ventilation   She was on oxygen for few days . Recently she denies any SOB, wheezing, no fever/chills. SLEEP APNEA  MEDICATION R/T CONDITION : Pt denies     THYROID DISEASE parathyroid removed. MEDICATION R/T CONDITION : no medication     Activity level:  Functional Capacity per patient:   1. Patient is  able to walk 2 city blocks on level ground without SOB. 2. Patient is  able to climb 2 flights of stairs without SOB. Denies hx of MRSA infection. Denies hx of blood clots. Denies hx of any personal or family hx of complications w/anesthesia.    PAST MEDICAL HISTORY     Past Medical History:   Diagnosis Date    Arthritis     Blockage of coronary artery of heart ([facility] [address] 2010    x 2 stents    CAD (coronary artery disease)     DDD (degenerative disc disease), cervical     cervical 3-6    Fibromyalgia     History of blood transfusion     History of kidney stones     last one \"about 10 years ago\"    Hyperlipidemia     Hypertension     Pancreatitis 2016    Sleep apnea     pt denies    Thyroid disease     parathyroid removed    Wears glasses     for reading       SURGICAL HISTORY       Past Surgical History:   Procedure Laterality Date    ARTHROPLASTY Left 2020    LEFT FIRST CARPOMETACARPAL ARTHROPLASTY performed by Ludmila Vines DO at [facility]      L4,L5,S1 hardware placed    BACK SURGERY  2018    SPINAL CORD STIMULATOR    CATARACT REMOVAL Bilateral 2016    CERVICAL FUSION N/A 2021    C3-6 ACDF, REMOVAL OF HARDWARE C6-7 performed by Murphy Kirkpatrick MD at [place] [address]    COLONOSCOPY      normal    CORONARY ANGIOPLASTY  2010    x 2 stents    FINGER SURGERY      right thumb joint    HERNIA REPAIR Right     inguinal    HYSTERECTOMY (CERVIX STATUS UNKNOWN)      JOINT REPLACEMENT Right     knee    KIDNEY STONE SURGERY      x 3    LITHOTRIPSY      x 3    PARATHYROIDECTOMY      PARATHYROIDECTOMY      IA PERCUT IMPLNT NEUROELECT,EPIDURAL N/A 2018    SPINAL CORD STIMULATOR IMPLANT performed by Roxana Choi MD at [address] [place] [address] Right 2019       SOCIAL HISTORY       Social History     Socioeconomic History    Marital status:       Spouse name: None    Number of children: None    Years of education: None    Highest education level: None   Tobacco Use    Smoking status: Former     Packs/day: 1.00     Years: 44.00     Pack years: 44.00     Types: Cigarettes     Quit date: 2010     Years since quittin.5    Smokeless tobacco: Never   Vaping Use    Vaping Use: Never used   Substance and Sexual Activity    Alcohol use: Yes     Comment: rarely    Drug use: No       REVIEW OF SYSTEMS      Allergies   Allergen Reactions    Lisinopril      Pt uncertain of the reaction    Toradol [Ketorolac Tromethamine] Swelling     Swelling at site of injection       Current Outpatient Medications on File Prior to Encounter   Medication Sig Dispense Refill    oxybutynin (DITROPAN-XL) 5 MG extended release tablet Take 5 mg by mouth daily      albuterol sulfate HFA (VENTOLIN HFA) 108 (90 Base) MCG/ACT inhaler Inhale 2 puffs into the lungs 4 times daily as needed for Wheezing 18 g 5    carvedilol (COREG) 6.25 MG tablet Take 1 tablet by mouth 2 times daily (with meals) 60 tablet 3    amLODIPine (NORVASC) 5 MG tablet Take 1 tablet by mouth nightly 30 tablet 3    pantoprazole (PROTONIX) 40 MG tablet Take 1 tablet by mouth nightly 30 tablet 0    gabapentin (NEURONTIN) 300 MG capsule Take 300 mg by mouth every morning. rOPINIRole (REQUIP) 2 MG tablet Take 5 mg by mouth in the morning, at noon, and at bedtime      Vitamin D (CHOLECALCIFEROL) 50 MCG (2000 UT) TABS tablet Take 1 tablet by mouth daily 30 tablet 0    zinc sulfate (ZINCATE) 220 (50 Zn) MG capsule Take 1 capsule by mouth daily 30 capsule 3    aspirin 81 MG EC tablet Take 81 mg by mouth daily      Multiple Vitamins-Minerals (THERAPEUTIC MULTIVITAMIN-MINERALS) tablet Take 1 tablet by mouth daily      hydrochlorothiazide (HYDRODIURIL) 25 MG tablet Take 25 mg by mouth Daily with supper       Calcium Carbonate-Vitamin D (OSCAL 500/200 D-3 PO) Take 1 tablet by mouth daily      DULoxetine (CYMBALTA) 60 MG capsule Take 60 mg by mouth daily      gabapentin (NEURONTIN) 600 MG tablet Take 400 mg by mouth 2 times daily. pravastatin (PRAVACHOL) 80 MG tablet Take 80 mg by mouth nightly       No current facility-administered medications on file prior to encounter. Review of Systems   Constitutional: Negative. HENT:  Positive for sinus pressure and sinus pain. Eyes:  Positive for visual disturbance. Eye glasses    Respiratory:  Positive for cough. Negative for chest tightness, shortness of breath and wheezing. Cardiovascular: Negative. Gastrointestinal: Negative. Genitourinary:  Positive for urgency. Musculoskeletal:  Positive for back pain. Negative for gait problem. Skin: Negative. Neurological: Negative. Hematological: Negative. Psychiatric/Behavioral: Negative.        GENERAL PHYSICAL EXAM     Vitals: BP 122/73   Pulse 75   Temp (!) 96.7 °F (35.9 °C) (Infrared)   Resp 16   Ht 5' 6\" (1.676 m)   Wt 211 lb (95.7 kg)   SpO2 97%   BMI 34.06 kg/m²               Physical Exam  Constitutional:       Appearance: Normal appearance. She is obese. HENT:      Head: Normocephalic. Right Ear: External ear normal.      Left Ear: External ear normal.      Nose: Nose normal.      Mouth/Throat:      Mouth: Mucous membranes are moist.   Eyes:      General:         Right eye: No discharge. Left eye: No discharge. Cardiovascular:      Rate and Rhythm: Normal rate and regular rhythm. Pulses: Normal pulses. Heart sounds: Normal heart sounds. Pulmonary:      Effort: Pulmonary effort is normal.      Breath sounds: Normal breath sounds. Abdominal:      General: Bowel sounds are normal. There is no distension. Palpations: Abdomen is soft. Tenderness: There is no abdominal tenderness. Musculoskeletal:      Cervical back: Normal range of motion and neck supple. Skin:     General: Skin is warm and dry. Neurological:      General: No focal deficit present. Mental Status: She is alert and oriented to person, place, and time.    Psychiatric:         Mood and Affect: Mood normal.         Behavior: Behavior normal.       LAB REVIEW     Lab Results   Component Value Date    WBC 4.3 11/28/2022    HGB 13.6 11/28/2022    HCT 40.9 11/28/2022    MCV 86.5 11/28/2022     11/28/2022     Lab Results   Component Value Date/Time     11/28/2022 09:38 AM    K 4.2 11/28/2022 09:38 AM    K 3.6 08/25/2021 05:38 AM     11/28/2022 09:38 AM    CO2 25 11/28/2022 09:38 AM    BUN 23 11/28/2022 09:38 AM    CREATININE 0.89 11/28/2022 09:38 AM    GLUCOSE 101 11/28/2022 09:38 AM    CALCIUM 10.1 11/28/2022 09:38 AM          PRELIMINARY EKG REVIEW, DATE:    Normal Sinus rhythm   Normal ECG     SURGERY / PROVISIONAL DIAGNOSES:      SPINAL CORD STIMULATOR REMOVAL    PAINFUL HARDWARE    Patient Active Problem List    Diagnosis Date Noted    Dyslipidemia     Pneumonia due to COVID-19 virus 08/31/2021    Acute respiratory failure with hypoxia (HCC)     Cervical spinal stenosis 08/17/2021    Acquired spondylolisthesis 01/23/2018    Spinal stenosis, lumbar region, without neurogenic claudication 01/23/2018    Post laminectomy syndrome 01/23/2018    Chronic midline low back pain without sciatica 01/23/2018    DIC (disseminated intravascular coagulation) ([facility] [address] 05/08/2016    Coagulopathy ([address] 05/08/2016    Thrombocytopenia ([address] 05/08/2016    Acute cystitis with hematuria     Pneumonia due to organism     Acute cystitis without hematuria 05/07/2016    CAD (coronary artery disease) 05/07/2016    Hypertension 05/07/2016    Lactic acid acidosis 05/07/2016    JAZZMINE (acute kidney injury) ([address] 05/07/2016    Sepsis ([facility] [address] 05/07/2016           Preliminary EKG, CBC, BMP completed today has been previewed per myself. CLEARANCE:   Dr. Rola Werner , anesthesia, was contacted and informed of patient's history and planned surgery. Orders received and no clearance required.       Total time spent on encounter- PAT provider minutes: 31-40 minutes     BEAU Moseley CNP on 11/28/2022 at 10:08 AM

## 2022-11-26 NOTE — H&P (VIEW-ONLY)
HISTORY and [facility]       NAME:  Christy Cosby  MRN: [id number]   YOB: 1950   Date: 11/28/2022   Age: 67 y.o. Gender: female     COMPLAINT AND PRESENT HISTORY:   Christy Cosby is 67 y.o.,  female, presents for pre-anesthesia/admission testing for SPINAL CORD STIMULATOR REMOVAL per Dr. Ramu Schuster  . Primary dx: PAINFUL HARDWARE.    HPI:  See below partial  of the note from office visit per Dr Ramu Schuster on 11/17/2022  ( viewed). This is a 67 y.o. female who presents to the clinic today for discussion for SCS removal.      Patient last seen in 2020 with diminished utility of SCS. Patient is following with pain management at [address] [place] [address]. She states her SCS has not been operational since she last was here. Patient has bilateral low back pain with bi-radicular buttocks pain. She does not have worsened pain with extensive ambulation or standing for extended periods of time. Update HPI:  Christy Cosby is 67 y.o.,  female, states she has spinal cord stimulator for the past two years which it  did not working. Pt C/O of tenderness with palpation over the spinal cord battery on the left lower side of her back. She has intermittent bilateral low back pain as aching/stabbing. With no numbness or tingling in her lower legs. No recent falls or trauma. No redness, swelling or rashes. Pt is following up with pain management for the sacral area pain and she had two steroid injection in the sacral area in the end of July with good pain relieve . Pain aggravated by walking, standing and bending over increase her lower back pain. Pt is on tylenol PRN for pain . RECENT IMAGING R/T HPI   MRI ANKLE RIGHT WO CONTRAST    Result Date: 11/4/2022  1. There appears to be a pes planovalgus deformity of the foot and there is evidence of a probable chronic tear of the distal spring ligament. The other ligaments of the ankle and foot appear intact.  2. There is a hallux valgus deformity with probable mild osteoarthritis of the first MTP joint and first metatarsal-sesamoid joints. 3. Remote healed fracture deformity of the second metatarsal shaft and postsurgical changes from remote osteotomy of the distal shaft of the first metatarsal and prior bunionectomy of the medial aspect of the first metatarsal head. 4. No MRI evidence of osteomyelitis is seen. 5. There is atrophy of the musculature of the foot which is most compatible with the sequela of a long-standing peripheral neuropathy. 6. There is a small ovoid cystlike focus along the plantar and lateral aspect of the second MTP joint capsule and this is most compatible with either a retinacular cyst or ganglion cyst. 7. There appears to be linear postsurgical scarring within the soft tissues in the region of the tarsal tunnel. No space-occupying mass is seen in the tarsal tunnel. 8. Please note that multiple images of the MRI of the foot are degraded by motion artifact. MRI FOOT RIGHT WO CONTRAST    Result Date: 11/4/2022  1. There appears to be a pes planovalgus deformity of the foot and there is evidence of a probable chronic tear of the distal spring ligament. The other ligaments of the ankle and foot appear intact. 2. There is a hallux valgus deformity with probable mild osteoarthritis of the first MTP joint and first metatarsal-sesamoid joints. 3. Remote healed fracture deformity of the second metatarsal shaft and postsurgical changes from remote osteotomy of the distal shaft of the first metatarsal and prior bunionectomy of the medial aspect of the first metatarsal head. 4. No MRI evidence of osteomyelitis is seen. 5. There is atrophy of the musculature of the foot which is most compatible with the sequela of a long-standing peripheral neuropathy.  6. There is a small ovoid cystlike focus along the plantar and lateral aspect of the second MTP joint capsule and this is most compatible with either a retinacular cyst or ganglion cyst. 7. There appears to be linear postsurgical scarring within the soft tissues in the region of the tarsal tunnel. No space-occupying mass is seen in the tarsal tunnel. 8. Please note that multiple images of the MRI of the foot are degraded by motion artifact. Review of additional significant medical hx:    CAD, HLD  Pt had blockage of coronary artery of heart and she had 2 stents on 2010   MEDICATION R/T CONDITION : Coreg ,amlodipine, ASA 81 mg. Pt follow up with her PCP she see her every 6 months the last office visit was last June     Her cardiology Dr Rudy Lopez last office visit was last July she see him every six months . BP Readings from Last 3 Encounters:   11/28/22 122/73   10/08/22 120/61   05/18/22 (!) 152/81     Covid 19 positive , pneumonia  2021   She was in the hospital for two weeks , no ventilation   She was on oxygen for few days . Recently she denies any SOB, wheezing, no fever/chills. SLEEP APNEA  MEDICATION R/T CONDITION : Pt denies     THYROID DISEASE parathyroid removed. MEDICATION R/T CONDITION : no medication     Activity level:  Functional Capacity per patient:   1. Patient is  able to walk 2 city blocks on level ground without SOB. 2. Patient is  able to climb 2 flights of stairs without SOB. Denies hx of MRSA infection. Denies hx of blood clots. Denies hx of any personal or family hx of complications w/anesthesia.    PAST MEDICAL HISTORY     Past Medical History:   Diagnosis Date    Arthritis     Blockage of coronary artery of heart ([facility] [address] 2010    x 2 stents    CAD (coronary artery disease)     DDD (degenerative disc disease), cervical     cervical 3-6    Fibromyalgia     History of blood transfusion     History of kidney stones     last one \"about 10 years ago\"    Hyperlipidemia     Hypertension     Pancreatitis 2016    Sleep apnea     pt denies    Thyroid disease     parathyroid removed    Wears glasses     for reading       SURGICAL HISTORY       Past Surgical History:   Procedure Laterality Date    ARTHROPLASTY Left 2020    LEFT FIRST CARPOMETACARPAL ARTHROPLASTY performed by Noemi Cordero DO at [address]      L4,L5,S1 hardware placed    BACK SURGERY  2018    SPINAL CORD STIMULATOR    CATARACT REMOVAL Bilateral 2016    CERVICAL FUSION N/A 2021    C3-6 ACDF, REMOVAL OF HARDWARE C6-7 performed by Ike Winston MD at [facility]    COLONOSCOPY      normal    CORONARY ANGIOPLASTY  2010    x 2 stents    FINGER SURGERY      right thumb joint    HERNIA REPAIR Right     inguinal    HYSTERECTOMY (CERVIX STATUS UNKNOWN)      JOINT REPLACEMENT Right     knee    KIDNEY STONE SURGERY      x 3    LITHOTRIPSY      x 3    PARATHYROIDECTOMY      PARATHYROIDECTOMY      TX PERCUT IMPLNT NEUROELECT,EPIDURAL N/A 2018    SPINAL CORD STIMULATOR IMPLANT performed by Kiana Smith MD at [address] Right 2019       SOCIAL HISTORY       Social History     Socioeconomic History    Marital status:       Spouse name: None    Number of children: None    Years of education: None    Highest education level: None   Tobacco Use    Smoking status: Former     Packs/day: 1.00     Years: 44.00     Pack years: 44.00     Types: Cigarettes     Quit date: 2010     Years since quittin.5    Smokeless tobacco: Never   Vaping Use    Vaping Use: Never used   Substance and Sexual Activity    Alcohol use: Yes     Comment: rarely    Drug use: No       REVIEW OF SYSTEMS      Allergies   Allergen Reactions    Lisinopril      Pt uncertain of the reaction    Toradol [Ketorolac Tromethamine] Swelling     Swelling at site of injection       Current Outpatient Medications on File Prior to Encounter   Medication Sig Dispense Refill    oxybutynin (DITROPAN-XL) 5 MG extended release tablet Take 5 mg by mouth daily      albuterol sulfate HFA (VENTOLIN HFA) 108 (90 Base) MCG/ACT inhaler Inhale 2 puffs into the lungs 4 times daily as needed for Wheezing 18 g 5    carvedilol (COREG) 6.25 MG tablet Take 1 tablet by mouth 2 times daily (with meals) 60 tablet 3    amLODIPine (NORVASC) 5 MG tablet Take 1 tablet by mouth nightly 30 tablet 3    pantoprazole (PROTONIX) 40 MG tablet Take 1 tablet by mouth nightly 30 tablet 0    gabapentin (NEURONTIN) 300 MG capsule Take 300 mg by mouth every morning. rOPINIRole (REQUIP) 2 MG tablet Take 5 mg by mouth in the morning, at noon, and at bedtime      Vitamin D (CHOLECALCIFEROL) 50 MCG (2000 UT) TABS tablet Take 1 tablet by mouth daily 30 tablet 0    zinc sulfate (ZINCATE) 220 (50 Zn) MG capsule Take 1 capsule by mouth daily 30 capsule 3    aspirin 81 MG EC tablet Take 81 mg by mouth daily      Multiple Vitamins-Minerals (THERAPEUTIC MULTIVITAMIN-MINERALS) tablet Take 1 tablet by mouth daily      hydrochlorothiazide (HYDRODIURIL) 25 MG tablet Take 25 mg by mouth Daily with supper       Calcium Carbonate-Vitamin D (OSCAL 500/200 D-3 PO) Take 1 tablet by mouth daily      DULoxetine (CYMBALTA) 60 MG capsule Take 60 mg by mouth daily      gabapentin (NEURONTIN) 600 MG tablet Take 400 mg by mouth 2 times daily. pravastatin (PRAVACHOL) 80 MG tablet Take 80 mg by mouth nightly       No current facility-administered medications on file prior to encounter. Review of Systems   Constitutional: Negative. HENT:  Positive for sinus pressure and sinus pain. Eyes:  Positive for visual disturbance. Eye glasses    Respiratory:  Positive for cough. Negative for chest tightness, shortness of breath and wheezing. Cardiovascular: Negative. Gastrointestinal: Negative. Genitourinary:  Positive for urgency. Musculoskeletal:  Positive for back pain. Negative for gait problem. Skin: Negative. Neurological: Negative. Hematological: Negative. Psychiatric/Behavioral: Negative.        GENERAL PHYSICAL EXAM     Vitals: BP 122/73   Pulse 75   Temp (!) 96.7 °F (35.9 °C) (Infrared)   Resp 16   Ht 5' 6\" (1.676 m)   Wt 211 lb (95.7 kg)   SpO2 97%   BMI 34.06 kg/m²               Physical Exam  Constitutional:       Appearance: Normal appearance. She is obese. HENT:      Head: Normocephalic. Right Ear: External ear normal.      Left Ear: External ear normal.      Nose: Nose normal.      Mouth/Throat:      Mouth: Mucous membranes are moist.   Eyes:      General:         Right eye: No discharge. Left eye: No discharge. Cardiovascular:      Rate and Rhythm: Normal rate and regular rhythm. Pulses: Normal pulses. Heart sounds: Normal heart sounds. Pulmonary:      Effort: Pulmonary effort is normal.      Breath sounds: Normal breath sounds. Abdominal:      General: Bowel sounds are normal. There is no distension. Palpations: Abdomen is soft. Tenderness: There is no abdominal tenderness. Musculoskeletal:      Cervical back: Normal range of motion and neck supple. Skin:     General: Skin is warm and dry. Neurological:      General: No focal deficit present. Mental Status: She is alert and oriented to person, place, and time.    Psychiatric:         Mood and Affect: Mood normal.         Behavior: Behavior normal.       LAB REVIEW     Lab Results   Component Value Date    WBC 4.3 11/28/2022    HGB 13.6 11/28/2022    HCT 40.9 11/28/2022    MCV 86.5 11/28/2022     11/28/2022     Lab Results   Component Value Date/Time     11/28/2022 09:38 AM    K 4.2 11/28/2022 09:38 AM    K 3.6 08/25/2021 05:38 AM     11/28/2022 09:38 AM    CO2 25 11/28/2022 09:38 AM    BUN 23 11/28/2022 09:38 AM    CREATININE 0.89 11/28/2022 09:38 AM    GLUCOSE 101 11/28/2022 09:38 AM    CALCIUM 10.1 11/28/2022 09:38 AM          PRELIMINARY EKG REVIEW, DATE:    Normal Sinus rhythm   Normal ECG     SURGERY / PROVISIONAL DIAGNOSES:      SPINAL CORD STIMULATOR REMOVAL    PAINFUL HARDWARE    Patient Active Problem List    Diagnosis Date Noted    Dyslipidemia     Pneumonia due to COVID-19 virus 08/31/2021    Acute respiratory failure with hypoxia (HCC)     Cervical spinal stenosis 08/17/2021    Acquired spondylolisthesis 01/23/2018    Spinal stenosis, lumbar region, without neurogenic claudication 01/23/2018    Post laminectomy syndrome 01/23/2018    Chronic midline low back pain without sciatica 01/23/2018    DIC (disseminated intravascular coagulation) ([facility] [address] 05/08/2016    Coagulopathy ([address] 05/08/2016    Thrombocytopenia ([address] 05/08/2016    Acute cystitis with hematuria     Pneumonia due to organism     Acute cystitis without hematuria 05/07/2016    CAD (coronary artery disease) 05/07/2016    Hypertension 05/07/2016    Lactic acid acidosis 05/07/2016    JAZZMINE (acute kidney injury) ([address] 05/07/2016    Sepsis ([address] 05/07/2016           Preliminary EKG, CBC, BMP completed today has been previewed per myself. CLEARANCE:   Dr. Darleen Caldera , anesthesia, was contacted and informed of patient's history and planned surgery. Orders received and no clearance required.       Total time spent on encounter- PAT provider minutes: 31-40 minutes     BEAU Alvarado - CNP on 11/28/2022 at 10:08 AM

## 2022-11-28 ENCOUNTER — HOSPITAL ENCOUNTER (OUTPATIENT)
Dept: PREADMISSION TESTING | Age: 72
Discharge: HOME | End: 2022-12-02
Payer: MEDICARE

## 2022-11-28 VITALS
HEART RATE: 75 BPM | SYSTOLIC BLOOD PRESSURE: 122 MMHG | HEIGHT: 66 IN | WEIGHT: 211 LBS | RESPIRATION RATE: 16 BRPM | BODY MASS INDEX: 33.91 KG/M2 | TEMPERATURE: 96.7 F | OXYGEN SATURATION: 97 % | DIASTOLIC BLOOD PRESSURE: 73 MMHG

## 2022-11-28 DIAGNOSIS — Z01.818 PREOP EXAMINATION: ICD-10-CM

## 2022-11-28 LAB
ABSOLUTE EOS #: 0.1 K/UL (ref 0–0.4)
ABSOLUTE LYMPH #: 1 K/UL (ref 1–4.8)
ABSOLUTE MONO #: 0.4 K/UL (ref 0.1–1.3)
ANION GAP SERPL CALCULATED.3IONS-SCNC: 11 MMOL/L (ref 9–17)
BASOPHILS # BLD: 1 % (ref 0–2)
BASOPHILS ABSOLUTE: 0 K/UL (ref 0–0.2)
BUN BLDV-MCNC: 23 MG/DL (ref 8–23)
CALCIUM SERPL-MCNC: 10.1 MG/DL (ref 8.6–10.4)
CHLORIDE BLD-SCNC: 105 MMOL/L (ref 98–107)
CO2: 25 MMOL/L (ref 20–31)
CREAT SERPL-MCNC: 0.89 MG/DL (ref 0.5–0.9)
EOSINOPHILS RELATIVE PERCENT: 3 % (ref 0–4)
GFR SERPL CREATININE-BSD FRML MDRD: >60 ML/MIN/1.73M2
GLUCOSE BLD-MCNC: 101 MG/DL (ref 70–99)
HCT VFR BLD CALC: 40.9 % (ref 36–46)
HEMOGLOBIN: 13.6 G/DL (ref 12–16)
LYMPHOCYTES # BLD: 24 % (ref 24–44)
MCH RBC QN AUTO: 28.8 PG (ref 26–34)
MCHC RBC AUTO-ENTMCNC: 33.3 G/DL (ref 31–37)
MCV RBC AUTO: 86.5 FL (ref 80–100)
MONOCYTES # BLD: 10 % (ref 1–7)
PDW BLD-RTO: 15.7 % (ref 11.5–14.9)
PLATELET # BLD: 187 K/UL (ref 150–450)
PMV BLD AUTO: 7.8 FL (ref 6–12)
POTASSIUM SERPL-SCNC: 4.2 MMOL/L (ref 3.7–5.3)
RBC # BLD: 4.72 M/UL (ref 4–5.2)
SEG NEUTROPHILS: 62 % (ref 36–66)
SEGMENTED NEUTROPHILS ABSOLUTE COUNT: 2.7 K/UL (ref 1.3–9.1)
SODIUM BLD-SCNC: 141 MMOL/L (ref 135–144)
WBC # BLD: 4.3 K/UL (ref 3.5–11)

## 2022-11-28 PROCEDURE — APPSS45 APP SPLIT SHARED TIME 31-45 MINUTES: Performed by: NURSE PRACTITIONER

## 2022-11-28 PROCEDURE — 93005 ELECTROCARDIOGRAM TRACING: CPT | Performed by: ANESTHESIOLOGY

## 2022-11-28 PROCEDURE — 85025 COMPLETE CBC W/AUTO DIFF WBC: CPT

## 2022-11-28 PROCEDURE — 80048 BASIC METABOLIC PNL TOTAL CA: CPT

## 2022-11-28 PROCEDURE — 36415 COLL VENOUS BLD VENIPUNCTURE: CPT

## 2022-11-28 RX ORDER — OXYBUTYNIN CHLORIDE 5 MG/1
5 TABLET, EXTENDED RELEASE ORAL DAILY
COMMUNITY

## 2022-11-28 ASSESSMENT — ENCOUNTER SYMPTOMS
CHEST TIGHTNESS: 0
BACK PAIN: 1
SINUS PRESSURE: 1
WHEEZING: 0
SHORTNESS OF BREATH: 0
COUGH: 1
GASTROINTESTINAL NEGATIVE: 1
SINUS PAIN: 1

## 2022-11-28 NOTE — DISCHARGE INSTRUCTIONS
Pre-op Instructions For Out-Patient Surgery    Medication Instructions:  Please stop herbs and any supplements now (includes vitamins and minerals). Please contact your surgeon and prescribing physician for pre-op instructions for any blood thinners. Aspirin as directed. If you have inhalers/aerosol treatments at home, please use them the morning of your surgery and bring the inhalers with you to the hospital.    Please take the following medications the morning of your surgery with a sip of water:    inhaler, carvedilol, amlodipine, pantoprazole    Surgery Instructions:  After midnight before surgery:  Do not eat or drink anything, including water, mints, gum, and hard candy. You may brush your teeth without swallowing. No smoking, chewing tobacco, or street drugs. Please shower or bathe before surgery. If you were given Surgical Scrub Chlorhexidine Gluconate Liquid (CHG), please shower the night before and the morning of your surgery following the detailed instructions you received during your pre-admission visit. Please do not wear any cologne, lotion, powder, deodorant, jewelry, piercings, perfume, makeup, nail polish, hair accessories, or hair spray on the day of surgery. Wear loose comfortable clothing. Leave your valuables at home. Bring a storage case for any glasses/contacts. An adult who is responsible for you MUST drive you home and should be with you for the first 24 hours after surgery. If having out-patient knee and foot surgeries, please arrange for planned crutches, walker, or wheelchair before arriving to the hospital.    The Day of Surgery:  Arrive at [address] [facility] [address] at the time directed by your surgeon and check in at the desk. If you have a living will or healthcare power of , please bring a copy. You will be taken to the pre-op holding area where you will be prepared for surgery.   A physical assessment will be performed by a nurse practitioner or house officer. Your IV will be started and you will meet your anesthesiologist.    When you go to surgery, your family will be directed to the surgical waiting room, where the doctor should speak with them after your surgery. After surgery, you will be taken to the recovery room then when you are awake and stable you will go to the short stay unit for preparation to be discharged. If you use a Bi-PAP or C-PAP machine, please bring it with you and leave it in the car in case it is needed in recovery room.

## 2022-11-29 LAB
EKG ATRIAL RATE: 73 BPM
EKG P AXIS: 19 DEGREES
EKG P-R INTERVAL: 148 MS
EKG Q-T INTERVAL: 392 MS
EKG QRS DURATION: 84 MS
EKG QTC CALCULATION (BAZETT): 431 MS
EKG R AXIS: 5 DEGREES
EKG T AXIS: 51 DEGREES
EKG VENTRICULAR RATE: 73 BPM

## 2022-11-29 PROCEDURE — 93010 ELECTROCARDIOGRAM REPORT: CPT | Performed by: INTERNAL MEDICINE

## 2022-12-09 ENCOUNTER — ANESTHESIA EVENT (OUTPATIENT)
Dept: OPERATING ROOM | Age: 72
End: 2022-12-09
Payer: MEDICARE

## 2022-12-09 NOTE — PRE-PROCEDURE INSTRUCTIONS
Nothing to eat after midnight. Are you taking any blood thinners? When was the last day? Make sure to use Hibiclens prior to surgery. Remove any jewelry and body piercings. Do you wear glasses? If so, please bring a case to store them in. Are you having any Covid symptoms? Do you have any new rashes, infections, etc. that we should be aware of? Do you have a ride home the day of surgery? It cannot be a cab or medical transportation.   Verify surgery time and what time to arrive at hospital.     NO ANSWER, VM LEFT

## 2022-12-12 ENCOUNTER — HOSPITAL ENCOUNTER (OUTPATIENT)
Age: 72
Setting detail: OUTPATIENT SURGERY
Discharge: HOME | End: 2022-12-12
Attending: ORTHOPAEDIC SURGERY | Admitting: ORTHOPAEDIC SURGERY
Payer: MEDICARE

## 2022-12-12 ENCOUNTER — APPOINTMENT (OUTPATIENT)
Dept: GENERAL RADIOLOGY | Age: 72
End: 2022-12-12
Attending: ORTHOPAEDIC SURGERY
Payer: MEDICARE

## 2022-12-12 ENCOUNTER — ANESTHESIA (OUTPATIENT)
Dept: OPERATING ROOM | Age: 72
End: 2022-12-12
Payer: MEDICARE

## 2022-12-12 VITALS
SYSTOLIC BLOOD PRESSURE: 127 MMHG | DIASTOLIC BLOOD PRESSURE: 80 MMHG | HEART RATE: 100 BPM | OXYGEN SATURATION: 95 % | BODY MASS INDEX: 33.11 KG/M2 | HEIGHT: 66 IN | TEMPERATURE: 96.9 F | RESPIRATION RATE: 18 BRPM | WEIGHT: 206 LBS

## 2022-12-12 DIAGNOSIS — T84.84XA PAINFUL ORTHOPAEDIC HARDWARE (HCC): ICD-10-CM

## 2022-12-12 PROCEDURE — 6360000002 HC RX W HCPCS: Performed by: NURSE ANESTHETIST, CERTIFIED REGISTERED

## 2022-12-12 PROCEDURE — 3600000012 HC SURGERY LEVEL 2 ADDTL 15MIN: Performed by: ORTHOPAEDIC SURGERY

## 2022-12-12 PROCEDURE — 7100000011 HC PHASE II RECOVERY - ADDTL 15 MIN: Performed by: ORTHOPAEDIC SURGERY

## 2022-12-12 PROCEDURE — 3600000002 HC SURGERY LEVEL 2 BASE: Performed by: ORTHOPAEDIC SURGERY

## 2022-12-12 PROCEDURE — 88300 SURGICAL PATH GROSS: CPT

## 2022-12-12 PROCEDURE — 7100000001 HC PACU RECOVERY - ADDTL 15 MIN: Performed by: ORTHOPAEDIC SURGERY

## 2022-12-12 PROCEDURE — 6370000000 HC RX 637 (ALT 250 FOR IP): Performed by: ANESTHESIOLOGY

## 2022-12-12 PROCEDURE — 7100000030 HC ASPR PHASE II RECOVERY - FIRST 15 MIN: Performed by: ORTHOPAEDIC SURGERY

## 2022-12-12 PROCEDURE — 2720000010 HC SURG SUPPLY STERILE: Performed by: ORTHOPAEDIC SURGERY

## 2022-12-12 PROCEDURE — 7100000031 HC ASPR PHASE II RECOVERY - ADDTL 15 MIN: Performed by: ORTHOPAEDIC SURGERY

## 2022-12-12 PROCEDURE — 3209999900 FLUORO FOR SURGICAL PROCEDURES

## 2022-12-12 PROCEDURE — 2500000003 HC RX 250 WO HCPCS: Performed by: ORTHOPAEDIC SURGERY

## 2022-12-12 PROCEDURE — 2500000003 HC RX 250 WO HCPCS: Performed by: NURSE ANESTHETIST, CERTIFIED REGISTERED

## 2022-12-12 PROCEDURE — 7100000010 HC PHASE II RECOVERY - FIRST 15 MIN: Performed by: ORTHOPAEDIC SURGERY

## 2022-12-12 PROCEDURE — 2709999900 HC NON-CHARGEABLE SUPPLY: Performed by: ORTHOPAEDIC SURGERY

## 2022-12-12 PROCEDURE — 7100000000 HC PACU RECOVERY - FIRST 15 MIN: Performed by: ORTHOPAEDIC SURGERY

## 2022-12-12 PROCEDURE — 6360000002 HC RX W HCPCS: Performed by: ANESTHESIOLOGY

## 2022-12-12 PROCEDURE — 3700000001 HC ADD 15 MINUTES (ANESTHESIA): Performed by: ORTHOPAEDIC SURGERY

## 2022-12-12 PROCEDURE — 63688 REV/RMV IMP SP NPG/R DTCH CN: CPT | Performed by: ORTHOPAEDIC SURGERY

## 2022-12-12 PROCEDURE — 63662 REMOVE SPINE ELTRD PLATE: CPT | Performed by: ORTHOPAEDIC SURGERY

## 2022-12-12 PROCEDURE — 2580000003 HC RX 258: Performed by: ANESTHESIOLOGY

## 2022-12-12 PROCEDURE — 3700000000 HC ANESTHESIA ATTENDED CARE: Performed by: ORTHOPAEDIC SURGERY

## 2022-12-12 RX ORDER — HYDROCODONE BITARTRATE AND ACETAMINOPHEN 5; 325 MG/1; MG/1
1 TABLET ORAL EVERY 4 HOURS PRN
Qty: 18 TABLET | Refills: 0 | Status: SHIPPED | OUTPATIENT
Start: 2022-12-12 | End: 2022-12-15

## 2022-12-12 RX ORDER — SODIUM CHLORIDE 0.9 % (FLUSH) 0.9 %
5-40 SYRINGE (ML) INJECTION PRN
Status: DISCONTINUED | OUTPATIENT
Start: 2022-12-12 | End: 2022-12-12 | Stop reason: HOSPADM

## 2022-12-12 RX ORDER — ONDANSETRON 2 MG/ML
4 INJECTION INTRAMUSCULAR; INTRAVENOUS
Status: DISCONTINUED | OUTPATIENT
Start: 2022-12-12 | End: 2022-12-12 | Stop reason: HOSPADM

## 2022-12-12 RX ORDER — ROCURONIUM BROMIDE 10 MG/ML
INJECTION, SOLUTION INTRAVENOUS PRN
Status: DISCONTINUED | OUTPATIENT
Start: 2022-12-12 | End: 2022-12-12 | Stop reason: SDUPTHER

## 2022-12-12 RX ORDER — LIDOCAINE HYDROCHLORIDE 10 MG/ML
1 INJECTION, SOLUTION EPIDURAL; INFILTRATION; INTRACAUDAL; PERINEURAL
Status: DISCONTINUED | OUTPATIENT
Start: 2022-12-12 | End: 2022-12-12 | Stop reason: HOSPADM

## 2022-12-12 RX ORDER — ONDANSETRON 2 MG/ML
INJECTION INTRAMUSCULAR; INTRAVENOUS PRN
Status: DISCONTINUED | OUTPATIENT
Start: 2022-12-12 | End: 2022-12-12 | Stop reason: SDUPTHER

## 2022-12-12 RX ORDER — BUPIVACAINE HYDROCHLORIDE AND EPINEPHRINE 2.5; 5 MG/ML; UG/ML
INJECTION, SOLUTION EPIDURAL; INFILTRATION; INTRACAUDAL; PERINEURAL PRN
Status: DISCONTINUED | OUTPATIENT
Start: 2022-12-12 | End: 2022-12-12 | Stop reason: ALTCHOICE

## 2022-12-12 RX ORDER — DEXAMETHASONE SODIUM PHOSPHATE 4 MG/ML
INJECTION, SOLUTION INTRA-ARTICULAR; INTRALESIONAL; INTRAMUSCULAR; INTRAVENOUS; SOFT TISSUE PRN
Status: DISCONTINUED | OUTPATIENT
Start: 2022-12-12 | End: 2022-12-12 | Stop reason: SDUPTHER

## 2022-12-12 RX ORDER — SODIUM CHLORIDE 9 MG/ML
INJECTION, SOLUTION INTRAVENOUS PRN
Status: DISCONTINUED | OUTPATIENT
Start: 2022-12-12 | End: 2022-12-12 | Stop reason: HOSPADM

## 2022-12-12 RX ORDER — SODIUM CHLORIDE 0.9 % (FLUSH) 0.9 %
5-40 SYRINGE (ML) INJECTION EVERY 12 HOURS SCHEDULED
Status: DISCONTINUED | OUTPATIENT
Start: 2022-12-12 | End: 2022-12-12 | Stop reason: HOSPADM

## 2022-12-12 RX ORDER — SODIUM CHLORIDE, SODIUM LACTATE, POTASSIUM CHLORIDE, CALCIUM CHLORIDE 600; 310; 30; 20 MG/100ML; MG/100ML; MG/100ML; MG/100ML
INJECTION, SOLUTION INTRAVENOUS CONTINUOUS
Status: DISCONTINUED | OUTPATIENT
Start: 2022-12-12 | End: 2022-12-12 | Stop reason: HOSPADM

## 2022-12-12 RX ORDER — GABAPENTIN 100 MG/1
100 CAPSULE ORAL ONCE
Status: COMPLETED | OUTPATIENT
Start: 2022-12-12 | End: 2022-12-12

## 2022-12-12 RX ORDER — SUCCINYLCHOLINE/SOD CL,ISO/PF 200MG/10ML
SYRINGE (ML) INTRAVENOUS PRN
Status: DISCONTINUED | OUTPATIENT
Start: 2022-12-12 | End: 2022-12-12 | Stop reason: SDUPTHER

## 2022-12-12 RX ORDER — DIPHENHYDRAMINE HYDROCHLORIDE 50 MG/ML
12.5 INJECTION INTRAMUSCULAR; INTRAVENOUS
Status: DISCONTINUED | OUTPATIENT
Start: 2022-12-12 | End: 2022-12-12 | Stop reason: HOSPADM

## 2022-12-12 RX ORDER — PROPOFOL 10 MG/ML
INJECTION, EMULSION INTRAVENOUS PRN
Status: DISCONTINUED | OUTPATIENT
Start: 2022-12-12 | End: 2022-12-12 | Stop reason: SDUPTHER

## 2022-12-12 RX ORDER — EPHEDRINE SULFATE/0.9% NACL/PF 50 MG/5 ML
SYRINGE (ML) INTRAVENOUS PRN
Status: DISCONTINUED | OUTPATIENT
Start: 2022-12-12 | End: 2022-12-12 | Stop reason: SDUPTHER

## 2022-12-12 RX ORDER — GLYCOPYRROLATE 0.2 MG/ML
INJECTION INTRAMUSCULAR; INTRAVENOUS PRN
Status: DISCONTINUED | OUTPATIENT
Start: 2022-12-12 | End: 2022-12-12 | Stop reason: SDUPTHER

## 2022-12-12 RX ORDER — LIDOCAINE HYDROCHLORIDE 20 MG/ML
INJECTION, SOLUTION EPIDURAL; INFILTRATION; INTRACAUDAL; PERINEURAL PRN
Status: DISCONTINUED | OUTPATIENT
Start: 2022-12-12 | End: 2022-12-12 | Stop reason: SDUPTHER

## 2022-12-12 RX ORDER — FENTANYL CITRATE 50 UG/ML
INJECTION, SOLUTION INTRAMUSCULAR; INTRAVENOUS PRN
Status: DISCONTINUED | OUTPATIENT
Start: 2022-12-12 | End: 2022-12-12 | Stop reason: SDUPTHER

## 2022-12-12 RX ORDER — ACETAMINOPHEN 500 MG
1000 TABLET ORAL ONCE
Status: COMPLETED | OUTPATIENT
Start: 2022-12-12 | End: 2022-12-12

## 2022-12-12 RX ORDER — CEFAZOLIN SODIUM 1 G/3ML
INJECTION, POWDER, FOR SOLUTION INTRAMUSCULAR; INTRAVENOUS PRN
Status: DISCONTINUED | OUTPATIENT
Start: 2022-12-12 | End: 2022-12-12 | Stop reason: SDUPTHER

## 2022-12-12 RX ADMIN — PHENYLEPHRINE HYDROCHLORIDE 100 MCG: 10 INJECTION INTRAVENOUS at 17:25

## 2022-12-12 RX ADMIN — SODIUM CHLORIDE, POTASSIUM CHLORIDE, SODIUM LACTATE AND CALCIUM CHLORIDE: 600; 310; 30; 20 INJECTION, SOLUTION INTRAVENOUS at 13:29

## 2022-12-12 RX ADMIN — DEXAMETHASONE SODIUM PHOSPHATE 4 MG: 4 INJECTION, SOLUTION INTRAMUSCULAR; INTRAVENOUS at 16:30

## 2022-12-12 RX ADMIN — Medication 140 MG: at 16:14

## 2022-12-12 RX ADMIN — ROCURONIUM BROMIDE 50 MG: 10 INJECTION, SOLUTION INTRAVENOUS at 16:17

## 2022-12-12 RX ADMIN — FENTANYL CITRATE 100 MCG: 50 INJECTION, SOLUTION INTRAMUSCULAR; INTRAVENOUS at 17:01

## 2022-12-12 RX ADMIN — PHENYLEPHRINE HYDROCHLORIDE 100 MCG: 10 INJECTION INTRAVENOUS at 16:45

## 2022-12-12 RX ADMIN — PHENYLEPHRINE HYDROCHLORIDE 100 MCG: 10 INJECTION INTRAVENOUS at 17:13

## 2022-12-12 RX ADMIN — ACETAMINOPHEN 1000 MG: 500 TABLET ORAL at 13:20

## 2022-12-12 RX ADMIN — GLYCOPYRROLATE 0.2 MG: 0.2 INJECTION, SOLUTION INTRAMUSCULAR; INTRAVENOUS at 16:48

## 2022-12-12 RX ADMIN — ONDANSETRON 4 MG: 2 INJECTION INTRAMUSCULAR; INTRAVENOUS at 16:30

## 2022-12-12 RX ADMIN — FENTANYL CITRATE 100 MCG: 50 INJECTION, SOLUTION INTRAMUSCULAR; INTRAVENOUS at 16:08

## 2022-12-12 RX ADMIN — GABAPENTIN 100 MG: 100 CAPSULE ORAL at 13:20

## 2022-12-12 RX ADMIN — PHENYLEPHRINE HYDROCHLORIDE 100 MCG: 10 INJECTION INTRAVENOUS at 17:07

## 2022-12-12 RX ADMIN — Medication 10 MG: at 17:30

## 2022-12-12 RX ADMIN — Medication 15 MG: at 17:22

## 2022-12-12 RX ADMIN — CEFAZOLIN 2 G: 1 INJECTION, POWDER, FOR SOLUTION INTRAMUSCULAR; INTRAVENOUS at 16:23

## 2022-12-12 RX ADMIN — PROPOFOL 150 MG: 10 INJECTION, EMULSION INTRAVENOUS at 16:14

## 2022-12-12 RX ADMIN — LIDOCAINE HYDROCHLORIDE 100 MG: 20 INJECTION, SOLUTION EPIDURAL; INFILTRATION; INTRACAUDAL; PERINEURAL at 16:12

## 2022-12-12 RX ADMIN — Medication 25 MG: at 17:05

## 2022-12-12 RX ADMIN — HYDROMORPHONE HYDROCHLORIDE 0.5 MG: 1 INJECTION, SOLUTION INTRAMUSCULAR; INTRAVENOUS; SUBCUTANEOUS at 18:32

## 2022-12-12 RX ADMIN — HYDROMORPHONE HYDROCHLORIDE 0.5 MG: 1 INJECTION, SOLUTION INTRAMUSCULAR; INTRAVENOUS; SUBCUTANEOUS at 18:42

## 2022-12-12 ASSESSMENT — PAIN DESCRIPTION - PAIN TYPE
TYPE: SURGICAL PAIN

## 2022-12-12 ASSESSMENT — PAIN DESCRIPTION - ORIENTATION
ORIENTATION: MID

## 2022-12-12 ASSESSMENT — PAIN SCALES - GENERAL
PAINLEVEL_OUTOF10: 9
PAINLEVEL_OUTOF10: 0
PAINLEVEL_OUTOF10: 8
PAINLEVEL_OUTOF10: 0
PAINLEVEL_OUTOF10: 3
PAINLEVEL_OUTOF10: 10

## 2022-12-12 ASSESSMENT — PAIN DESCRIPTION - LOCATION
LOCATION: BACK

## 2022-12-12 ASSESSMENT — PAIN - FUNCTIONAL ASSESSMENT: PAIN_FUNCTIONAL_ASSESSMENT: 0-10

## 2022-12-12 ASSESSMENT — PAIN DESCRIPTION - DESCRIPTORS
DESCRIPTORS: BURNING
DESCRIPTORS: ACHING
DESCRIPTORS: BURNING

## 2022-12-12 NOTE — INTERVAL H&P NOTE
Update History & Physical    The patient's History and Physical of November 28, 2022 was reviewed with the patient and I examined the patient. There was no change. The surgical site was confirmed by the patient and me. Patient will be having : SPINAL CORD STIMULATOR REMOVAL. Patient has been NPO since midnight. No blood thinners in the past 5-7 days. (Aspirin and vitamins)  Patient took  Coreg  this am with sip of water. Patient denies any personal or family problems with anesthesia. Patient did not require  clearance. Patient was physically assessed, including cardiovascular and respiratory.      Electronically signed by BEAU Comer CNP on 12/12/2022 at 12:43 PM

## 2022-12-12 NOTE — ANESTHESIA PRE PROCEDURE
Department of Anesthesiology  Preprocedure Note       Name:  Arabella Vuong   Age:  67 y.o.  :  1950                                          MRN:  [id number]         Date:  2022      Surgeon: April Stokes):  Claudia Turpin MD    Procedure: Procedure(s):  SPINAL CORD STIMULATOR REMOVAL    Medications prior to admission:   Prior to Admission medications    Medication Sig Start Date End Date Taking?  Authorizing Provider   oxybutynin (DITROPAN-XL) 5 MG extended release tablet Take 5 mg by mouth daily    Historical Provider, MD   albuterol sulfate HFA (VENTOLIN HFA) 108 (90 Base) MCG/ACT inhaler Inhale 2 puffs into the lungs 4 times daily as needed for Wheezing 10/21/21   Talib Nguyen MD   carvedilol (COREG) 6.25 MG tablet Take 1 tablet by mouth 2 times daily (with meals) 21   BEAU Nelson CNP   amLODIPine (NORVASC) 5 MG tablet Take 1 tablet by mouth nightly 21   BEAU Nelson CNP   pantoprazole (PROTONIX) 40 MG tablet Take 1 tablet by mouth nightly 21   BEAU Rivas CNP   rOPINIRole (REQUIP) 2 MG tablet Take 5 mg by mouth in the morning, at noon, and at bedtime    Historical Provider, MD   Vitamin D (CHOLECALCIFEROL) 50 MCG ( UT) TABS tablet Take 1 tablet by mouth daily 21   Betzy Neal MD   zinc sulfate (ZINCATE) 220 (50 Zn) MG capsule Take 1 capsule by mouth daily 21   Betzy Neal MD   aspirin 81 MG EC tablet Take 81 mg by mouth daily    Historical Provider, MD   Multiple Vitamins-Minerals (THERAPEUTIC MULTIVITAMIN-MINERALS) tablet Take 1 tablet by mouth daily    Historical Provider, MD   hydrochlorothiazide (HYDRODIURIL) 25 MG tablet Take 25 mg by mouth Daily with supper     Historical Provider, MD   Calcium Carbonate-Vitamin D (OSCAL 500/200 D-3 PO) Take 1 tablet by mouth daily    Historical Provider, MD   DULoxetine (CYMBALTA) 60 MG capsule Take 60 mg by mouth daily    Historical Provider, MD   gabapentin (NEURONTIN) 600 MG tablet Take 400 mg by mouth 3 times daily. Historical Provider, MD   pravastatin (PRAVACHOL) 80 MG tablet Take 80 mg by mouth nightly    Historical Provider, MD       Current medications:    Current Facility-Administered Medications   Medication Dose Route Frequency Provider Last Rate Last Admin    lidocaine PF 1 % injection 1 mL  1 mL IntraDERmal Once PRN Blayne Cardoza MD        lactated ringers infusion   IntraVENous Continuous Blayne Cardoza  mL/hr at 12/12/22 1329 New Bag at 12/12/22 1329    sodium chloride flush 0.9 % injection 5-40 mL  5-40 mL IntraVENous 2 times per day Blayne Cardoza MD        sodium chloride flush 0.9 % injection 5-40 mL  5-40 mL IntraVENous PRN Blayne Cardoza MD        0.9 % sodium chloride infusion   IntraVENous PRN Blayne Cardoza MD           Allergies:     Allergies   Allergen Reactions    Lisinopril      Pt uncertain of the reaction    Toradol [Ketorolac Tromethamine] Swelling     Swelling at site of injection       Problem List:    Patient Active Problem List   Diagnosis Code    Acute cystitis without hematuria N30.00    CAD (coronary artery disease) I25.10    Hypertension I10    Lactic acid acidosis E87.20    JAZZMINE (acute kidney injury) ([facility] N17.9    Sepsis ([address] A41.9    DIC (disseminated intravascular coagulation) ([address] D65    Coagulopathy ([facility] [address] [facility] D68.9    Thrombocytopenia ([facility] [facility] D69.6    Acute cystitis with hematuria N30.01    Pneumonia due to organism J18.9    Acquired spondylolisthesis M43.10    Spinal stenosis, lumbar region, without neurogenic claudication M48.061    Post laminectomy syndrome M96.1    Chronic midline low back pain without sciatica M54.50, G89.29    Cervical spinal stenosis M48.02    Acute respiratory failure with hypoxia (HCC) J96.01    Pneumonia due to COVID-19 virus U07.1, J12.82    Dyslipidemia E78.5    Preop examination Z01.818       Past Medical History:        Diagnosis Date    Arthritis     Blockage of coronary artery of heart ([facility] [address] [facility] 2010    x 2 stents    CAD (coronary artery disease)     DDD (degenerative disc disease), cervical     cervical 3-6    Fibromyalgia     History of blood transfusion     History of kidney stones     last one \"about 10 years ago\"    Hyperlipidemia     Hypertension     Pancreatitis 2016    Sleep apnea     pt denies    Thyroid disease     parathyroid removed    Wears glasses     for reading       Past Surgical History:        Procedure Laterality Date    ARTHROPLASTY Left 2020    LEFT FIRST CARPOMETACARPAL ARTHROPLASTY performed by Rajendra Bender DO at [address]      L4,L5,S1 hardware placed    BACK SURGERY  2018    SPINAL CORD STIMULATOR    CATARACT REMOVAL Bilateral 2016    CERVICAL FUSION N/A 2021    C3-6 ACDF, REMOVAL OF HARDWARE C6-7 performed by Gilmar oRberts MD at [address]    COLONOSCOPY      normal    CORONARY ANGIOPLASTY  2010    x 2 stents    FINGER SURGERY      right thumb joint    HERNIA REPAIR Right     inguinal    HYSTERECTOMY (CERVIX STATUS UNKNOWN)      JOINT REPLACEMENT Right     knee    KIDNEY STONE SURGERY      x 3    LITHOTRIPSY      x 3    PARATHYROIDECTOMY      PARATHYROIDECTOMY      SD PERCUT IMPLNT NEUROELECT,EPIDURAL N/A 2018    SPINAL CORD STIMULATOR IMPLANT performed by Fabiola Marti MD at [address] Right 2019       Social History:    Social History     Tobacco Use    Smoking status: Former     Packs/day: 1.00     Years: 44.00     Pack years: 44.00     Types: Cigarettes     Quit date: 2010     Years since quittin.5    Smokeless tobacco: Never   Substance Use Topics    Alcohol use: Yes     Comment: rarely                                Counseling given: Not Answered      Vital Signs (Current):   Vitals:    22 1304   BP: (!) 156/86   Pulse: 80   Resp: 18   Temp: (!) 96.6 °F (35.9 °C) TempSrc: Infrared   SpO2: 97%   Weight: 206 lb (93.4 kg)   Height: 5' 6\" (1.676 m)                                              BP Readings from Last 3 Encounters:   12/12/22 (!) 156/86   11/28/22 122/73   10/08/22 120/61       NPO Status: Time of last liquid consumption: 2230                        Time of last solid consumption: 2100                        Date of last liquid consumption: 12/11/22                        Date of last solid food consumption: 12/11/22    BMI:   Wt Readings from Last 3 Encounters:   12/12/22 206 lb (93.4 kg)   11/28/22 211 lb (95.7 kg)   11/17/22 202 lb (91.6 kg)     Body mass index is 33.25 kg/m². CBC:   Lab Results   Component Value Date/Time    WBC 4.3 11/28/2022 09:38 AM    RBC 4.72 11/28/2022 09:38 AM    HGB 13.6 11/28/2022 09:38 AM    HCT 40.9 11/28/2022 09:38 AM    MCV 86.5 11/28/2022 09:38 AM    RDW 15.7 11/28/2022 09:38 AM     11/28/2022 09:38 AM       CMP:   Lab Results   Component Value Date/Time     11/28/2022 09:38 AM    K 4.2 11/28/2022 09:38 AM    K 3.6 08/25/2021 05:38 AM     11/28/2022 09:38 AM    CO2 25 11/28/2022 09:38 AM    BUN 23 11/28/2022 09:38 AM    CREATININE 0.89 11/28/2022 09:38 AM    GFRAA >60 09/10/2021 04:45 AM    LABGLOM >60 11/28/2022 09:38 AM    GLUCOSE 101 11/28/2022 09:38 AM    PROT 6.1 10/08/2022 06:10 PM    CALCIUM 10.1 11/28/2022 09:38 AM    BILITOT 0.3 10/08/2022 06:10 PM    ALKPHOS 109 10/08/2022 06:10 PM    AST 18 10/08/2022 06:10 PM    ALT 22 10/08/2022 06:10 PM       POC Tests: No results for input(s): POCGLU, POCNA, POCK, POCCL, POCBUN, POCHEMO, POCHCT in the last 72 hours.     Coags:   Lab Results   Component Value Date/Time    PROTIME 13.4 09/10/2021 04:45 AM    INR 1.0 09/10/2021 04:45 AM    APTT 32.1 09/10/2021 04:45 AM    APTT 33.5 07/23/2021 12:28 PM       HCG (If Applicable): No results found for: PREGTESTUR, PREGSERUM, HCG, HCGQUANT     ABGs: No results found for: PHART, PO2ART, TIK8YCB, YWH3SUT, BEART, S6DKMQSY     Type & Screen (If Applicable):  Lab Results   Component Value Date    LABRH POS 08/17/2021       Drug/Infectious Status (If Applicable):  Lab Results   Component Value Date/Time    HEPCAB NONREACTIVE 05/08/2016 05:06 PM       COVID-19 Screening (If Applicable):   Lab Results   Component Value Date/Time    COVID19 DETECTED 08/31/2021 07:52 PM    COVID19 DETECTED 08/23/2021 01:30 PM           Anesthesia Evaluation  Patient summary reviewed and Nursing notes reviewed no history of anesthetic complications:   Airway: Mallampati: III  TM distance: >3 FB   Neck ROM: full  Mouth opening: > = 3 FB   Dental: normal exam         Pulmonary:normal exam  breath sounds clear to auscultation  (+) sleep apnea:                             Cardiovascular:    (+) hypertension:, CAD:, CABG/stent (s/p 2 stents):, hyperlipidemia      ECG reviewed  Rhythm: regular  Rate: normal  Echocardiogram reviewed         Beta Blocker:  Dose within 24 Hrs      ROS comment: Global left ventricular function is difficult to assess but appears mildly reduced with an estimated EF of 45-50%. (9/2021)     Neuro/Psych:   (+) neuromuscular disease:,              ROS comment: Spinal stenosis, lumbar region  Post laminectomy syndrome  Cervical spinal stenosis  Fibromyalgia GI/Hepatic/Renal:   (+) renal disease: kidney stones,           Endo/Other:    (+) : arthritis: OA., .                  ROS comment: Painful orthopaedic hardware  Abdominal:             Vascular: negative vascular ROS. Other Findings:           Anesthesia Plan      general     ASA 3       Induction: intravenous. MIPS: Postoperative opioids intended and Prophylactic antiemetics administered. Anesthetic plan and risks discussed with patient. Plan discussed with CRNA.                     Judge Severance, MD   12/12/2022

## 2022-12-12 NOTE — BRIEF OP NOTE
Brief Postoperative Note      Patient: Marcello Bazan  YOB: 1950  MRN: [id number]    Date of Procedure: 12/12/2022    Pre-Op Diagnosis: PAINFUL HARDWARE     Post-Op Diagnosis: Same       Procedure(s):  SPINAL CORD STIMULATOR REMOVAL - Hardware removal deep x3 with fluroscopy    Surgeon(s):  Roxana Choi MD    Assistant:  * No surgical staff found *    Anesthesia: General    Estimated Blood Loss (mL): Minimal    Complications: None    Specimens:   ID Type Source Tests Collected by Time Destination   A : spinal cord stimulator for gross exam only Hardware Spinal Cord SURGICAL PATHOLOGY Roxana Choi MD 12/12/2022 1700        Implants:  * No implants in log *      Drains: * No LDAs found *    Findings: see dictation    Electronically signed by Roxana Choi MD on 12/12/2022 at 6:09 PM

## 2022-12-12 NOTE — DISCHARGE INSTRUCTIONS
Dr. Maryam Echeverria MD        POSTOPERATIVE INSTRUCTIONS    Surgery:        WOUND CARE  Discontinue dressing ok to discontinue dressings in approximately 3 days as they start to  fail roll up stick to things and no longer work otherwise can stay on if functioning properly  [address] [address] shower with dressing on and after discontinuation  After dressings discontinued no further dressing required      MEDICATIONS  Use Tylenol and NSAIDs as first line treatment for pain  Please resume all home medications, unless instructed otherwise. ACTIVITY  OK to ice and elevate the extremity as needed for 2-3 days  NO driving while taking narcotic medications or until instructed otherwise by physician. Yanick Rice or his nurse at [phone] if any questions or concerns  **If you have an emergency that requires immediate attention, proceed to the nearest emergency room. FOLLOW-UP CARE / QUESTIONS  Follow up as previously scheduled  Contact the office to confirm/schedule your post-op visits if needed.

## 2022-12-13 ENCOUNTER — TELEPHONE (OUTPATIENT)
Dept: ORTHOPEDIC SURGERY | Age: 72
End: 2022-12-13

## 2022-12-13 NOTE — TELEPHONE ENCOUNTER
Pt called in just had surgery yesterday 12/12/22 to remove a spinal stimulator and was wondering if she should have some antibiotic, states has normally  had some in the past for 10 days or so. Hellen Mehta also was told to remove the dressings after 3 days but states has had a lot of drainage but is not coming onto clothes and wants to know if it is ok to re-dress the wound    Please pt with any questions @ [phone]

## 2022-12-13 NOTE — ANESTHESIA POSTPROCEDURE EVALUATION
POST- ANESTHESIA EVALUATION       Pt Name: Nneka Saini  MRN: [id number]  YOB: 1950  Date of evaluation: 12/12/2022  Time:  7:03 PM      BP (!) 142/95   Pulse 98   Temp 97.1 °F (36.2 °C) (Infrared)   Resp 13   Ht 5' 6\" (1.676 m)   Wt 206 lb (93.4 kg)   SpO2 94%   BMI 33.25 kg/m²      Consciousness Level  Awake  Cardiopulmonary Status  Stable  Pain Adequately Treated YES  Nausea / Vomiting  NO  Adequate Hydration  YES  Anesthesia Related Complications NONE      Electronically signed by Kim Kellogg MD on 12/12/2022 at 7:03 PM       Department of Anesthesiology  Postprocedure Note    Patient: Nneka Saini  MRN: [id number]  YOB: 1950  Date of evaluation: 12/12/2022      Procedure Summary     Date: 12/12/22 Room / Location: [address] [place] [address] 01 / [address]    Anesthesia Start: 5014 Anesthesia Stop: 3826    Procedure: SPINAL CORD STIMULATOR REMOVAL (Back) Diagnosis:       Painful orthopaedic hardware [facility])      (PAINFUL HARDWARE)    Surgeons: Ilda Ennis MD Responsible Provider: Kim Kellogg MD    Anesthesia Type: general ASA Status: 3          Anesthesia Type: No value filed.     Seth Phase I: Seth Score: 8    Seth Phase II:        Anesthesia Post Evaluation

## 2022-12-13 NOTE — OP NOTE
[address] [facility]                 [address] [facility], [address]                                OPERATIVE REPORT    PATIENT NAME: Gilson Ortez               :        1950  MED REC NO:   [id number]                              ROOM:  ACCOUNT NO:   [de-identified]                           ADMIT DATE: 2022  PROVIDER:     Fadi Lovett    DATE OF PROCEDURE:  2022    PREOPERATIVE DIAGNOSES:  Painful and failed spinal cord stimulator with  a prior extension placement and repositioning of the implantable pulse  generator anteriorly. POSTOPERATIVE DIAGNOSES:  Painful and failed spinal cord stimulator with  a prior extension placement and repositioning of the implantable pulse  generator anteriorly. OPERATION PERFORMED:  1. Removal of hardware deep x3; one, the implantable pulse generator;  two, the extension wires; three, the paddle lead in the intralaminar  space through three separate incisions. 2.  Fluoroscopic assistance. SURGEON:  Fadi Lovett MD.    ASSISTANT:  None. ANESTHESIA:  General.    BLOOD LOSS:  Minimal.    COMPLICATIONS:  None. SPECIMENS:  Spinal cord stimulator with extensions and a paddle lead. IMPLANTS:  None. DRAINS:  None. FINDINGS:  1. Unable to perform surgery through a single position. 2.  Initial positioning in the lateral decubitus position, allowed  removal of the implantable pulse generator, which was placed into the  patient's lateral abdominal fat. A separate lateral incision placed  where the patient's prior implantable pulse generator to remove the wire  extensions and the stress relieving loops. After removal of the  implantable pulse generator and the extension wires in closing nose  wounds, the patient was repositioned in the prone position on the  [facility] table for removal of the paddle lead.   3.  Fluoroscopy utilized for verification of hardware position and final  AP and lateral fluoroscopic images were obtained verifying hardware had  removed. SPECIMEN:  The spinal cord stimulator wires were spent for gross only. PROCEDURE IN DETAIL:  The patient was taken to the operating room and  placed under general anesthesia, transferred to the [facility] table. At  this point, it became quite evident that the anterior abdominal wall or  anterolateral abdominal wall IPG was not able to be accessed. The  patient was therefore repositioned from the [facility] table to a standard  operating table, held in position with lateral positioners, checked for  padding and positioning and her abdomen and over to the left flank where  the implantable pulse generator had been placed initially were prepped  and draped in the usual sterile fashion. It should be noted that  already we had marked the sites fluoroscopically to verify particularly  where the wire extensions and stress relieving loop were. At this juncture, beginning at the implantable pulse generator local  anesthetic was injected into the pocket as well as along the prior  incision. Prior incision was made. Dissection was carried down through  the skin, subcutaneous fat to the pocket containing the implantable  pulse generator. Implantable pulse generator was evacuated from the  pocket and the wires were cut. Attention was now directed to the separate incision at the initial  implantable pulse generator site prior to being transferred. An  incision was made through the skin after injecting local, carried  through the subcu fat until the wire loop, stress relieving loop was  encountered. This was dissected free and the wires from the original or  the new implantable pulse generator site were brought were simply  retracted into this incision. The wire connectors for the extensor  wires, which had run to the new implantable pulse generator site were  dissected free and the initial wires from the paddle lead were cut.   The  wire extensions were removed. At this juncture, both the implantable pulse generator and wire  extension sites were reapproximated subcu 2-0 Vicryl suture followed by  a 3-0 subcuticular Vicryl skin closure and an Aquacel dressing was  applied. The patient was now turned supine on the OR table and then repositioned  prone [facility] and the patient's back was reprepped and  draped in the usual sterile fashion. The site for the thoracic  laminotomy lead was again verified fluoroscopically. Skin incision  made, carried down through skin and subcutaneous tissue. The wires to  the paddle lead were identified, dissected free. The muscle was freed  off of the spinous process at which the paddle lead entered beneath the  lamina. Gradually the wires were dissected down to the lamina. A  high-speed bur was utilized to create about a 3-mm laminotomy. Thus  allowing us access to the pocket that the paddle lead _____, gradually  this was freed of soft tissue and scar and the paddle lead wires were  removed. It should be noted that we used local anesthetic at all incision sites. At this juncture, the deep fascia was reapproximated with #2 Vicryl  suture and followed by final AP and lateral fluoroscopic images,  verifying that the thoracic laminotomy paddle lead, the extension wires  and the implantable pulse generator all have been removed. The  subcuticular layer was reapproximated with 2-0 Vicryl followed by a 3-0  Vicryl skin closure and another Aquacel dressing was applied. The  patient was awakened from anesthesia, taken to recovery room in stable  condition. COMPLICATIONS ARISING DURING THE OPERATION:  None noted.         KAYLEIGH Khalil    D: 12/12/2022 18:27:35       T: 12/12/2022 18:32:10     [email or web address]  Job#: [id number]     Doc#: [id number]    CC:

## 2022-12-14 LAB — SURGICAL PATHOLOGY REPORT: NORMAL

## 2022-12-28 PROBLEM — Z01.818 PREOP EXAMINATION: Status: RESOLVED | Noted: 2022-11-28 | Resolved: 2022-12-28

## 2022-12-29 ENCOUNTER — OFFICE VISIT (OUTPATIENT)
Dept: ORTHOPEDIC SURGERY | Age: 72
End: 2022-12-29

## 2022-12-29 VITALS — RESPIRATION RATE: 14 BRPM | OXYGEN SATURATION: 100 % | HEIGHT: 66 IN | BODY MASS INDEX: 33.11 KG/M2 | WEIGHT: 206 LBS

## 2022-12-29 DIAGNOSIS — M96.1 POSTLAMINECTOMY SYNDROME, LUMBAR REGION: Primary | ICD-10-CM

## 2022-12-29 PROCEDURE — 99024 POSTOP FOLLOW-UP VISIT: CPT | Performed by: ORTHOPAEDIC SURGERY

## 2022-12-29 NOTE — PROGRESS NOTES
Patient ID: Skyler Carr is a 67 y.o. female    Chief Compliant:  Chief Complaint   Patient presents with    Post-Op Check     Post op spinal cord stimulator removal        Diagnostic imaging:        Assessment and Plan:  1. Postlaminectomy syndrome, lumbar region        2 weeks post SCS removal 12/12/2022, healing well    Follow up 10 weeks    HPI:  This is a 67 y.o. female who presents to the clinic today for 2 weeks post SCS removal 12/12/2022. Healing well with minor irritation along incision line. Review of Systems   All other systems reviewed and are negative.       Past History:    Current Outpatient Medications:     oxybutynin (DITROPAN-XL) 5 MG extended release tablet, Take 5 mg by mouth daily, Disp: , Rfl:     albuterol sulfate HFA (VENTOLIN HFA) 108 (90 Base) MCG/ACT inhaler, Inhale 2 puffs into the lungs 4 times daily as needed for Wheezing, Disp: 18 g, Rfl: 5    carvedilol (COREG) 6.25 MG tablet, Take 1 tablet by mouth 2 times daily (with meals), Disp: 60 tablet, Rfl: 3    amLODIPine (NORVASC) 5 MG tablet, Take 1 tablet by mouth nightly, Disp: 30 tablet, Rfl: 3    pantoprazole (PROTONIX) 40 MG tablet, Take 1 tablet by mouth nightly, Disp: 30 tablet, Rfl: 0    rOPINIRole (REQUIP) 2 MG tablet, Take 5 mg by mouth in the morning, at noon, and at bedtime, Disp: , Rfl:     Vitamin D (CHOLECALCIFEROL) 50 MCG (2000 UT) TABS tablet, Take 1 tablet by mouth daily, Disp: 30 tablet, Rfl: 0    zinc sulfate (ZINCATE) 220 (50 Zn) MG capsule, Take 1 capsule by mouth daily, Disp: 30 capsule, Rfl: 3    aspirin 81 MG EC tablet, Take 81 mg by mouth daily, Disp: , Rfl:     Multiple Vitamins-Minerals (THERAPEUTIC MULTIVITAMIN-MINERALS) tablet, Take 1 tablet by mouth daily, Disp: , Rfl:     hydrochlorothiazide (HYDRODIURIL) 25 MG tablet, Take 25 mg by mouth Daily with supper , Disp: , Rfl:     Calcium Carbonate-Vitamin D (OSCAL 500/200 D-3 PO), Take 1 tablet by mouth daily, Disp: , Rfl:     DULoxetine (CYMBALTA) 60 MG capsule, Take 60 mg by mouth daily, Disp: , Rfl:     gabapentin (NEURONTIN) 600 MG tablet, Take 400 mg by mouth 3 times daily. , Disp: , Rfl:     pravastatin (PRAVACHOL) 80 MG tablet, Take 80 mg by mouth nightly, Disp: , Rfl:   Allergies   Allergen Reactions    Lisinopril      Pt uncertain of the reaction    Toradol [Ketorolac Tromethamine] Swelling     Swelling at site of injection     Social History     Socioeconomic History    Marital status:       Spouse name: Not on file    Number of children: Not on file    Years of education: Not on file    Highest education level: Not on file   Occupational History    Not on file   Tobacco Use    Smoking status: Former     Packs/day: 1.00     Years: 44.00     Pack years: 44.00     Types: Cigarettes     Quit date: 2010     Years since quittin.6    Smokeless tobacco: Never   Vaping Use    Vaping Use: Never used   Substance and Sexual Activity    Alcohol use: Yes     Comment: rarely    Drug use: No    Sexual activity: Not on file   Other Topics Concern    Not on file   Social History Narrative    Not on file     Social Determinants of Health     Financial Resource Strain: Not on file   Food Insecurity: Not on file   Transportation Needs: Not on file   Physical Activity: Not on file   Stress: Not on file   Social Connections: Not on file   Intimate Partner Violence: Not on file   Housing Stability: Not on file     Past Medical History:   Diagnosis Date    Arthritis     Blockage of coronary artery of heart ([facility] [place] [address] 2010    x 2 stents    CAD (coronary artery disease)     DDD (degenerative disc disease), cervical     cervical 3-6    Fibromyalgia     History of blood transfusion     History of kidney stones     last one \"about 10 years ago\"    Hyperlipidemia     Hypertension     Pancreatitis 2016    Sleep apnea     pt denies    Thyroid disease     parathyroid removed    Wears glasses     for reading     Past Surgical History:   Procedure Laterality Date ARTHROPLASTY Left 02/06/2020    LEFT FIRST CARPOMETACARPAL ARTHROPLASTY performed by Rajendra Bender DO at [address]  2012    L4,L5,S1 hardware placed    BACK SURGERY  2018    SPINAL CORD STIMULATOR    CATARACT REMOVAL Bilateral 2016    CERVICAL FUSION N/A 08/17/2021    C3-6 ACDF, REMOVAL OF HARDWARE C6-7 performed by Gilmar Roberts MD at [address]    COLONOSCOPY      normal    CORONARY ANGIOPLASTY  2010    x 2 stents    FINGER SURGERY      right thumb joint    HERNIA REPAIR Right     inguinal    HYSTERECTOMY (CERVIX STATUS UNKNOWN)      JOINT REPLACEMENT Right     knee    KIDNEY STONE SURGERY      x 3    LITHOTRIPSY      x 3    PAIN MANAGEMENT PROCEDURE N/A 12/12/2022    SPINAL CORD STIMULATOR REMOVAL performed by Fabiola Marti MD at [id number] [facility] [address] N/A 08/08/2018    SPINAL CORD STIMULATOR IMPLANT performed by Fabiola Marti MD at [address] [place] Right 03/2019     Family History   Problem Relation Age of Onset    Heart Disease Father         Physical Exam:  Vitals signs and nursing note reviewed. Constitutional:       Appearance: well-developed. HENT:      Head: Normocephalic and atraumatic. Nose: Nose normal.   Eyes:      Conjunctiva/sclera: Conjunctivae normal.   Neck:      Musculoskeletal: Normal range of motion and neck supple. Pulmonary:      Effort: Pulmonary effort is normal. No respiratory distress. Musculoskeletal:      Comments: Normal gait     Skin:     General: Skin is warm and dry. Neurological:      Mental Status: Alert and oriented to person, place, and time. Sensory: No sensory deficit. Psychiatric:         Behavior: Behavior normal.         Thought Content:  Thought content normal.    On physical exam 3 incisions one from the paddle lead I from the initial IPG pocket and the third from the second IPG pocket all well-healed no erythema    Provider Attestation:  Annabella Lee, personally performed the services described in this documentation. All medical record entries made by the scribe were at my direction and in my presence. I have reviewed the chart and discharge instructions and agree that the records reflect my personal performance and is accurate and complete. Awa Reyes MD 12/29/22       Scribe Attestation:  By signing my name below, Kandice Pelletier, attest that this documentation has been prepared under the direction and in the presence of Dr. Julia Rendon. Electronically signed: Real Bautista, 12/29/22     Please note that this chart was generated using voice recognition Dragon dictation software. Although every effort was made to ensure the accuracy of this automated transcription, some errors in transcription may have occurred.

## 2023-02-20 NOTE — ED NOTES
Discharge education reviewed with patient, she verbalized understanding of need to follow-up with PCP/urology, as well as understanding of prescription medications. Patient denies further questions at this time.       Murphy Siddiqui RN  10/04/20 [id number]
Dr Araceli Carolina called back and connected with Dr Mills Spurling  10/04/20 [phone]
Dr Yolette Langford called on cell, message left     Aris Collet  10/04/20 [id number]
20

## 2025-02-22 NOTE — PROGRESS NOTES
Pt resting in bed watching TV. Assessment and vital signs as charted. Pt denies pain and is A & O x 4. Cardiac monitor continues to show NSR. Nasal canula remains in use at 5L. Pt denies any other needs at this time. Writer reminded pt to reposition from side to side. Pt agrees. Bed alarm on. Call light in reach. Will continue to monitor. Add Progress Note...

## 2025-06-04 ENCOUNTER — HOSPITAL ENCOUNTER (EMERGENCY)
Age: 75
Discharge: HOME | End: 2025-06-04
Attending: EMERGENCY MEDICINE
Payer: MEDICARE

## 2025-06-04 ENCOUNTER — APPOINTMENT (OUTPATIENT)
Dept: GENERAL RADIOLOGY | Age: 75
End: 2025-06-04
Payer: MEDICARE

## 2025-06-04 VITALS
SYSTOLIC BLOOD PRESSURE: 131 MMHG | HEART RATE: 89 BPM | RESPIRATION RATE: 16 BRPM | DIASTOLIC BLOOD PRESSURE: 92 MMHG | OXYGEN SATURATION: 94 % | TEMPERATURE: 98 F

## 2025-06-04 DIAGNOSIS — L03.113 CELLULITIS OF HAND, RIGHT: Primary | ICD-10-CM

## 2025-06-04 LAB
ANION GAP SERPL CALCULATED.3IONS-SCNC: 10 MMOL/L (ref 9–16)
BASOPHILS # BLD: 0.03 K/UL (ref 0–0.2)
BASOPHILS NFR BLD: 1 % (ref 0–2)
BUN SERPL-MCNC: 25 MG/DL (ref 8–23)
BUN/CREAT SERPL: 28 (ref 9–20)
CALCIUM SERPL-MCNC: 9.6 MG/DL (ref 8.6–10.4)
CHLORIDE SERPL-SCNC: 106 MMOL/L (ref 98–107)
CO2 SERPL-SCNC: 23 MMOL/L (ref 20–31)
CREAT SERPL-MCNC: 0.9 MG/DL (ref 0.5–0.9)
CRP SERPL HS-MCNC: 10.5 MG/L (ref 0–5)
EOSINOPHIL # BLD: 0.21 K/UL (ref 0–0.44)
EOSINOPHILS RELATIVE PERCENT: 5 % (ref 1–4)
ERYTHROCYTE [DISTWIDTH] IN BLOOD BY AUTOMATED COUNT: 13 % (ref 11.8–14.4)
ERYTHROCYTE [SEDIMENTATION RATE] IN BLOOD BY PHOTOMETRIC METHOD: 18 MM/HR (ref 0–30)
GFR, ESTIMATED: 68 ML/MIN/1.73M2
GLUCOSE SERPL-MCNC: 129 MG/DL (ref 74–99)
HCT VFR BLD AUTO: 40.5 % (ref 36.3–47.1)
HGB BLD-MCNC: 13.8 G/DL (ref 11.9–15.1)
IMM GRANULOCYTES # BLD AUTO: <0.03 K/UL (ref 0–0.3)
IMM GRANULOCYTES NFR BLD: 0 %
LYMPHOCYTES NFR BLD: 1.11 K/UL (ref 1.1–3.7)
LYMPHOCYTES RELATIVE PERCENT: 25 % (ref 24–43)
MCH RBC QN AUTO: 31.3 PG (ref 25.2–33.5)
MCHC RBC AUTO-ENTMCNC: 34.1 G/DL (ref 28.4–34.8)
MCV RBC AUTO: 91.8 FL (ref 82.6–102.9)
MONOCYTES NFR BLD: 0.39 K/UL (ref 0.1–1.2)
MONOCYTES NFR BLD: 9 % (ref 3–12)
NEUTROPHILS NFR BLD: 60 % (ref 36–65)
NEUTS SEG NFR BLD: 2.66 K/UL (ref 1.5–8.1)
NRBC BLD-RTO: 0 PER 100 WBC
PLATELET # BLD AUTO: 195 K/UL (ref 138–453)
PMV BLD AUTO: 9.8 FL (ref 8.1–13.5)
POTASSIUM SERPL-SCNC: 3.8 MMOL/L (ref 3.7–5.3)
RBC # BLD AUTO: 4.41 M/UL (ref 3.95–5.11)
SODIUM SERPL-SCNC: 139 MMOL/L (ref 136–145)
WBC OTHER # BLD: 4.4 K/UL (ref 3.5–11.3)

## 2025-06-04 PROCEDURE — 2580000003 HC RX 258: Performed by: EMERGENCY MEDICINE

## 2025-06-04 PROCEDURE — 85025 COMPLETE CBC W/AUTO DIFF WBC: CPT

## 2025-06-04 PROCEDURE — 80048 BASIC METABOLIC PNL TOTAL CA: CPT

## 2025-06-04 PROCEDURE — 99284 EMERGENCY DEPT VISIT MOD MDM: CPT

## 2025-06-04 PROCEDURE — 73130 X-RAY EXAM OF HAND: CPT

## 2025-06-04 PROCEDURE — 86140 C-REACTIVE PROTEIN: CPT

## 2025-06-04 PROCEDURE — 85652 RBC SED RATE AUTOMATED: CPT

## 2025-06-04 PROCEDURE — 96365 THER/PROPH/DIAG IV INF INIT: CPT

## 2025-06-04 PROCEDURE — 6360000002 HC RX W HCPCS: Performed by: EMERGENCY MEDICINE

## 2025-06-04 RX ADMIN — PIPERACILLIN AND TAZOBACTAM 3375 MG: 3; .375 INJECTION, POWDER, LYOPHILIZED, FOR SOLUTION INTRAVENOUS at 10:55

## 2025-06-04 ASSESSMENT — LIFESTYLE VARIABLES
HOW OFTEN DO YOU HAVE A DRINK CONTAINING ALCOHOL: NEVER
HOW MANY STANDARD DRINKS CONTAINING ALCOHOL DO YOU HAVE ON A TYPICAL DAY: PATIENT DOES NOT DRINK

## 2025-06-04 ASSESSMENT — PAIN DESCRIPTION - DESCRIPTORS: DESCRIPTORS: BURNING;SHARP

## 2025-06-04 ASSESSMENT — PAIN DESCRIPTION - ORIENTATION: ORIENTATION: RIGHT

## 2025-06-04 ASSESSMENT — ENCOUNTER SYMPTOMS
ABDOMINAL DISTENTION: 0
BACK PAIN: 0
SORE THROAT: 0
SHORTNESS OF BREATH: 0

## 2025-06-04 ASSESSMENT — PAIN SCALES - GENERAL: PAINLEVEL_OUTOF10: 10

## 2025-06-04 ASSESSMENT — PAIN DESCRIPTION - PAIN TYPE: TYPE: ACUTE PAIN

## 2025-06-04 ASSESSMENT — PAIN DESCRIPTION - LOCATION: LOCATION: HAND

## 2025-06-04 ASSESSMENT — PAIN - FUNCTIONAL ASSESSMENT: PAIN_FUNCTIONAL_ASSESSMENT: 0-10

## 2025-06-04 ASSESSMENT — PAIN DESCRIPTION - FREQUENCY: FREQUENCY: CONTINUOUS

## 2025-06-04 NOTE — DISCHARGE INSTRUCTIONS
Keep an eye on the worsening of the infection.  Take antibiotics as directed.  Keep the area clean and dry.  Follow-up with orthopedics next week.  Return to the emergency department if the swelling or redness gets worse.

## 2025-06-04 NOTE — ED PROVIDER NOTES
[facility] EMERGENCY DEPARTMENT  EMERGENCY DEPARTMENT ENCOUNTER      Pt Name: Mary Anne Davison  MRN: [id number]  Birthdate 1950  Date of evaluation: 6/4/2025  Provider: Mile Juarez DO    CHIEF COMPLAINT       Chief Complaint   Patient presents with    Hand Pain     Right hand redness and swelling from cat scratch.         HISTORY OF PRESENT ILLNESS   (Location/Symptom, Timing/Onset, Context/Setting, Quality, Duration, Modifying Factors, Severity)  Note limiting factors.   Mary Anne Davison is a 74 y.o. female who presents to the emergency department with the redness and swelling to the right hand after a cat scratch 5 days ago.  Patient states that she has been trying to keep it clean using peroxide and Neosporin on top of it.  This past weekend she did have worsening of the redness and swelling and apparently was having difficulty with moving her thumb and fourth finger.  She states that today it is a little better compared to what it was over the weekend however the redness is now concentrated just underneath the right thumb, at the first webspace on the dorsal aspect of the right hand.  She is able to move all her fingers okay.  She denies any fevers or chills.    REVIEW OF SYSTEMS    (2-9 systems for level 4, 10 or more for level 5)     Review of Systems   Constitutional:  Negative for fatigue and fever.   HENT:  Negative for congestion and sore throat.    Eyes:  Negative for visual disturbance.   Respiratory:  Negative for shortness of breath.    Cardiovascular:  Negative for chest pain and palpitations.   Gastrointestinal:  Negative for abdominal distention.   Genitourinary:  Negative for dysuria.   Musculoskeletal:  Negative for back pain.        Right hand redness and swelling   Skin:  Negative for rash.   Psychiatric/Behavioral:  Negative for suicidal ideas.        Except as noted above the remainder of the review of systems was reviewed and negative.       PAST MEDICAL HISTORY     Past

## (undated) DEVICE — BANDAGE COMPR W4INXL12FT E DISP ESMARCH EVEN

## (undated) DEVICE — SUTURE VCRL + SZ 3-0 L27IN ABSRB WHT FS-1 3/8 CIR REV CUT VCP442H

## (undated) DEVICE — SINGLE PORT MANIFOLD: Brand: NEPTUNE 2

## (undated) DEVICE — 3M™ IOBAN™ 2 ANTIMICROBIAL INCISE DRAPE 6650EZ: Brand: IOBAN™ 2

## (undated) DEVICE — SYRINGE,EAR/ULCER, 2 OZ, STERILE: Brand: MEDLINE

## (undated) DEVICE — Device

## (undated) DEVICE — SUTURE VCRL + SZ 2-0 L27IN ABSRB UD CP-1 1/2 CIR REV CUT VCP266H

## (undated) DEVICE — STRIP,CLOSURE,WOUND,MEDI-STRIP,1/2X4: Brand: MEDLINE

## (undated) DEVICE — LINER SUCT CANSTR 1500CC SEMI RIG W/ POR HYDROPHOBIC SHUT

## (undated) DEVICE — SPLINT ORTH W4XL15IN PLSTR OF PARIS LO EXOTHERM SMOOTH

## (undated) DEVICE — SOLUTION IV IRRIG POUR BRL 0.9% SODIUM CHL 2F7124

## (undated) DEVICE — GLOVE ORANGE PI 8   MSG9080

## (undated) DEVICE — GOWN,SIRUS,NON REINFRCD,LARGE,SET IN SL: Brand: MEDLINE

## (undated) DEVICE — NEEDLE SPNL L3.5IN DIA25GA QNCKE TYP BVL SPINOCAN

## (undated) DEVICE — COVER,MAYO STAND,XL,STERILE: Brand: MEDLINE

## (undated) DEVICE — BANDAGE,GAUZE,4.5"X4.1YD,STERILE,LF: Brand: MEDLINE

## (undated) DEVICE — TUNNELING TOOL KIT, 35CM: Brand: NEVRO®

## (undated) DEVICE — C-ARMOR C-ARM EQUIPMENT COVERS CLEAR STERILE UNIVERSAL FIT 12 PER CASE: Brand: C-ARMOR

## (undated) DEVICE — JACKSON / MODULAR SPINAL TABLE STYLE POSITIONING KIT - STANDARD: Brand: SOULE MEDICAL

## (undated) DEVICE — DRESSING,GAUZE,XEROFORM,CURAD,5"X9",ST: Brand: CURAD

## (undated) DEVICE — Z DISCONTINUED PER MEDLINE USE 2741942 DRESSING AQUACEL 6 IN ALG W9XL15CM SIL CVR WTRPRF VIR BACT BARR ANTIMIC

## (undated) DEVICE — SUTURE VCRL + SZ 2 L27IN ABSRB UD L40MM CP 1/2 CIR REV CUT VCP195H

## (undated) DEVICE — GLOVE SURG SZ 65 THK91MIL LTX FREE SYN POLYISOPRENE

## (undated) DEVICE — DISCONTINUED NO SUB IDED TG GLOVE SURG SENSICARE ALOE LT LF PF ST GRN SZ 8

## (undated) DEVICE — CATHETER IV 16 GAX32.5 IN TRPL BVL LUERLOCK TIP ANGIOCATH

## (undated) DEVICE — SYRINGE MED 10ML LUERLOCK TIP W/O SFTY DISP

## (undated) DEVICE — EVACUATOR SURG 100CC SIL BLB SUCT RESVR FOR CLS WND DRNGE

## (undated) DEVICE — YANKAUER,BULB TIP,W/O VENT,RIGID,STERILE: Brand: MEDLINE

## (undated) DEVICE — SUTURE MCRYL SZ 4-0 L27IN ABSRB UD L19MM PS-2 1/2 CIR PRIM Y426H

## (undated) DEVICE — GLOVE ORTHO 8   MSG9480

## (undated) DEVICE — GLOVE ORANGE PI 7   MSG9070

## (undated) DEVICE — DRAPE,LAP,CHOLE,W/TROUGHS,STERILE: Brand: MEDLINE

## (undated) DEVICE — PADDING,UNDERCAST,COTTON, 4"X4YD STERILE: Brand: MEDLINE

## (undated) DEVICE — PATIENT REMOTE KIT: Brand: SENZA®

## (undated) DEVICE — CHLORAPREP 26ML ORANGE

## (undated) DEVICE — INTENDED FOR TISSUE SEPARATION, AND OTHER PROCEDURES THAT REQUIRE A SHARP SURGICAL BLADE TO PUNCTURE OR CUT.: Brand: BARD-PARKER ® CARBON RIB-BACK BLADES

## (undated) DEVICE — GLOVE SURG SZ 8 L12IN FNGR THK79MIL GRN LTX FREE

## (undated) DEVICE — GLOVE SURG SZ 85 L12IN FNGR ORTHO 126MIL CRM LTX FREE

## (undated) DEVICE — DRAIN SURG FLAT W7MMXL20CM FULL PERF

## (undated) DEVICE — SOLUTION IRRIG 1000ML STRL H2O USP PLAS POUR BTL

## (undated) DEVICE — SOLUTION IRRIG 1000ML 0.9% SOD CHL USP POUR PLAS BTL

## (undated) DEVICE — DRESSING POSTOP AG PRISMASEAL 3.5X6IN

## (undated) DEVICE — TORQUE WRENCH KIT: Brand: NEVRO®

## (undated) DEVICE — SENZA®  CHARGER KIT: Brand: SENZA®

## (undated) DEVICE — GLOVE SURG SZ 8 L12IN FNGR THK13MIL BRN LTX SYN POLYMER W

## (undated) DEVICE — BLADE ES L4IN INSUL EDGE

## (undated) DEVICE — BASIC SINGLE BASIN BTC-LF: Brand: MEDLINE INDUSTRIES, INC.

## (undated) DEVICE — 4.0MM PRECISION ROUND

## (undated) DEVICE — SUTURE PERMAHAND SZ 0 L30IN NONABSORBABLE BLK FSL L30MM 3/8 680H

## (undated) DEVICE — COVER LT HNDL BLU PLAS

## (undated) DEVICE — BLADE OPHTH ORNG GRINDLESS SMALLER ALTERNATIVE TO NO15 GEN

## (undated) DEVICE — TUBING, SUCTION, 1/4" X 20', STRAIGHT: Brand: MEDLINE INDUSTRIES, INC.

## (undated) DEVICE — GLOVE ORANGE PI 7 1/2   MSG9075

## (undated) DEVICE — AGENT HEMOSTATIC SURGIFLOW MATRIX KIT W/THROMBIN

## (undated) DEVICE — KIT NEUROSTIM PASS ELEV ACC FOR INTERSTIM SPNL CRD STIM

## (undated) DEVICE — SPONGE GZ W4XL4IN COT 12 PLY TYP VII WVN C FLD DSGN

## (undated) DEVICE — GOWN,AURORA,NONREINFORCED,LARGE: Brand: MEDLINE

## (undated) DEVICE — BUR RND DIA COARSE 5.0MM

## (undated) DEVICE — COLLAR CERV UNIV AD L13-19IN TRACH OPN TWO PC RIG POLYETH